# Patient Record
Sex: FEMALE | Race: WHITE | NOT HISPANIC OR LATINO | Employment: FULL TIME | URBAN - METROPOLITAN AREA
[De-identification: names, ages, dates, MRNs, and addresses within clinical notes are randomized per-mention and may not be internally consistent; named-entity substitution may affect disease eponyms.]

---

## 2017-04-19 ENCOUNTER — TELEPHONE (OUTPATIENT)
Dept: FAMILY MEDICINE | Facility: CLINIC | Age: 37
End: 2017-04-19

## 2017-04-19 NOTE — TELEPHONE ENCOUNTER
Panel Management Review      Patient has the following on her problem list: None      Composite cancer screening  Chart review shows that this patient is due/due soon for the following Pap Smear  Summary:    Patient is due/failing the following:   PAP    Action needed:   Patient needs office visit for physical.    Type of outreach:    Sent letter.    Questions for provider review:    None                                                                                                                                    Sylvie Lerner M.A.       Chart routed to n/a .

## 2017-04-19 NOTE — LETTER
[facility]  [address] [facility] [id number] [phone]          April 19, 2017    Azalea Mckeon  [id number]  [facility] [address]          Our records indicate that you have not scheduled for a(n)annual female exam and pap smear with pap smear  which was recommended by your health care team. Monitoring and managing your preventative and chronic health conditions are very important to us.     If you have received your health care elsewhere, please provide us with that information so it can be documented in your chart.    Please call [phone] or message us through your [employer] account to schedule an appointment or provide information for your chart.     I look forward to seeing you and working with you on your health care needs.       *If you have already scheduled an appointment, please disregard this reminder        Sincerely,    Katelin GONZALEZ  amp

## 2017-11-26 ENCOUNTER — HEALTH MAINTENANCE LETTER (OUTPATIENT)
Age: 37
End: 2017-11-26

## 2020-10-01 ENCOUNTER — E-VISIT (OUTPATIENT)
Dept: OBGYN | Facility: CLINIC | Age: 40
End: 2020-10-01
Payer: COMMERCIAL

## 2020-10-01 DIAGNOSIS — F32.A ANXIETY AND DEPRESSION: Primary | ICD-10-CM

## 2020-10-01 DIAGNOSIS — F41.9 ANXIETY AND DEPRESSION: Primary | ICD-10-CM

## 2020-10-01 PROCEDURE — 99207 PR NON-BILLABLE SERV PER CHARTING: CPT | Performed by: NURSE PRACTITIONER

## 2020-10-01 ASSESSMENT — ANXIETY QUESTIONNAIRES
5. BEING SO RESTLESS THAT IT IS HARD TO SIT STILL: NOT AT ALL
6. BECOMING EASILY ANNOYED OR IRRITABLE: NEARLY EVERY DAY
1. FEELING NERVOUS, ANXIOUS, OR ON EDGE: MORE THAN HALF THE DAYS
7. FEELING AFRAID AS IF SOMETHING AWFUL MIGHT HAPPEN: NOT AT ALL
7. FEELING AFRAID AS IF SOMETHING AWFUL MIGHT HAPPEN: NOT AT ALL
4. TROUBLE RELAXING: MORE THAN HALF THE DAYS
GAD7 TOTAL SCORE: 13
2. NOT BEING ABLE TO STOP OR CONTROL WORRYING: NEARLY EVERY DAY
3. WORRYING TOO MUCH ABOUT DIFFERENT THINGS: NEARLY EVERY DAY
GAD7 TOTAL SCORE: 13
GAD7 TOTAL SCORE: 13

## 2020-10-01 ASSESSMENT — PATIENT HEALTH QUESTIONNAIRE - PHQ9
SUM OF ALL RESPONSES TO PHQ QUESTIONS 1-9: 14
SUM OF ALL RESPONSES TO PHQ QUESTIONS 1-9: 14
10. IF YOU CHECKED OFF ANY PROBLEMS, HOW DIFFICULT HAVE THESE PROBLEMS MADE IT FOR YOU TO DO YOUR WORK, TAKE CARE OF THINGS AT HOME, OR GET ALONG WITH OTHER PEOPLE: SOMEWHAT DIFFICULT

## 2020-10-02 ASSESSMENT — ANXIETY QUESTIONNAIRES: GAD7 TOTAL SCORE: 13

## 2020-10-02 ASSESSMENT — PATIENT HEALTH QUESTIONNAIRE - PHQ9: SUM OF ALL RESPONSES TO PHQ QUESTIONS 1-9: 14

## 2020-10-07 ENCOUNTER — HOSPITAL ENCOUNTER (OUTPATIENT)
Dept: BEHAVIORAL HEALTH | Facility: CLINIC | Age: 40
Discharge: HOME | End: 2020-10-07
Attending: FAMILY MEDICINE | Admitting: FAMILY MEDICINE
Payer: COMMERCIAL

## 2020-10-07 PROCEDURE — 90791 PSYCH DIAGNOSTIC EVALUATION: CPT | Mod: GT | Performed by: PSYCHOLOGIST

## 2020-10-07 ASSESSMENT — COLUMBIA-SUICIDE SEVERITY RATING SCALE - C-SSRS
1. IN THE PAST MONTH, HAVE YOU WISHED YOU WERE DEAD OR WISHED YOU COULD GO TO SLEEP AND NOT WAKE UP?: NO
2. HAVE YOU ACTUALLY HAD ANY THOUGHTS OF KILLING YOURSELF?: NO
1. IN THE PAST MONTH, HAVE YOU WISHED YOU WERE DEAD OR WISHED YOU COULD GO TO SLEEP AND NOT WAKE UP?: NO
ATTEMPT PAST THREE MONTHS: NO
5. HAVE YOU STARTED TO WORK OUT OR WORKED OUT THE DETAILS OF HOW TO KILL YOURSELF? DO YOU INTEND TO CARRY OUT THIS PLAN?: NO
4. HAVE YOU HAD THESE THOUGHTS AND HAD SOME INTENTION OF ACTING ON THEM?: NO
2. HAVE YOU ACTUALLY HAD ANY THOUGHTS OF KILLING YOURSELF LIFETIME?: NO
ATTEMPT LIFETIME: NO
3. HAVE YOU BEEN THINKING ABOUT HOW YOU MIGHT KILL YOURSELF?: NO
4. HAVE YOU HAD THESE THOUGHTS AND HAD SOME INTENTION OF ACTING ON THEM?: NO
5. HAVE YOU STARTED TO WORK OUT OR WORKED OUT THE DETAILS OF HOW TO KILL YOURSELF? DO YOU INTEND TO CARRY OUT THIS PLAN?: NO

## 2020-10-07 ASSESSMENT — PAIN SCALES - GENERAL: PAINLEVEL: NO PAIN (0)

## 2020-10-07 NOTE — PROGRESS NOTES
"Azalea Mckeon is a 40 year old female who is being evaluated via a billable video visit.      The patient has been notified of following:     \"This video visit will be conducted via a call between you and your physician/provider. We have found that certain health care needs can be provided without the need for an in-person physical exam.  This service lets us provide the care you need with a video conversation.  If a prescription is necessary we can send it directly to your pharmacy.  If lab work is needed we can place an order for that and you can then stop by our lab to have the test done at a later time.    Video visits are billed at different rates depending on your insurance coverage.  Please reach out to your insurance provider with any questions.    If during the course of the call the physician/provider feels a video visit is not appropriate, you will not be charged for this service.\"    Patient has given verbal consent for Video visit? Yes  How would you like to obtain your AVS? MyChart  If you are dropped from the video visit, the video invite should be resent to: Text to cell phone: [phone] (Mobile)  Will anyone else be joining your video visit? No    Subjective     Azalea Mckeon is a 40 year old female who presents today via video visit for the following health issues:    HPI       Anxiety     Are you having other symptoms that might be associated with anxiety? Yes:  Crying, not being able to relax, no energy,    Have you had a significant life event? No     Are you feeling depressed? No    Do you have any concerns with your use of alcohol or other drugs? No    Reports she has been feeling anxious for the past 6 months.  Denies any trigger.  Reports possible mild anxiety in 2011 after the birth of her daughter.  Reports obsessive thoughts when it comes to work.  She has 3 children and reports feeling overwhelmed getting them ready and preparing them for school.  Reports crying spells for unknown " reason.  Appetite good.  Reports  is supportive.  Denies SI/HI.  She has an appt with Psychology on  but does not feel she can wait that long to start medication.  She spoke with a therapist yesterday.      Social History     Tobacco Use     Smoking status: Former Smoker     Quit date: 2009     Years since quittin.2     Smokeless tobacco: Never Used   Substance Use Topics     Alcohol use: No     Drug use: No     MUSHTAQ-7 SCORE 3/6/2015 10/1/2020 10/6/2020   Total Score 18 - -   Total Score - 13 (moderate anxiety) 16 (severe anxiety)   Total Score - 13 -   Some encounter information is confidential and restricted. Go to Review [employer] activity to see all data.     PHQ 10/1/2020   PHQ-9 Total Score 14   Q9: Thoughts of better off dead/self-harm past 2 weeks Several days   F/U: Thoughts of suicide or self-harm Yes   F/U: Self harm-plan No   F/U: Self-harm action No   F/U: Safety concerns No   Some encounter information is confidential and restricted. Go to Review Coll[employer] activity to see all data.     Last PHQ-9 10/1/2020   1.  Little interest or pleasure in doing things 2   2.  Feeling down, depressed, or hopeless 2   3.  Trouble falling or staying asleep, or sleeping too much 1   4.  Feeling tired or having little energy 1   5.  Poor appetite or overeating 1   6.  Feeling bad about yourself 3   7.  Trouble concentrating 3   8.  Moving slowly or restless 0   Q9: Thoughts of better off dead/self-harm past 2 weeks 1   PHQ-9 Total Score 14   In the past two weeks have you had thoughts of suicide or self harm? Yes   Do you have concerns about your personal safety or the safety of others? No   In the past 2 weeks have you thought about a plan or had intention to harm yourself? No   In the past 2 weeks have you acted on these thoughts in any way? No   Some encounter information is confidential and restricted. Go to Review Coll[employer]Rx activity to see all data.     MUSHTAQ-7  10/1/2020   1. Feeling nervous,  anxious, or on edge 2   2. Not being able to stop or control worrying 3   3. Worrying too much about different things 3   4. Trouble relaxing 2   5. Being so restless that it is hard to sit still 0   6. Becoming easily annoyed or irritable 3   7. Feeling afraid, as if something awful might happen 0   MUSHTAQ-7 Total Score 13   Some encounter information is confidential and restricted. Go to Review Flowsheets activity to see all data.         How many servings of fruits and vegetables do you eat daily?  2-3    On average, how many sweetened beverages do you drink each day (Examples: soda, juice, sweet tea, etc.  Do NOT count diet or artificially sweetened beverages)?   1    How many days per week do you exercise enough to make your heart beat faster? 3 or less    How many minutes a day do you exercise enough to make your heart beat faster? 20 - 29    How many days per week do you miss taking your medication? 0         Video Start Time: 9:00        Review of Systems   Constitutional: Positive for fatigue. Negative for appetite change.   Respiratory: Negative for shortness of breath.    Cardiovascular: Negative for chest pain.   Psychiatric/Behavioral: Positive for agitation. Negative for self-injury, sleep disturbance and suicidal ideas. The patient is nervous/anxious.             Objective           Vitals:  No vitals were obtained today due to virtual visit.    Physical Exam     GENERAL: Healthy, alert and no distress  EYES: Eyes grossly normal to inspection.  No discharge or erythema, or obvious scleral/conjunctival abnormalities.  RESP: No audible wheeze, cough, or visible cyanosis.  No visible retractions or increased work of breathing.    SKIN: Visible skin clear. No significant rash, abnormal pigmentation or lesions.  NEURO: Cranial nerves grossly intact.  Mentation and speech appropriate for age.  PSYCH: Mentation appears normal, flat affect, tearful. Judgement and insight intact, normal speech and appearance  well-groomed.            Assessment & Plan     1. Anxiety  - follow-up with Psychology on 11/16.  - stop medication and contact office if you feel your symptoms are getting worse.  ED if develops SI/HI.   - escitalopram (LEXAPRO) 10 MG tablet; Take 1 tablet (10 mg) by mouth daily  Dispense: 30 tablet; Refill: 1  - discussed importance of self care.         Return in about 4 weeks (around 11/5/2020) for Anxiety.    MOLINA St CNP  [facility]      Video-Visit Details    Type of service:  Video Visit    Video End Time:9:12    Originating Location (pt. Location): Home    [facility]     Platform used for Video Visit: Agari

## 2020-10-07 NOTE — PROGRESS NOTES
"Adult Mental Health Day Treatment  Evaluator Name:  Luis Flores      Credentials:  [facility]IEBER[facility]    PATIENT'S NAME: Azalea Mckeon  PREFERRED NAME: Azalea   PRONOUNS:       MRN:   [id number]  :   1980   ACCT. NUMBER: [id number]  DATE OF SERVICE: 10/07/20  START TIME: 1100  END TIME: 1200  PREFERRED PHONE: [phone]  May we leave a program related message: Yes  SERVICE MODALITY:  Video Visit:    Telemedicine Visit: The patient's condition can be safely assessed and treated via synchronous audio and visual telemedicine encounter.      Reason for Telemedicine Visit: Services only offered telehealth    Originating Site (Patient Location): Patient's home    Distant Site (Provider Location): [facility] [place]    Consent:  The patient/guardian has verbally consented to: the potential risks and benefits of telemedicine (video visit) versus in person care; bill my insurance or make self-payment for services provided; and responsibility for payment of non-covered services.     Patient would like the video invitation sent by: Text to cell phone: .    Mode of Communication:  Video Conference via [employer]    As the provider I attest to compliance with applicable laws and regulations related to telemedicine.    UNIVERSAL ADULT DIAGNOSTIC ASSESSMENT      Identifying Information:  Patient is a 40 year old, .  The pronoun use throughout this assessment reflects the patient's chosen pronoun.  Patient was referred for an assessment by insurance co.  Patient attended the session alone.     Chief Complaint:   The reason for seeking services at this time is: \" edgy, angry, short fused, day dream, check out of conversation, and this is happening more frequently  \"   The problem(s) began May 2020. Patient has attempted to resolve these concerns in the past through read a few self help books and took CBD. .    Social/Family History:  Patient reported they grew up in [place], [place].  They were raised by " "biological mother.  Parents  35 years ago when the client was 5 years old. The client's mother did not remarry and remains single The client's father did not remarry and remains single.   Patient reported that their childhood was \"good\".  Patient described their current relationships with family of origin as : \"don't talk to sister much, dad  3 years ago, and talks to mom weekly. .      The patient describes their cultural background as .  Cultural influences and impact on patient's life structure, values, norms, and healthcare: no cultural issues.      Contextual influences on patient's health include: Stopped going into work. Pt reports she does not want to be there. No really big stressors.    These factors will be addressed in the Preliminary Treatment plan.  Patient identified their preferred language to be English. Patient reported they does not need the assistance of an  or other support involved in therapy.     Patient reported had no significant delays in developmental tasks.   Patient's highest education level was some college. Patient identified the following learning problems: none reported.  Modifications will not be used to assist communication intherapy.   Patient reports they are  able to understand written materials.    Patient reported the following relationship history .  Patient's current relationship status is  for 13 years.   Patient identified their sexual orientation as heterosexual.  Patient reported having three child(racheal). Patient identified spouse as part of their support system.  Patient identified the quality of these relationships as good.      Patient's current living/housing situation involves staying in own home/apartment.  They live with spouse and kids (10. 9. And 7) and they report that housing is stable.     Patient is currently employed part time and reports they are able to function appropriately at work..  Patient reports their " finances are obtained through employment.  Patient does not identify finances as a current stressor.      Patient reported that they have not been involved with the legal system.   Patient denies being on probation / parole / under the jurisdiction of the court.    Patient's Strengths and Limitations:  Patient identified the following strengths or resources that will help them succeed in treatment: intelligence. Things that may interfere with the patient's success in treatment include: lack of social support.     Personal and Family Medical History:   Patient does report a family history of mental health concerns.  Patient reports family history includes Anxiety Disorder in her daughter and mother; Autism Spectrum Disorder in her daughter; Bleeding Disorder in her sister; Depression in her sister; Diabetes in her father; Pulmonary Embolism in her sister; Substance Abuse in her father..     Patient does not report Mental Health Diagnosis or Treatment.      Patient has not had a physical exam to rule out medical causes for current symptoms.  Date of last physical exam was greater than a year ago and client was encouraged to schedule an exam with PCP. The patient has a [facility] Primary Care Provider, who is named No primary care provider on file...  Patient reports no current medical concerns.      There are not significant appetite / nutritional concerns / weight changes.       Patient does not report a history of head injury / trauma / cognitive impairment.      Patient reports not taking any current medications    Medication Adherence:  Patient reports no current eds.    Patient Allergies:    Allergies   Allergen Reactions     Percocet [Oxycodone-Acetaminophen] Itching       Medical History:    Past Medical History:   Diagnosis Date     NO ACTIVE PROBLEMS          Current Mental Status Exam:   Appearance:  Appropriate    Eye Contact:  Good   Psychomotor:  Normal       Gait / station:  no problem  Attitude /  Demeanor: Cooperative   Speech      Rate / Production: Normal/ Responsive      Volume:  Normal  volume      Language:  intact  Mood:   Irritable  Sad   Affect:   Appropriate    Thought Content: Clear   Thought Process: Coherent       Associations: No loosening of associations  Insight:   Fair   Judgment:  Intact   Orientation:  All  Attention/concentration: Good    Rating Scales:    PHQ9:    PHQ-9 SCORE 3/6/2015 10/1/2020 10/6/2020   PHQ-9 Total Score 4 - -   PHQ-9 Total Score MyChart - 14 (Moderate depression) 14 (Moderate depression)   PHQ-9 Total Score - 14 14   ;    GAD7:    MUSHTAQ-7 SCORE 3/6/2015 10/1/2020 10/6/2020   Total Score 18 - -   Total Score - 13 (moderate anxiety) 16 (severe anxiety)   Total Score - 13 16     CGI:     First:Considering your total clinical experience with this particular patient population, how severe are the patient's symptoms at this time?: 3 (10/7/2020 11:08 AM)  ;    Most recentCompared to the patient's condition at the START of treatment, this patient's condition is: 3 (10/7/2020 11:08 AM)      Substance Use:  Patient did report a family history of substance use concerns; see medical history section for details.  Patient has not received chemical dependency treatment in the past.  Patient has not ever been to detox.      Patient is not currently receiving any chemical dependency treatment. Patient reported the following problems as a result of their substance use: None.    Patient reports she stopped drinking alcohol two weeks ago. Pt reports she was drinking on weekends prior. Pt reports the past 6 weeks she started having trouble focusing. She reports she poured herself a drink to calm down. She realized that was not healthy and stopped.     Patient denies using tobacco.  Patient denies using marijuana.  Patient reports she has some caffeine.   Patient reports using/abusing the following substance(s). Patient reported no other substance use.     CAGE- AID:    CAGE-AID Total Score  10/7/2020   Total Score 2       Substance Use: No symptoms    Based on the positive CAGE score and clinical interview there  are not indications of drug or alcohol abuse.    Significant Losses / Trauma / Abuse / Neglect Issues:   Patient did not serve in the .  There are indications or report of significant loss, trauma, abuse or neglect issues related to: are no indications and client denies any losses, trauma, abuse, or neglect concerns.  Concerns for possible neglect are not present.     Safety Assessment:   Current Safety Concerns:  [place] Suicide Severity Rating Scale (Lifetime/Recent)  [place] Suicide Severity Rating (Lifetime/Recent) 10/7/2020   1. Wish to be Dead (Lifetime) No   1. Wish to be Dead (Recent) No   2. Non-Specific Active Suicidal Thoughts (Lifetime) No   2. Non-Specific Active Suicidal Thoughts (Recent) No   3. Active Suicidal Ideation with any Methods (Not Plan) Without Intent to Act (Lifetime) No   3. Active Sucidal Ideation with any Methods (Not Plan) Without Intent to Act (Recent) No   4. Active Suicidal Ideation with Some Intent to Act, Without Specific Plan (Lifetime) No   4. Active Suicidal Ideation with Some Intent to Act, Without Specific Plan (Recent) No   5. Active Suicidal Ideation with Specific Plan and Intent (Lifetime) No   5. Active Suicidal Ideation with Specific Plan and Intent (Recent) No   Actual Attempt (Lifetime) No   Actual Attempt (Past 3 Months) No   Has subject engaged in non-suicidal self-injurious behavior? (Lifetime) No   Has subject engaged in non-suicidal self-injurious behavior? (Past 3 Months) No     Patient denies current homicidal ideation and behaviors.  Patient denies current self-injurious ideation and behaviors.    Patient denied risk behaviors associated with substance use.  Patient denies any high risk behaviors associated with mental health symptoms.  Patient reports the following current concerns for their personal safety: None.    History of  Safety Concerns:  Patient denied a history of homicidal ideation.     Patient denied a history of personal safety concerns.    Patient denied a history of assaultive behaviors.    Patient denied a history of sexual assault behaviors.     Patient denied a history of risk behaviors associated with substance use.  Patient denies any history of high risk behaviors associated with mental health symptoms.  Patient reports the following protective factors: abstinence from substances, daily obligations and financial stability    Risk Plan:  See Recommendations for Safety and Risk Management Plan    Review of Symptoms per patient report:  Depression: Excessive or inappropriate guilt, Change in energy level, Difficulties concentrating, Low self-worth, Feeling sad, down, or depressed, Withdrawn and Frequent crying  Cristina:  No Symptoms  Psychosis: No Symptoms  Anxiety: Nervousness, Social anxiety, Poor concentration and Irritability  Panic:  No symptoms  Post Traumatic Stress Disorder:  No Symptoms   Eating Disorder: No Symptoms  ADD / ADHD:  No symptoms  Conduct Disorder: No symptoms  Autism Spectrum Disorder: No symptoms  Obsessive Compulsive Disorder: No Symptoms    Patient reports the following compulsive behaviors and treatment history: Picking - has not had treatment.Pt reports she picks at her hair.      Diagnostic Criteria:   A. Excessive anxiety and worry about a number of events or activities (such as work or school performance).   B. The person finds it difficult to control the worry.  C. Select 3 or more symptoms (required for diagnosis). Only one item is required in children.   - Restlessness or feeling keyed up or on edge.    - Being easily fatigued.    - Difficulty concentrating or mind going blank.    - Irritability.   D. The focus of the anxiety and worry is not confined to features of an Axis I disorder.  E. The anxiety, worry, or physical symptoms cause clinically significant distress or impairment in social,  occupational, or other important areas of functioning.   F. The disturbance is not due to the direct physiological effects of a substance (e.g., a drug of abuse, a medication) or a general medical condition (e.g., hyperthyroidism) and does not occur exclusively during a Mood Disorder, a Psychotic Disorder, or a Pervasive Developmental Disorder.    - The aformentioned symptoms began 5 month(s) ago and occurs 7 days per week and is experienced as moderate.  CRITERIA (A-C) REPRESENT A MAJOR DEPRESSIVE EPISODE - SELECT THESE CRITERIA   - Depressed mood. Note: In children and adolescents, can be irritable mood.     - Fatigue or loss of energy.    - Feelings of worthlessness or inappropriate and excessive guilt.    - Diminished ability to think or concentrate, or indecisiveness.   B) The symptoms cause clinically significant distress or impairment in social, occupational, or other important areas of functioning  C) The episode is not attributable to the physiological effects of a substance or to another medical condition  D) The occurence of major depressive episode is not better explained by other thought / psychotic disorders  E) There has never been a manic episode or hypomanic episode    Functional Status:  Patient reports the following functional impairments: childcare / parenting, relationship(s) and work / vocational responsibilities.     WHODAS:   WHODAS 2.0 Total Score 10/6/2020   Total Score 34   Total Score MyChart 34       Clinical Summary:  1. Reason for assessment: Anxiety, depressive sx  .  2. Psychosocial, Cultural and Contextual Factors: None identified.   .  3. Principal DSM5 Diagnoses  (Sustained by DSM5 Criteria Listed Above):   311 (F32.9) Unspecified Depressive Disorder   300.02 (F41.1) Generalized Anxiety Disorder.  4. Other Diagnoses that is relevant to services:   None.  5. Provisional Diagnosis:  311 (F32.9) Unspecified Depressive Disorder   300.02 (F41.1) Generalized Anxiety Disorder as  evidenced by anxiety and depressive sx.  .  6. Prognosis: Return to Normal Functioning.  7. Likely consequences of symptoms if not treated: Will need a higher level of care.  8. Client strengths include:  educated, empathetic, employed, goal-focused, good listener, intelligent, motivated, open to learning, open to suggestions / feedback, wants to learn, willing to ask questions, willing to relate to others and work history .     Recommendations:     1. Plan for Safety and Risk Management:   Recommended that patient call 911 or go to the local ED should there be a change in any of these risk factors..          Report to child / adult protection services was NA.     2. Patient's identified no cultural issues identified.     3. Initial Treatment will focus on:    Depressed Mood - ,  Anxiety - ,.     4. Resources/Service Plan:    services are not indicated.   Modifications to assist communication are not indicated.   Additional disability accommodations are not indicated.      5. Collaboration:   Collaboration / coordination of treatment will be initiated with the following  support professionals: primary care physician.      6.  Referrals:   The following referral(s) will be initiated: Outpatient [facility]. Next Scheduled Appointment: TBD thru  Intake Dept.     A Release of Information has been obtained for the following: None.    7. LOTTIE:    LOTTIE:  Discussed the general effects of drugs and alcohol on health and well-being. Provider gave patient printed information about the effects of chemical use on their health and well being. Recommendations:  Abstinence from alcohol   .     8. Records:    were reviewed at time of assessment.   Information in this assessment was obtained from the medical record and  provided by patient who is a good historian.    Patient will have open access to their mental health medical record.      Eval type:  Mental Health    Provider Name/ Credentials:  Luis Flores MA  [facility]  October 7, 2020

## 2020-10-08 ENCOUNTER — VIRTUAL VISIT (OUTPATIENT)
Dept: FAMILY MEDICINE | Facility: CLINIC | Age: 40
End: 2020-10-08
Payer: COMMERCIAL

## 2020-10-08 DIAGNOSIS — F41.9 ANXIETY: Primary | ICD-10-CM

## 2020-10-08 PROCEDURE — 96127 BRIEF EMOTIONAL/BEHAV ASSMT: CPT | Performed by: NURSE PRACTITIONER

## 2020-10-08 PROCEDURE — 99214 OFFICE O/P EST MOD 30 MIN: CPT | Mod: 95 | Performed by: NURSE PRACTITIONER

## 2020-10-08 RX ORDER — ESCITALOPRAM OXALATE 10 MG/1
10 TABLET ORAL DAILY
Qty: 30 TABLET | Refills: 1 | Status: SHIPPED | OUTPATIENT
Start: 2020-10-08 | End: 2020-11-16

## 2020-10-08 ASSESSMENT — ENCOUNTER SYMPTOMS
SHORTNESS OF BREATH: 0
APPETITE CHANGE: 0
NERVOUS/ANXIOUS: 1
SLEEP DISTURBANCE: 0
AGITATION: 1
FATIGUE: 1

## 2020-11-16 ENCOUNTER — VIRTUAL VISIT (OUTPATIENT)
Dept: PSYCHOLOGY | Facility: CLINIC | Age: 40
End: 2020-11-16
Payer: COMMERCIAL

## 2020-11-16 ENCOUNTER — E-VISIT (OUTPATIENT)
Dept: FAMILY MEDICINE | Facility: CLINIC | Age: 40
End: 2020-11-16
Payer: COMMERCIAL

## 2020-11-16 DIAGNOSIS — F43.23 ADJUSTMENT DISORDER WITH MIXED ANXIETY AND DEPRESSED MOOD: Primary | ICD-10-CM

## 2020-11-16 DIAGNOSIS — F41.9 ANXIETY: ICD-10-CM

## 2020-11-16 PROCEDURE — 90791 PSYCH DIAGNOSTIC EVALUATION: CPT | Mod: 95 | Performed by: SOCIAL WORKER

## 2020-11-16 PROCEDURE — 99421 OL DIG E/M SVC 5-10 MIN: CPT | Performed by: NURSE PRACTITIONER

## 2020-11-16 RX ORDER — ESCITALOPRAM OXALATE 10 MG/1
15 TABLET ORAL DAILY
Qty: 45 TABLET | Refills: 1 | Status: SHIPPED | OUTPATIENT
Start: 2020-11-16 | End: 2021-01-06

## 2020-11-16 ASSESSMENT — COLUMBIA-SUICIDE SEVERITY RATING SCALE - C-SSRS
5. HAVE YOU STARTED TO WORK OUT OR WORKED OUT THE DETAILS OF HOW TO KILL YOURSELF? DO YOU INTEND TO CARRY OUT THIS PLAN?: YES
REASONS FOR IDEATION PAST MONTH: DOES NOT APPLY
1. IN THE PAST MONTH, HAVE YOU WISHED YOU WERE DEAD OR WISHED YOU COULD GO TO SLEEP AND NOT WAKE UP?: YES
TOTAL  NUMBER OF ABORTED OR SELF INTERRUPTED ATTEMPTS PAST 3 MONTHS: NO
3. HAVE YOU BEEN THINKING ABOUT HOW YOU MIGHT KILL YOURSELF?: YES
REASONS FOR IDEATION LIFETIME: DOES NOT APPLY
1. IN THE PAST MONTH, HAVE YOU WISHED YOU WERE DEAD OR WISHED YOU COULD GO TO SLEEP AND NOT WAKE UP?: YES
2. HAVE YOU ACTUALLY HAD ANY THOUGHTS OF KILLING YOURSELF LIFETIME?: YES
ATTEMPT LIFETIME: NO
4. HAVE YOU HAD THESE THOUGHTS AND HAD SOME INTENTION OF ACTING ON THEM?: YES
2. HAVE YOU ACTUALLY HAD ANY THOUGHTS OF KILLING YOURSELF?: YES
5. HAVE YOU STARTED TO WORK OUT OR WORKED OUT THE DETAILS OF HOW TO KILL YOURSELF? DO YOU INTEND TO CARRY OUT THIS PLAN?: YES
TOTAL  NUMBER OF INTERRUPTED ATTEMPTS LIFETIME: NO
ATTEMPT PAST THREE MONTHS: NO
4. HAVE YOU HAD THESE THOUGHTS AND HAD SOME INTENTION OF ACTING ON THEM?: YES
6. HAVE YOU EVER DONE ANYTHING, STARTED TO DO ANYTHING, OR PREPARED TO DO ANYTHING TO END YOUR LIFE?: YES
6. HAVE YOU EVER DONE ANYTHING, STARTED TO DO ANYTHING, OR PREPARED TO DO ANYTHING TO END YOUR LIFE?: YES
TOTAL  NUMBER OF ABORTED OR SELF INTERRUPTED ATTEMPTS PAST LIFETIME: NO
TOTAL  NUMBER OF INTERRUPTED ATTEMPTS PAST 3 MONTHS: NO

## 2020-11-16 ASSESSMENT — ANXIETY QUESTIONNAIRES
7. FEELING AFRAID AS IF SOMETHING AWFUL MIGHT HAPPEN: SEVERAL DAYS
2. NOT BEING ABLE TO STOP OR CONTROL WORRYING: NEARLY EVERY DAY
2. NOT BEING ABLE TO STOP OR CONTROL WORRYING: SEVERAL DAYS
1. FEELING NERVOUS, ANXIOUS, OR ON EDGE: SEVERAL DAYS
4. TROUBLE RELAXING: SEVERAL DAYS
6. BECOMING EASILY ANNOYED OR IRRITABLE: NEARLY EVERY DAY
6. BECOMING EASILY ANNOYED OR IRRITABLE: MORE THAN HALF THE DAYS
1. FEELING NERVOUS, ANXIOUS, OR ON EDGE: NEARLY EVERY DAY
7. FEELING AFRAID AS IF SOMETHING AWFUL MIGHT HAPPEN: SEVERAL DAYS
GAD7 TOTAL SCORE: 16
GAD7 TOTAL SCORE: 7
GAD7 TOTAL SCORE: 7
7. FEELING AFRAID AS IF SOMETHING AWFUL MIGHT HAPPEN: SEVERAL DAYS
3. WORRYING TOO MUCH ABOUT DIFFERENT THINGS: SEVERAL DAYS
GAD7 TOTAL SCORE: 7
5. BEING SO RESTLESS THAT IT IS HARD TO SIT STILL: SEVERAL DAYS
4. TROUBLE RELAXING: MORE THAN HALF THE DAYS
5. BEING SO RESTLESS THAT IT IS HARD TO SIT STILL: NOT AT ALL
3. WORRYING TOO MUCH ABOUT DIFFERENT THINGS: NEARLY EVERY DAY

## 2020-11-16 ASSESSMENT — PATIENT HEALTH QUESTIONNAIRE - PHQ9
SUM OF ALL RESPONSES TO PHQ QUESTIONS 1-9: 10
SUM OF ALL RESPONSES TO PHQ QUESTIONS 1-9: 10
SUM OF ALL RESPONSES TO PHQ QUESTIONS 1-9: 18
10. IF YOU CHECKED OFF ANY PROBLEMS, HOW DIFFICULT HAVE THESE PROBLEMS MADE IT FOR YOU TO DO YOUR WORK, TAKE CARE OF THINGS AT HOME, OR GET ALONG WITH OTHER PEOPLE: SOMEWHAT DIFFICULT

## 2020-11-16 NOTE — PROGRESS NOTES
"[facility]   Provider Name:  Miriam Luciano     Credentials:  [facility]    PATIENT'S NAME: Angel Mckeon  PREFERRED NAME: Angel  PRONOUNS:     she/her/hers  MRN: [id number]  : 1980   ACCT. NUMBER:  [id number]  DATE OF SERVICE: 20  START TIME: 1235  END TIME: 1330  PREFERRED PHONE: [phone]  May we leave a program related message: Yes  SERVICE MODALITY:  Video Visit:      Provider verified identity through the following two step process.  Patient provided:  Patient     Telemedicine Visit: The patient's condition can be safely assessed and treated via synchronous audio and visual telemedicine encounter.      Reason for Telemedicine Visit: Services only offered telehealth    Originating Site (Patient Location): Patient's home    Distant Site (Provider Location): Provider Remote Setting    Consent:  The patient/guardian has verbally consented to: the potential risks and benefits of telemedicine (video visit) versus in person care; bill my insurance or make self-payment for services provided; and responsibility for payment of non-covered services.     Patient would like the video invitation sent by: Send to e-mail at: [email or web address]    Mode of Communication:  Video Conference via [facility]    As the provider I attest to compliance with applicable laws and regulations related to telemedicine.    UNIVERSAL ADULT Mental Health DIAGNOSTIC ASSESSMENT      Identifying Information:  Patient is a 40 year old, .  The pronoun use throughout this assessment reflects the patient's chosen pronoun.  Patient was referred for an assessment by primary care provider.  Patient attended the session alone.     Chief Complaint:   The reason for seeking services at this time is: \" feeling al lot of emotions, maybe anxiety \"   The problem(s) began May 2020. Patient has not attempted to resolve these concerns in the past.    Social/Family History:  Patient reported they grew up in [place], [place].  " They were raised by biological mother. Patient's parent's  when patient was [age] years of age. Patient had some visitation with biological father. Patient reported that their childhood was pretty.  Patient describes current relationships with family of origin as talks to mother once a week, father passed away 3 years ok.      The patient describes their cultural background as 40 year old  female who reports no known cultural bias. Patient identified their preferred language to be English. Patient reported they does not need the assistance of an  or other support involved in therapy.     Patient reported had no significant delays in developmental tasks.   Patient's highest education level was two years of college. Patient identified the following learning problems: none reported.  Patient reports they are  able to understand written materials.    Patient reported the following relationship history.  Patient's current relationship status is  for 13 years.   Patient identified their sexual orientation as heterosexual.  Patient reported having three child(racheal).     Patient's current living/housing situation involves staying in own home/apartment.  They live with spouse and three children and they report that housing is stable. Patient identified spouse as part of their support system.  Patient identified the quality of these relationships as good.       Patient is currently employed part time and reports they are able to function appropriately at work.  Patient reports she has cut back her hours due to anxiety. Patient reports their income is obtained through employment and spouse.  Patient does not identify finances as a current stressor.      Patient denies substance related arrests or legal issues.  Patient denies being on probation / parole / under the jurisdiction of the court.      Patient's Strengths and Limitations:  Patient identified the following strengths or resources that  will help them succeed in treatment: friends / good social support, family support, motivation and work ethic. Things that may interfere with the patient's success in treatment include: none identified.     Personal and Family Medical History:   Patient does report a family history of mental health concerns.  Patient reports family history includes Anxiety Disorder in her daughter and mother; Autism Spectrum Disorder in her daughter; Bleeding Disorder in her sister; Depression in her sister; Diabetes in her father; Pulmonary Embolism in her sister; Substance Abuse in her father..     Patient does report Mental Health Diagnosis and/or Treatment.  Patient Patient reported the following previous diagnoses which include(s): an Anxiety Disorder.  Patient reported symptoms began May 2020.   Patient has not received mental health services in the past: none.  Psychiatric Hospitalizations: None.  Patient denies a history of civil commitment.  Currently, patient is not receiving other mental health services.  These include none.           Patient has not had a physical exam to rule out medical causes for current symptoms.  Date of last physical exam was greater than a year ago and client was encouraged to schedule an exam with PCP. The patient has a [facility] Provider, who is named No Ref-Primary, Physician..  Patient reports no current medical concerns.  There are not significant appetite / nutritional concerns / weight changes.   Patient does not report a history of head injury / trauma / cognitive impairment.      Patient reports current meds as: lexapro      Medication Adherence:  Patient reports taking prescribed medications as prescribed.    Patient Allergies:    Allergies   Allergen Reactions     Percocet [Oxycodone-Acetaminophen] Itching       Medical History:    Past Medical History:   Diagnosis Date     NO ACTIVE PROBLEMS          Current Mental Status Exam:   Appearance:  Appropriate    Eye  Contact:  Fair   Psychomotor:  Restless       Gait / station:  no problem  Attitude / Demeanor: Cooperative  Interested Pleasant  Speech      Rate / Production: Emotional Talkative      Volume:  Normal  volume      Language:  intact  Mood:   Anxious   Affect:   Worrisome    Thought Content: Clear   Thought Process: Coherent       Associations: No loosening of associations  Insight:   Fair   Judgment:  Impaired   Orientation:  All  Attention/concentration: Good    Rating Scales:    PHQ9:    PHQ-9 SCORE 10/1/2020 10/6/2020 11/16/2020   PHQ-9 Total Score - - -   PHQ-9 Total Score MyChart 14 (Moderate depression) 14 (Moderate depression) -   PHQ-9 Total Score 14 - 18   Some encounter information is confidential and restricted. Go to Review Flowsheets activity to see all data.   ;    GAD7:    MUSHTAQ-7 SCORE 10/1/2020 10/6/2020 11/16/2020   Total Score - - -   Total Score 13 (moderate anxiety) 16 (severe anxiety) -   Total Score 13 - 16   Some encounter information is confidential and restricted. Go to Review Flowsheets activity to see all data.     CGI:     First:Considering your total clinical experience with this particular patient population, how severe are the patient's symptoms at this time?: 4 (11/16/2020  1:00 PM)  ;    Most recentCompared to the patient's condition at the START of treatment, this patient's condition is: 4 (11/16/2020  1:00 PM)      Substance Use:  Patient did report a family history of substance use concerns; see medical history section for details.  Patient has not received chemical dependency treatment in the past.  Patient has not ever been to detox.      Patient is not currently receiving any chemical dependency treatment. Patient reported the following problems as a result of their substance use: none.    Patient denies using alcohol.   Patient denies using tobacco.  Patient denies using marijuana.  Patient reports using caffeine 1 times per day and drinks 1 at a time. Patient started using  caffeine at age 17.  Patient reports using/abusing the following substance(s). Patient reported no other substance use.     CAGE- AID:    CAGE-AID Total Score 11/16/2020   Total Score 0   Some encounter information is confidential and restricted. Go to Review Flowsheets activity to see all data.       Substance Use: No symptoms    Based on the negative CAGE score and clinical interview there  are not indications of drug or alcohol abuse.      Significant Losses / Trauma / Abuse / Neglect Issues:   Patient did not serve in the .  There are indications or report of significant loss, trauma, abuse or neglect issues related to: are indications but client denies any losses, trauma, abuse, or neglect concerns and death of bio father.  Concerns for possible neglect are not present.     Safety Assessment:   Current Safety Concerns:  [place] Suicide Severity Rating Scale (Lifetime/Recent)  [place] Suicide Severity Rating (Lifetime/Recent) 11/16/2020   1. Wish to be Dead (Lifetime) Yes   1. Wish to be Dead (Recent) Yes   Wish to be Dead Description (Recent) (No Data)   Comments May 2020 and this past weekend no attempts   2. Non-Specific Active Suicidal Thoughts (Lifetime) Yes   2. Non-Specific Active Suicidal Thoughts (Recent) Yes   Non-Specific Active Suicidal Thought Description (Recent) (No Data)   Comments feeling sad and not knowing why   3. Active Suicidal Ideation with any Methods (Not Plan) Without Intent to Act (Lifetime) Yes   3. Active Sucidal Ideation with any Methods (Not Plan) Without Intent to Act (Recent) Yes   4. Active Suicidal Ideation with Some Intent to Act, Without Specific Plan (Lifetime) Yes   Active Suicidal Ideation with Some Intent to Act, Without Specific Plan Description (Lifetime) (No Data)   Comments by hanging    4. Active Suicidal Ideation with Some Intent to Act, Without Specific Plan (Recent) Yes   Active Suicidal Ideation with Some Intent to Act, Without Specific Plan Description  (Recent) (No Data)   Comments by hanging   5. Active Suicidal Ideation with Specific Plan and Intent (Lifetime) Yes   5. Active Suicidal Ideation with Specific Plan and Intent (Recent) Yes   Most Severe Ideation Rating (Lifetime) 2   Most Severe Ideation Description (Lifetime) 2   Frequency (Lifetime) 1   Duration (Lifetime) 2   Controllability (Lifetime) 2   Protective Factors  (Lifetime) 1   Reasons for Ideation (Lifetime) 0   Most Severe Ideation Rating (Past Month) 2   Most Severe Ideation Description (Past Month) 2   Frequency (Past Month) 1   Duration (Past Month) 2   Controllability (Past Month) 2   Protective Factors (Past Month) 1   Reasons for Ideation (Past Month) 0   Actual Attempt (Lifetime) No   Actual Attempt (Past 3 Months) No   Has subject engaged in non-suicidal self-injurious behavior? (Lifetime) No   Has subject engaged in non-suicidal self-injurious behavior? (Past 3 Months) No   Interrupted Attempts (Lifetime) No   Interrupted Attempts (Past 3 Months) No   Aborted or Self-Interrupted Attempt (Lifetime) No   Aborted or Self-Interrupted Attempt (Past 3 Months) No   Preparatory Acts or Behavior (Lifetime) Yes   Preparatory Acts or Behavior (Past 3 Months) Yes   Preparatory Acts or Behavior Description (Past 3 Months) (No Data)   Comments put cord around neck   Some encounter information is confidential and restricted. Go to Review Flowsheets activity to see all data.     Patient denies current homicidal ideation and behaviors.  Patient denies current self-injurious ideation and behaviors.    Patient denied risk behaviors associated with substance use.  Patient denies any high risk behaviors associated with mental health symptoms.  Patient reports the following current concerns for their personal safety: None.  Patient reports there are  firearms in the house. The firearms are secured in a locked space.     History of Safety Concerns:  Patient denied a history of homicidal ideation.     Patient  denied a history of personal safety concerns.    Patient denied a history of assaultive behaviors.    Patient denied a history of sexual assault behaviors.     Patient denied a history of risk behaviors associated with substance use.  Patient denies any history of high risk behaviors associated with mental health symptoms.  Patient reports the following protective factors: positive relationships positive social network and positive family connections, forward/future oriented thinking and dedication to family/friends    Risk Plan:  See Recommendations for Safety and Risk Management Plan    Review of Symptoms per patient report:  Depression: Change in sleep, Lack of interest, Change in energy level, Difficulties concentrating, Suicidal ideation, Ruminations, Feeling sad, down, or depressed and Withdrawn  Cristina:  No Symptoms  Psychosis: No Symptoms  Anxiety: Excessive worry, Nervousness, Physical complaints, such as headaches, stomachaches, muscle tension, Sleep disturbance, Ruminations and Poor concentration  Panic:  No symptoms  Post Traumatic Stress Disorder:  Experienced traumatic event loss of father   Eating Disorder: No Symptoms  ADD / ADHD:  No symptoms  Conduct Disorder: No symptoms  Autism Spectrum Disorder: No symptoms  Obsessive Compulsive Disorder: No Symptoms    Patient reports the following compulsive behaviors and treatment history: none.      Diagnostic Criteria:   A. The development of emotional or behavioral symptoms in response to an identifiable stressor(s) occurring within 3 months of the onset of the stressor(s)  B. These symptoms or behaviors are clinically significant, as evidenced by one or both of the following:       - Marked distress that is out of proportion to the severity/intensity of the stressor (with consideration for external context & culture)       - Significant impairment in social, occupational, or other important areas of functioning  C. The stress-related disturbance does not  meet criteria for another disorder & is not not an exacerbation of another mental disorder  D. The symptoms do not represent normal bereavement  E. Once the stressor or its consequences have terminated, the symptoms do not persist for more than an additional 6 months       * Adjustment Disorder with Mixed Anxiety and Depressed Mood: The predominant manifestation is a combination of depression and anxiety    Functional Status:  Patient reports the following functional impairments: relationship(s), self-care, social interactions and work / vocational responsibilities.     WHODAS:   WHODAS 2.0 Total Score 10/6/2020 11/16/2020   Total Score - 33   Total Score MyChart 34 -   Some encounter information is confidential and restricted. Go to Review Flowsheets activity to see all data.       Clinical Summary:  1. Reason for assessment: increase in depression and anxiety.  2. Psychosocial, Cultural and Contextual Factors: COVID-19, isolation.  3. Principal DSM5 Diagnoses  (Sustained by DSM5 Criteria Listed Above):   Adjustment Disorders  309.28 (F43.23) With mixed anxiety and depressed mood.    6. Prognosis: Return to Normal Functioning, Expect Improvement and Relieve Acute Symptoms.  7. Likely consequences of symptoms if not treated: increase in symptoms.  8. Client strengths include:  caring, creative, empathetic, insightful and intelligent .     Recommendations:     1. Plan for Safety and Risk Management:   A safety and risk management plan has been developed including: Patient consented to co-developed safety plan.  Safety and risk management plan was completed.  Patient agreed to use safety plan should any safety concerns arise.  A copy was given to the patient..          Report to child / adult protection services was NA.     2. Patient's identified none identified.     3. Initial Treatment will focus on:    Depressed Mood - motivation for change  Anxiety - manage anxiety.     4. Resources/Service Plan:     "services are not indicated.   Modifications to assist communication are not indicated.   Additional disability accommodations are not indicated.      5. Collaboration:   Collaboration / coordination of treatment will be initiated with the following  support professionals: none.      6.  Referrals:   The following referral(s) will be initiated: Outpatient Mental Cody Therapy. Next Scheduled Appointment: 12/2020.     A Release of Information has been obtained for the following: none.    7. LOTTIE:    LOTTIE:  Discussed the general effects of drugs and alcohol on health and well-being.   8. Records:   These were reviewed at time of assessment.   Information in this assessment was obtained from the medical record and  provided by patient who is a good historian.    Patient will have open access to their mental health medical record.      Provider Name/ Credentials:  Miriam Luciano, [facility]  November 16, 2020                                                   [place] Mckeon     SAFETY PLAN:  Step 1: Warning signs / cues (Thoughts, images, mood, situation, behavior) that a crisis may be developing:    Thoughts: I feel sad    Images: visions of harm: SI    Thinking Processes: ruminations (can't stop thinking about my problems): sadness and racing thoughts    Mood: worsening depression, hopelessness and intense worry    Behaviors: isolating/withdrawing     Situations: triggers to feels sad   Step 2: Coping strategies - Things I can do to take my mind off of my problems without contacting another person (relaxation technique, physical activity):    Distress Tolerance Strategies:  sensory based activities/self-soothe with five senses: ground self or talk to spouse    Physical Activities: go for a walk and deep breathing    Focus on helpful thoughts:  \"I will get through this\", \"Ride the wave\" and remind myself of what is important to me: family  Step 3: People and social settings that provide distraction:   Name: spouse    Name: " crisis Phone: [phone]       being around my family   Step 4: Remind myself of people and things that are important to me and worth living for:  My family     Step 5: When I am in crisis, I can ask these people to help me use my safety plan:   Spouse and 988  Step 6: Making the environment safe:     be around others  Step 7: Professionals or agencies I can contact during a crisis:    [facility] Daytime Number: [phone]    Suicide Prevention Lifeline: 5-475-033-ORYU (4155)    Crisis Text Line Service (available 24 hours a day, 7 days a week): Text [place] to 737310  Local Crisis Services: 988    Call 911 or go to my nearest emergency department.   I helped develop this safety plan and agree to use it when needed.  I have been given a copy of this plan.      Client signature _________________________________________________________________  Today s date:  11/16/2020  Adapted from Safety Plan Template 2008 Gayatri Farah and Paul [employer] is reprinted with the express permission of the authors.  No portion of the Safety Plan Template may be reproduced without the express, written permission.  You can contact the authors at [email or web address] or [email or web address].

## 2020-11-17 ASSESSMENT — ANXIETY QUESTIONNAIRES
GAD7 TOTAL SCORE: 7
GAD7 TOTAL SCORE: 16

## 2020-11-17 ASSESSMENT — PATIENT HEALTH QUESTIONNAIRE - PHQ9: SUM OF ALL RESPONSES TO PHQ QUESTIONS 1-9: 10

## 2020-11-17 NOTE — PATIENT INSTRUCTIONS
Thank you for choosing us for your care. I have placed an order for a prescription so that you can start treatment. View your full visit summary for details by clicking on the link below. Your pharmacist will able to address any questions you may have about the medication.      If you're not feeling better within 4-6 weeks please schedule an appointment.  You can schedule an appointment right here in [employer], or call [phone]  If the visit is for the same symptoms as your e-visit, we'll refund the cost of your e-visit if seen within seven days.    Thank you for choosing us for your care. Based on your symptoms and length of illness, I do not think that you need a prescription at this time but would like you to start working with a therapy.  Please follow the care advise I've provided and the  will call you with a therapy appointment.  View your full visit summary for details by clicking on the link below.     If you're not feeling better within 4-6 weeks, please let me know and we can consider if a prescription is needed.  If you are feeling worse, please let me know before then.  You can schedule an appointment right here in [employer], or call [phone]  If the visit is for the same symptoms as your e-visit, we'll refund the cost of your e-visit if seen within seven days.

## 2020-12-02 DIAGNOSIS — F43.23 ADJUSTMENT DISORDER WITH MIXED ANXIETY AND DEPRESSED MOOD: ICD-10-CM

## 2020-12-02 DIAGNOSIS — F41.9 ANXIETY: ICD-10-CM

## 2020-12-02 RX ORDER — ESCITALOPRAM OXALATE 10 MG/1
15 TABLET ORAL DAILY
Qty: 45 TABLET | Refills: 1 | OUTPATIENT
Start: 2020-12-02

## 2020-12-14 ENCOUNTER — HEALTH MAINTENANCE LETTER (OUTPATIENT)
Age: 40
End: 2020-12-14

## 2020-12-14 ENCOUNTER — VIRTUAL VISIT (OUTPATIENT)
Dept: PSYCHOLOGY | Facility: CLINIC | Age: 40
End: 2020-12-14
Payer: COMMERCIAL

## 2020-12-14 DIAGNOSIS — F43.23 ADJUSTMENT DISORDER WITH MIXED ANXIETY AND DEPRESSED MOOD: Primary | ICD-10-CM

## 2020-12-14 PROCEDURE — 90834 PSYTX W PT 45 MINUTES: CPT | Mod: 95 | Performed by: SOCIAL WORKER

## 2020-12-14 ASSESSMENT — PATIENT HEALTH QUESTIONNAIRE - PHQ9: SUM OF ALL RESPONSES TO PHQ QUESTIONS 1-9: 9

## 2020-12-14 ASSESSMENT — ANXIETY QUESTIONNAIRES
3. WORRYING TOO MUCH ABOUT DIFFERENT THINGS: MORE THAN HALF THE DAYS
5. BEING SO RESTLESS THAT IT IS HARD TO SIT STILL: MORE THAN HALF THE DAYS
6. BECOMING EASILY ANNOYED OR IRRITABLE: SEVERAL DAYS
7. FEELING AFRAID AS IF SOMETHING AWFUL MIGHT HAPPEN: SEVERAL DAYS
2. NOT BEING ABLE TO STOP OR CONTROL WORRYING: MORE THAN HALF THE DAYS
1. FEELING NERVOUS, ANXIOUS, OR ON EDGE: NEARLY EVERY DAY
4. TROUBLE RELAXING: SEVERAL DAYS
GAD7 TOTAL SCORE: 12

## 2020-12-14 NOTE — PROGRESS NOTES
Progress Note    Patient Name: Azalea Mckeon  Date: 2020         Service Type: Individual      Session Start Time:   Session End Time:      Session Length: 1    Session #: 49    Attendees: Client    Service Modality:  Video Visit:      Provider verified identity through the following two step process.  Patient provided:  Patient     Telemedicine Visit: The patient's condition can be safely assessed and treated via synchronous audio and visual telemedicine encounter.      Reason for Telemedicine Visit: Services only offered telehealth    Originating Site (Patient Location): Patient's home    Distant Site (Provider Location): Provider Remote Setting    Consent:  The patient/guardian has verbally consented to: the potential risks and benefits of telemedicine (video visit) versus in person care; bill my insurance or make self-payment for services provided; and responsibility for payment of non-covered services.     Patient would like the video invitation sent by: Send to e-mail at: [email or web address]    Mode of Communication:  Video Conference via [employer]well    As the provider I attest to compliance with applicable laws and regulations related to telemedicine.     Treatment Plan Last Reviewed: 2020 due 3/14/2021  PHQ-9 / MUSHTAQ-7 :     DATA  Interactive Complexity: No  Crisis: No       Progress Since Last Session (Related to Symptoms / Goals / Homework):   Symptoms: Worsening increase MUSHTAQ-7    Homework: Completed in session      Episode of Care Goals: No improvement - CONTEMPLATION (Considering change and yet undecided); Intervened by assessing the negative and positive thinking (ambivalence) about behavior change     Current / Ongoing Stressors and Concerns:   COVID-19, isolation and identifying triggers     Treatment Objective(s) Addressed in This Session:     Patient will demonstrate and report a level of depression that can be managed at a [facility].   Absence of persistent depression mood and report of reduced frequency and intensity of low mood and absence of persistent low energy and motivation to acceptable levels, report subjective improved motivation and increased energy for a period of 90 days, within 6 months as clinically observed and by patient self-report.  Patient will demonstrate and report a level of anxiety that can be managed at a lower level of care.  Absence of persistent anxious mood and report of reduced frequency and intensity of worry and absence of persistent anxious mood to acceptable levels, no panic attacks, report subjective comfort with rumination for a period of 90 days, within 6 months as clinically observed and by patient self-report.     Intervention:   Motivational Interviewing    MI Intervention: Co-Developed Goal: depression/anxiety and coping skills, Expressed Empathy/Understanding, Supported Autonomy, Collaboration, Evocation, Permission to raise concern or advise and Open-ended questions     Change Talk Expressed by the Patient: Desire to change Ability to change Reasons to change Need to change    Provider Response to Change Talk: E - Evoked more info from patient about behavior change, A - Affirmed patient's thoughts, decisions, or attempts at behavior change, R - Reflected patient's change talk and S - Summarized patient's change talk statements          ASSESSMENT: Current Emotional / Mental Status (status of significant symptoms):   Risk status (Self / Other harm or suicidal ideation)   Patient denies current fears or concerns for personal safety.   Patient denies current or recent suicidal ideation or behaviors.   Patient denies current or recent homicidal ideation or behaviors.   Patient denies current or recent self injurious behavior or ideation.   Patient denies other safety concerns.   Patient reports there has been no change in risk factors since their last session.     Patient reports there has been no change in  protective factors since their last session.     A safety and risk management plan has been developed including: Patient consented to co-developed safety plan.  Safety and risk management plan was completed.  Patient agreed to use safety plan should any safety concerns arise.  A copy was given to the patient.     Appearance:   Appropriate    Eye Contact:   Fair    Psychomotor Behavior: Restless    Attitude:   Cooperative  Friendly Pleasant   Orientation:   All   Speech    Rate / Production: Emotional Talkative    Volume:  Normal    Mood:    Anxious  Depressed    Affect:    Worrisome    Thought Content:  Clear    Thought Form:  Coherent    Insight:    Fair      Medication Review:   Changes to psychiatric medications, see updated Medication List in EPIC.      Medication Compliance:   Yes     Changes in Health Issues:   None reported     Chemical Use Review:   Substance Use: Chemical use reviewed, no active concerns identified      Tobacco Use: No current tobacco use.      Diagnosis:  1. Adjustment disorder with mixed anxiety and depressed mood        Collateral Reports Completed:   Not Applicable    PLAN: (Patient Tasks / Therapist Tasks / Other)  Review education to manage anxiety        Miriam Luciano, [facility]  12/14/2020                                                         ______________________________________________________________________    Treatment Plan    Patient's Name: Azalea Mckeon  YOB: 1980    Date: 12/14/2020    DSM5 Diagnoses: Adjustment Disorders  309.28 (F43.23) With mixed anxiety and depressed mood  Psychosocial / Contextual Factors: COVID-19, isolation and identifying triggers  WHODAS:     Referral / Collaboration:  Referral to another professional/service is not indicated at this time..    Anticipated number of session or this episode of care: 33      MeasurableTreatment Goal(s) related to diagnosis / functional impairment(s)  Goal 1: Patient will absence of persistent  depression mood and report of reduced frequency and intensity of low mood and absence of persistent low energy and motivation to acceptable levels, report subjective improved motivation and increased energy for a period of 90 days, within 6 months as clinically observed and by patient self-report.    I will know I've met my goal when I go back to me. When I don't have bad thoughts or anger.      Objective #A (Patient Action)    Patient will demonstrate and report a level of depression that can be managed at a [facility].  Absence of persistent depression mood and report of reduced frequency and intensity of low mood and absence of persistent low energy and motivation to acceptable levels, report subjective improved motivation and increased energy for a period of 90 days, within 6 months as clinically observed and by patient self-report.  Status: New - Date: 12/14/2020     Intervention(s)  Therapist will provide individual therapy to identify triggers to depression, gain feedback on helpful coping tools and thought-reframing techniques, and identify preferred way of being.  Tx to include discussion of current stressors and interpersonal conflicts and how to cope with these, coaching, diagnostic testing, referral for medication as indicated, use prescribed medication, cognitive restructuring, interpersonal, family therapy, supportive therapy services.        Goal 2: Patient will absence of persistent anxiety mood and report of reduced frequency and intensity of worry and absence of persistent anxious mood to acceptable levels, no panic attacks, report subjective comfort with rumination for a period of 90 days. Within 6 months as clinically observed and by patient self-report.    I will know I've met my goal when I can let things roll off my back.      Objective #A (Patient Action)    Status: New - Date: 12/14/2020     Patient will demonstrate and report a level of anxiety that can be managed at a lower level of care.   Absence of persistent anxious mood and report of reduced frequency and intensity of worry and absence of persistent anxious mood to acceptable levels, no panic attacks, report subjective comfort with rumination for a period of 90 days, within 6 months as clinically observed and by patient self-report.    Intervention(s)  Therapist will provide individual therapy to identify triggers to anxiety, gain feedback on helpful coping tools and thought-reframing techniques, and identify preferred way of being. Tx to include discuss current stressors and interpersonal conflicts and how to cope with these, coaching, diagnostic testing , referral for medication as indicated, use prescribed medication, cognitive restructuring, interpersonal,   family therapy, supportive therapy services.        Patient has reviewed and agreed to the above plan.      Miriam Luciano, [facility]  December 14, 2020

## 2020-12-15 ASSESSMENT — ANXIETY QUESTIONNAIRES: GAD7 TOTAL SCORE: 12

## 2020-12-21 ENCOUNTER — VIRTUAL VISIT (OUTPATIENT)
Dept: PSYCHOLOGY | Facility: CLINIC | Age: 40
End: 2020-12-21
Payer: COMMERCIAL

## 2020-12-21 DIAGNOSIS — F43.23 ADJUSTMENT DISORDER WITH MIXED ANXIETY AND DEPRESSED MOOD: Primary | ICD-10-CM

## 2020-12-21 PROCEDURE — 90834 PSYTX W PT 45 MINUTES: CPT | Mod: 95 | Performed by: SOCIAL WORKER

## 2020-12-21 ASSESSMENT — ANXIETY QUESTIONNAIRES
2. NOT BEING ABLE TO STOP OR CONTROL WORRYING: MORE THAN HALF THE DAYS
GAD7 TOTAL SCORE: 11
3. WORRYING TOO MUCH ABOUT DIFFERENT THINGS: MORE THAN HALF THE DAYS
6. BECOMING EASILY ANNOYED OR IRRITABLE: MORE THAN HALF THE DAYS
5. BEING SO RESTLESS THAT IT IS HARD TO SIT STILL: NOT AT ALL
7. FEELING AFRAID AS IF SOMETHING AWFUL MIGHT HAPPEN: MORE THAN HALF THE DAYS
1. FEELING NERVOUS, ANXIOUS, OR ON EDGE: NEARLY EVERY DAY
4. TROUBLE RELAXING: NOT AT ALL

## 2020-12-21 ASSESSMENT — PATIENT HEALTH QUESTIONNAIRE - PHQ9: SUM OF ALL RESPONSES TO PHQ QUESTIONS 1-9: 9

## 2020-12-21 NOTE — PROGRESS NOTES
Progress Note    Patient Name: Azalea Mckeon  Date: 2020         Service Type: Individual      Session Start Time: 103  Session End Time: 1115     Session Length: 41    Session #: 2    Attendees: Client    Service Modality:  Video Visit:      Provider verified identity through the following two step process.  Patient provided:  Patient     Telemedicine Visit: The patient's condition can be safely assessed and treated via synchronous audio and visual telemedicine encounter.      Reason for Telemedicine Visit: Services only offered telehealth    Originating Site (Patient Location): Patient's home    Distant Site (Provider Location): Provider Remote Setting    Consent:  The patient/guardian has verbally consented to: the potential risks and benefits of telemedicine (video visit) versus in person care; bill my insurance or make self-payment for services provided; and responsibility for payment of non-covered services.     Patient would like the video invitation sent by: Send to e-mail at: [email or web address]    Mode of Communication:  Video Conference via Amwell    As the provider I attest to compliance with applicable laws and regulations related to telemedicine.     Treatment Plan Last Reviewed: 2020 due 3/14/2021  PHQ-9 / MUSHTAQ-7 :     DATA  Interactive Complexity: No  Crisis: No       Progress Since Last Session (Related to Symptoms / Goals / Homework):   Symptoms: Improving decrease MUSHTAQ-7    Homework: Completed in session      Episode of Care Goals: No improvement - CONTEMPLATION (Considering change and yet undecided); Intervened by assessing the negative and positive thinking (ambivalence) about behavior change     Current / Ongoing Stressors and Concerns:   COVID-19, isolation and identifying triggers     Treatment Objective(s) Addressed in This Session:     Patient will demonstrate and report a level of depression that can be managed at a [facility].   Absence of persistent depression mood and report of reduced frequency and intensity of low mood and absence of persistent low energy and motivation to acceptable levels, report subjective improved motivation and increased energy for a period of 90 days, within 6 months as clinically observed and by patient self-report.  Patient will demonstrate and report a level of anxiety that can be managed at a lower level of care.  Absence of persistent anxious mood and report of reduced frequency and intensity of worry and absence of persistent anxious mood to acceptable levels, no panic attacks, report subjective comfort with rumination for a period of 90 days, within 6 months as clinically observed and by patient self-report.     Intervention:   Therapist met with patient to review goals and interventions. Therapist utilized reflected listening as patient gave brief reflection of week. Patient processed different scenarios where she will feel anxious. Therapist supported patient as she processed and validated patient. Therapist educated patient on anxiety and how if effects the brain and body. Therapist encouraged patient to challenged self, ground self and identify triggers.   Patient presented with an anxious affect. Patient was engaged in session and open to feedback. Patient reported no safety concerns.         ASSESSMENT: Current Emotional / Mental Status (status of significant symptoms):   Risk status (Self / Other harm or suicidal ideation)   Patient denies current fears or concerns for personal safety.   Patient denies current or recent suicidal ideation or behaviors.   Patient denies current or recent homicidal ideation or behaviors.   Patient denies current or recent self injurious behavior or ideation.   Patient denies other safety concerns.   Patient reports there has been no change in risk factors since their last session.     Patient reports there has been no change in protective factors since their last  session.     A safety and risk management plan has been developed including: Patient consented to co-developed safety plan.  Safety and risk management plan was completed.  Patient agreed to use safety plan should any safety concerns arise.  A copy was given to the patient.     Appearance:   Appropriate    Eye Contact:   Fair    Psychomotor Behavior: Restless    Attitude:   Cooperative  Friendly Pleasant   Orientation:   All   Speech    Rate / Production: Emotional Talkative    Volume:  Normal    Mood:    Anxious  Depressed    Affect:    Worrisome    Thought Content:  Clear    Thought Form:  Coherent    Insight:    Fair      Medication Review:   No changes to current psychiatric medication(s)     Medication Compliance:   Yes     Changes in Health Issues:   None reported     Chemical Use Review:   Substance Use: Chemical use reviewed, no active concerns identified      Tobacco Use: No current tobacco use.      Diagnosis:  1. Adjustment disorder with mixed anxiety and depressed mood        Collateral Reports Completed:   Not Applicable    PLAN: (Patient Tasks / Therapist Tasks / Other)  patient to challenged self, ground self and identify triggers.       Miriam Luciano, [facility]  12/21/2020                                                         ______________________________________________________________________    Treatment Plan    Patient's Name: Azalea Mckeon  YOB: 1980    Date: 12/14/2020    DSM5 Diagnoses: Adjustment Disorders  309.28 (F43.23) With mixed anxiety and depressed mood  Psychosocial / Contextual Factors: COVID-19, isolation and identifying triggers  WHODAS:     Referral / Collaboration:  Referral to another professional/service is not indicated at this time..    Anticipated number of session or this episode of care: 33      MeasurableTreatment Goal(s) related to diagnosis / functional impairment(s)  Goal 1: Patient will absence of persistent depression mood and report of reduced  frequency and intensity of low mood and absence of persistent low energy and motivation to acceptable levels, report subjective improved motivation and increased energy for a period of 90 days, within 6 months as clinically observed and by patient self-report.    I will know I've met my goal when I go back to me. When I don't have bad thoughts or anger.      Objective #A (Patient Action)    Patient will demonstrate and report a level of depression that can be managed at a [facility].  Absence of persistent depression mood and report of reduced frequency and intensity of low mood and absence of persistent low energy and motivation to acceptable levels, report subjective improved motivation and increased energy for a period of 90 days, within 6 months as clinically observed and by patient self-report.  Status: New - Date: 12/14/2020     Intervention(s)  Therapist will provide individual therapy to identify triggers to depression, gain feedback on helpful coping tools and thought-reframing techniques, and identify preferred way of being.  Tx to include discussion of current stressors and interpersonal conflicts and how to cope with these, coaching, diagnostic testing, referral for medication as indicated, use prescribed medication, cognitive restructuring, interpersonal, family therapy, supportive therapy services.        Goal 2: Patient will absence of persistent anxiety mood and report of reduced frequency and intensity of worry and absence of persistent anxious mood to acceptable levels, no panic attacks, report subjective comfort with rumination for a period of 90 days. Within 6 months as clinically observed and by patient self-report.    I will know I've met my goal when I can let things roll off my back.      Objective #A (Patient Action)    Status: New - Date: 12/14/2020     Patient will demonstrate and report a level of anxiety that can be managed at a lower level of care.  Absence of persistent anxious mood and  report of reduced frequency and intensity of worry and absence of persistent anxious mood to acceptable levels, no panic attacks, report subjective comfort with rumination for a period of 90 days, within 6 months as clinically observed and by patient self-report.    Intervention(s)  Therapist will provide individual therapy to identify triggers to anxiety, gain feedback on helpful coping tools and thought-reframing techniques, and identify preferred way of being. Tx to include discuss current stressors and interpersonal conflicts and how to cope with these, coaching, diagnostic testing , referral for medication as indicated, use prescribed medication, cognitive restructuring, interpersonal,   family therapy, supportive therapy services.        Patient has reviewed and agreed to the above plan.      Miriam Luciano, [facility]  December 14, 2020

## 2020-12-22 ASSESSMENT — ANXIETY QUESTIONNAIRES: GAD7 TOTAL SCORE: 11

## 2020-12-28 ENCOUNTER — VIRTUAL VISIT (OUTPATIENT)
Dept: PSYCHOLOGY | Facility: CLINIC | Age: 40
End: 2020-12-28
Payer: COMMERCIAL

## 2020-12-28 DIAGNOSIS — F43.23 ADJUSTMENT DISORDER WITH MIXED ANXIETY AND DEPRESSED MOOD: Primary | ICD-10-CM

## 2020-12-28 PROCEDURE — 90834 PSYTX W PT 45 MINUTES: CPT | Mod: 95 | Performed by: SOCIAL WORKER

## 2020-12-28 ASSESSMENT — ANXIETY QUESTIONNAIRES
7. FEELING AFRAID AS IF SOMETHING AWFUL MIGHT HAPPEN: MORE THAN HALF THE DAYS
3. WORRYING TOO MUCH ABOUT DIFFERENT THINGS: SEVERAL DAYS
5. BEING SO RESTLESS THAT IT IS HARD TO SIT STILL: NOT AT ALL
2. NOT BEING ABLE TO STOP OR CONTROL WORRYING: MORE THAN HALF THE DAYS
6. BECOMING EASILY ANNOYED OR IRRITABLE: MORE THAN HALF THE DAYS
1. FEELING NERVOUS, ANXIOUS, OR ON EDGE: NEARLY EVERY DAY
4. TROUBLE RELAXING: SEVERAL DAYS
GAD7 TOTAL SCORE: 11

## 2020-12-28 ASSESSMENT — PATIENT HEALTH QUESTIONNAIRE - PHQ9: SUM OF ALL RESPONSES TO PHQ QUESTIONS 1-9: 7

## 2020-12-28 NOTE — PROGRESS NOTES
Progress Note    Patient Name: Azalea Mckeon  Date: 2020         Service Type: Individual      Session Start Time: 1035  Session End Time: 1113     Session Length: 38    Session #: 3    Attendees: Client    Service Modality:  Video Visit:      Provider verified identity through the following two step process.  Patient provided:  Patient     Telemedicine Visit: The patient's condition can be safely assessed and treated via synchronous audio and visual telemedicine encounter.      Reason for Telemedicine Visit: Services only offered telehealth    Originating Site (Patient Location): Patient's home    Distant Site (Provider Location): Provider Remote Setting    Consent:  The patient/guardian has verbally consented to: the potential risks and benefits of telemedicine (video visit) versus in person care; bill my insurance or make self-payment for services provided; and responsibility for payment of non-covered services.     Patient would like the video invitation sent by: Send to e-mail at: [email or web address]    Mode of Communication:  Video Conference via Amwell    As the provider I attest to compliance with applicable laws and regulations related to telemedicine.     Treatment Plan Last Reviewed: 2020 due 3/14/2021  PHQ-9 / MUSHTAQ-7 :     DATA  Interactive Complexity: No  Crisis: No       Progress Since Last Session (Related to Symptoms / Goals / Homework):   Symptoms: Improving decrease PHQ-9    Homework: Completed in session      Episode of Care Goals: Minimal progress - PREPARATION (Decided to change - considering how); Intervened by negotiating a change plan and determining options / strategies for behavior change, identifying triggers, exploring social supports, and working towards setting a date to begin behavior change     Current / Ongoing Stressors and Concerns:   COVID-19, isolation and identifying triggers     Treatment Objective(s) Addressed in  This Session:     Patient will demonstrate and report a level of depression that can be managed at a [facility].  Absence of persistent depression mood and report of reduced frequency and intensity of low mood and absence of persistent low energy and motivation to acceptable levels, report subjective improved motivation and increased energy for a period of 90 days, within 6 months as clinically observed and by patient self-report.  Patient will demonstrate and report a level of anxiety that can be managed at a lower level of care.  Absence of persistent anxious mood and report of reduced frequency and intensity of worry and absence of persistent anxious mood to acceptable levels, no panic attacks, report subjective comfort with rumination for a period of 90 days, within 6 months as clinically observed and by patient self-report.     Intervention:   Therapist met with patient to review goals and interventions. Therapist utilized reflected listening as patient gave brief reflection of week. Patient processed anxiety, medication and children. Therapist supported patient as she processed. Therapist educated patient on medications with anxiety and depression (seasonal, situational and chronic). Therapist coached patient through paying attention to her body and leaving situation before feeling completley overwhelmed.   Patient presented with an anxious/brighter affect. Patient was engaged in session and open to feedback. Patient reported no safety concerns.         ASSESSMENT: Current Emotional / Mental Status (status of significant symptoms):   Risk status (Self / Other harm or suicidal ideation)   Patient denies current fears or concerns for personal safety.   Patient denies current or recent suicidal ideation or behaviors.   Patient denies current or recent homicidal ideation or behaviors.   Patient denies current or recent self injurious behavior or ideation.   Patient denies other safety concerns.   Patient reports there  has been no change in risk factors since their last session.     Patient reports there has been no change in protective factors since their last session.     A safety and risk management plan has been developed including: Patient consented to co-developed safety plan.  Safety and risk management plan was completed.  Patient agreed to use safety plan should any safety concerns arise.  A copy was given to the patient.     Appearance:   Appropriate    Eye Contact:   Fair    Psychomotor Behavior: Normal    Attitude:   Cooperative  Friendly Pleasant   Orientation:   All   Speech    Rate / Production: Talkative    Volume:  Normal    Mood:    Anxious    Affect:    Worrisome    Thought Content:  Clear    Thought Form:  Coherent    Insight:    Fair      Medication Review:   No changes to current psychiatric medication(s)     Medication Compliance:   Yes     Changes in Health Issues:   None reported     Chemical Use Review:   Substance Use: Chemical use reviewed, no active concerns identified      Tobacco Use: No current tobacco use.      Diagnosis:  1. Adjustment disorder with mixed anxiety and depressed mood        Collateral Reports Completed:   Not Applicable    PLAN: (Patient Tasks / Therapist Tasks / Other)  patient to challenged self, ground self and identify triggers.    paying attention to her body and leaving situation before feeling completley overwhelmed.     Miriam Luciano, [facility]  12/28/2020                                                         ______________________________________________________________________    Treatment Plan    Patient's Name: Azalea Mckeon  YOB: 1980    Date: 12/14/2020    DSM5 Diagnoses: Adjustment Disorders  309.28 (F43.23) With mixed anxiety and depressed mood  Psychosocial / Contextual Factors: COVID-19, isolation and identifying triggers  WHODAS:     Referral / Collaboration:  Referral to another professional/service is not indicated at this time..    Anticipated  number of session or this episode of care: 33      MeasurableTreatment Goal(s) related to diagnosis / functional impairment(s)  Goal 1: Patient will absence of persistent depression mood and report of reduced frequency and intensity of low mood and absence of persistent low energy and motivation to acceptable levels, report subjective improved motivation and increased energy for a period of 90 days, within 6 months as clinically observed and by patient self-report.    I will know I've met my goal when I go back to me. When I don't have bad thoughts or anger.      Objective #A (Patient Action)    Patient will demonstrate and report a level of depression that can be managed at a [facility].  Absence of persistent depression mood and report of reduced frequency and intensity of low mood and absence of persistent low energy and motivation to acceptable levels, report subjective improved motivation and increased energy for a period of 90 days, within 6 months as clinically observed and by patient self-report.  Status: New - Date: 12/14/2020     Intervention(s)  Therapist will provide individual therapy to identify triggers to depression, gain feedback on helpful coping tools and thought-reframing techniques, and identify preferred way of being.  Tx to include discussion of current stressors and interpersonal conflicts and how to cope with these, coaching, diagnostic testing, referral for medication as indicated, use prescribed medication, cognitive restructuring, interpersonal, family therapy, supportive therapy services.        Goal 2: Patient will absence of persistent anxiety mood and report of reduced frequency and intensity of worry and absence of persistent anxious mood to acceptable levels, no panic attacks, report subjective comfort with rumination for a period of 90 days. Within 6 months as clinically observed and by patient self-report.    I will know I've met my goal when I can let things roll off my back.       Objective #A (Patient Action)    Status: New - Date: 12/14/2020     Patient will demonstrate and report a level of anxiety that can be managed at a lower level of care.  Absence of persistent anxious mood and report of reduced frequency and intensity of worry and absence of persistent anxious mood to acceptable levels, no panic attacks, report subjective comfort with rumination for a period of 90 days, within 6 months as clinically observed and by patient self-report.    Intervention(s)  Therapist will provide individual therapy to identify triggers to anxiety, gain feedback on helpful coping tools and thought-reframing techniques, and identify preferred way of being. Tx to include discuss current stressors and interpersonal conflicts and how to cope with these, coaching, diagnostic testing , referral for medication as indicated, use prescribed medication, cognitive restructuring, interpersonal,   family therapy, supportive therapy services.        Patient has reviewed and agreed to the above plan.      Miriam Luciano, [facility]  December 14, 2020

## 2020-12-29 ASSESSMENT — ANXIETY QUESTIONNAIRES: GAD7 TOTAL SCORE: 11

## 2021-01-04 DIAGNOSIS — F43.23 ADJUSTMENT DISORDER WITH MIXED ANXIETY AND DEPRESSED MOOD: ICD-10-CM

## 2021-01-04 DIAGNOSIS — F41.9 ANXIETY: ICD-10-CM

## 2021-01-04 NOTE — LETTER
January 6, 2021    Azalea Mckeon  [address] [facility] [address]    Dear Azalea,       We recently received a refill request for escitalopram (LEXAPRO) 10 MG tablet.  We have refilled this for a one time 30 day supply only because you are due for a:    Anxiety/medication check office visit      Please call at your earliest convenience so that there will not be a delay with your future refills.          Thank you,   Your [facility] Team/  [phone]

## 2021-01-05 NOTE — TELEPHONE ENCOUNTER
"Routing refill request to provider for review/approval because:  Failed protocol.  No future appointment scheduled. Please advise. Thank you. Ricarda Martinez R.N.  Requested Prescriptions   Pending Prescriptions Disp Refills    escitalopram (LEXAPRO) 10 MG tablet 45 tablet 1     Sig: Take 1.5 tablets (15 mg) by mouth daily       SSRIs Protocol Failed - 1/4/2021  8:30 AM        Failed - PHQ-9 score less than 5 in past 6 months     Please review last PHQ-9 score.           Passed - Medication is active on med list        Passed - Patient is age 18 or older        Passed - No active pregnancy on record        Passed - No positive pregnancy test in last 12 months        Passed - Recent (6 mo) or future (30 days) visit within the authorizing provider's specialty     Patient had office visit in the last 6 months or has a visit in the next 30 days with authorizing provider or within the authorizing provider's specialty.  See \"Patient Info\" tab in inbasket, or \"Choose Columns\" in Meds & Orders section of the refill encounter.                       "

## 2021-01-06 RX ORDER — ESCITALOPRAM OXALATE 10 MG/1
15 TABLET ORAL DAILY
Qty: 45 TABLET | Refills: 0 | Status: SHIPPED | OUTPATIENT
Start: 2021-01-06 | End: 2021-01-08

## 2021-01-08 ENCOUNTER — VIRTUAL VISIT (OUTPATIENT)
Dept: FAMILY MEDICINE | Facility: CLINIC | Age: 41
End: 2021-01-08
Payer: COMMERCIAL

## 2021-01-08 DIAGNOSIS — F43.23 ADJUSTMENT DISORDER WITH MIXED ANXIETY AND DEPRESSED MOOD: ICD-10-CM

## 2021-01-08 PROCEDURE — 99213 OFFICE O/P EST LOW 20 MIN: CPT | Mod: 95 | Performed by: NURSE PRACTITIONER

## 2021-01-08 RX ORDER — ESCITALOPRAM OXALATE 20 MG/1
20 TABLET ORAL DAILY
Qty: 30 TABLET | Refills: 0 | Status: SHIPPED | OUTPATIENT
Start: 2021-01-08 | End: 2021-01-28

## 2021-01-08 ASSESSMENT — ENCOUNTER SYMPTOMS
FEVER: 0
DECREASED CONCENTRATION: 1
CHILLS: 0
SLEEP DISTURBANCE: 0
NERVOUS/ANXIOUS: 1
APPETITE CHANGE: 0

## 2021-01-08 NOTE — PROGRESS NOTES
Azalea is a 40 year old who is being evaluated via a billable video visit.      How would you like to obtain your AVS? MyChart  If the video visit is dropped, the invitation should be resent by: Text to cell phone: [phone]  Will anyone else be joining your video visit? No    Video Start Time: 11:17  Assessment & Plan     1. Adjustment disorder with mixed anxiety and depressed mood  - continue with therapy.   - escitalopram (LEXAPRO) 20 MG tablet; Take 1 tablet (20 mg) by mouth daily  Dispense: 30 tablet; Refill: 0      15 minutes spent on the date of the encounter doing chart review, history and exam, documentation and further activities as noted above        Return in about 3 months (around 2021).    [facility] [place]    Subjective     Azalea is a 40 year old who presents to clinic today for the following health issues     HPI       Depression Followup    How are you doing with your depression since your last visit? Improved     Are you having other symptoms that might be associated with depression? Yes:       Have you had a significant life event?  No     Are you feeling anxious or having panic attacks?   No    Do you have any concerns with your use of alcohol or other drugs? No    Lexapro was increased from 10 mg to 15 mg approx 2 months ago. She noticed good improvement in overall mood.  She is seeing a therapist routinely.  Has been doing phq9 and gad7 scores every visit.  Seeing improvement in depression but no so much anxiety.  Sleeping well at night.  Has noticed she gets side tracked more often lately.    Social History     Tobacco Use     Smoking status: Former Smoker     Quit date: 2009     Years since quittin.5     Smokeless tobacco: Never Used   Substance Use Topics     Alcohol use: No     Drug use: No     PHQ 2020   PHQ-9 Total Score 9 9 7   Q9: Thoughts of better off dead/self-harm past 2 weeks Several days Not at  all Not at all   F/U: Thoughts of suicide or self-harm - - -   F/U: Self harm-plan - - -   F/U: Self-harm action - - -   F/U: Safety concerns - - -   Some encounter information is confidential and restricted. Go to Review Flowsheets activity to see all data.     MUSHTAQ-7 SCORE 12/14/2020 12/21/2020 12/28/2020   Total Score - - -   Total Score - - -   Total Score 12 11 11   Some encounter information is confidential and restricted. Go to Review Flowsheets activity to see all data.         Suicide Assessment Five-step Evaluation and Treatment (SAFE-T)      How many servings of fruits and vegetables do you eat daily?  2-3    On average, how many sweetened beverages do you drink each day (Examples: soda, juice, sweet tea, etc.  Do NOT count diet or artificially sweetened beverages)?   1    How many days per week do you exercise enough to make your heart beat faster? 3 or less    How many minutes a day do you exercise enough to make your heart beat faster? 20 - 29    How many days per week do you miss taking your medication? 0      Review of Systems   Constitutional: Negative for appetite change, chills and fever.   Psychiatric/Behavioral: Positive for decreased concentration. Negative for self-injury, sleep disturbance and suicidal ideas. The patient is nervous/anxious.             Objective           Vitals:  No vitals were obtained today due to virtual visit.    Physical Exam   GENERAL: Healthy, alert and no distress  EYES: Eyes grossly normal to inspection.  No discharge or erythema, or obvious scleral/conjunctival abnormalities.  RESP: No audible wheeze, cough, or visible cyanosis.  No visible retractions or increased work of breathing.    SKIN: Visible skin clear. No significant rash, abnormal pigmentation or lesions.  NEURO: Cranial nerves grossly intact.  Mentation and speech appropriate for age.  PSYCH: Mentation appears normal, affect normal/bright, judgement and insight intact, normal speech and appearance  [email or web address].                Video-Visit Details    Type of service:  Video Visit    Video End Time:11:25    Originating Location (pt. Location): Home    Distant [facility] (provider [facility]     Platform used for Video Visit: GoodyTag

## 2021-01-19 ENCOUNTER — VIRTUAL VISIT (OUTPATIENT)
Dept: PSYCHOLOGY | Facility: CLINIC | Age: 41
End: 2021-01-19
Payer: COMMERCIAL

## 2021-01-19 DIAGNOSIS — F43.23 ADJUSTMENT DISORDER WITH MIXED ANXIETY AND DEPRESSED MOOD: Primary | ICD-10-CM

## 2021-01-19 PROCEDURE — 90834 PSYTX W PT 45 MINUTES: CPT | Mod: 95 | Performed by: SOCIAL WORKER

## 2021-01-19 ASSESSMENT — PATIENT HEALTH QUESTIONNAIRE - PHQ9: SUM OF ALL RESPONSES TO PHQ QUESTIONS 1-9: 4

## 2021-01-19 ASSESSMENT — ANXIETY QUESTIONNAIRES
2. NOT BEING ABLE TO STOP OR CONTROL WORRYING: SEVERAL DAYS
4. TROUBLE RELAXING: NOT AT ALL
7. FEELING AFRAID AS IF SOMETHING AWFUL MIGHT HAPPEN: SEVERAL DAYS
1. FEELING NERVOUS, ANXIOUS, OR ON EDGE: SEVERAL DAYS
6. BECOMING EASILY ANNOYED OR IRRITABLE: SEVERAL DAYS
3. WORRYING TOO MUCH ABOUT DIFFERENT THINGS: SEVERAL DAYS
GAD7 TOTAL SCORE: 6
5. BEING SO RESTLESS THAT IT IS HARD TO SIT STILL: SEVERAL DAYS

## 2021-01-19 NOTE — PROGRESS NOTES
Progress Note    Patient Name: Azalea Mckeon  Date: 2021         Service Type: Individual      Session Start Time: 1036  Session End Time: 1123     Session Length: 47    Session #: 4    Attendees: Client    Service Modality:  Video Visit:      Provider verified identity through the following two step process.  Patient provided:  Patient     Telemedicine Visit: The patient's condition can be safely assessed and treated via synchronous audio and visual telemedicine encounter.      Reason for Telemedicine Visit: Services only offered telehealth    Originating Site (Patient Location): Patient's home    Distant Site (Provider Location): Provider Remote Setting    Consent:  The patient/guardian has verbally consented to: the potential risks and benefits of telemedicine (video visit) versus in person care; bill my insurance or make self-payment for services provided; and responsibility for payment of non-covered services.     Patient would like the video invitation sent by: Send to e-mail at: [email or web address]    Mode of Communication:  Video Conference via Amwell    As the provider I attest to compliance with applicable laws and regulations related to telemedicine.     Treatment Plan Last Reviewed: 2020 due 3/14/2021  PHQ-9 / MUSHTAQ-7 : 4/6    DATA  Interactive Complexity: No  Crisis: No       Progress Since Last Session (Related to Symptoms / Goals / Homework):   Symptoms: Improving decrease PHQ-9 MUSHTAQ-7    Homework: Completed in session      Episode of Care Goals: Minimal progress - PREPARATION (Decided to change - considering how); Intervened by negotiating a change plan and determining options / strategies for behavior change, identifying triggers, exploring social supports, and working towards setting a date to begin behavior change     Current / Ongoing Stressors and Concerns:   COVID-19, isolation and identifying triggers     Treatment Objective(s) Addressed  in This Session:     Patient will demonstrate and report a level of depression that can be managed at a [facility].  Absence of persistent depression mood and report of reduced frequency and intensity of low mood and absence of persistent low energy and motivation to acceptable levels, report subjective improved motivation and increased energy for a period of 90 days, within 6 months as clinically observed and by patient self-report.  Patient will demonstrate and report a level of anxiety that can be managed at a lower level of care.  Absence of persistent anxious mood and report of reduced frequency and intensity of worry and absence of persistent anxious mood to acceptable levels, no panic attacks, report subjective comfort with rumination for a period of 90 days, within 6 months as clinically observed and by patient self-report.     Intervention:   Therapist met with patient to review goals and interventions. Therapist utilized reflected listening as patient gave brief reflection of week. Patient processed feeling better with the medication increase and her desire to not stay on medications. Therapist educated patient on the importance of identifying triggers and managing them in the moment. Therapist coached patient on coping tools, grounding techniques and for patient to speak to her med provider about her medication goals.   Patient presented with an anxious/brighter affect. Patient was engaged in session and open to feedback. Patient reported no safety concerns.         ASSESSMENT: Current Emotional / Mental Status (status of significant symptoms):   Risk status (Self / Other harm or suicidal ideation)   Patient denies current fears or concerns for personal safety.   Patient denies current or recent suicidal ideation or behaviors.   Patient denies current or recent homicidal ideation or behaviors.   Patient denies current or recent self injurious behavior or ideation.   Patient denies other safety  concerns.   Patient reports there has been no change in risk factors since their last session.     Patient reports there has been no change in protective factors since their last session.     A safety and risk management plan has been developed including: Patient consented to co-developed safety plan.  Safety and risk management plan was completed.  Patient agreed to use safety plan should any safety concerns arise.  A copy was given to the patient.     Appearance:   Appropriate    Eye Contact:   Fair    Psychomotor Behavior: Normal    Attitude:   Cooperative  Friendly Pleasant   Orientation:   All   Speech    Rate / Production: Talkative    Volume:  Normal    Mood:    Anxious    Affect:    Worrisome    Thought Content:  Clear    Thought Form:  Coherent    Insight:    Fair      Medication Review:   Changes to psychiatric medications, see updated Medication List in EPIC.      Medication Compliance:   Yes     Changes in Health Issues:   None reported     Chemical Use Review:   Substance Use: Chemical use reviewed, no active concerns identified      Tobacco Use: No current tobacco use.      Diagnosis:  1. Adjustment disorder with mixed anxiety and depressed mood        Collateral Reports Completed:   Not Applicable    PLAN: (Patient Tasks / Therapist Tasks / Other)  Utilize coping tools, grounding techniques and for patient to speak to her med provider about her medication goals.       Miriam Luciano, [facility]  1/19/2021                                                         ______________________________________________________________________    Treatment Plan    Patient's Name: Azalea Mckeon  YOB: 1980    Date: 12/14/2020    DSM5 Diagnoses: Adjustment Disorders  309.28 (F43.23) With mixed anxiety and depressed mood  Psychosocial / Contextual Factors: COVID-19, isolation and identifying triggers  WHODAS:     Referral / Collaboration:  Referral to another professional/service is not indicated at this  time..    Anticipated number of session or this episode of care: 33      MeasurableTreatment Goal(s) related to diagnosis / functional impairment(s)  Goal 1: Patient will absence of persistent depression mood and report of reduced frequency and intensity of low mood and absence of persistent low energy and motivation to acceptable levels, report subjective improved motivation and increased energy for a period of 90 days, within 6 months as clinically observed and by patient self-report.    I will know I've met my goal when I go back to me. When I don't have bad thoughts or anger.      Objective #A (Patient Action)    Patient will demonstrate and report a level of depression that can be managed at a [facility].  Absence of persistent depression mood and report of reduced frequency and intensity of low mood and absence of persistent low energy and motivation to acceptable levels, report subjective improved motivation and increased energy for a period of 90 days, within 6 months as clinically observed and by patient self-report.  Status: New - Date: 12/14/2020     Intervention(s)  Therapist will provide individual therapy to identify triggers to depression, gain feedback on helpful coping tools and thought-reframing techniques, and identify preferred way of being.  Tx to include discussion of current stressors and interpersonal conflicts and how to cope with these, coaching, diagnostic testing, referral for medication as indicated, use prescribed medication, cognitive restructuring, interpersonal, family therapy, supportive therapy services.        Goal 2: Patient will absence of persistent anxiety mood and report of reduced frequency and intensity of worry and absence of persistent anxious mood to acceptable levels, no panic attacks, report subjective comfort with rumination for a period of 90 days. Within 6 months as clinically observed and by patient self-report.    I will know I've met my goal when I can let things roll  off my back.      Objective #A (Patient Action)    Status: New - Date: 12/14/2020     Patient will demonstrate and report a level of anxiety that can be managed at a lower level of care.  Absence of persistent anxious mood and report of reduced frequency and intensity of worry and absence of persistent anxious mood to acceptable levels, no panic attacks, report subjective comfort with rumination for a period of 90 days, within 6 months as clinically observed and by patient self-report.    Intervention(s)  Therapist will provide individual therapy to identify triggers to anxiety, gain feedback on helpful coping tools and thought-reframing techniques, and identify preferred way of being. Tx to include discuss current stressors and interpersonal conflicts and how to cope with these, coaching, diagnostic testing , referral for medication as indicated, use prescribed medication, cognitive restructuring, interpersonal,   family therapy, supportive therapy services.        Patient has reviewed and agreed to the above plan.      Miriam Luciano, [facility]  December 14, 2020

## 2021-01-20 ASSESSMENT — ANXIETY QUESTIONNAIRES: GAD7 TOTAL SCORE: 6

## 2021-01-28 DIAGNOSIS — F43.23 ADJUSTMENT DISORDER WITH MIXED ANXIETY AND DEPRESSED MOOD: ICD-10-CM

## 2021-01-28 RX ORDER — ESCITALOPRAM OXALATE 20 MG/1
20 TABLET ORAL DAILY
Qty: 90 TABLET | Refills: 1 | Status: SHIPPED | OUTPATIENT
Start: 2021-01-28 | End: 2021-09-03

## 2021-02-02 ENCOUNTER — VIRTUAL VISIT (OUTPATIENT)
Dept: PSYCHOLOGY | Facility: CLINIC | Age: 41
End: 2021-02-02
Payer: COMMERCIAL

## 2021-02-02 DIAGNOSIS — F43.23 ADJUSTMENT DISORDER WITH MIXED ANXIETY AND DEPRESSED MOOD: Primary | ICD-10-CM

## 2021-02-02 PROCEDURE — 90834 PSYTX W PT 45 MINUTES: CPT | Mod: GT | Performed by: SOCIAL WORKER

## 2021-02-02 ASSESSMENT — ANXIETY QUESTIONNAIRES
7. FEELING AFRAID AS IF SOMETHING AWFUL MIGHT HAPPEN: NOT AT ALL
5. BEING SO RESTLESS THAT IT IS HARD TO SIT STILL: NOT AT ALL
1. FEELING NERVOUS, ANXIOUS, OR ON EDGE: SEVERAL DAYS
4. TROUBLE RELAXING: NOT AT ALL
3. WORRYING TOO MUCH ABOUT DIFFERENT THINGS: SEVERAL DAYS
2. NOT BEING ABLE TO STOP OR CONTROL WORRYING: MORE THAN HALF THE DAYS
6. BECOMING EASILY ANNOYED OR IRRITABLE: SEVERAL DAYS
GAD7 TOTAL SCORE: 5

## 2021-02-02 ASSESSMENT — PATIENT HEALTH QUESTIONNAIRE - PHQ9: SUM OF ALL RESPONSES TO PHQ QUESTIONS 1-9: 3

## 2021-02-02 NOTE — PROGRESS NOTES
Progress Note    Patient Name: Azalea Mckeon  Date: 2021         Service Type: Individual      Session Start Time: 1135  Session End Time: 1217     Session Length: 42  Session #: 5    Attendees: Client    Service Modality:  Video Visit:      Provider verified identity through the following two step process.  Patient provided:  Patient     Telemedicine Visit: The patient's condition can be safely assessed and treated via synchronous audio and visual telemedicine encounter.      Reason for Telemedicine Visit: Services only offered telehealth    Originating Site (Patient Location): Patient's home    Distant Site (Provider Location): Provider Remote Setting    Consent:  The patient/guardian has verbally consented to: the potential risks and benefits of telemedicine (video visit) versus in person care; bill my insurance or make self-payment for services provided; and responsibility for payment of non-covered services.     Patient would like the video invitation sent by: Send to e-mail at: [email or web address]    Mode of Communication:  Video Conference via Amwell    As the provider I attest to compliance with applicable laws and regulations related to telemedicine.     Treatment Plan Last Reviewed: 2020 due 3/14/2021  PHQ-9 / MUSHTAQ-7 : 3/5    DATA  Interactive Complexity: No  Crisis: No       Progress Since Last Session (Related to Symptoms / Goals / Homework):   Symptoms: Improving decrease PHQ-9 MUSHTAQ-7    Homework: Achieved / completed to satisfaction      Episode of Care Goals: Satisfactory progress - ACTION (Actively working towards change); Intervened by reinforcing change plan / affirming steps taken     Current / Ongoing Stressors and Concerns:   COVID-19, isolation and identifying triggers     Treatment Objective(s) Addressed in This Session:     Patient will demonstrate and report a level of depression that can be managed at a [facility].  Absence of persistent  depression mood and report of reduced frequency and intensity of low mood and absence of persistent low energy and motivation to acceptable levels, report subjective improved motivation and increased energy for a period of 90 days, within 6 months as clinically observed and by patient self-report.  Patient will demonstrate and report a level of anxiety that can be managed at a lower level of care.  Absence of persistent anxious mood and report of reduced frequency and intensity of worry and absence of persistent anxious mood to acceptable levels, no panic attacks, report subjective comfort with rumination for a period of 90 days, within 6 months as clinically observed and by patient self-report.     Intervention:   Therapist met with patient to review goals and interventions. Therapist utilized reflected listening as patient gave brief reflection of week. Patient processed her anxiety with feeling needed and not wanting to come across as so. Therapist supported patient as she processed. Therapist challenged patient to delegate age appropriate tasks to her children and her spouse. Therapist coached patient through little deal big deal.   Patient presented with an anxious/brighter affect. Patient was engaged in session and open to feedback. Patient reported no safety concerns.         ASSESSMENT: Current Emotional / Mental Status (status of significant symptoms):   Risk status (Self / Other harm or suicidal ideation)   Patient denies current fears or concerns for personal safety.   Patient denies current or recent suicidal ideation or behaviors.   Patient denies current or recent homicidal ideation or behaviors.   Patient denies current or recent self injurious behavior or ideation.   Patient denies other safety concerns.   Patient reports there has been no change in risk factors since their last session.     Patient reports there has been no change in protective factors since their last session.     A safety and risk  management plan has been developed including: Patient consented to co-developed safety plan.  Safety and risk management plan was completed.  Patient agreed to use safety plan should any safety concerns arise.  A copy was given to the patient.     Appearance:   Appropriate    Eye Contact:   Fair    Psychomotor Behavior: Normal    Attitude:   Cooperative  Friendly Pleasant   Orientation:   All   Speech    Rate / Production: Talkative    Volume:  Normal    Mood:    Anxious    Affect:    Worrisome    Thought Content:  Clear    Thought Form:  Coherent    Insight:    Fair      Medication Review:   No changes to current psychiatric medication(s)     Medication Compliance:   Yes     Changes in Health Issues:   None reported     Chemical Use Review:   Substance Use: Chemical use reviewed, no active concerns identified      Tobacco Use: No current tobacco use.      Diagnosis:  1. Adjustment disorder with mixed anxiety and depressed mood        Collateral Reports Completed:   Not Applicable    PLAN: (Patient Tasks / Therapist Tasks / Other)  Speak to spouse about feelings  Work towards discharge  Have kids make their lunches  Let spouse handle morning     Miriam MCampos Luciano, [facility]  2/2/2021                                                         ______________________________________________________________________    Treatment Plan    Patient's Name: Azalea Mckeon  YOB: 1980    Date: 12/14/2020    DSM5 Diagnoses: Adjustment Disorders  309.28 (F43.23) With mixed anxiety and depressed mood  Psychosocial / Contextual Factors: COVID-19, isolation and identifying triggers  WHODAS:     Referral / Collaboration:  Referral to another professional/service is not indicated at this time..    Anticipated number of session or this episode of care: 33      MeasurableTreatment Goal(s) related to diagnosis / functional impairment(s)  Goal 1: Patient will absence of persistent depression mood and report of reduced frequency and  intensity of low mood and absence of persistent low energy and motivation to acceptable levels, report subjective improved motivation and increased energy for a period of 90 days, within 6 months as clinically observed and by patient self-report.    I will know I've met my goal when I go back to me. When I don't have bad thoughts or anger.      Objective #A (Patient Action)    Patient will demonstrate and report a level of depression that can be managed at a [facility].  Absence of persistent depression mood and report of reduced frequency and intensity of low mood and absence of persistent low energy and motivation to acceptable levels, report subjective improved motivation and increased energy for a period of 90 days, within 6 months as clinically observed and by patient self-report.  Status: New - Date: 12/14/2020     Intervention(s)  Therapist will provide individual therapy to identify triggers to depression, gain feedback on helpful coping tools and thought-reframing techniques, and identify preferred way of being.  Tx to include discussion of current stressors and interpersonal conflicts and how to cope with these, coaching, diagnostic testing, referral for medication as indicated, use prescribed medication, cognitive restructuring, interpersonal, family therapy, supportive therapy services.        Goal 2: Patient will absence of persistent anxiety mood and report of reduced frequency and intensity of worry and absence of persistent anxious mood to acceptable levels, no panic attacks, report subjective comfort with rumination for a period of 90 days. Within 6 months as clinically observed and by patient self-report.    I will know I've met my goal when I can let things roll off my back.      Objective #A (Patient Action)    Status: New - Date: 12/14/2020     Patient will demonstrate and report a level of anxiety that can be managed at a lower level of care.  Absence of persistent anxious mood and report of reduced  frequency and intensity of worry and absence of persistent anxious mood to acceptable levels, no panic attacks, report subjective comfort with rumination for a period of 90 days, within 6 months as clinically observed and by patient self-report.    Intervention(s)  Therapist will provide individual therapy to identify triggers to anxiety, gain feedback on helpful coping tools and thought-reframing techniques, and identify preferred way of being. Tx to include discuss current stressors and interpersonal conflicts and how to cope with these, coaching, diagnostic testing , referral for medication as indicated, use prescribed medication, cognitive restructuring, interpersonal,   family therapy, supportive therapy services.        Patient has reviewed and agreed to the above plan.      Miriam Luciano, [facility]  December 14, 2020

## 2021-02-03 ASSESSMENT — ANXIETY QUESTIONNAIRES: GAD7 TOTAL SCORE: 5

## 2021-03-02 ENCOUNTER — VIRTUAL VISIT (OUTPATIENT)
Dept: PSYCHOLOGY | Facility: CLINIC | Age: 41
End: 2021-03-02
Payer: COMMERCIAL

## 2021-03-02 DIAGNOSIS — F43.23 ADJUSTMENT DISORDER WITH MIXED ANXIETY AND DEPRESSED MOOD: Primary | ICD-10-CM

## 2021-03-02 PROCEDURE — 90832 PSYTX W PT 30 MINUTES: CPT | Mod: 95 | Performed by: SOCIAL WORKER

## 2021-03-02 ASSESSMENT — ANXIETY QUESTIONNAIRES
7. FEELING AFRAID AS IF SOMETHING AWFUL MIGHT HAPPEN: SEVERAL DAYS
1. FEELING NERVOUS, ANXIOUS, OR ON EDGE: SEVERAL DAYS
3. WORRYING TOO MUCH ABOUT DIFFERENT THINGS: SEVERAL DAYS
4. TROUBLE RELAXING: NOT AT ALL
2. NOT BEING ABLE TO STOP OR CONTROL WORRYING: SEVERAL DAYS
GAD7 TOTAL SCORE: 5
5. BEING SO RESTLESS THAT IT IS HARD TO SIT STILL: NOT AT ALL
6. BECOMING EASILY ANNOYED OR IRRITABLE: SEVERAL DAYS

## 2021-03-02 ASSESSMENT — PATIENT HEALTH QUESTIONNAIRE - PHQ9: SUM OF ALL RESPONSES TO PHQ QUESTIONS 1-9: 2

## 2021-03-02 NOTE — PROGRESS NOTES
Discharge Summary  Multiple Sessions    Client Name: Azalea Mckeon MRN#: [id number] YOB: 1980    Discharge Date:   2021    Service Modality: Video Visit:      Provider verified identity through the following two step process.  Patient provided:  Patient     Telemedicine Visit: The patient's condition can be safely assessed and treated via synchronous audio and visual telemedicine encounter.      Reason for Telemedicine Visit: Services only offered telehealth    Originating Site (Patient Location): Patient's place of employment    Distant Site (Provider Location): Provider Remote Setting    Consent:  The patient/guardian has verbally consented to: the potential risks and benefits of telemedicine (video visit) versus in person care; bill my insurance or make self-payment for services provided; and responsibility for payment of non-covered services.     Patient would like the video invitation sent by:  Send to e-mail at: [email or web address]    Mode of Communication:  Video Conference via Amwell    As the provider I attest to compliance with applicable laws and regulations related to telemedicine.    Service Type: Individual      Session Start Time: 1033  Session End Time: 1050       Session Length: 17     Session #: 5     Attendees: Client      Focus of Treatment Objective(s):  Client's presenting concerns included: Anxiety - managing worries  Stage of Change at time of Discharge: MAINTENANCE (Working to maintain change, with risk of relapse)    Medication Adherence:  Yes    Chemical Use:  No    Assessment: Current Emotional / Mental Status (status of significant symptoms):    Risk status (Self / Other harm or suicidal ideation)  Client denies current fears or concerns for personal safety.  Client denies current or recent suicidal ideation or behaviors.  Client denies current or recent homicidal ideation or behaviors.  Client denies current or recent self injurious behavior  or ideation.  Client denies other safety concerns.  A safety and risk management plan has not been developed at this time, however client was given the after-hours number should there be a change in any of these risk factors.    Appearance:   Appropriate   Eye Contact:   Good   Psychomotor Behavior: Normal   Attitude:   Cooperative   Orientation:   All  Speech   Rate / Production: Talkative Normal    Volume:  Normal   Mood:    Normal  Affect:    Appropriate   Thought Content:  Clear   Thought Form:  Coherent  Logical   Insight:   Good     DSM5 Diagnoses: (Sustained by DSM5 Criteria Listed Above)  Diagnoses: Adjustment Disorders  309.28 (F43.23) With mixed anxiety and depressed mood  Psychosocial & Contextual Factors: upon intake COVID-19, isolation.  WHODAS 2.0 (12 item) Score: 19    Reason for Discharge:  Client is satisfied with progress      Aftercare Plan:  Client may resume counseling services at any time in the future by calling the [facility] Intake Office, [phone].      Miriam Luciano, [facility]  March 2, 2021

## 2021-03-03 ASSESSMENT — ANXIETY QUESTIONNAIRES: GAD7 TOTAL SCORE: 5

## 2021-04-12 NOTE — PROGRESS NOTES
[facility]  [address] [address] [address]  Phone: [phone]  Primary Provider: No Ref-Primary, Physician  Pre-op Performing Provider: RIGO MG      PREOPERATIVE EVALUATION:  Today's date: 4/14/2021    Azalea Mckeon is a 40 year old female who presents for a preoperative evaluation.    Surgical Information:  Surgery/Procedure: liposuction  Surgery Location: [facility]  Surgeon: Mackenzie  Surgery Date: 4/22/2021  Time of Surgery: 8:30 am  Where patient plans to recover: At home with family  Fax number for surgical facility: [phone]  Type of Anesthesia Anticipated: General    Assessment & Plan     The proposed surgical procedure is considered INTERMEDIATE risk.    1. Preop general physical exam  - CBC with platelets  - Basic metabolic panel  (Ca, Cl, CO2, Creat, Gluc, K, Na, BUN)    2. Elective surgical procedure    3. Adjustment disorder with mixed anxiety and depressed mood  - stable         Risks and Recommendations:  The patient has the following additional risks and recommendations for perioperative complications:   - No identified additional risk factors other than previously addressed    Medication Instructions:  Patient is to take all scheduled medications on the day of surgery    RECOMMENDATION:  APPROVAL GIVEN to proceed with proposed procedure, without further diagnostic evaluation.      Subjective     HPI related to upcoming procedure:   Will be undergoing Liposuction on bilateral legs, back, and upper arms.     Preop Questions 4/14/2021   1. Have you ever had a heart attack or stroke? No   2. Have you ever had surgery on your heart or blood vessels, such as a stent placement, a coronary artery bypass, or surgery on an artery in your head, neck, heart, or legs? No   3. Do you have chest pain with activity? No   4. Do you have a history of  heart failure? No   5. Do you currently have a cold, bronchitis or symptoms of other infection? No   6.  Do you have a cough, shortness of breath, or wheezing? No   7. Do you or anyone in your family have previous history of blood clots? No   8. Do you or does anyone in your family have a serious bleeding problem such as prolonged bleeding following surgeries or cuts? No   9. Have you ever had problems with anemia or been told to take iron pills? No   10. Have you had any abnormal blood loss such as black, tarry or bloody stools, or abnormal vaginal bleeding? No   11. Have you ever had a blood transfusion? No   12. Are you willing to have a blood transfusion if it is medically needed before, during, or after your surgery? Yes   13. Have you or any of your relatives ever had problems with anesthesia? No   14. Do you have sleep apnea, excessive snoring or daytime drowsiness? No   15. Do you have any artifical heart valves or other implanted medical devices like a pacemaker, defibrillator, or continuous glucose monitor? No   16. Do you have artificial joints? No   17. Are you allergic to latex? YES: Rash    18. Is there any chance that you may be pregnant? No     Health Care Directive:  Patient does not have a Health Care Directive or Living Will: Discussed advance care planning with patient; information given to patient to review.    Preoperative Review of :   reviewed - no record of controlled substances prescribed.      Status of Chronic Conditions:  DEPRESSION - Patient has a long history of Depression of moderate severity requiring medication for control with recent symptoms being stable..Current symptoms of depression include none.       Review of Systems  CONSTITUTIONAL: NEGATIVE for fever, chills, change in weight  INTEGUMENTARY/SKIN: NEGATIVE for worrisome rashes, moles or lesions  EYES: NEGATIVE for vision changes or irritation  ENT/MOUTH: NEGATIVE for ear, mouth and throat problems  RESP: NEGATIVE for significant cough or SOB  BREAST: NEGATIVE for masses, tenderness or discharge  CV: NEGATIVE for chest  pain, palpitations or peripheral edema  GI: NEGATIVE for nausea, abdominal pain, heartburn, or change in bowel habits  : NEGATIVE for frequency, dysuria, or hematuria  MUSCULOSKELETAL: NEGATIVE for significant arthralgias or myalgia  NEURO: NEGATIVE for weakness, dizziness or paresthesias  ENDOCRINE: NEGATIVE for temperature intolerance, skin/hair changes  HEME: NEGATIVE for bleeding problems  PSYCHIATRIC: NEGATIVE for changes in mood or affect    Patient Active Problem List    Diagnosis Date Noted     Anxiety 2015     Priority: Medium     CARDIOVASCULAR SCREENING; LDL GOAL LESS THAN 160 01/10/2013     Priority: Medium      Past Medical History:   Diagnosis Date     NO ACTIVE PROBLEMS      Past Surgical History:   Procedure Laterality Date     GYN SURGERY           GYN SURGERY           GYN SURGERY  2013    C/S     GYN SURGERY      C/S     HC REMOVAL OF OVARIAN CYST(S)       TUBAL LIGATION       Current Outpatient Medications   Medication Sig Dispense Refill     escitalopram (LEXAPRO) 20 MG tablet Take 1 tablet (20 mg) by mouth daily 90 tablet 1       Allergies   Allergen Reactions     Percocet [Oxycodone-Acetaminophen] Itching     Latex Rash        Social History     Tobacco Use     Smoking status: Former Smoker     Quit date: 2009     Years since quittin.7     Smokeless tobacco: Never Used   Substance Use Topics     Alcohol use: No     Family History   Problem Relation Age of Onset     Diabetes Father      Substance Abuse Father      Bleeding Disorder Sister      Pulmonary Embolism Sister         33     Depression Sister      Anxiety Disorder Mother      Autism Spectrum Disorder Daughter      Anxiety Disorder Daughter      Cancer No family hx of      Hypertension No family hx of      Cerebrovascular Disease No family hx of      Thyroid Disease No family hx of      Glaucoma No family hx of      Macular Degeneration No family hx of      History   Drug Use  "No         Objective     /80   Pulse 76   Temp 98.6  F (37  C) (Oral)   Ht 1.632 m (5' 4.25\")   Wt 67.9 kg (149 lb 9.6 oz)   BMI 25.48 kg/m      Physical Exam    GENERAL APPEARANCE: healthy, alert and no distress     EYES: EOMI, PERRL     HENT: ear canals and TM's normal and nose and mouth without ulcers or lesions     NECK: no adenopathy, no asymmetry, masses, or scars and thyroid normal to palpation     RESP: lungs clear to auscultation - no rales, rhonchi or wheezes     CV: regular rates and rhythm, normal S1 S2, no S3 or S4 and no murmur, click or rub     ABDOMEN:  soft, nontender, no HSM or masses and bowel sounds normal     MS: extremities normal- no gross deformities noted, no evidence of inflammation in joints, FROM in all extremities.     SKIN: no suspicious lesions or rashes     NEURO: Normal strength and tone, sensory exam grossly normal, mentation intact and speech normal     PSYCH: mentation appears normal. and affect normal/bright     LYMPHATICS: No cervical adenopathy      Diagnostics:  Recent Results (from the past 24 hour(s))   CBC with platelets    Collection Time: 04/14/21  8:33 AM   Result Value Ref Range    WBC 5.3 4.0 - 11.0 10e9/L    RBC Count 4.29 3.8 - 5.2 10e12/L    Hemoglobin 13.3 11.7 - 15.7 g/dL    Hematocrit 39.9 35.0 - 47.0 %    MCV 93 78 - 100 fl    MCH 31.0 26.5 - 33.0 pg    MCHC 33.3 31.5 - 36.5 g/dL    RDW 12.9 10.0 - 15.0 %    Platelet Count 206 150 - 450 10e9/L   Basic metabolic panel  (Ca, Cl, CO2, Creat, Gluc, K, Na, BUN)    Collection Time: 04/14/21  8:33 AM   Result Value Ref Range    Sodium 137 133 - 144 mmol/L    Potassium 3.9 3.4 - 5.3 mmol/L    Chloride 105 94 - 109 mmol/L    Carbon Dioxide 29 20 - 32 mmol/L    Anion Gap 3 3 - 14 mmol/L    Glucose 98 70 - 99 mg/dL    Urea Nitrogen 8 7 - 30 mg/dL    Creatinine 0.78 0.52 - 1.04 mg/dL    GFR Estimate >90 >60 mL/min/[1.73_m2]    GFR Estimate If Black >90 >60 mL/min/[1.73_m2]    Calcium 8.8 8.5 - 10.1 mg/dL      No " EKG required, no history of coronary heart disease, significant arrhythmia, peripheral arterial disease or other structural heart disease.    Revised Cardiac Risk Index (RCRI):  The patient has the following serious cardiovascular risks for perioperative complications:   - No serious cardiac risks = 0 points     RCRI Interpretation: 0 points: Class I (very low risk - 0.4% complication rate)           Signed Electronically by: MOLINA St CNP  Copy of this evaluation report is provided to requesting physician.

## 2021-04-13 ENCOUNTER — MYC MEDICAL ADVICE (OUTPATIENT)
Dept: URGENT CARE | Facility: URGENT CARE | Age: 41
End: 2021-04-13

## 2021-04-13 ENCOUNTER — TELEPHONE (OUTPATIENT)
Dept: FAMILY MEDICINE | Facility: CLINIC | Age: 41
End: 2021-04-13

## 2021-04-13 NOTE — TELEPHONE ENCOUNTER
Patient Quality Outreach      Summary:        Patient is due/failing the following:   Cervical Cancer Screening - PAP Needed and Annual wellness, date due: 2016    Type of outreach:    Sent [employer] message.    Questions for provider review:    None                                                                                                                                     Mireya Cummings CMA       Chart routed to close.

## 2021-04-14 ENCOUNTER — OFFICE VISIT (OUTPATIENT)
Dept: FAMILY MEDICINE | Facility: CLINIC | Age: 41
End: 2021-04-14
Payer: COMMERCIAL

## 2021-04-14 VITALS
HEIGHT: 64 IN | SYSTOLIC BLOOD PRESSURE: 119 MMHG | DIASTOLIC BLOOD PRESSURE: 80 MMHG | HEART RATE: 76 BPM | WEIGHT: 149.6 LBS | BODY MASS INDEX: 25.54 KG/M2 | TEMPERATURE: 98.6 F

## 2021-04-14 DIAGNOSIS — F43.23 ADJUSTMENT DISORDER WITH MIXED ANXIETY AND DEPRESSED MOOD: ICD-10-CM

## 2021-04-14 DIAGNOSIS — Z41.9 ELECTIVE SURGICAL PROCEDURE: ICD-10-CM

## 2021-04-14 DIAGNOSIS — Z01.818 PREOP GENERAL PHYSICAL EXAM: Primary | ICD-10-CM

## 2021-04-14 LAB
ANION GAP SERPL CALCULATED.3IONS-SCNC: 3 MMOL/L (ref 3–14)
BUN SERPL-MCNC: 8 MG/DL (ref 7–30)
CALCIUM SERPL-MCNC: 8.8 MG/DL (ref 8.5–10.1)
CHLORIDE SERPL-SCNC: 105 MMOL/L (ref 94–109)
CO2 SERPL-SCNC: 29 MMOL/L (ref 20–32)
CREAT SERPL-MCNC: 0.78 MG/DL (ref 0.52–1.04)
ERYTHROCYTE [DISTWIDTH] IN BLOOD BY AUTOMATED COUNT: 12.9 % (ref 10–15)
GFR SERPL CREATININE-BSD FRML MDRD: >90 ML/MIN/{1.73_M2}
GLUCOSE SERPL-MCNC: 98 MG/DL (ref 70–99)
HCT VFR BLD AUTO: 39.9 % (ref 35–47)
HGB BLD-MCNC: 13.3 G/DL (ref 11.7–15.7)
MCH RBC QN AUTO: 31 PG (ref 26.5–33)
MCHC RBC AUTO-ENTMCNC: 33.3 G/DL (ref 31.5–36.5)
MCV RBC AUTO: 93 FL (ref 78–100)
PLATELET # BLD AUTO: 206 10E9/L (ref 150–450)
POTASSIUM SERPL-SCNC: 3.9 MMOL/L (ref 3.4–5.3)
RBC # BLD AUTO: 4.29 10E12/L (ref 3.8–5.2)
SODIUM SERPL-SCNC: 137 MMOL/L (ref 133–144)
WBC # BLD AUTO: 5.3 10E9/L (ref 4–11)

## 2021-04-14 PROCEDURE — 85027 COMPLETE CBC AUTOMATED: CPT | Performed by: NURSE PRACTITIONER

## 2021-04-14 PROCEDURE — 99214 OFFICE O/P EST MOD 30 MIN: CPT | Performed by: NURSE PRACTITIONER

## 2021-04-14 PROCEDURE — 36415 COLL VENOUS BLD VENIPUNCTURE: CPT | Performed by: NURSE PRACTITIONER

## 2021-04-14 PROCEDURE — 80048 BASIC METABOLIC PNL TOTAL CA: CPT | Performed by: NURSE PRACTITIONER

## 2021-04-14 ASSESSMENT — MIFFLIN-ST. JEOR: SCORE: 1337.55

## 2021-04-14 NOTE — PATIENT INSTRUCTIONS

## 2021-04-14 NOTE — LETTER
My Depression Action Plan  Name: Azalea Mckeon   Date of Birth 1980  Date: 4/12/2021    My doctor: No Ref-Primary, Physician   My clinic: [facility]  [id number] [place] [id number] [phone]          GREEN    ZONE   Good Control    What it looks like:     Things are going generally well. You have normal ups and downs. You may even feel depressed from time to time, but bad moods usually last less than a day.   What you need to do:  1. Continue to care for yourself (see self care plan)  2. Check your depression survival kit and update it as needed  3. Follow your physician s recommendations including any medication.  4. Do not stop taking medication unless you consult with your physician first.           YELLOW         ZONE Getting Worse    What it looks like:     Depression is starting to interfere with your life.     It may be hard to get out of bed; you may be starting to isolate yourself from others.    Symptoms of depression are starting to last most all day and this has happened for several days.     You may have suicidal thoughts but they are not constant.   What you need to do:     1. Call your care team. Your response to treatment will improve if you keep your care team informed of your progress. Yellow periods are signs an adjustment may need to be made.     2. Continue your self-care.  Just get dressed and ready for the day.  Don't give yourself time to talk yourself out of it.    3. Talk to someone in your support network.    4. Open up your Depression Self-Care Plan/Wellness Kit.           RED    ZONE Medical Alert - Get Help    What it looks like:     Depression is seriously interfering with your life.     You may experience these or other symptoms: You can t get out of bed most days, can t work or engage in other necessary activities, you have trouble taking care of basic hygiene, or basic responsibilities, thoughts of suicide or death that will  not go away, self-injurious behavior.     What you need to do:  1. Call your care team and request a same-day appointment. If they are not available (weekends or after hours) call your local crisis line, emergency room or 911.          Depression Self-Care Plan / Wellness Kit    Many people find that medication and therapy are helpful treatments for managing depression. In addition, making small changes to your everyday life can help to boost your mood and improve your wellbeing. Below are some tips for you to consider. Be sure to talk with your medical provider and/or behavioral health consultant if your symptoms are worsening or not improving.     Sleep   Sleep hygiene  means all of the habits that support good, restful sleep. It includes maintaining a consistent bedtime and wake time, using your bedroom only for sleeping or sex, and keeping the bedroom dark and free of distractions like a computer, smartphone, or television.     Develop a Healthy Routine  Maintain good hygiene. Get out of bed in the morning, make your bed, brush your teeth, take a shower, and get dressed. Don t spend too much time viewing media that makes you feel stressed. Find time to relax each day.    Exercise  Get some form of exercise every day. This will help reduce pain and release endorphins, the  feel good  chemicals in your brain. It can be as simple as just going for a walk or doing some gardening, anything that will get you moving.      Diet  Strive to eat healthy foods, including fruits and vegetables. Drink plenty of water. Avoid excessive sugar, caffeine, alcohol, and other mood-altering substances.     Stay Connected with Others  Stay in touch with friends and family members.    Manage Your Mood  Try deep breathing, massage therapy, biofeedback, or meditation. Take part in fun activities when you can. Try to find something to smile about each day.     Psychotherapy  Be open to working with a therapist if your provider recommends  it.     Medication  Be sure to take your medication as prescribed. Most anti-depressants need to be taken every day. It usually takes several weeks for medications to work. Not all medicines work for all people. It is important to follow-up with your provider to make sure you have a treatment plan that is working for you. Do not stop your medication abruptly without first discussing it with your provider.    Crisis Resources   These hotlines are for both adults and children. They and are open 24 hours a day, 7 days a week unless noted otherwise.      National Suicide Prevention Lifeline   6-045-891-TALK (9627)      Crisis Text Line    www.crisistextline.org  Text HOME to 316425 from anywhere in the [place], anytime, about any type of crisis. A live, trained crisis counselor will receive the text and respond quickly.      [place] Lifeline for LGBTQ Youth  A national crisis intervention and suicide lifeline for LGBTQ youth under 25. Provides a safe place to talk without judgement. Call 1-669.570.8844; text START to 821540 or visit www.thetrevorproject.org to talk to a trained counselor.      For [facility] crisis numbers, visit the [facility] website at:  [place]://[place].gov/dhs/people-we-serve/adults/health-care/mental-health/resources/crisis-contacts.jsp

## 2021-08-30 DIAGNOSIS — F43.23 ADJUSTMENT DISORDER WITH MIXED ANXIETY AND DEPRESSED MOOD: ICD-10-CM

## 2021-08-30 NOTE — TELEPHONE ENCOUNTER
"Requested Prescriptions   Pending Prescriptions Disp Refills    escitalopram (LEXAPRO) 20 MG tablet 90 tablet 1     Sig: Take 1 tablet (20 mg) by mouth daily        SSRIs Protocol Failed - 8/30/2021  8:23 AM        Failed - PHQ-9 score less than 5 in past 6 months     Please review last PHQ-9 score.           Passed - Medication is active on med list        Passed - Patient is age 18 or older        Passed - No active pregnancy on record        Passed - No positive pregnancy test in last 12 months        Passed - Recent (6 mo) or future (30 days) visit within the authorizing provider's specialty     Patient had office visit in the last 6 months or has a visit in the next 30 days with authorizing provider or within the authorizing provider's specialty.  See \"Patient Info\" tab in inbasket, or \"Choose Columns\" in Meds & Orders section of the refill encounter.                  "

## 2021-09-03 RX ORDER — ESCITALOPRAM OXALATE 20 MG/1
20 TABLET ORAL DAILY
Qty: 90 TABLET | Refills: 0 | Status: SHIPPED | OUTPATIENT
Start: 2021-09-03 | End: 2021-12-01

## 2021-09-14 ENCOUNTER — VIRTUAL VISIT (OUTPATIENT)
Dept: PSYCHOLOGY | Facility: CLINIC | Age: 41
End: 2021-09-14
Payer: COMMERCIAL

## 2021-09-14 DIAGNOSIS — F43.23 ADJUSTMENT DISORDER WITH MIXED ANXIETY AND DEPRESSED MOOD: Primary | ICD-10-CM

## 2021-09-14 PROCEDURE — 90834 PSYTX W PT 45 MINUTES: CPT | Mod: 95 | Performed by: SOCIAL WORKER

## 2021-09-14 ASSESSMENT — PATIENT HEALTH QUESTIONNAIRE - PHQ9: SUM OF ALL RESPONSES TO PHQ QUESTIONS 1-9: 14

## 2021-09-14 ASSESSMENT — ANXIETY QUESTIONNAIRES
5. BEING SO RESTLESS THAT IT IS HARD TO SIT STILL: SEVERAL DAYS
4. TROUBLE RELAXING: SEVERAL DAYS
2. NOT BEING ABLE TO STOP OR CONTROL WORRYING: MORE THAN HALF THE DAYS
1. FEELING NERVOUS, ANXIOUS, OR ON EDGE: MORE THAN HALF THE DAYS
6. BECOMING EASILY ANNOYED OR IRRITABLE: MORE THAN HALF THE DAYS
3. WORRYING TOO MUCH ABOUT DIFFERENT THINGS: MORE THAN HALF THE DAYS
GAD7 TOTAL SCORE: 11
7. FEELING AFRAID AS IF SOMETHING AWFUL MIGHT HAPPEN: SEVERAL DAYS

## 2021-09-14 NOTE — PROGRESS NOTES
Progress Note    Patient Name: Azalea Mckeon  Date: 2021         Service Type: Individual      Session Start Time: 1103  Session End Time: 1152     Session Length: 38-52    Session #: 6    Attendees: Client    Service Modality:  Video Visit:      Provider verified identity through the following two step process.  Patient provided:  Patient  and Patient is known previously to provider    Telemedicine Visit: The patient's condition can be safely assessed and treated via synchronous audio and visual telemedicine encounter.      Reason for Telemedicine Visit: Services only offered telehealth    Originating Site (Patient Location): Patient's home    Distant Site (Provider Location): Provider Remote Setting    Consent:  The patient/guardian has verbally consented to: the potential risks and benefits of telemedicine (video visit) versus in person care; bill my insurance or make self-payment for services provided; and responsibility for payment of non-covered services.     Patient would like the video invitation sent by: Send to e-mail at: [email or web address]    Mode of Communication:  Video Conference via [employer]    As the provider I attest to compliance with applicable laws and regulations related to telemedicine.     Treatment Plan Last Reviewed: 2021 due 21  PHQ-9 / MUSHTAQ-7 :     DATA  Interactive Complexity: No  Crisis: No       Progress Since Last Session (Related to Symptoms / Goals / Homework):   Symptoms: Worsening phq-9 mushtaq-7    Homework: Completed in session      Episode of Care Goals: Minimal progress - RELAPSE (Returned to unhealthy behavior); Intervened by reassessing readiness to change and identifying appropriate stage.  Identified reasons for relapse, successes, and change talk     Current / Ongoing Stressors and Concerns:   marriage isolation and identifying triggers     Treatment Objective(s) Addressed in This Session:     Patient will  demonstrate and report a level of depression that can be managed at a [facility].  Absence of persistent depression mood and report of reduced frequency and intensity of low mood and absence of persistent low energy and motivation to acceptable levels, report subjective improved motivation and increased energy for a period of 90 days, within 6 months as clinically observed and by patient self-report.  Patient will demonstrate and report a level of anxiety that can be managed at a lower level of care.  Absence of persistent anxious mood and report of reduced frequency and intensity of worry and absence of persistent anxious mood to acceptable levels, no panic attacks, report subjective comfort with rumination for a period of 90 days, within 6 months as clinically observed and by patient self-report.     Intervention:   Therapist met with patient to review goals and interventions. Therapist utilized reflected listening as patient gave brief reflection of week. Patient reported an increase in depression and anxiety and processed. Therapist supported patient as she processed and validated patient. Therapist removed shame and educated patient on the importance of allowing self to feel that loss or relationship along with setting firm boundaries with spouse.   Patient presented with an anxious affect. Patient was engaged in session and open to feedback. Patient reported no safety concerns.         ASSESSMENT: Current Emotional / Mental Status (status of significant symptoms):   Risk status (Self / Other harm or suicidal ideation)   Patient denies current fears or concerns for personal safety.   Patient denies current or recent suicidal ideation or behaviors.   Patient denies current or recent homicidal ideation or behaviors.   Patient denies current or recent self injurious behavior or ideation.   Patient denies other safety concerns.   Patient reports there has been no change in risk factors since their last session.      Patient reports there has been no change in protective factors since their last session.     A safety and risk management plan has been developed including: Patient consented to co-developed safety plan.  Safety and risk management plan was completed.  Patient agreed to use safety plan should any safety concerns arise.  A copy was given to the patient.     Appearance:   Appropriate    Eye Contact:   Fair    Psychomotor Behavior: Normal    Attitude:   Cooperative  Friendly Pleasant   Orientation:   All   Speech    Rate / Production: Talkative    Volume:  Normal    Mood:    Anxious  Depressed  Sad    Affect:    Worrisome    Thought Content:  Clear    Thought Form:  Coherent    Insight:    Fair      Medication Review:   No changes to current psychiatric medication(s)     Medication Compliance:   Yes     Changes in Health Issues:   None reported     Chemical Use Review:   Substance Use: Chemical use reviewed, no active concerns identified      Tobacco Use: No current tobacco use.      Diagnosis:  1. Adjustment disorder with mixed anxiety and depressed mood        Collateral Reports Completed:   Not Applicable    PLAN: (Patient Tasks / Therapist Tasks / Other)  Therapist removed shame and educated patient on the importance of allowing self to feel that loss or relationship along with setting firm boundaries with spouse.     Miriam Luciano, [facility]  9/14/2021                                                         ______________________________________________________________________    Treatment Plan    Patient's Name: Azalea Mckeon  YOB: 1980    Date: 9/14/2021    DSM5 Diagnoses: Adjustment Disorders  309.28 (F43.23) With mixed anxiety and depressed mood  Psychosocial / Contextual Factors: COVID-19, isolation and identifying triggers  WHODAS: 19    Referral / Collaboration:  Referral to another professional/service is not indicated at this time..    Anticipated number of session or this episode of  care: 15      MeasurableTreatment Goal(s) related to diagnosis / functional impairment(s)  Goal 1: Patient will absence of persistent depression mood and report of reduced frequency and intensity of low mood and absence of persistent low energy and motivation to acceptable levels, report subjective improved motivation and increased energy for a period of 90 days, within 6 months as clinically observed and by patient self-report.    I will know I've met my goal when I go back to me. When I don't have bad thoughts or anger.      Objective #A (Patient Action)    Patient will demonstrate and report a level of depression that can be managed at a [facility].  Absence of persistent depression mood and report of reduced frequency and intensity of low mood and absence of persistent low energy and motivation to acceptable levels, report subjective improved motivation and increased energy for a period of 90 days, within 6 months as clinically observed and by patient self-report.  Status: Restarted - Date: 9/14/2021     Intervention(s)  Therapist will provide individual therapy to identify triggers to depression, gain feedback on helpful coping tools and thought-reframing techniques, and identify preferred way of being.  Tx to include discussion of current stressors and interpersonal conflicts and how to cope with these, coaching, diagnostic testing, referral for medication as indicated, use prescribed medication, cognitive restructuring, interpersonal, family therapy, supportive therapy services.        Goal 2: Patient will absence of persistent anxiety mood and report of reduced frequency and intensity of worry and absence of persistent anxious mood to acceptable levels, no panic attacks, report subjective comfort with rumination for a period of 90 days. Within 6 months as clinically observed and by patient self-report.    I will know I've met my goal when I can let things roll off my back.      Objective #A (Patient  Action)    Status: Restarted - Date: 9/14/2021     Patient will demonstrate and report a level of anxiety that can be managed at a lower level of care.  Absence of persistent anxious mood and report of reduced frequency and intensity of worry and absence of persistent anxious mood to acceptable levels, no panic attacks, report subjective comfort with rumination for a period of 90 days, within 6 months as clinically observed and by patient self-report.    Intervention(s)  Therapist will provide individual therapy to identify triggers to anxiety, gain feedback on helpful coping tools and thought-reframing techniques, and identify preferred way of being. Tx to include discuss current stressors and interpersonal conflicts and how to cope with these, coaching, diagnostic testing , referral for medication as indicated, use prescribed medication, cognitive restructuring, interpersonal,   family therapy, supportive therapy services.        Patient has reviewed and agreed to the above plan.      Miriam Luciano, [facility]  9/14/2021

## 2021-09-15 ASSESSMENT — ANXIETY QUESTIONNAIRES: GAD7 TOTAL SCORE: 11

## 2021-10-02 ENCOUNTER — HEALTH MAINTENANCE LETTER (OUTPATIENT)
Age: 41
End: 2021-10-02

## 2021-11-29 DIAGNOSIS — F43.23 ADJUSTMENT DISORDER WITH MIXED ANXIETY AND DEPRESSED MOOD: ICD-10-CM

## 2021-11-29 NOTE — LETTER
December 3, 2021    [place]  [address] [address] [address]    Dear Azalea,       We recently received a refill request for escitalopram (LEXAPRO) 20 MG tablet.  We have refilled this for a one time 30 day supply only because you are due for a:    Anxiety/medication check office visit      Please call at your earliest convenience so that there will not be a delay with your future refills.          Thank you,   Your [facility] Team/  [phone]

## 2021-12-01 RX ORDER — ESCITALOPRAM OXALATE 20 MG/1
TABLET ORAL
Qty: 30 TABLET | Refills: 0 | Status: SHIPPED | OUTPATIENT
Start: 2021-12-01 | End: 2022-01-04

## 2021-12-01 NOTE — TELEPHONE ENCOUNTER
"Requested Prescriptions   Pending Prescriptions Disp Refills    escitalopram (LEXAPRO) 20 MG tablet [Pharmacy Med Name: ESCITALOPRAM 20 MG TABLET] 90 tablet 0     Sig: TAKE 1 TABLET BY MOUTH EVERY DAY        SSRIs Protocol Failed - 11/29/2021 12:32 AM        Failed - PHQ-9 score less than 5 in past 6 months     Please review last PHQ-9 score.           Failed - Recent (6 mo) or future (30 days) visit within the authorizing provider's specialty     Patient had office visit in the last 6 months or has a visit in the next 30 days with authorizing provider or within the authorizing provider's specialty.  See \"Patient Info\" tab in inbasket, or \"Choose Columns\" in Meds & Orders section of the refill encounter.            Passed - Medication is active on med list        Passed - Patient is age 18 or older        Passed - No active pregnancy on record        Passed - No positive pregnancy test in last 12 months              "

## 2022-01-01 DIAGNOSIS — F43.23 ADJUSTMENT DISORDER WITH MIXED ANXIETY AND DEPRESSED MOOD: ICD-10-CM

## 2022-01-01 NOTE — LETTER
January 4, 2022    Azale[place] Mckeon  [address] [address] [address]    Dear Azalea,       We recently received a refill request for escitalopram (LEXAPRO) 20 MG tablet.  We have refilled this for a one time 30 day supply only because you are due for a:    Medication refill check office visit      Please call at your earliest convenience so that there will not be a delay with your future refills.          Thank you,   Your [facility] Team/JAYLIN  [phone]

## 2022-01-03 NOTE — TELEPHONE ENCOUNTER
"Requested Prescriptions   Pending Prescriptions Disp Refills    escitalopram (LEXAPRO) 20 MG tablet [Pharmacy Med Name: ESCITALOPRAM 20 MG TABLET] 30 tablet 0     Sig: TAKE 1 TABLET BY MOUTH EVERY DAY        SSRIs Protocol Failed - 1/1/2022 11:31 AM        Failed - PHQ-9 score less than 5 in past 6 months     Please review last PHQ-9 score.           Failed - Recent (6 mo) or future (30 days) visit within the authorizing provider's specialty     Patient had office visit in the last 6 months or has a visit in the next 30 days with authorizing provider or within the authorizing provider's specialty.  See \"Patient Info\" tab in inbasket, or \"Choose Columns\" in Meds & Orders section of the refill encounter.            Passed - Medication is active on med list        Passed - Patient is age 18 or older        Passed - No active pregnancy on record        Passed - No positive pregnancy test in last 12 months              "

## 2022-01-04 RX ORDER — ESCITALOPRAM OXALATE 20 MG/1
TABLET ORAL
Qty: 30 TABLET | Refills: 0 | Status: SHIPPED | OUTPATIENT
Start: 2022-01-04 | End: 2023-04-07

## 2022-01-22 ENCOUNTER — HEALTH MAINTENANCE LETTER (OUTPATIENT)
Age: 42
End: 2022-01-22

## 2022-09-04 ENCOUNTER — HEALTH MAINTENANCE LETTER (OUTPATIENT)
Age: 42
End: 2022-09-04

## 2023-02-03 ENCOUNTER — ANCILLARY PROCEDURE (OUTPATIENT)
Dept: MAMMOGRAPHY | Facility: CLINIC | Age: 43
End: 2023-02-03
Payer: COMMERCIAL

## 2023-02-03 DIAGNOSIS — Z12.31 VISIT FOR SCREENING MAMMOGRAM: ICD-10-CM

## 2023-02-03 PROCEDURE — 77067 SCR MAMMO BI INCL CAD: CPT | Mod: TC | Performed by: RADIOLOGY

## 2023-02-03 PROCEDURE — 77063 BREAST TOMOSYNTHESIS BI: CPT | Mod: TC | Performed by: RADIOLOGY

## 2023-03-02 ENCOUNTER — ANCILLARY PROCEDURE (OUTPATIENT)
Dept: ULTRASOUND IMAGING | Facility: CLINIC | Age: 43
End: 2023-03-02
Attending: FAMILY MEDICINE
Payer: COMMERCIAL

## 2023-03-02 ENCOUNTER — ANCILLARY PROCEDURE (OUTPATIENT)
Dept: MAMMOGRAPHY | Facility: CLINIC | Age: 43
End: 2023-03-02
Attending: FAMILY MEDICINE
Payer: COMMERCIAL

## 2023-03-02 DIAGNOSIS — R92.8 ABNORMAL MAMMOGRAM: ICD-10-CM

## 2023-03-02 PROCEDURE — 77065 DX MAMMO INCL CAD UNI: CPT | Mod: RT

## 2023-03-02 PROCEDURE — 76642 ULTRASOUND BREAST LIMITED: CPT | Mod: RT

## 2023-03-02 PROCEDURE — G0279 TOMOSYNTHESIS, MAMMO: HCPCS

## 2023-03-09 ENCOUNTER — TRANSFERRED RECORDS (OUTPATIENT)
Dept: MULTI SPECIALTY CLINIC | Facility: CLINIC | Age: 43
End: 2023-03-09

## 2023-03-09 LAB — RETINOPATHY: NORMAL

## 2023-03-17 ENCOUNTER — ANCILLARY PROCEDURE (OUTPATIENT)
Dept: MAMMOGRAPHY | Facility: CLINIC | Age: 43
End: 2023-03-17
Attending: FAMILY MEDICINE
Payer: COMMERCIAL

## 2023-03-17 DIAGNOSIS — R92.8 ABNORMAL MAMMOGRAM: ICD-10-CM

## 2023-03-17 PROCEDURE — 19081 BX BREAST 1ST LESION STRTCTC: CPT | Mod: RT | Performed by: RADIOLOGY

## 2023-03-17 PROCEDURE — 77066 DX MAMMO INCL CAD BI: CPT | Performed by: RADIOLOGY

## 2023-03-17 PROCEDURE — 88342 IMHCHEM/IMCYTCHM 1ST ANTB: CPT | Mod: XS | Performed by: PATHOLOGY

## 2023-03-17 PROCEDURE — 88341 IMHCHEM/IMCYTCHM EA ADD ANTB: CPT | Mod: XS | Performed by: PATHOLOGY

## 2023-03-17 PROCEDURE — 88360 TUMOR IMMUNOHISTOCHEM/MANUAL: CPT | Mod: GC | Performed by: PATHOLOGY

## 2023-03-17 PROCEDURE — 88305 TISSUE EXAM BY PATHOLOGIST: CPT | Mod: GC | Performed by: PATHOLOGY

## 2023-03-17 PROCEDURE — G0279 TOMOSYNTHESIS, MAMMO: HCPCS | Performed by: RADIOLOGY

## 2023-03-17 RX ORDER — IOPAMIDOL 755 MG/ML
90 INJECTION, SOLUTION INTRAVASCULAR ONCE
Status: COMPLETED | OUTPATIENT
Start: 2023-03-17 | End: 2023-03-17

## 2023-03-17 RX ADMIN — IOPAMIDOL 90 ML: 755 INJECTION, SOLUTION INTRAVASCULAR at 10:47

## 2023-03-21 ENCOUNTER — TELEPHONE (OUTPATIENT)
Dept: MAMMOGRAPHY | Facility: CLINIC | Age: 43
End: 2023-03-21
Payer: COMMERCIAL

## 2023-03-21 LAB
PATH REPORT.COMMENTS IMP SPEC: ABNORMAL
PATH REPORT.COMMENTS IMP SPEC: YES
PATH REPORT.FINAL DX SPEC: ABNORMAL
PATH REPORT.GROSS SPEC: ABNORMAL
PATH REPORT.MICROSCOPIC SPEC OTHER STN: ABNORMAL
PATH REPORT.RELEVANT HX SPEC: ABNORMAL
PATHOLOGY SYNOPTIC REPORT: ABNORMAL
PHOTO IMAGE: ABNORMAL

## 2023-03-21 NOTE — TELEPHONE ENCOUNTER
Spoke with patient regarding right breast biopsy results, which indicate invasive ductal carcinoma. Notified patient that the radiologist's recommendation is surgical and oncologic consultations. Patient verbalized understanding of these results and all questions answered to her satisfaction. Pt is scheduled with Dr. Souza and Dr. Dyson on 3/29.

## 2023-03-28 ENCOUNTER — TELEPHONE (OUTPATIENT)
Dept: SURGERY | Facility: CLINIC | Age: 43
End: 2023-03-28
Payer: COMMERCIAL

## 2023-03-28 NOTE — TELEPHONE ENCOUNTER
MDC Previsit      Date of call:3/28/23      Reviewed MDC: Pt to see Dr.Bryan Dyson  at 1:00pm and Dr. Mitra Souza  at 1:45pm      Address/Location of clinic: [address] [address]/Phone number: [phone]    Upon arrival to the clinic patient to take the elevator or stairs to the second floor and check in at   desk D      Call [phone] with any questions prior to this appointment            Viji Llamas RNCC  Surgical Oncology, Plastics and General Surgery  [facility]

## 2023-03-29 ENCOUNTER — PATIENT OUTREACH (OUTPATIENT)
Dept: ONCOLOGY | Facility: CLINIC | Age: 43
End: 2023-03-29

## 2023-03-29 ENCOUNTER — OFFICE VISIT (OUTPATIENT)
Dept: ONCOLOGY | Facility: CLINIC | Age: 43
End: 2023-03-29
Payer: COMMERCIAL

## 2023-03-29 ENCOUNTER — OFFICE VISIT (OUTPATIENT)
Dept: SURGERY | Facility: CLINIC | Age: 43
End: 2023-03-29
Payer: COMMERCIAL

## 2023-03-29 VITALS
WEIGHT: 157 LBS | DIASTOLIC BLOOD PRESSURE: 81 MMHG | SYSTOLIC BLOOD PRESSURE: 119 MMHG | BODY MASS INDEX: 26.8 KG/M2 | TEMPERATURE: 97.7 F | HEIGHT: 64 IN | OXYGEN SATURATION: 98 % | HEART RATE: 67 BPM

## 2023-03-29 VITALS
SYSTOLIC BLOOD PRESSURE: 119 MMHG | OXYGEN SATURATION: 98 % | HEIGHT: 64 IN | WEIGHT: 157 LBS | TEMPERATURE: 97.7 F | DIASTOLIC BLOOD PRESSURE: 81 MMHG | BODY MASS INDEX: 26.8 KG/M2 | HEART RATE: 67 BPM

## 2023-03-29 DIAGNOSIS — C50.811 MALIGNANT NEOPLASM OF OVERLAPPING SITES OF RIGHT BREAST IN FEMALE, ESTROGEN RECEPTOR POSITIVE (H): ICD-10-CM

## 2023-03-29 DIAGNOSIS — C50.811 MALIGNANT NEOPLASM OF OVERLAPPING SITES OF RIGHT BREAST IN FEMALE, ESTROGEN RECEPTOR POSITIVE (H): Primary | ICD-10-CM

## 2023-03-29 DIAGNOSIS — Z17.0 MALIGNANT NEOPLASM OF OVERLAPPING SITES OF RIGHT BREAST IN FEMALE, ESTROGEN RECEPTOR POSITIVE (H): Primary | ICD-10-CM

## 2023-03-29 DIAGNOSIS — Z17.0 MALIGNANT NEOPLASM OF OVERLAPPING SITES OF RIGHT BREAST IN FEMALE, ESTROGEN RECEPTOR POSITIVE (H): ICD-10-CM

## 2023-03-29 LAB
INTERPRETATION: NORMAL
LAB PDF RESULT: NORMAL
SIGNIFICANT RESULTS: NORMAL
SPECIMEN DESCRIPTION: NORMAL
TEST DETAILS, MDL: NORMAL

## 2023-03-29 PROCEDURE — 99204 OFFICE O/P NEW MOD 45 MIN: CPT | Performed by: INTERNAL MEDICINE

## 2023-03-29 PROCEDURE — 99205 OFFICE O/P NEW HI 60 MIN: CPT | Performed by: SURGERY

## 2023-03-29 PROCEDURE — 36415 COLL VENOUS BLD VENIPUNCTURE: CPT | Performed by: INTERNAL MEDICINE

## 2023-03-29 NOTE — PROGRESS NOTES
Writer received referral to Cancer Risk Management/Genetic Counseling.  Referred for: Breast Cancer/Family hx of Breast Cancer     Referral reviewed for appropriate plan, and sent to New Patient Scheduling for completion.    Ricarda Carballo, RN, BSN  Oncology New Patient Nurse Navigator   [facility]  [phone]

## 2023-03-29 NOTE — NURSING NOTE
"Azalea Mckeon's chief complaint for this visit includes:  Chief Complaint   Patient presents with     New Patient     invasive ductal carcinoma  path printed- imaging in EPIC Previsit completed ce CHECK IN 2ND FLOOR DESK D     PCP: No Ref-Primary, Physician    Referring Provider:  No referring provider defined for this encounter.    Ht 1.632 m (5' 4.25\")   BMI 25.48 kg/m    Data Unavailable        Allergies   Allergen Reactions     Percocet [Oxycodone-Acetaminophen] Itching     Latex Rash         Do you need any medication refills at today's visit? No    Oncology Rooming Note    March 29, 2023 12:50 PM   Azalea Mckeon is a 42 year old female who presents for:    Chief Complaint   Patient presents with     New Patient     invasive ductal carcinoma  path printed- imaging in EPIC Previsit completed ce CHECK IN 2ND FLOOR DESK D     Initial Vitals: /81 (BP Location: Left arm, Patient Position: Sitting, Cuff Size: Adult Regular)   Pulse 67   Temp 97.7  F (36.5  C) (Oral)   Ht 1.632 m (5' 4.25\")   Wt 71.2 kg (157 lb)   SpO2 98%   BMI 26.74 kg/m   Estimated body mass index is 26.74 kg/m  as calculated from the following:    Height as of this encounter: 1.632 m (5' 4.25\").    Weight as of this encounter: 71.2 kg (157 lb). Body surface area is 1.8 meters squared.  Data Unavailable Comment: Data Unavailable   No LMP recorded.  Allergies reviewed:   Medications reviewed:     Medications:   Pharmacy name entered into [facility]: [id number] [place], [place] - [address]    Clinical concerns:      Jocelynn Whaley                              "

## 2023-03-29 NOTE — LETTER
3/29/2023         RE: Azalea Mckeon  [address] [facility] [address]        Dear Colleague,    Thank you for referring your patient, Azalea Mckeon, to the [facility] [place]. Please see a copy of my visit note below.    [facility] Hematology / Oncology  Initial Visit / Consultation Note Mar 29, 2023  Name: Azalea Mckeon  :  1980  MRN:  [id number]    --------------------    Assessment / Plan:  Over the course of our visit, Azalea, her  and I reviewed the natural history of what appears to be invasive ductal carcinoma amongst the big span of DCIS.  Given the size of the span of calcifications and abnormalities on breast imaging, it is likely that she will need a mastectomy with sentinel lymph node biopsy.  Once we have a finalized surgical pathology report, I anticipate she will be a great candidate for an Oncotype DX test to determine the role of systemic chemotherapy.  We also reviewed the indications for radiation for positive margins, lumpectomy as well as positive lymph nodes.  Determination of tamoxifen versus ovarian suppression/aromatase inhibitor would also be determined by surgical pathology report as well as the Oncotype DX test.  She will plan to have genetic testing given her young age of diagnosis.  We will plan to see her back 3 to 4 weeks postoperatively to review pathology report, Oncotype DX score and finalize a plan for adjuvant therapy.    Thank you kindly for this consultation.  King Dyson MD    --------------------    Interval History:  Azalea present for evaluation of breast cancer.    Presented w/ couple months of right breast heaviness and pain.  No prior breast biopsies.    Maternal family history of early stage breast cancer (great-aunts and 2nd cousin).  No family history of ovarian, colon, uterine, prostate cancer.    Menarche age 13; 3 full term pregnancies (first at age 28); no nursing; 1/2 right ovary removed for  ovarian cyst; no HRT; no OCPs.    Screening mammogram Feb 2023:  Findings: The breasts are heterogeneously dense, which may obscure small masses.     There are possible calcifications in the upper right breast.  Bilateral breast augmentation.     The remainder of the breast tissue is unremarkable.  There is no suspicious finding of the left breast.    Diagnostic mammogram / ultrasound Mar 2023:  FINDINGS:    Additional mammographic images were obtained for further  evaluation of possible calcifications in the upper right breast.  Additional views demonstrate fine pleomorphic calcifications in a  segmental distribution spanning the upper inner and upper outer  quadrants measuring up to 10 cm. At approximately the 11:00 position  there is asymmetry with mild architectural distortion amidst the  calcifications.     Targeted right breast ultrasound was performed demonstrating vague  areas of shadowing throughout the upper outer and upper inner  quadrants. There is a more focal area of subtle shadowing at the 11:00  position, 5 cm from the nipple measuring up to 9 mm.    Contrast enhanced mammogram Mar 2023:  FINDINGS:   BREAST DENSITY: Scattered fibroglandular densities.     There is moderate background parenchymal enhancement.     On standard implant displaced mammographic views, in the right breast,  there is redemonstration of the segmental pleomorphic calcifications  in the right upper outer and upper inner breast. Additionally,  redemonstration of the focal asymmetry with superimposed  calcifications at 11:00 position, middle depth. No significant change  in the left breast.     On dual subtraction images, there is avid contrast enhancement  associated with the focal asymmetry in the right breast at 11:00  position, middle depth, seen best on CC view. There is mild diffuse  equivocal nonmass enhancement throughout the right breast, which could  match the distribution of the microcalcifications spanning at least  10  cm and involving the superior and central breast.     There is no suspicious enhancement in the left breast.     Breast biopsy Mar 2023:  RIGHT BREAST, STEREOTACTIC CORE BIOPSY:   -INVASIVE DUCTAL CARCINOMA, Loy[place] grade 3, at least 9 mm in linear extent.  -Ductal carcinoma in-situ (DCIS), high nuclear grade, solid type.  -Calcifications associated with invasive carcinoma and DCIS.  -Invasive carcinoma is estrogen receptor positive (>90% nuclei, strong staining), progesterone receptor positive (>80% nuclei, moderate staining) and negative for HER2 gene amplification by immunohistochemistry (score 1+).    --------------------    Review of Systems:  10 point ROS negative except for that above.    Past Medical / Surgical History:  Past Medical History:   Diagnosis Date     NO ACTIVE PROBLEMS      Past Surgical History:   Procedure Laterality Date     GYN SURGERY           GYN SURGERY           GYN SURGERY  2013    C/S     GYN SURGERY      C/S     HC REMOVAL OF OVARIAN CYST(S)       TUBAL LIGATION         Family History:  Family History   Problem Relation Age of Onset     Diabetes Father      Substance Abuse Father      Bleeding Disorder Sister      Pulmonary Embolism Sister         33     Depression Sister      Anxiety Disorder Mother      Autism Spectrum Disorder Daughter      Anxiety Disorder Daughter      Cancer No family hx of      Hypertension No family hx of      Cerebrovascular Disease No family hx of      Thyroid Disease No family hx of      Glaucoma No family hx of      Macular Degeneration No family hx of        Social History:  Social History     Socioeconomic History     Marital status:    Tobacco Use     Smoking status: Former     Types: Cigarettes     Quit date: 2009     Years since quittin.7     Smokeless tobacco: Never   Substance and Sexual Activity     Alcohol use: No     Drug use: No     Sexual activity: Yes     Partners: Male      "Birth control/protection: Surgical     Comment: tubal ligation       Medications / Allergies:  Reviewed in EMR.    --------------------    Physical Exam:  VS: /81 (BP Location: Left arm, Patient Position: Sitting, Cuff Size: Adult Regular)   Pulse 67   Temp 97.7  F (36.5  C) (Oral)   Ht 1.632 m (5' 4.25\")   Wt 71.2 kg (157 lb)   SpO2 98%   BMI 26.74 kg/m    GEN: Well appearing.  BRITTANEY: No axillary adenopathy.    Labs / Imaging / Path:  We reviewed screening, diagnostic mammogram, breast ultrasound, breast biopsy pathology.      Again, thank you for allowing me to participate in the care of your patient.        Sincerely,        King Dyson MD    "

## 2023-03-29 NOTE — PROGRESS NOTES
[facility] Hematology / Oncology  Initial Visit / Consultation Note Mar 29, 2023  Name: Azalea Mckeon  :  1980  MRN:  [id number]    --------------------    Assessment / Plan:  Over the course of our visit, Azalea, her  and I reviewed the natural history of what appears to be invasive ductal carcinoma amongst the big span of DCIS.  Given the size of the span of calcifications and abnormalities on breast imaging, it is likely that she will need a mastectomy with sentinel lymph node biopsy.  Once we have a finalized surgical pathology report, I anticipate she will be a great candidate for an Oncotype DX test to determine the role of systemic chemotherapy.  We also reviewed the indications for radiation for positive margins, lumpectomy as well as positive lymph nodes.  Determination of tamoxifen versus ovarian suppression/aromatase inhibitor would also be determined by surgical pathology report as well as the Oncotype DX test.  She will plan to have genetic testing given her young age of diagnosis.  We will plan to see her back 3 to 4 weeks postoperatively to review pathology report, Oncotype DX score and finalize a plan for adjuvant therapy.  Case discussed w/ Dr. Souza.    Thank you kindly for this consultation.  King Dyson MD    --------------------    Interval History:  Azalea present for evaluation of breast cancer.    Presented w/ couple months of right breast heaviness and pain.  No prior breast biopsies.    Maternal family history of early stage breast cancer (great-aunts and 2nd cousin).  No family history of ovarian, colon, uterine, prostate cancer.    Menarche age 13; 3 full term pregnancies (first at age 28); no nursing; 1/2 right ovary removed for ovarian cyst; no HRT; no OCPs.    Screening mammogram 2023:  Findings: The breasts are heterogeneously dense, which may obscure small masses.     There are possible calcifications in the upper right breast.  Bilateral breast  augmentation.     The remainder of the breast tissue is unremarkable.  There is no suspicious finding of the left breast.    Diagnostic mammogram / ultrasound Mar 2023:  FINDINGS:    Additional mammographic images were obtained for further  evaluation of possible calcifications in the upper right breast.  Additional views demonstrate fine pleomorphic calcifications in a  segmental distribution spanning the upper inner and upper outer  quadrants measuring up to 10 cm. At approximately the 11:00 position  there is asymmetry with mild architectural distortion amidst the  calcifications.     Targeted right breast ultrasound was performed demonstrating vague  areas of shadowing throughout the upper outer and upper inner  quadrants. There is a more focal area of subtle shadowing at the 11:00  position, 5 cm from the nipple measuring up to 9 mm.    Contrast enhanced mammogram Mar 2023:  FINDINGS:   BREAST DENSITY: Scattered fibroglandular densities.     There is moderate background parenchymal enhancement.     On standard implant displaced mammographic views, in the right breast,  there is redemonstration of the segmental pleomorphic calcifications  in the right upper outer and upper inner breast. Additionally,  redemonstration of the focal asymmetry with superimposed  calcifications at 11:00 position, middle depth. No significant change  in the left breast.     On dual subtraction images, there is avid contrast enhancement  associated with the focal asymmetry in the right breast at 11:00  position, middle depth, seen best on CC view. There is mild diffuse  equivocal nonmass enhancement throughout the right breast, which could  match the distribution of the microcalcifications spanning at least 10  cm and involving the superior and central breast.     There is no suspicious enhancement in the left breast.     Breast biopsy Mar 2023:  RIGHT BREAST, STEREOTACTIC CORE BIOPSY:   -INVASIVE DUCTAL CARCINOMA, [place]a grade 3,  at least 9 mm in linear extent.  -Ductal carcinoma in-situ (DCIS), high nuclear grade, solid type.  -Calcifications associated with invasive carcinoma and DCIS.  -Invasive carcinoma is estrogen receptor positive (>90% nuclei, strong staining), progesterone receptor positive (>80% nuclei, moderate staining) and negative for HER2 gene amplification by immunohistochemistry (score 1+).    --------------------    Review of Systems:  10 point ROS negative except for that above.    Past Medical / Surgical History:  Past Medical History:   Diagnosis Date     NO ACTIVE PROBLEMS      Past Surgical History:   Procedure Laterality Date     GYN SURGERY           GYN SURGERY           GYN SURGERY  2013    C/S     GYN SURGERY      C/S     HC REMOVAL OF OVARIAN CYST(S)       TUBAL LIGATION         Family History:  Family History   Problem Relation Age of Onset     Diabetes Father      Substance Abuse Father      Bleeding Disorder Sister      Pulmonary Embolism Sister         33     Depression Sister      Anxiety Disorder Mother      Autism Spectrum Disorder Daughter      Anxiety Disorder Daughter      Cancer No family hx of      Hypertension No family hx of      Cerebrovascular Disease No family hx of      Thyroid Disease No family hx of      Glaucoma No family hx of      Macular Degeneration No family hx of        Social History:  Social History     Socioeconomic History     Marital status:    Tobacco Use     Smoking status: Former     Types: Cigarettes     Quit date: 2009     Years since quittin.7     Smokeless tobacco: Never   Substance and Sexual Activity     Alcohol use: No     Drug use: No     Sexual activity: Yes     Partners: Male     Birth control/protection: Surgical     Comment: tubal ligation       Medications / Allergies:  Reviewed in EMR.    --------------------    Physical Exam:  VS: /81 (BP Location: Left arm, Patient Position: Sitting, Cuff Size:  "Adult Regular)   Pulse 67   Temp 97.7  F (36.5  C) (Oral)   Ht 1.632 m (5' 4.25\")   Wt 71.2 kg (157 lb)   SpO2 98%   BMI 26.74 kg/m    GEN: Well appearing.  BRITTANEY: No axillary adenopathy.    Labs / Imaging / Path:  We reviewed screening, diagnostic mammogram, breast ultrasound, breast biopsy pathology.  "

## 2023-03-29 NOTE — LETTER
3/29/2023         RE: Azalea Mckeon  [id number] [facility] [address]        Dear Colleague,    Thank you for referring your patient, Azalea Mckeon, to the [facility]. Please see a copy of my visit note below.    NEW SURGICAL CONSULTATION  Mar 29, 2023    Azalea Mckeon is a 42 year old woman who presents with a right  breast complaint.  She was self-referred.    HPI:    She noted a RIGHT breast heaviness that had been worsening to pain with pressure.  She noted also a nipple change as well where it was persistently more prominent. No inversion or discharge.    Imaging showed calcifications spanning upper RIGHT breast measuring 10 cm. Contrast imaging showed mild diffuse equivocal enhancement.    A biopsy was performed and a clip was placed.  It showed invasive ductal carcinoma, grade 3, ER+ NJ+ HER2-.      BREAST-SPECIFIC HISTORY:  Prior breast surgeries: Yes - bilateral augmentation w/ saline implants (retropectoral) - no issues with them  Prior radiation history: No  Bra size: 36 C (after augmentation), 34 B (before augmentation)  Dominant hand: Left    FAMILY HISTORY:  Breast ca: Yes - mat aunts x 2 (dx late 50s and late 40s)  Ovarian ca: No  Pancreatic ca: No  Melanoma: No  Gastric ca: No  Colon ca: No  Other cancer: No    Past Medical History:   Diagnosis Date     NO ACTIVE PROBLEMS    No HTN, asthma, MI, CVA, DM    Past Surgical History:   Procedure Laterality Date     GYN SURGERY           GYN SURGERY           GYN SURGERY  2013    C/S     GYN SURGERY      C/S     HC REMOVAL OF OVARIAN CYST(S)       TUBAL LIGATION     No GA issues    Current Outpatient Medications   Medication Sig Dispense Refill     escitalopram (LEXAPRO) 20 MG tablet TAKE 1 TABLET BY MOUTH EVERY DAY (Patient not taking: Reported on 3/29/2023) 30 tablet 0           Allergies   Allergen Reactions     Percocet [Oxycodone-Acetaminophen] Itching     Latex Rash     "    SOCIAL HISTORY:  Smokes: No    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    ROS:  Easy bruising/bleeding: No  History of DVT/PE: No    /81 (BP Location: Left arm, Patient Position: Sitting, Cuff Size: Adult Regular)   Pulse 67   Temp 97.7  F (36.5  C) (Oral)   Ht 1.632 m (5' 4.25\")   Wt 71.2 kg (157 lb)   SpO2 98%   BMI 26.74 kg/m     Physical Exam  Constitutional:       Appearance: She is well-developed.   Chest:   Breasts:     Breasts are symmetrical.      Right: No inverted nipple, mass, nipple discharge, skin change or tenderness.      Left: No inverted nipple, mass, nipple discharge, skin change or tenderness.          Comments: Patient was examined in both supine and upright positions. Bilateral augmentation.  Lymphadenopathy:      Cervical: No cervical adenopathy.      Right cervical: No superficial, deep or posterior cervical adenopathy.     Left cervical: No superficial, deep or posterior cervical adenopathy.      Upper Body:      Right upper body: No supraclavicular, axillary or pectoral adenopathy.      Left upper body: No supraclavicular, axillary or pectoral adenopathy.      Comments: No lymphedema in bilateral upper extremities.   Skin:     General: Skin is warm and dry.          INVESTIGATIONS:    Contrast-Enhanced Mammogram (3/17/2023) showed:  FINDINGS:   BREAST DENSITY: Scattered fibroglandular densities.  There is moderate background parenchymal enhancement.  On standard implant displaced mammographic views, in the right breast, there is redemonstration of the segmental pleomorphic calcifications in the right upper outer and upper inner breast. Additionally, redemonstration of the focal asymmetry with superimposed calcifications at 11:00 position, middle depth. No significant change in the left breast.  On dual subtraction images, there is avid contrast enhancement associated with the focal asymmetry in the right breast at 11:00 position, middle depth, seen best on CC view. " There is mild diffuse equivocal nonmass enhancement throughout the right breast, which could match the distribution of the microcalcifications spanning at least 10 cm and involving the superior and central breast.  There is no suspicious enhancement in the left breast.   IMPRESSION: BI-RADS CATEGORY: 4 - Suspicious.     Diagnostic Mammogram & Ultrasound (3/2/2023) showed:  FINDINGS:    Additional mammographic images were obtained for further evaluation of possible calcifications in the upper right breast. Additional views demonstrate fine pleomorphic calcifications in a segmental distribution spanning the upper inner and upper outer quadrants measuring up to 10 cm. At approximately the 11:00 position there is asymmetry with mild architectural distortion amidst the calcifications.  Targeted right breast ultrasound was performed demonstrating vague areas of shadowing throughout the upper outer and upper inner quadrants. There is a more focal area of subtle shadowing at the 11:00 position, 5 cm from the nipple measuring up to 9 mm.  IMPRESSION: BI-RADS CATEGORY: 4 - Suspicious Abnormality-Biopsy Should Be Considered    Screening Mammogram (2/3/2023) showed:  Findings: The breasts are heterogeneously dense, which may obscure small masses.  There are possible calcifications in the upper right breast.  Bilateral breast augmentation.  The remainder of the breast tissue is unremarkable.  There is no suspicious finding of the left breast.  IMPRESSION: BI-RADS CATEGORY: 0 - Incomplete - Need Additional Imaging    Biopsy (3/17/2023) showed:  Final Diagnosis   RIGHT BREAST, STEREOTACTIC CORE BIOPSY:   -INVASIVE DUCTAL CARCINOMA, Rafaela grade 3, at least 9 mm in linear extent.  -Ductal carcinoma in-situ (DCIS), high nuclear grade, solid type.  -Calcifications associated with invasive carcinoma and DCIS.  -Invasive carcinoma is estrogen receptor positive (>90% nuclei, strong staining), progesterone receptor positive (>80%  nuclei, moderate staining) and negative for HER2 gene amplification by immunohistochemistry (score 1+).     ASSESSMENT:  Azalea Mckeon is a 42 year old woman with right breast cancer.    Her stage is:   Cancer Staging   Malignant neoplasm of overlapping sites of right breast in female, estrogen receptor positive (H)  Staging form: Breast, AJCC 8th Edition  - Clinical: Stage IIB (cT3, cN0, cM0, G3, ER+, KS+, HER2-) - Signed by Mitra Souza MD on 3/29/2023    I personally reviewed the imaging above.  I personally discussed her case with Dr Dyson of medical oncology (whom she met with as well) today as part of multidisciplinary care.    I reviewed the imaging, diagnosis, staging, and management (including surgery, chemotherapy/systemic therapy, radiation therapy, and endocrine therapy) of breast cancer with Azalea Mckeon and her spouse. A copy of the biopsy pathology report was also provided.    The mainstay of treatment for resectable breast cancer is surgical resection, in the form of either breast conservation (segmental mastectomy plus radiation) or mastectomy.  We reviewed that the two strategies are equivalent in terms of overall survival.  The advantages and disadvantages of each were discussed.    The surgeries are performed as day surgeries.  Azalea Mckeon IS NOT a candidate for breast conservation therapy owing to the extent of the suspicious calcifications.      We also discussed the various types of mastectomy, including simple, skin-sparing, and nipple-sparing mastectomy.  We reviewed that the nipple-sparing technique is cosmetic; sensation and contractility will likely be lost.  Azalea Mckeon is not a candidate for nipple-sparing mastectomy owing to the proximity of the suspicious calcifications to the base of the nipple.  The risks of a mastectomy were discussed with the patient and family, including the risks of bleeding, wound infection, wound dehiscence, skin flap/nipple necrosis, poor  cosmetic outcomes with skin folds, decreased shoulder range of motion, chest wall numbness, etc.   A drain would be placed intra-operatively. The option of having immediate versus delayed reconstruction was also discussed.   We reviewed that the advantages of immediate reconstruction includes superior cosmetics, as the skin is preserved.  However, the major disadvantage is increased postoperative risks, including skin flap ischemia and expander infection, which can potentially delay adjuvant oncologic treatments which may be needed post-surgically.  Azalea Mckeon was interested in this; a Plastic Surgery consultation was offered and will be arranged. Depending on the margin and carmella status post-mastectomy, radiation may be necessary.      In addition to the surgical management of the breast, a sentinel lymph node biopsy is recommended for carmella staging of the axilla.  This is performed with the combination of the radioactive Tilmanocept and lymphazurin. The risks of a sentinel lymph node biopsy were discussed with the patient and family, including the risks of lymphedema (5-10%), bleeding, wound infection, wound dehiscence, seroma formation, and paresthesias. There is an approximately 10% false negative rate associated with sentinel lymph node biopsy as published in the literature.  The findings of the sentinel lymph node biopsy may result in the need for further surgery (i.e. Axillary lymph node dissection) or radiation. There is a 1-2% chance of patients whose sentinel lymph nodes do not map despite dual tracer (radioactive Tilmanocept and lymphazurin). Should this be the case, we discussed that I would proceed with an axillary lymph node dissection at the index procedure if proceeding with a mastectomy.  The higher risks of an axillary lymph node dissection were also reviewed, including lymphedema (20-30%), bleeding, wound infection, wound dehiscence, seroma formation, nerve injury, limited arm range of motion  and paresthesias. We discussed that a drain would be placed intra-operatively should an axillary lymph node dissection be performed.     We discussed that surgical pathology results will be reviewed at the postoperative visit to allow for careful discussion of next steps and for answering questions.    Finally, we reviewed that as part of team-based approach to breast cancer, medical oncology and radiation oncology referrals will also be made to discuss adjuvant systemic/endocrine therapy and radiation therapy.  The decision regarding adjuvant systemic therapy will be made after surgery, depending on Oncotype DX results.  We reviewed that this can take up to 4 weeks after surgery to result.      We reviewed the genetic testing guidelines by the American Society of Breast Surgeons from February 2019.  I have recommended genetic testing and counseling.  The natural history of pathogenic variants in genes like BRCA and breast cancer were discussed with the patient. Should a pathogenic variant be identified, we reviewed the risk reduction benefits of a contralateral mastectomy in this situation. Because genetic testing results will influence surgical management of this breast cancer, I have recommended germline genetic testing on an urgent basis with the Breast Actionable panel.  We discussed that results can take 2-3 weeks to result. We reviewed that genetic counseling will be arranged.  Please see below for additional details.     All of the above was discussed with Azalea Mckeon and all questions were answered.  She elected to proceed with RIGHT (vs BILATERAL) skin-sparing mastectomy, RIGHT axillary sentinel lymph node biopsy, possible axillary lymph node dissection.    Total time spent with the patient was 55 minutes, of which 75% was counseling.     PLAN:  1. Germline genetic testing with the Breast Actionable panel - I will review results with patient via [employer].   2. Genetic counseling referral  3. Postoperative  Oncotype per Dr Dyson  4. RIGHT (vs BILATERAL) skin-sparing mastectomy  5. RIGHT axillary sentinel lymph node biopsy, possible axillary lymph node dissection   6. Patient to report to her PCP for preoperative H&P and testing.  7. Plastic surgery referral    Mitra Souza MD MS [facility] FACS  Associate Professor of Surgery  Division of Surgical Oncology  [facility]     65 minutes spent on the date of the encounter doing chart review, review of test results, interpretation of tests, patient visit, documentation, discussion with other provider(s) and discussion with family.    ---    GENETIC TESTING FOR BRCA1/BRCA2:    Based on her personal history, Azalea meets the current National Comprehensive Cancer Network (NCCN) criteria for genetic testing of BRCA1/BRCA2 and/or requires genetic testing based on the recommendation of the American Society of Breast Surgeons Consensus Guideline on Genetic Testing for Hereditary Breast Cancer.    As hereditary breast cancer is suspected, there is a reasonable probability of detecting a mutation in my patient.    Medical Management:  For Azalea, we reviewed that the information from genetic testing may determine:    surgery to treat Azalea's active cancer diagnosis    targeted chemotherapies for Azalea's active cancer, or     additional cancer screening and/or medical intervention for which Azalea may qualify    Additionally, family history was reviewed for any significant cancer history in Azalea's first and second degree relatives. Family history is significant for the following: see above    I, a qualified medical provider, counseled the patient on possible test results and its implications in relation to Azalea's treatment/management. Azalea expressed understanding and consented to proceed with genetic testing. Additionally, a referral to genetic counseling was placed for further discussion.       Again, thank you for allowing me to participate in the care of your patient.         Sincerely,        Mitra Souza MD

## 2023-03-29 NOTE — PROGRESS NOTES
NEW SURGICAL CONSULTATION  Mar 29, 2023    Azalea Mckeon is a 42 year old woman who presents with a right  breast complaint.  She was self-referred.    HPI:    She noted a RIGHT breast heaviness that had been worsening to pain with pressure.  She noted also a nipple change as well where it was persistently more prominent. No inversion or discharge.    Imaging showed calcifications spanning upper RIGHT breast measuring 10 cm. Contrast imaging showed mild diffuse equivocal enhancement.    A biopsy was performed and a clip was placed.  It showed invasive ductal carcinoma, grade 3, ER+ MD+ HER2-.      BREAST-SPECIFIC HISTORY:  Prior breast surgeries: Yes - bilateral augmentation w/ saline implants (retropectoral) - no issues with them  Prior radiation history: No  Bra size: 36 C (after augmentation), 34 B (before augmentation)  Dominant hand: Left    FAMILY HISTORY:  Breast ca: Yes - mat aunts x 2 (dx late 50s and late 40s)  Ovarian ca: No  Pancreatic ca: No  Melanoma: No  Gastric ca: No  Colon ca: No  Other cancer: No    Past Medical History:   Diagnosis Date     NO ACTIVE PROBLEMS    No HTN, asthma, MI, CVA, DM    Past Surgical History:   Procedure Laterality Date     GYN SURGERY           GYN SURGERY           GYN SURGERY  2013    C/S     GYN SURGERY      C/S     HC REMOVAL OF OVARIAN CYST(S)       TUBAL LIGATION     No GA issues    Current Outpatient Medications   Medication Sig Dispense Refill     escitalopram (LEXAPRO) 20 MG tablet TAKE 1 TABLET BY MOUTH EVERY DAY (Patient not taking: Reported on 3/29/2023) 30 tablet 0           Allergies   Allergen Reactions     Percocet [Oxycodone-Acetaminophen] Itching     Latex Rash        SOCIAL HISTORY:  Smokes: No    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    ROS:  Easy bruising/bleeding: No  History of DVT/PE: No    /81 (BP Location: Left arm, Patient Position: Sitting, Cuff Size: Adult Regular)   Pulse 67   Temp  "97.7  F (36.5  C) (Oral)   Ht 1.632 m (5' 4.25\")   Wt 71.2 kg (157 lb)   SpO2 98%   BMI 26.74 kg/m     Physical Exam  Constitutional:       Appearance: She is well-developed.   Chest:   Breasts:     Breasts are symmetrical.      Right: No inverted nipple, mass, nipple discharge, skin change or tenderness.      Left: No inverted nipple, mass, nipple discharge, skin change or tenderness.          Comments: Patient was examined in both supine and upright positions. Bilateral augmentation.  Lymphadenopathy:      Cervical: No cervical adenopathy.      Right cervical: No superficial, deep or posterior cervical adenopathy.     Left cervical: No superficial, deep or posterior cervical adenopathy.      Upper Body:      Right upper body: No supraclavicular, axillary or pectoral adenopathy.      Left upper body: No supraclavicular, axillary or pectoral adenopathy.      Comments: No lymphedema in bilateral upper extremities.   Skin:     General: Skin is warm and dry.          INVESTIGATIONS:    Contrast-Enhanced Mammogram (3/17/2023) showed:  FINDINGS:   BREAST DENSITY: Scattered fibroglandular densities.  There is moderate background parenchymal enhancement.  On standard implant displaced mammographic views, in the right breast, there is redemonstration of the segmental pleomorphic calcifications in the right upper outer and upper inner breast. Additionally, redemonstration of the focal asymmetry with superimposed calcifications at 11:00 position, middle depth. No significant change in the left breast.  On dual subtraction images, there is avid contrast enhancement associated with the focal asymmetry in the right breast at 11:00 position, middle depth, seen best on CC view. There is mild diffuse equivocal nonmass enhancement throughout the right breast, which could match the distribution of the microcalcifications spanning at least 10 cm and involving the superior and central breast.  There is no suspicious enhancement in " the left breast.   IMPRESSION: BI-RADS CATEGORY: 4 - Suspicious.     Diagnostic Mammogram & Ultrasound (3/2/2023) showed:  FINDINGS:    Additional mammographic images were obtained for further evaluation of possible calcifications in the upper right breast. Additional views demonstrate fine pleomorphic calcifications in a segmental distribution spanning the upper inner and upper outer quadrants measuring up to 10 cm. At approximately the 11:00 position there is asymmetry with mild architectural distortion amidst the calcifications.  Targeted right breast ultrasound was performed demonstrating vague areas of shadowing throughout the upper outer and upper inner quadrants. There is a more focal area of subtle shadowing at the 11:00 position, 5 cm from the nipple measuring up to 9 mm.  IMPRESSION: BI-RADS CATEGORY: 4 - Suspicious Abnormality-Biopsy Should Be Considered    Screening Mammogram (2/3/2023) showed:  Findings: The breasts are heterogeneously dense, which may obscure small masses.  There are possible calcifications in the upper right breast.  Bilateral breast augmentation.  The remainder of the breast tissue is unremarkable.  There is no suspicious finding of the left breast.  IMPRESSION: BI-RADS CATEGORY: 0 - Incomplete - Need Additional Imaging    Biopsy (3/17/2023) showed:  Final Diagnosis   RIGHT BREAST, STEREOTACTIC CORE BIOPSY:   -INVASIVE DUCTAL CARCINOMA, Rafaela grade 3, at least 9 mm in linear extent.  -Ductal carcinoma in-situ (DCIS), high nuclear grade, solid type.  -Calcifications associated with invasive carcinoma and DCIS.  -Invasive carcinoma is estrogen receptor positive (>90% nuclei, strong staining), progesterone receptor positive (>80% nuclei, moderate staining) and negative for HER2 gene amplification by immunohistochemistry (score 1+).     ASSESSMENT:  Azalea Mckeon is a 42 year old woman with right breast cancer.    Her stage is:   Cancer Staging   Malignant neoplasm of overlapping  sites of right breast in female, estrogen receptor positive (H)  Staging form: Breast, AJCC 8th Edition  - Clinical: Stage IIB (cT3, cN0, cM0, G3, ER+, MA+, HER2-) - Signed by Mitra Souza MD on 3/29/2023    I personally reviewed the imaging above.  I personally discussed her case with Dr Dyson of medical oncology (whom she met with as well) today as part of multidisciplinary care.    I reviewed the imaging, diagnosis, staging, and management (including surgery, chemotherapy/systemic therapy, radiation therapy, and endocrine therapy) of breast cancer with Azalea Mckeon and her spouse. A copy of the biopsy pathology report was also provided.    The mainstay of treatment for resectable breast cancer is surgical resection, in the form of either breast conservation (segmental mastectomy plus radiation) or mastectomy.  We reviewed that the two strategies are equivalent in terms of overall survival.  The advantages and disadvantages of each were discussed.    The surgeries are performed as day surgeries.  Azalea Mckeon IS NOT a candidate for breast conservation therapy owing to the extent of the suspicious calcifications.      We also discussed the various types of mastectomy, including simple, skin-sparing, and nipple-sparing mastectomy.  We reviewed that the nipple-sparing technique is cosmetic; sensation and contractility will likely be lost.  Azalea Mckeon is not a candidate for nipple-sparing mastectomy owing to the proximity of the suspicious calcifications to the base of the nipple.  The risks of a mastectomy were discussed with the patient and family, including the risks of bleeding, wound infection, wound dehiscence, skin flap/nipple necrosis, poor cosmetic outcomes with skin folds, decreased shoulder range of motion, chest wall numbness, etc.   A drain would be placed intra-operatively. The option of having immediate versus delayed reconstruction was also discussed.   We reviewed that the advantages  of immediate reconstruction includes superior cosmetics, as the skin is preserved.  However, the major disadvantage is increased postoperative risks, including skin flap ischemia and expander infection, which can potentially delay adjuvant oncologic treatments which may be needed post-surgically.  Azalea Mckeon was interested in this; a Plastic Surgery consultation was offered and will be arranged. Depending on the margin and carmella status post-mastectomy, radiation may be necessary.      In addition to the surgical management of the breast, a sentinel lymph node biopsy is recommended for carmella staging of the axilla.  This is performed with the combination of the radioactive Tilmanocept and lymphazurin. The risks of a sentinel lymph node biopsy were discussed with the patient and family, including the risks of lymphedema (5-10%), bleeding, wound infection, wound dehiscence, seroma formation, and paresthesias. There is an approximately 10% false negative rate associated with sentinel lymph node biopsy as published in the literature.  The findings of the sentinel lymph node biopsy may result in the need for further surgery (i.e. Axillary lymph node dissection) or radiation. There is a 1-2% chance of patients whose sentinel lymph nodes do not map despite dual tracer (radioactive Tilmanocept and lymphazurin). Should this be the case, we discussed that I would proceed with an axillary lymph node dissection at the index procedure if proceeding with a mastectomy.  The higher risks of an axillary lymph node dissection were also reviewed, including lymphedema (20-30%), bleeding, wound infection, wound dehiscence, seroma formation, nerve injury, limited arm range of motion and paresthesias. We discussed that a drain would be placed intra-operatively should an axillary lymph node dissection be performed.     We discussed that surgical pathology results will be reviewed at the postoperative visit to allow for careful discussion  of next steps and for answering questions.    Finally, we reviewed that as part of team-based approach to breast cancer, medical oncology and radiation oncology referrals will also be made to discuss adjuvant systemic/endocrine therapy and radiation therapy.  The decision regarding adjuvant systemic therapy will be made after surgery, depending on Oncotype DX results.  We reviewed that this can take up to 4 weeks after surgery to result.      We reviewed the genetic testing guidelines by the American Society of Breast Surgeons from February 2019.  I have recommended genetic testing and counseling.  The natural history of pathogenic variants in genes like BRCA and breast cancer were discussed with the patient. Should a pathogenic variant be identified, we reviewed the risk reduction benefits of a contralateral mastectomy in this situation. Because genetic testing results will influence surgical management of this breast cancer, I have recommended germline genetic testing on an urgent basis with the Breast Actionable panel.  We discussed that results can take 2-3 weeks to result. We reviewed that genetic counseling will be arranged.  Please see below for additional details.     All of the above was discussed with Azalea Mckeon and all questions were answered.  She elected to proceed with RIGHT (vs BILATERAL) skin-sparing mastectomy, RIGHT axillary sentinel lymph node biopsy, possible axillary lymph node dissection.    Total time spent with the patient was 55 minutes, of which 75% was counseling.     PLAN:  1. Germline genetic testing with the Breast Actionable panel - I will review results with patient via [facility].   2. Genetic counseling referral  3. Postoperative Oncotype per Dr Dyson  4. RIGHT (vs BILATERAL) skin-sparing mastectomy  5. RIGHT axillary sentinel lymph node biopsy, possible axillary lymph node dissection   6. Patient to report to her PCP for preoperative H&P and testing.  7. Plastic surgery  referral    Mitra Souza MD MS [facility] FACS  Associate Professor of Surgery  Division of Surgical Oncology  [facility]     65 minutes spent on the date of the encounter doing chart review, review of test results, interpretation of tests, patient visit, documentation, discussion with other provider(s) and discussion with family.    ---    GENETIC TESTING FOR BRCA1/BRCA2:    Based on her personal history, Azalea meets the current National Comprehensive Cancer Network (NCCN) criteria for genetic testing of BRCA1/BRCA2 and/or requires genetic testing based on the recommendation of the American Society of Breast Surgeons Consensus Guideline on Genetic Testing for Hereditary Breast Cancer.    As hereditary breast cancer is suspected, there is a reasonable probability of detecting a mutation in my patient.    Medical Management:  For Azalea, we reviewed that the information from genetic testing may determine:    surgery to treat Azalea's active cancer diagnosis    targeted chemotherapies for Azalea's active cancer, or     additional cancer screening and/or medical intervention for which Azalea may qualify    Additionally, family history was reviewed for any significant cancer history in Azalea's first and second degree relatives. Family history is significant for the following: see above    I, a qualified medical provider, counseled the patient on possible test results and its implications in relation to Azalea's treatment/management. Azalea expressed understanding and consented to proceed with genetic testing. Additionally, a referral to genetic counseling was placed for further discussion.

## 2023-03-29 NOTE — NURSING NOTE
Per  Genetic testing to be ordered and pt to have lab drawn today, plastics referral and Genetic counseling referral to be placed. Genetic testing consent reviewed and signed by pt.  Lab ordered, referrals placed and appt scheduled on 4/7/23 at 3:15pm with ...Surgery folder reviewed with patient today in clinic.  Viji Llamas RN

## 2023-03-29 NOTE — NURSING NOTE
"Azalea Mckeon's chief complaint for this visit includes:  Chief Complaint   Patient presents with     New Patient     invasive ductal carcinoma  path printed- imaging in EPIC Previsit completed ce CHECK IN 2ND FLOOR DESK D     PCP: No Ref-Primary, Physician    Referring Provider:  No referring provider defined for this encounter.    /81 (BP Location: Left arm, Patient Position: Sitting, Cuff Size: Adult Regular)   Pulse 67   Temp 97.7  F (36.5  C) (Oral)   Ht 1.632 m (5' 4.25\")   Wt 71.2 kg (157 lb)   SpO2 98%   BMI 26.74 kg/m    Data Unavailable        Allergies   Allergen Reactions     Percocet [Oxycodone-Acetaminophen] Itching     Latex Rash         Do you need any medication refills at today's visit?    Azalea Mckeon's goals for this visit include:   She requests these members of her care team be copied on today's visit information:     PCP: No Ref-Primary, Physician    Referring Provider:  No referring provider defined for this encounter.    /81 (BP Location: Left arm, Patient Position: Sitting, Cuff Size: Adult Regular)   Pulse 67   Temp 97.7  F (36.5  C) (Oral)   Ht 1.632 m (5' 4.25\")   Wt 71.2 kg (157 lb)   SpO2 98%   BMI 26.74 kg/m      Do you need any medication refills at today's visit?    "

## 2023-03-30 LAB — INTERPRETATION: NORMAL

## 2023-03-30 NOTE — PATIENT INSTRUCTIONS
1) Send Oncotype Dx score from surgical pathology report.  2) BJT 3-4 weeks post-op (surgery date TBD).    King Dyson MD.

## 2023-04-03 ENCOUNTER — LAB (OUTPATIENT)
Dept: LAB | Facility: CLINIC | Age: 43
End: 2023-04-03
Payer: COMMERCIAL

## 2023-04-03 DIAGNOSIS — C50.811 MALIGNANT NEOPLASM OF OVERLAPPING SITES OF RIGHT BREAST IN FEMALE, ESTROGEN RECEPTOR POSITIVE (H): Primary | ICD-10-CM

## 2023-04-03 DIAGNOSIS — Z17.0 MALIGNANT NEOPLASM OF OVERLAPPING SITES OF RIGHT BREAST IN FEMALE, ESTROGEN RECEPTOR POSITIVE (H): Primary | ICD-10-CM

## 2023-04-03 PROCEDURE — 81162 BRCA1&2 GEN FULL SEQ DUP/DEL: CPT | Performed by: SURGERY

## 2023-04-03 PROCEDURE — G0452 MOLECULAR PATHOLOGY INTERPR: HCPCS | Mod: 26 | Performed by: PATHOLOGY

## 2023-04-06 ENCOUNTER — TELEPHONE (OUTPATIENT)
Dept: SURGERY | Facility: CLINIC | Age: 43
End: 2023-04-06
Payer: COMMERCIAL

## 2023-04-06 DIAGNOSIS — Z17.0 MALIGNANT NEOPLASM OF OVERLAPPING SITES OF RIGHT BREAST IN FEMALE, ESTROGEN RECEPTOR POSITIVE (H): Primary | ICD-10-CM

## 2023-04-06 DIAGNOSIS — C50.811 MALIGNANT NEOPLASM OF OVERLAPPING SITES OF RIGHT BREAST IN FEMALE, ESTROGEN RECEPTOR POSITIVE (H): Primary | ICD-10-CM

## 2023-04-06 RX ORDER — ACETAMINOPHEN 325 MG/1
975 TABLET ORAL ONCE
Status: CANCELLED | OUTPATIENT
Start: 2023-04-06 | End: 2023-04-06

## 2023-04-06 RX ORDER — CEFAZOLIN SODIUM 2 G/50ML
2 SOLUTION INTRAVENOUS SEE ADMIN INSTRUCTIONS
Status: CANCELLED | OUTPATIENT
Start: 2023-04-06

## 2023-04-06 RX ORDER — CEFAZOLIN SODIUM 2 G/50ML
2 SOLUTION INTRAVENOUS
Status: CANCELLED | OUTPATIENT
Start: 2023-04-06

## 2023-04-06 RX ORDER — ENOXAPARIN SODIUM 100 MG/ML
40 INJECTION SUBCUTANEOUS
Status: CANCELLED | OUTPATIENT
Start: 2023-04-06

## 2023-04-06 NOTE — PROGRESS NOTES
Patient has not yet decided on RIGHT vs BILATERAL surgery - however, I will place surgery orders to facilitate OR scheduling.    Mitra Souza MD MS FRCSC FACS  Associate Professor of Surgery  Division of Surgical Oncology  [facility]

## 2023-04-06 NOTE — TELEPHONE ENCOUNTER
PREVISIT INFORMATION                                                   No patient name on file. scheduled for future visit at [facility].    Patient is scheduled to see Dr. Ferreira on 4/7/23  Reason for visit: Breast reconstruction  Referring provider   Has patient seen previous specialist?   Medical Records:      REVIEW                                                     New patient packet mailed to patient: Medication reconciliation complete:     No patient specified.      Allergies:     PLAN/FOLLOW-UP NEEDED                                                     Patient Reminders Given:  Please, make sure you bring an updated list of your medications.   If you are having a procedure, please, present 15 minutes early.  If you need to cancel or reschedule,please call [phone].

## 2023-04-07 ENCOUNTER — OFFICE VISIT (OUTPATIENT)
Dept: SURGERY | Facility: CLINIC | Age: 43
End: 2023-04-07
Payer: COMMERCIAL

## 2023-04-07 VITALS — BODY MASS INDEX: 26.7 KG/M2 | WEIGHT: 156.4 LBS | HEIGHT: 64 IN

## 2023-04-07 DIAGNOSIS — C50.811 MALIGNANT NEOPLASM OF OVERLAPPING SITES OF RIGHT BREAST IN FEMALE, ESTROGEN RECEPTOR POSITIVE (H): ICD-10-CM

## 2023-04-07 DIAGNOSIS — Z17.0 MALIGNANT NEOPLASM OF OVERLAPPING SITES OF RIGHT BREAST IN FEMALE, ESTROGEN RECEPTOR POSITIVE (H): ICD-10-CM

## 2023-04-07 DIAGNOSIS — Z85.3 HISTORY OF RIGHT BREAST CANCER: Primary | ICD-10-CM

## 2023-04-07 PROCEDURE — 99205 OFFICE O/P NEW HI 60 MIN: CPT | Performed by: PLASTIC SURGERY

## 2023-04-07 RX ORDER — CEFAZOLIN SODIUM 2 G/50ML
2 SOLUTION INTRAVENOUS SEE ADMIN INSTRUCTIONS
Status: CANCELLED | OUTPATIENT
Start: 2023-04-07

## 2023-04-07 RX ORDER — CEFAZOLIN SODIUM 2 G/50ML
2 SOLUTION INTRAVENOUS
Status: CANCELLED | OUTPATIENT
Start: 2023-04-07

## 2023-04-07 ASSESSMENT — PAIN SCALES - GENERAL: PAINLEVEL: NO PAIN (0)

## 2023-04-07 NOTE — PROGRESS NOTES
REFERRING PROVIDER:  Dr. Mitra Souza, [facility]    PRESENTING COMPLAINT:  Consultation for breast reconstruction.    HISTORY OF PRESENTING COMPLAINT:  Ms. Mckeon is 42 years old, here with her .  She has been referred to me for my recommendations regarding breast reconstruction.  She has been diagnosed with right-sided breast cancer, is scheduled to undergo bilateral nipple-nonsparing mastectomy and is interested in reconstruction.  She wears a C cup and wants to be a bit smaller, around a B cup.  She had breast implants placed 8 years ago under the muscle saline implants through an IMF approach with 425 cc implants.  The patient may require radiation therapy, but it is unknown at this moment in time.    PAST MEDICAL HISTORY:  Nil.    PAST SURGICAL HISTORY:  Breast augmentation, nephrectomy, 3 C-sections, abdominoplasty.    MEDICATIONS:  Nil.    ALLERGIES:  Latex and Percocet -- hives.    SOCIAL HISTORY:  Does not smoke, does not drink.  Lives in [place].  Works in an office.    REVIEW OF SYSTEMS:  Denies chest pain, shortness of breath, MI, CVA, DVT and PE.    PHYSICAL EXAMINATION:  Vital signs are stable.  She is afebrile, in no obvious distress.  She is 5 feet 4 inches, 156 pounds, BMI 26.8 kg/m2.  On examination of her breasts, she has grade 2 on 3 ptosis, breast implant in the submuscular plane, grade 2-3 Baker classification.  She has a scar in her abdomen.  She has enough tissue in her thighs to give her around a B cup.  She has enough tissue in her back to give her around an A to B cup.    ASSESSMENT AND PLAN:  Based upon the above findings, a diagnosis of bilateral breast augmentation with right-sided breast cancer, undergoing bilateral nipple-nonsparing mastectomy for breast cancer in the right side and interested in reconstruction was made.  The patient has had an abdominoplasty.  She is not a candidate for RUDI flap.  I had a monica detailed discussion with the patient and her  about breast  reconstruction, the elective nature of reconstruction, the staged nature of reconstruction, implant-based versus autologous reconstruction, the pros and cons of each.  The patient is not interested in proceeding with any form of implant-based reconstruction going forward.  She is only interested in autologous reconstruction.  I think given the fact she is not a candidate for a RUDI flap, her only options are either a thigh-based flap or latissimus flap.  The pros and cons of each were explained.  The scars were explained in detail.  She will think about that in more detail but will go with one of those.  With regards to the first stage, expander-based reconstruction will be undertaken.  She understood that plan.  All risks, benefits, and alternatives of the procedure including pain, infection, bleeding, scarring, asymmetry, seromas, hematomas, wound breakdown, wound dehiscence, exposure of the expander, requirement of removal depending on infection, delaying of reconstruction based on the blood flow, DVT, PE, MI, CVA, pneumonia, renal failure and death were all explained.  She understood that she will be numb.  All her questions were answered.  She was happy the visit.  I look forward to helping her out in the near future.  All exam and discussion done in presence of my student, jJ Garza.    Total time spent in the encounter today, including chart review, visit itself and postoperative paperwork, was 60 minutes.    LAWANDA Ferreira MD    cc:  Mitra Souza MD  [facility]  [address] [facility], [address] [address]

## 2023-04-07 NOTE — NURSING NOTE
"Azalea Mckeon's chief complaint for this visit includes:  Chief Complaint   Patient presents with     Consult     Breast reconstruction, pt of Dr. Souza.       PCP: No Ref-Primary, Physician    Referring Provider:  Mitra Souza MD  [address] [place] [id number] 1.626 m (5' 4\")   Wt 70.9 kg (156 lb 6.4 oz)   BMI 26.85 kg/m    No Pain (0)        Allergies   Allergen Reactions     Percocet [Oxycodone-Acetaminophen] Itching     Latex Rash         Do you need any medication refills at today's visit? NA    Comfort Beckford, RN           "

## 2023-04-07 NOTE — LETTER
4/7/2023         RE: Azalea Mckeon  [address] [facility] [address]        Dear Colleague,    Thank you for referring your patient, Azalea Mckeon, to [facility]. Please see a copy of my visit note below.    REFERRING PROVIDER:  Dr. Mitra Souza, [facility]    PRESENTING COMPLAINT:  Consultation for breast reconstruction.    HISTORY OF PRESENTING COMPLAINT:  Ms. Mckeon is 42 years old, here with her .  She has been referred to me for my recommendations regarding breast reconstruction.  She has been diagnosed with right-sided breast cancer, is scheduled to undergo bilateral nipple-nonsparing mastectomy and is interested in reconstruction.  She wears a C cup and wants to be a bit smaller, around a B cup.  She had breast implants placed 8 years ago under the muscle saline implants through an IMF approach with 425 cc implants.  The patient may require radiation therapy, but it is unknown at this moment in time.    PAST MEDICAL HISTORY:  Nil.    PAST SURGICAL HISTORY:  Breast augmentation, nephrectomy, 3 C-sections, abdominoplasty.    MEDICATIONS:  Nil.    ALLERGIES:  Latex and Percocet -- hives.    SOCIAL HISTORY:  Does not smoke, does not drink.  Lives in [place].  Works in an office.    REVIEW OF SYSTEMS:  Denies chest pain, shortness of breath, MI, CVA, DVT and PE.    PHYSICAL EXAMINATION:  Vital signs are stable.  She is afebrile, in no obvious distress.  She is 5 feet 4 inches, 156 pounds, BMI 26.8 kg/m2.  On examination of her breasts, she has grade 2 on 3 ptosis, breast implant in the submuscular plane, grade 2-3 Baker classification.  She has a scar in her abdomen.  She has enough tissue in her thighs to give her around a B cup.  She has enough tissue in her back to give her around an A to B cup.    ASSESSMENT AND PLAN:  Based upon the above findings, a diagnosis of bilateral breast augmentation with right-sided breast cancer, undergoing bilateral nipple-nonsparing  mastectomy for breast cancer in the right side and interested in reconstruction was made.  The patient has had an abdominoplasty.  She is not a candidate for RUDI flap.  I had a monica detailed discussion with the patient and her  about breast reconstruction, the elective nature of reconstruction, the staged nature of reconstruction, implant-based versus autologous reconstruction, the pros and cons of each.  The patient is not interested in proceeding with any form of implant-based reconstruction going forward.  She is only interested in autologous reconstruction.  I think given the fact she is not a candidate for a RUDI flap, her only options are either a thigh-based flap or latissimus flap.  The pros and cons of each were explained.  The scars were explained in detail.  She will think about that in more detail but will go with one of those.  With regards to the first stage, expander-based reconstruction will be undertaken.  She understood that plan.  All risks, benefits, and alternatives of the procedure including pain, infection, bleeding, scarring, asymmetry, seromas, hematomas, wound breakdown, wound dehiscence, exposure of the expander, requirement of removal depending on infection, delaying of reconstruction based on the blood flow, DVT, PE, MI, CVA, pneumonia, renal failure and death were all explained.  She understood that she will be numb.  All her questions were answered.  She was happy the visit.  I look forward to helping her out in the near future.  All exam and discussion done in presence of my student, Jj Garza.    Total time spent in the encounter today, including chart review, visit itself and postoperative paperwork, was 60 minutes.    LAWANDA Ferreira MD    cc:  Mitra Souza MD  [address], [address] [address]        Again, thank you for allowing me to participate in the care of your patient.        Sincerely,        EDWARDO Ferreira MD

## 2023-04-10 ENCOUNTER — PREP FOR PROCEDURE (OUTPATIENT)
Dept: PLASTIC SURGERY | Facility: CLINIC | Age: 43
End: 2023-04-10

## 2023-04-10 ENCOUNTER — OFFICE VISIT (OUTPATIENT)
Dept: FAMILY MEDICINE | Facility: CLINIC | Age: 43
End: 2023-04-10
Payer: COMMERCIAL

## 2023-04-10 VITALS
WEIGHT: 155.6 LBS | BODY MASS INDEX: 26.56 KG/M2 | HEIGHT: 64 IN | RESPIRATION RATE: 16 BRPM | TEMPERATURE: 97.9 F | OXYGEN SATURATION: 97 % | SYSTOLIC BLOOD PRESSURE: 131 MMHG | DIASTOLIC BLOOD PRESSURE: 82 MMHG | HEART RATE: 77 BPM

## 2023-04-10 DIAGNOSIS — Z17.0 MALIGNANT NEOPLASM OF OVERLAPPING SITES OF RIGHT BREAST IN FEMALE, ESTROGEN RECEPTOR POSITIVE (H): ICD-10-CM

## 2023-04-10 DIAGNOSIS — Z01.818 PREOP GENERAL PHYSICAL EXAM: Primary | ICD-10-CM

## 2023-04-10 DIAGNOSIS — C50.811 MALIGNANT NEOPLASM OF OVERLAPPING SITES OF RIGHT BREAST IN FEMALE, ESTROGEN RECEPTOR POSITIVE (H): ICD-10-CM

## 2023-04-10 LAB
BASOPHILS # BLD AUTO: 0 10E3/UL (ref 0–0.2)
BASOPHILS NFR BLD AUTO: 0 %
EOSINOPHIL # BLD AUTO: 0.1 10E3/UL (ref 0–0.7)
EOSINOPHIL NFR BLD AUTO: 1 %
ERYTHROCYTE [DISTWIDTH] IN BLOOD BY AUTOMATED COUNT: 12.4 % (ref 10–15)
HCT VFR BLD AUTO: 40 % (ref 35–47)
HGB BLD-MCNC: 13.8 G/DL (ref 11.7–15.7)
LYMPHOCYTES # BLD AUTO: 2 10E3/UL (ref 0.8–5.3)
LYMPHOCYTES NFR BLD AUTO: 23 %
MCH RBC QN AUTO: 31.2 PG (ref 26.5–33)
MCHC RBC AUTO-ENTMCNC: 34.5 G/DL (ref 31.5–36.5)
MCV RBC AUTO: 91 FL (ref 78–100)
MONOCYTES # BLD AUTO: 0.5 10E3/UL (ref 0–1.3)
MONOCYTES NFR BLD AUTO: 6 %
NEUTROPHILS # BLD AUTO: 5.9 10E3/UL (ref 1.6–8.3)
NEUTROPHILS NFR BLD AUTO: 70 %
PLATELET # BLD AUTO: 248 10E3/UL (ref 150–450)
RBC # BLD AUTO: 4.42 10E6/UL (ref 3.8–5.2)
WBC # BLD AUTO: 8.5 10E3/UL (ref 4–11)

## 2023-04-10 PROCEDURE — 85025 COMPLETE CBC W/AUTO DIFF WBC: CPT | Performed by: PHYSICIAN ASSISTANT

## 2023-04-10 PROCEDURE — 36415 COLL VENOUS BLD VENIPUNCTURE: CPT | Performed by: PHYSICIAN ASSISTANT

## 2023-04-10 PROCEDURE — 99213 OFFICE O/P EST LOW 20 MIN: CPT | Performed by: PHYSICIAN ASSISTANT

## 2023-04-10 ASSESSMENT — PATIENT HEALTH QUESTIONNAIRE - PHQ9
SUM OF ALL RESPONSES TO PHQ QUESTIONS 1-9: 6
10. IF YOU CHECKED OFF ANY PROBLEMS, HOW DIFFICULT HAVE THESE PROBLEMS MADE IT FOR YOU TO DO YOUR WORK, TAKE CARE OF THINGS AT HOME, OR GET ALONG WITH OTHER PEOPLE: NOT DIFFICULT AT ALL
SUM OF ALL RESPONSES TO PHQ QUESTIONS 1-9: 6

## 2023-04-10 ASSESSMENT — PAIN SCALES - GENERAL: PAINLEVEL: MILD PAIN (2)

## 2023-04-10 NOTE — PATIENT INSTRUCTIONS
For informational purposes only. Not to replace the advice of your health care provider. Copyright   2003,  [employer] [facility]. All rights reserved. Clinically reviewed by Nevin Shepherd MD. HOSTEX [id number] - REV .  Preparing for Your Surgery  Getting started  A nurse will call you to review your health history and instructions. They will give you an arrival time based on your scheduled surgery time. Please be ready to share:    Your doctor's clinic name and phone number    Your medical, surgical, and anesthesia history    A list of allergies and sensitivities    A list of medicines, including herbal treatments and over-the-counter drugs    Whether the patient has a legal guardian (ask how to send us the papers in advance)  Please tell us if you're pregnant--or if there's any chance you might be pregnant. Some surgeries may injure a fetus (unborn baby), so they require a pregnancy test. Surgeries that are safe for a fetus don't always need a test, and you can choose whether to have one.   If you have a child who's having surgery, please ask for a copy of Preparing for Your Child's Surgery.    Preparing for surgery    Within 10 to 30 days of surgery: Have a pre-op exam (sometimes called an H&P, or History and Physical). This can be done at a clinic or pre-operative center.  ? If you're having a , you may not need this exam. Talk to your care team.    At your pre-op exam, talk to your care team about all medicines you take. If you need to stop any medicines before surgery, ask when to start taking them again.  ? We do this for your safety. Many medicines can make you bleed too much during surgery. Some change how well surgery (anesthesia) drugs work.    Call your insurance company to let them know you're having surgery. (If you don't have insurance, call [phone].)    Call your clinic if there's any change in your health. This includes signs of a cold or flu (sore throat, runny nose,  cough, rash, fever). It also includes a scrape or scratch near the surgery site.    If you have questions on the day of surgery, call your hospital or surgery center.  Eating and drinking guidelines  For your safety: Unless your surgeon tells you otherwise, follow the guidelines below.    Eat and drink as usual until 8 hours before you arrive for surgery. After that, no food or milk.    Drink clear liquids until 2 hours before you arrive. These are liquids you can see through, like water, Gatorade, and Propel Water. They also include plain black coffee and tea (no cream or milk), candy, and breath mints. You can spit out gum when you arrive.    If you drink alcohol: Stop drinking it the night before surgery.    If your care team tells you to take medicine on the morning of surgery, it's okay to take it with a sip of water.  Preventing infection    Shower or bathe the night before and morning of your surgery. Follow the instructions your clinic gave you. (If no instructions, use regular soap.)    Don't shave or clip hair near your surgery site. We'll remove the hair if needed.    Don't smoke or vape the morning of surgery. You may chew nicotine gum up to 2 hours before surgery. A nicotine patch is okay.  ? Note: Some surgeries require you to completely quit smoking and nicotine. Check with your surgeon.    Your care team will make every effort to keep you safe from infection. We will:  ? Clean our hands often with soap and water (or an alcohol-based hand rub).  ? Clean the skin at your surgery site with a special soap that kills germs.  ? Give you a special gown to keep you warm. (Cold raises the risk of infection.)  ? Wear special hair covers, masks, gowns and gloves during surgery.  ? Give antibiotic medicine, if prescribed. Not all surgeries need antibiotics.  What to bring on the day of surgery    Photo ID and insurance card    Copy of your health care directive, if you have one    Glasses and hearing aids (bring  cases)  ? You can't wear contacts during surgery    Inhaler and eye drops, if you use them (tell us about these when you arrive)    CPAP machine or breathing device, if you use them    A few personal items, if spending the night    If you have . . .  ? A pacemaker, ICD (cardiac defibrillator) or other implant: Bring the ID card.  ? An implanted stimulator: Bring the remote control.  ? A legal guardian: Bring a copy of the certified (court-stamped) guardianship papers.  Please remove any jewelry, including body piercings. Leave jewelry and other valuables at home.  If you're going home the day of surgery    You must have a responsible adult drive you home. They should stay with you overnight as well.    If you don't have someone to stay with you, and you aren't safe to go home alone, we may keep you overnight. Insurance often won't pay for this.  After surgery  If it's hard to control your pain or you need more pain medicine, please call your surgeon's office.  Questions?   If you have any questions for your care team, list them here: _________________________________________________________________________________________________________________________________________________________________________ ____________________________________ ____________________________________ ____________________________________

## 2023-04-10 NOTE — PROGRESS NOTES
[facility]  [address] [facility] [address]  Phone: [phone]  Primary Provider: No Ref-Primary, Physician  Pre-op Performing Provider: CARLOS KRISHNAN      PREOPERATIVE EVALUATION:  Today's date: 4/10/2023    Azalea Mckeon is a 42 year old female who presents for a preoperative evaluation.       View : No data to display.              Surgical Information:  Surgery/Procedure:BILATERAL Skin-Sparing Mastectomy, RIGHT Axillary [place] Lymph Node Biopsy, Possible RIGHT Axillary Lymph Node Dissection AND  Reconstruct breast, insert tissue expander bilateral,With Spy,Will  Need Allergan Expanders, -750 cc  Surgery Location: [facility]  Surgeon:Mitra Souza MD and EDWARDO Ferreira MD  Surgery Date: 4/24/2023  Time of Surgery: 1:50 pm  Where patient plans to recover: At home with family  Fax number for surgical facility: Note does not need to be faxed, will be available electronically in Epic.    Assessment & Plan     The proposed surgical procedure is considered INTERMEDIATE risk.    (Z01.818) Preop general physical exam  (primary encounter diagnosis)  Comment: Patient here for preoperative examination.  Plan: CBC with platelets and differential          (C50.811,  Z17.0) Malignant neoplasm of overlapping sites of right breast in female, estrogen receptor positive (H)  Comment: History of malignant neoplasm right breast.  Estrogen receptor positive.  Patient has been following with breast clinic.  Had mammogram 2/3/2023 with sensation of breast heaviness.  Confirmatory biopsies have been performed.  Has not yet initiated chemotherapy or radiation therapy.  No medications on daily basis      Risks and Recommendations:  The patient has the following additional risks and recommendations for perioperative complications:   - No identified additional risk factors other than previously addressed    Medication Instructions:  Patient is on no chronic  medications    RECOMMENDATION:  APPROVAL GIVEN to proceed with proposed procedure pending review of diagnostic evaluation.        Subjective     HPI related to upcoming procedure:   Breast with heavy sensation with abnormal mammogram 2/3/2023with follow up ultrasound and mammogram 3/2/2023. Biopsy on 3/17/2023. Breast implants 9 years prior. No abnormal nipple discharge. No chemotherapy or radiation therapy as of yet.   .       4/10/2023     1:40 PM   Preop Questions   1. Have you ever had a heart attack or stroke? No   2. Have you ever had surgery on your heart or blood vessels, such as a stent placement, a coronary artery bypass, or surgery on an artery in your head, neck, heart, or legs? No   3. Do you have chest pain with activity? No   4. Do you have a history of  heart failure? No   5. Do you currently have a cold, bronchitis or symptoms of other infection? No   6. Do you have a cough, shortness of breath, or wheezing? No   7. Do you or anyone in your family have previous history of blood clots? No   8. Do you or does anyone in your family have a serious bleeding problem such as prolonged bleeding following surgeries or cuts? No   9. Have you ever had problems with anemia or been told to take iron pills? No   10. Have you had any abnormal blood loss such as black, tarry or bloody stools, or abnormal vaginal bleeding? No   11. Have you ever had a blood transfusion? No   12. Are you willing to have a blood transfusion if it is medically needed before, during, or after your surgery? Yes   13. Have you or any of your relatives ever had problems with anesthesia? No   14. Do you have sleep apnea, excessive snoring or daytime drowsiness? No   15. Do you have any artifical heart valves or other implanted medical devices like a pacemaker, defibrillator, or continuous glucose monitor? No   16. Do you have artificial joints? No   17. Are you allergic to latex? YES:    18. Is there any chance that you may be pregnant? No        Health Care Directive:  Patient does not have a Health Care Directive or Living Will: Discussed advance care planning with patient; information given to patient to review.    Preoperative Review of :   reviewed - no record of controlled substances prescribed.      Status of Chronic Conditions:  See problem list for active medical problems.  Problems all longstanding and stable, except as noted/documented.  See ROS for pertinent symptoms related to these conditions.      Review of Systems  CONSTITUTIONAL: NEGATIVE for fever, chills, change in weight  INTEGUMENTARY/SKIN: NEGATIVE for worrisome rashes, moles or lesions  EYES: NEGATIVE for vision changes or irritation  ENT/MOUTH: NEGATIVE for ear, mouth and throat problems  RESP: NEGATIVE for significant cough or SOB  CV: NEGATIVE for chest pain, palpitations or peripheral edema  GI: NEGATIVE for nausea, abdominal pain, heartburn, or change in bowel habits  : NEGATIVE for frequency, dysuria, or hematuria  MUSCULOSKELETAL: NEGATIVE for significant arthralgias or myalgia  NEURO: NEGATIVE for weakness, dizziness or paresthesias  ENDOCRINE: NEGATIVE for temperature intolerance, skin/hair changes  HEME: NEGATIVE for bleeding problems  PSYCHIATRIC: NEGATIVE for changes in mood or affect    Patient Active Problem List    Diagnosis Date Noted     Malignant neoplasm of overlapping sites of right breast in female, estrogen receptor positive (H) 2023     Priority: Medium     Anxiety 2015     Priority: Medium     CARDIOVASCULAR SCREENING; LDL GOAL LESS THAN 160 01/10/2013     Priority: Medium      Past Medical History:   Diagnosis Date     NO ACTIVE PROBLEMS      Past Surgical History:   Procedure Laterality Date     GYN SURGERY           GYN SURGERY           GYN SURGERY  2013    C/S     GYN SURGERY      C/S     HC REMOVAL OF OVARIAN CYST(S)       TUBAL LIGATION       No current outpatient medications on file.  "      Allergies   Allergen Reactions     Percocet [Oxycodone-Acetaminophen] Itching     Latex Rash        Social History     Tobacco Use     Smoking status: Former     Types: Cigarettes     Quit date: 2009     Years since quittin.7     Smokeless tobacco: Never   Vaping Use     Vaping status: Never Used   Substance Use Topics     Alcohol use: No     Family History   Problem Relation Age of Onset     Diabetes Father      Substance Abuse Father      Bleeding Disorder Sister      Pulmonary Embolism Sister         33     Depression Sister      Anxiety Disorder Mother      Autism Spectrum Disorder Daughter      Anxiety Disorder Daughter      Cancer No family hx of      Hypertension No family hx of      Cerebrovascular Disease No family hx of      Thyroid Disease No family hx of      Glaucoma No family hx of      Macular Degeneration No family hx of      History   Drug Use No         Objective     /82   Pulse 77   Temp 97.9  F (36.6  C) (Tympanic)   Resp 16   Ht 1.632 m (5' 4.25\")   Wt 70.6 kg (155 lb 9.6 oz)   SpO2 97%   BMI 26.50 kg/m      Physical Exam    GENERAL APPEARANCE: healthy, alert and no distress     EYES: EOMI, PERRL     HENT: ear canals and TM's normal and nose and mouth without ulcers or lesions     NECK: no adenopathy, no asymmetry, masses, or scars and thyroid normal to palpation     RESP: lungs clear to auscultation - no rales, rhonchi or wheezes     CV: regular rates and rhythm, normal S1 S2, no S3 or S4 and no murmur, click or rub     ABDOMEN:  soft, nontender, no HSM or masses and bowel sounds normal     MS: extremities normal- no gross deformities noted, no evidence of inflammation in joints, FROM in all extremities.     SKIN: no suspicious lesions or rashes     NEURO: Normal strength and tone, sensory exam grossly normal, mentation intact and speech normal     PSYCH: mentation appears normal. and affect normal/bright     LYMPHATICS: No cervical adenopathy    Recent Labs   Lab " Test 04/14/21  0833   HGB 13.3         POTASSIUM 3.9   CR 0.78        Diagnostics:  Recent Results (from the past 24 hour(s))   CBC with platelets and differential    Collection Time: 04/10/23  2:09 PM   Result Value Ref Range    WBC Count 8.5 4.0 - 11.0 10e3/uL    RBC Count 4.42 3.80 - 5.20 10e6/uL    Hemoglobin 13.8 11.7 - 15.7 g/dL    Hematocrit 40.0 35.0 - 47.0 %    MCV 91 78 - 100 fL    MCH 31.2 26.5 - 33.0 pg    MCHC 34.5 31.5 - 36.5 g/dL    RDW 12.4 10.0 - 15.0 %    Platelet Count 248 150 - 450 10e3/uL    % Neutrophils 70 %    % Lymphocytes 23 %    % Monocytes 6 %    % Eosinophils 1 %    % Basophils 0 %    Absolute Neutrophils 5.9 1.6 - 8.3 10e3/uL    Absolute Lymphocytes 2.0 0.8 - 5.3 10e3/uL    Absolute Monocytes 0.5 0.0 - 1.3 10e3/uL    Absolute Eosinophils 0.1 0.0 - 0.7 10e3/uL    Absolute Basophils 0.0 0.0 - 0.2 10e3/uL      No EKG required, no history of coronary heart disease, significant arrhythmia, peripheral arterial disease or other structural heart disease.    Revised Cardiac Risk Index (RCRI):  The patient has the following serious cardiovascular risks for perioperative complications:   - No serious cardiac risks = 0 points     RCRI Interpretation: 0 points: Class I (very low risk - 0.4% complication rate)           Signed Electronically by: Jem Arredondo PA-C  Copy of this evaluation report is provided to requesting physician.      Answers for HPI/ROS submitted by the patient on 4/10/2023  If you checked off any problems, how difficult have these problems made it for you to do your work, take care of things at home, or get along with other people?: Not difficult at all  PHQ9 TOTAL SCORE: 6

## 2023-04-11 ENCOUNTER — TELEPHONE (OUTPATIENT)
Dept: ONCOLOGY | Facility: CLINIC | Age: 43
End: 2023-04-11
Payer: COMMERCIAL

## 2023-04-11 NOTE — TELEPHONE ENCOUNTER
RN Care Coordinator: Alayna Singh RN     Surgery is scheduled with Dr. Mitra Souza & Dr. Melida Ferreira  Date: 4/24/2023   Location: [facility].  Scheduled per next available date    Nuc Med injection scheduled to be delivered to the OR for surgeon to inject.    H&P to be completed by Primary Care team; patient to schedule     COVID-19 test: n/a    Post-op: 5/10/2023 at 2:00pm with Dr. Souza and 5/12/2023 at 3:15pm at the [facility], in person visit    Patient will receive a phone call from pre-admission nurses 1-2 days prior to surgery with arrival and start time.    Spoke with the patient and was able to confirm all scheduled information.     The surgery packet was provided by the RNCC    Surgery and appointment overview was sent via [employer]    ______________    Liz Rosario on 4/11/2023 at 11:56 AM  P: [phone]

## 2023-04-12 ENCOUNTER — MYC MEDICAL ADVICE (OUTPATIENT)
Dept: SURGERY | Facility: CLINIC | Age: 43
End: 2023-04-12
Payer: COMMERCIAL

## 2023-04-12 NOTE — TELEPHONE ENCOUNTER
Dr. Libby Cordoba's  at the [facility] reached out to RN with date and time of post op that was scheduled and to inquire if this appt was okay. RN reviewed this clinic and this appt was scheduled during the Multidisciplinary clinic. Dr. Souza ok'd via staff message on 4/12 that she is okay with adding this patient at 2:45pm. Appointment updated and [employer] message sent to patient with this information. RN will follow up to ensure mychart is read and if not RN will call patient today...Viji Llamas RN

## 2023-04-21 ENCOUNTER — OFFICE VISIT (OUTPATIENT)
Dept: SURGERY | Facility: CLINIC | Age: 43
End: 2023-04-21
Payer: COMMERCIAL

## 2023-04-21 ENCOUNTER — ANESTHESIA EVENT (OUTPATIENT)
Dept: SURGERY | Facility: CLINIC | Age: 43
End: 2023-04-21
Payer: COMMERCIAL

## 2023-04-21 DIAGNOSIS — C50.811 MALIGNANT NEOPLASM OF OVERLAPPING SITES OF RIGHT BREAST IN FEMALE, ESTROGEN RECEPTOR POSITIVE (H): Primary | ICD-10-CM

## 2023-04-21 DIAGNOSIS — Z17.0 MALIGNANT NEOPLASM OF OVERLAPPING SITES OF RIGHT BREAST IN FEMALE, ESTROGEN RECEPTOR POSITIVE (H): Primary | ICD-10-CM

## 2023-04-21 PROCEDURE — 99214 OFFICE O/P EST MOD 30 MIN: CPT | Performed by: PLASTIC SURGERY

## 2023-04-21 NOTE — LETTER
4/21/2023         RE: Azalea Mckeon  [address] [facility] [address]        Dear Colleague,    Thank you for referring your patient, Azalea Mckeon, to the [facility]. Please see a copy of my visit note below.    Service Date: 04/21/2023    PREOPERATIVE VISIT    RESENTING COMPLAINT:  Preoperative visit for upcoming bilateral breast reconstruction.    HISTORY OF PRESENTING COMPLAINT:  Ms. Mckeon is 42 years old.  She has been diagnosed with right-sided breast cancer, undergoing bilateral nipple-non-sparing mastectomy and is interested in reconstruction.  She is scheduled for this coming Monday.  Plan is to do an expander-based reconstruction. Down the road some form of autologous reconstruction will be undertaken as the patient is not interested in implant-based reconstruction.  She wants to either undergo a latissimus flap reconstruction or a PAP flap reconstruction.  No change otherwise in her history and physical exam.    ASSESSMENT AND PLAN:  Based on the above findings, a diagnosis of right breast cancer, here for preoperative visit for upcoming bilateral mastectomy and reconstruction was made.  Went over the planned procedure with the patient in detail.  We will place expanders and a drain.  Plan is to do autologous reconstruction down the road.  All risks, benefits, and alternatives of the procedure including pain, infection, bleeding, scarring, asymmetry, seromas, hematomas, wound breakdown, wound dehiscence, loss of the expander, requirement of further surgeries delay of the reconstruction based on the findings in the operating room or blood flow to the skin flaps, DVT, PE, MI, CVA, pneumonia, renal failure and death.  Numbness was also explained.  All questions were answered.  She was happy with the visit.  All exam and discussion done in the presence of my resident, Rusty Eagle MD    Total time spent in the encounter today, including chart review, visit itself and  post-visit paperwork, was 30 minutes.    EDWARDO Ferreira MD        D: 2023   T: 2023   MT: marisol    Name:     MAIN COTA  MRN:      [id number]        Account:      [id number]   :      1980           Service Date: 2023       Document: [id number]       Again, thank you for allowing me to participate in the care of your patient.        Sincerely,        EDWARDO Ferreira MD

## 2023-04-21 NOTE — NURSING NOTE
Azalea Mckeon's chief complaint for this visit includes:  Chief Complaint   Patient presents with     RECHECK     Breast reconstruction, consult for surgery on 4/24; combined case with Dr. Souza     PCP: No Ref-Primary, Physician    Referring Provider:  No referring provider defined for this encounter.    There were no vitals taken for this visit.  Data Unavailable        Allergies   Allergen Reactions     Percocet [Oxycodone-Acetaminophen] Itching     Latex Rash         Do you need any medication refills at today's visit? [facility] Visit Facilitator- Surgical Specialties

## 2023-04-23 NOTE — PROGRESS NOTES
Service Date: 2023    PREOPERATIVE VISIT    RESENTING COMPLAINT:  Preoperative visit for upcoming bilateral breast reconstruction.    HISTORY OF PRESENTING COMPLAINT:  Ms. Mckeon is 42 years old.  She has been diagnosed with right-sided breast cancer, undergoing bilateral nipple-non-sparing mastectomy and is interested in reconstruction.  She is scheduled for this coming Monday.  Plan is to do an expander-based reconstruction. Down the road some form of autologous reconstruction will be undertaken as the patient is not interested in implant-based reconstruction.  She wants to either undergo a latissimus flap reconstruction or a PAP flap reconstruction.  No change otherwise in her history and physical exam.    ASSESSMENT AND PLAN:  Based on the above findings, a diagnosis of right breast cancer, here for preoperative visit for upcoming bilateral mastectomy and reconstruction was made.  Went over the planned procedure with the patient in detail.  We will place expanders and a drain.  Plan is to do autologous reconstruction down the road.  All risks, benefits, and alternatives of the procedure including pain, infection, bleeding, scarring, asymmetry, seromas, hematomas, wound breakdown, wound dehiscence, loss of the expander, requirement of further surgeries delay of the reconstruction based on the findings in the operating room or blood flow to the skin flaps, DVT, PE, MI, CVA, pneumonia, renal failure and death.  Numbness was also explained.  All questions were answered.  She was happy with the visit.  All exam and discussion done in the presence of my resident, Rusty Eagle MD    Total time spent in the encounter today, including chart review, visit itself and post-visit paperwork, was 30 minutes.    EDWARDO Ferreira MD        D: 2023   T: 2023   MT: marisol    Name:     MAIN MCKEON  MRN:      [id number]        Account:      [id number]   :      1980           Service Date:  04/21/2023       Document: [id number]

## 2023-04-24 ENCOUNTER — HOSPITAL ENCOUNTER (OUTPATIENT)
Dept: NUCLEAR MEDICINE | Facility: CLINIC | Age: 43
Setting detail: NUCLEAR MEDICINE
Discharge: HOME | End: 2023-04-24
Attending: SURGERY
Payer: COMMERCIAL

## 2023-04-24 ENCOUNTER — TRANSFERRED RECORDS (OUTPATIENT)
Dept: HEALTH INFORMATION MANAGEMENT | Facility: CLINIC | Age: 43
End: 2023-04-24

## 2023-04-24 ENCOUNTER — ANESTHESIA (OUTPATIENT)
Dept: SURGERY | Facility: CLINIC | Age: 43
End: 2023-04-24
Payer: COMMERCIAL

## 2023-04-24 ENCOUNTER — HOSPITAL ENCOUNTER (OUTPATIENT)
Facility: CLINIC | Age: 43
Discharge: HOME | End: 2023-04-24
Attending: SURGERY | Admitting: SURGERY
Payer: COMMERCIAL

## 2023-04-24 VITALS
HEART RATE: 79 BPM | OXYGEN SATURATION: 96 % | RESPIRATION RATE: 14 BRPM | HEIGHT: 64 IN | BODY MASS INDEX: 27.06 KG/M2 | TEMPERATURE: 98.1 F | SYSTOLIC BLOOD PRESSURE: 102 MMHG | DIASTOLIC BLOOD PRESSURE: 63 MMHG | WEIGHT: 158.51 LBS

## 2023-04-24 DIAGNOSIS — Z17.0 MALIGNANT NEOPLASM OF OVERLAPPING SITES OF RIGHT BREAST IN FEMALE, ESTROGEN RECEPTOR POSITIVE (H): Primary | ICD-10-CM

## 2023-04-24 DIAGNOSIS — Z17.0 MALIGNANT NEOPLASM OF OVERLAPPING SITES OF RIGHT BREAST IN FEMALE, ESTROGEN RECEPTOR POSITIVE (H): ICD-10-CM

## 2023-04-24 DIAGNOSIS — C50.811 MALIGNANT NEOPLASM OF OVERLAPPING SITES OF RIGHT BREAST IN FEMALE, ESTROGEN RECEPTOR POSITIVE (H): Primary | ICD-10-CM

## 2023-04-24 DIAGNOSIS — C50.811 MALIGNANT NEOPLASM OF OVERLAPPING SITES OF RIGHT BREAST IN FEMALE, ESTROGEN RECEPTOR POSITIVE (H): ICD-10-CM

## 2023-04-24 PROCEDURE — 250N000013 HC RX MED GY IP 250 OP 250 PS 637: Performed by: SURGERY

## 2023-04-24 PROCEDURE — 250N000011 HC RX IP 250 OP 636: Performed by: NURSE ANESTHETIST, CERTIFIED REGISTERED

## 2023-04-24 PROCEDURE — 258N000003 HC RX IP 258 OP 636: Performed by: NURSE ANESTHETIST, CERTIFIED REGISTERED

## 2023-04-24 PROCEDURE — 250N000009 HC RX 250: Performed by: NURSE ANESTHETIST, CERTIFIED REGISTERED

## 2023-04-24 PROCEDURE — 88342 IMHCHEM/IMCYTCHM 1ST ANTB: CPT | Mod: 26 | Performed by: PATHOLOGY

## 2023-04-24 PROCEDURE — 250N000009 HC RX 250

## 2023-04-24 PROCEDURE — 88307 TISSUE EXAM BY PATHOLOGIST: CPT | Mod: 26 | Performed by: PATHOLOGY

## 2023-04-24 PROCEDURE — 250N000011 HC RX IP 250 OP 636

## 2023-04-24 PROCEDURE — C1789 PROSTHESIS, BREAST, IMP: HCPCS | Performed by: SURGERY

## 2023-04-24 PROCEDURE — 88307 TISSUE EXAM BY PATHOLOGIST: CPT | Mod: TC | Performed by: SURGERY

## 2023-04-24 PROCEDURE — 19303 MAST SIMPLE COMPLETE: CPT | Mod: 50 | Performed by: SURGERY

## 2023-04-24 PROCEDURE — 38792 RA TRACER ID OF SENTINL NODE: CPT

## 2023-04-24 PROCEDURE — 19357 TISS XPNDR PLMT BRST RCNSTJ: CPT | Mod: 50 | Performed by: PLASTIC SURGERY

## 2023-04-24 PROCEDURE — 38525 BIOPSY/REMOVAL LYMPH NODES: CPT | Mod: RT | Performed by: SURGERY

## 2023-04-24 PROCEDURE — 250N000011 HC RX IP 250 OP 636: Performed by: SURGERY

## 2023-04-24 PROCEDURE — 343N000001 HC RX 343: Performed by: SURGERY

## 2023-04-24 PROCEDURE — 250N000009 HC RX 250: Performed by: SURGERY

## 2023-04-24 PROCEDURE — 88300 SURGICAL PATH GROSS: CPT | Mod: 26 | Performed by: PATHOLOGY

## 2023-04-24 PROCEDURE — A9520 TC99 TILMANOCEPT DIAG 0.5MCI: HCPCS | Performed by: SURGERY

## 2023-04-24 PROCEDURE — 250N000011 HC RX IP 250 OP 636: Performed by: ANESTHESIOLOGY

## 2023-04-24 PROCEDURE — 360N000076 HC SURGERY LEVEL 3, PER MIN: Performed by: SURGERY

## 2023-04-24 PROCEDURE — 38900 IO MAP OF SENT LYMPH NODE: CPT | Mod: RT | Performed by: SURGERY

## 2023-04-24 PROCEDURE — 710N000010 HC RECOVERY PHASE 1, LEVEL 2, PER MIN: Performed by: SURGERY

## 2023-04-24 PROCEDURE — 250N000011 HC RX IP 250 OP 636: Performed by: PLASTIC SURGERY

## 2023-04-24 PROCEDURE — 272N000002 HC OR SUPPLY OTHER OPNP: Performed by: SURGERY

## 2023-04-24 PROCEDURE — 710N000012 HC RECOVERY PHASE 2, PER MINUTE: Performed by: SURGERY

## 2023-04-24 PROCEDURE — 272N000001 HC OR GENERAL SUPPLY STERILE: Performed by: SURGERY

## 2023-04-24 PROCEDURE — 250N000009 HC RX 250: Performed by: PLASTIC SURGERY

## 2023-04-24 PROCEDURE — 37799 UNLISTED PX VASCULAR SURGERY: CPT | Performed by: PLASTIC SURGERY

## 2023-04-24 PROCEDURE — 370N000017 HC ANESTHESIA TECHNICAL FEE, PER MIN: Performed by: SURGERY

## 2023-04-24 PROCEDURE — 999N000141 HC STATISTIC PRE-PROCEDURE NURSING ASSESSMENT: Performed by: SURGERY

## 2023-04-24 PROCEDURE — 99207 NM LYMPHOSCINTIGRAPHY INJECTION ONLY: CPT | Performed by: RADIOLOGY

## 2023-04-24 PROCEDURE — 88360 TUMOR IMMUNOHISTOCHEM/MANUAL: CPT | Mod: 26 | Performed by: PATHOLOGY

## 2023-04-24 DEVICE — NATRELLE TE SMOOTH 133S-FX-13-T (US)
Type: IMPLANTABLE DEVICE | Site: BREAST | Status: NON-FUNCTIONAL
Brand: NATRELLE 133S TISSUE EXPANDERS
Removed: 2024-06-13

## 2023-04-24 RX ORDER — DEXMEDETOMIDINE HYDROCHLORIDE 4 UG/ML
INJECTION, SOLUTION INTRAVENOUS
Status: COMPLETED | OUTPATIENT
Start: 2023-04-24 | End: 2023-04-24

## 2023-04-24 RX ORDER — NALOXONE HYDROCHLORIDE 0.4 MG/ML
0.2 INJECTION, SOLUTION INTRAMUSCULAR; INTRAVENOUS; SUBCUTANEOUS
Status: DISCONTINUED | OUTPATIENT
Start: 2023-04-24 | End: 2023-04-24 | Stop reason: HOSPADM

## 2023-04-24 RX ORDER — NALOXONE HYDROCHLORIDE 0.4 MG/ML
0.4 INJECTION, SOLUTION INTRAMUSCULAR; INTRAVENOUS; SUBCUTANEOUS
Status: DISCONTINUED | OUTPATIENT
Start: 2023-04-24 | End: 2023-04-24 | Stop reason: HOSPADM

## 2023-04-24 RX ORDER — METOPROLOL TARTRATE 1 MG/ML
1-2 INJECTION, SOLUTION INTRAVENOUS EVERY 5 MIN PRN
Status: DISCONTINUED | OUTPATIENT
Start: 2023-04-24 | End: 2023-04-24 | Stop reason: HOSPADM

## 2023-04-24 RX ORDER — FENTANYL CITRATE 50 UG/ML
25 INJECTION, SOLUTION INTRAMUSCULAR; INTRAVENOUS EVERY 5 MIN PRN
Status: DISCONTINUED | OUTPATIENT
Start: 2023-04-24 | End: 2023-04-24 | Stop reason: HOSPADM

## 2023-04-24 RX ORDER — BUPIVACAINE HYDROCHLORIDE 2.5 MG/ML
INJECTION, SOLUTION EPIDURAL; INFILTRATION; INTRACAUDAL
Status: COMPLETED | OUTPATIENT
Start: 2023-04-24 | End: 2023-04-24

## 2023-04-24 RX ORDER — INDOCYANINE GREEN AND WATER 25 MG
KIT INJECTION PRN
Status: DISCONTINUED | OUTPATIENT
Start: 2023-04-24 | End: 2023-04-24 | Stop reason: HOSPADM

## 2023-04-24 RX ORDER — FENTANYL CITRATE 50 UG/ML
25-50 INJECTION, SOLUTION INTRAMUSCULAR; INTRAVENOUS
Status: DISCONTINUED | OUTPATIENT
Start: 2023-04-24 | End: 2023-04-24 | Stop reason: HOSPADM

## 2023-04-24 RX ORDER — SODIUM CHLORIDE, SODIUM LACTATE, POTASSIUM CHLORIDE, CALCIUM CHLORIDE 600; 310; 30; 20 MG/100ML; MG/100ML; MG/100ML; MG/100ML
INJECTION, SOLUTION INTRAVENOUS CONTINUOUS PRN
Status: DISCONTINUED | OUTPATIENT
Start: 2023-04-24 | End: 2023-04-24

## 2023-04-24 RX ORDER — ACETAMINOPHEN 325 MG/1
650 TABLET ORAL
Status: DISCONTINUED | OUTPATIENT
Start: 2023-04-24 | End: 2023-04-24 | Stop reason: HOSPADM

## 2023-04-24 RX ORDER — OXYCODONE HYDROCHLORIDE 5 MG/1
5 TABLET ORAL EVERY 6 HOURS PRN
Qty: 16 TABLET | Refills: 0 | Status: SHIPPED | OUTPATIENT
Start: 2023-04-24 | End: 2023-04-24

## 2023-04-24 RX ORDER — ENOXAPARIN SODIUM 100 MG/ML
40 INJECTION SUBCUTANEOUS
Status: COMPLETED | OUTPATIENT
Start: 2023-04-24 | End: 2023-04-24

## 2023-04-24 RX ORDER — INDOCYANINE GREEN AND WATER 25 MG
KIT INJECTION PRN
Status: DISCONTINUED | OUTPATIENT
Start: 2023-04-24 | End: 2023-04-24

## 2023-04-24 RX ORDER — PROPOFOL 10 MG/ML
INJECTION, EMULSION INTRAVENOUS PRN
Status: DISCONTINUED | OUTPATIENT
Start: 2023-04-24 | End: 2023-04-24

## 2023-04-24 RX ORDER — HYDROMORPHONE HCL IN WATER/PF 6 MG/30 ML
0.4 PATIENT CONTROLLED ANALGESIA SYRINGE INTRAVENOUS EVERY 5 MIN PRN
Status: DISCONTINUED | OUTPATIENT
Start: 2023-04-24 | End: 2023-04-24 | Stop reason: HOSPADM

## 2023-04-24 RX ORDER — DEXAMETHASONE SODIUM PHOSPHATE 10 MG/ML
INJECTION, SOLUTION INTRAMUSCULAR; INTRAVENOUS
Status: COMPLETED | OUTPATIENT
Start: 2023-04-24 | End: 2023-04-24

## 2023-04-24 RX ORDER — AMOXICILLIN 250 MG
1 CAPSULE ORAL 2 TIMES DAILY
Qty: 60 TABLET | Refills: 0 | Status: SHIPPED | OUTPATIENT
Start: 2023-04-24 | End: 2023-05-24

## 2023-04-24 RX ORDER — PROPOFOL 10 MG/ML
INJECTION, EMULSION INTRAVENOUS CONTINUOUS PRN
Status: DISCONTINUED | OUTPATIENT
Start: 2023-04-24 | End: 2023-04-24

## 2023-04-24 RX ORDER — ONDANSETRON 2 MG/ML
INJECTION INTRAMUSCULAR; INTRAVENOUS PRN
Status: DISCONTINUED | OUTPATIENT
Start: 2023-04-24 | End: 2023-04-24

## 2023-04-24 RX ORDER — HYDROMORPHONE HYDROCHLORIDE 2 MG/1
2 TABLET ORAL EVERY 6 HOURS PRN
Qty: 15 TABLET | Refills: 0 | Status: SHIPPED | OUTPATIENT
Start: 2023-04-24 | End: 2023-04-27

## 2023-04-24 RX ORDER — FENTANYL CITRATE 50 UG/ML
50 INJECTION, SOLUTION INTRAMUSCULAR; INTRAVENOUS EVERY 5 MIN PRN
Status: DISCONTINUED | OUTPATIENT
Start: 2023-04-24 | End: 2023-04-24 | Stop reason: HOSPADM

## 2023-04-24 RX ORDER — ACETAMINOPHEN 325 MG/1
975 TABLET ORAL ONCE
Status: COMPLETED | OUTPATIENT
Start: 2023-04-24 | End: 2023-04-24

## 2023-04-24 RX ORDER — DEXAMETHASONE SODIUM PHOSPHATE 4 MG/ML
INJECTION, SOLUTION INTRA-ARTICULAR; INTRALESIONAL; INTRAMUSCULAR; INTRAVENOUS; SOFT TISSUE PRN
Status: DISCONTINUED | OUTPATIENT
Start: 2023-04-24 | End: 2023-04-24

## 2023-04-24 RX ORDER — HYDROMORPHONE HCL IN WATER/PF 6 MG/30 ML
0.2 PATIENT CONTROLLED ANALGESIA SYRINGE INTRAVENOUS EVERY 5 MIN PRN
Status: DISCONTINUED | OUTPATIENT
Start: 2023-04-24 | End: 2023-04-24 | Stop reason: HOSPADM

## 2023-04-24 RX ORDER — OXYCODONE HYDROCHLORIDE 10 MG/1
10 TABLET ORAL
Status: DISCONTINUED | OUTPATIENT
Start: 2023-04-24 | End: 2023-04-24 | Stop reason: HOSPADM

## 2023-04-24 RX ORDER — CEFAZOLIN SODIUM/WATER 2 G/20 ML
2 SYRINGE (ML) INTRAVENOUS SEE ADMIN INSTRUCTIONS
Status: DISCONTINUED | OUTPATIENT
Start: 2023-04-24 | End: 2023-04-24 | Stop reason: HOSPADM

## 2023-04-24 RX ORDER — CEFAZOLIN SODIUM/WATER 2 G/20 ML
2 SYRINGE (ML) INTRAVENOUS
Status: COMPLETED | OUTPATIENT
Start: 2023-04-24 | End: 2023-04-24

## 2023-04-24 RX ORDER — ONDANSETRON 2 MG/ML
4 INJECTION INTRAMUSCULAR; INTRAVENOUS EVERY 30 MIN PRN
Status: DISCONTINUED | OUTPATIENT
Start: 2023-04-24 | End: 2023-04-24 | Stop reason: HOSPADM

## 2023-04-24 RX ORDER — FENTANYL CITRATE 50 UG/ML
INJECTION, SOLUTION INTRAMUSCULAR; INTRAVENOUS PRN
Status: DISCONTINUED | OUTPATIENT
Start: 2023-04-24 | End: 2023-04-24

## 2023-04-24 RX ORDER — ONDANSETRON 4 MG/1
4 TABLET, ORALLY DISINTEGRATING ORAL EVERY 8 HOURS PRN
Qty: 10 TABLET | Refills: 0 | Status: SHIPPED | OUTPATIENT
Start: 2023-04-24 | End: 2023-12-12

## 2023-04-24 RX ORDER — SODIUM CHLORIDE, SODIUM LACTATE, POTASSIUM CHLORIDE, CALCIUM CHLORIDE 600; 310; 30; 20 MG/100ML; MG/100ML; MG/100ML; MG/100ML
INJECTION, SOLUTION INTRAVENOUS CONTINUOUS
Status: DISCONTINUED | OUTPATIENT
Start: 2023-04-24 | End: 2023-04-24 | Stop reason: HOSPADM

## 2023-04-24 RX ORDER — CEPHALEXIN 500 MG/1
500 CAPSULE ORAL 4 TIMES DAILY
Qty: 56 CAPSULE | Refills: 0 | Status: SHIPPED | OUTPATIENT
Start: 2023-04-24 | End: 2023-05-08

## 2023-04-24 RX ORDER — LIDOCAINE HYDROCHLORIDE 20 MG/ML
INJECTION, SOLUTION INFILTRATION; PERINEURAL PRN
Status: DISCONTINUED | OUTPATIENT
Start: 2023-04-24 | End: 2023-04-24

## 2023-04-24 RX ORDER — ONDANSETRON 4 MG/1
4 TABLET, ORALLY DISINTEGRATING ORAL EVERY 30 MIN PRN
Status: DISCONTINUED | OUTPATIENT
Start: 2023-04-24 | End: 2023-04-24 | Stop reason: HOSPADM

## 2023-04-24 RX ORDER — FLUMAZENIL 0.1 MG/ML
0.2 INJECTION, SOLUTION INTRAVENOUS
Status: DISCONTINUED | OUTPATIENT
Start: 2023-04-24 | End: 2023-04-24 | Stop reason: HOSPADM

## 2023-04-24 RX ADMIN — PROPOFOL 80 MG: 10 INJECTION, EMULSION INTRAVENOUS at 14:26

## 2023-04-24 RX ADMIN — HYDROMORPHONE HYDROCHLORIDE 0.5 MG: 1 INJECTION, SOLUTION INTRAMUSCULAR; INTRAVENOUS; SUBCUTANEOUS at 19:11

## 2023-04-24 RX ADMIN — MIDAZOLAM 2 MG: 1 INJECTION INTRAMUSCULAR; INTRAVENOUS at 14:15

## 2023-04-24 RX ADMIN — FENTANYL CITRATE 50 MCG: 50 INJECTION, SOLUTION INTRAMUSCULAR; INTRAVENOUS at 18:22

## 2023-04-24 RX ADMIN — TRANEXAMIC ACID 1 G: 1 INJECTION, SOLUTION INTRAVENOUS at 18:39

## 2023-04-24 RX ADMIN — PROPOFOL 130 MCG/KG/MIN: 10 INJECTION, EMULSION INTRAVENOUS at 14:32

## 2023-04-24 RX ADMIN — SODIUM CHLORIDE, POTASSIUM CHLORIDE, SODIUM LACTATE AND CALCIUM CHLORIDE: 600; 310; 30; 20 INJECTION, SOLUTION INTRAVENOUS at 14:15

## 2023-04-24 RX ADMIN — ONDANSETRON 4 MG: 2 INJECTION INTRAMUSCULAR; INTRAVENOUS at 19:11

## 2023-04-24 RX ADMIN — Medication 20 MG: at 17:26

## 2023-04-24 RX ADMIN — FENTANYL CITRATE 50 MCG: 50 INJECTION, SOLUTION INTRAMUSCULAR; INTRAVENOUS at 14:55

## 2023-04-24 RX ADMIN — ACETAMINOPHEN 975 MG: 325 TABLET ORAL at 13:03

## 2023-04-24 RX ADMIN — Medication 20 MG: at 15:20

## 2023-04-24 RX ADMIN — INDOCYANINE GREEN AND WATER 12.5 MG: KIT at 18:18

## 2023-04-24 RX ADMIN — FENTANYL CITRATE 50 MCG: 50 INJECTION, SOLUTION INTRAMUSCULAR; INTRAVENOUS at 16:29

## 2023-04-24 RX ADMIN — DEXAMETHASONE SODIUM PHOSPHATE 6 MG: 4 INJECTION, SOLUTION INTRA-ARTICULAR; INTRALESIONAL; INTRAMUSCULAR; INTRAVENOUS; SOFT TISSUE at 14:34

## 2023-04-24 RX ADMIN — PROPOFOL 40 MG: 10 INJECTION, EMULSION INTRAVENOUS at 19:31

## 2023-04-24 RX ADMIN — SUGAMMADEX 200 MG: 100 INJECTION, SOLUTION INTRAVENOUS at 19:32

## 2023-04-24 RX ADMIN — DEXMEDETOMIDINE 40 MCG: 100 INJECTION, SOLUTION, CONCENTRATE INTRAVENOUS at 13:40

## 2023-04-24 RX ADMIN — LIDOCAINE HYDROCHLORIDE 80 MG: 20 INJECTION, SOLUTION INFILTRATION; PERINEURAL at 14:26

## 2023-04-24 RX ADMIN — FENTANYL CITRATE 50 MCG: 50 INJECTION, SOLUTION INTRAMUSCULAR; INTRAVENOUS at 14:26

## 2023-04-24 RX ADMIN — ONDANSETRON 4 MG: 4 TABLET, ORALLY DISINTEGRATING ORAL at 21:03

## 2023-04-24 RX ADMIN — FENTANYL CITRATE 25 MCG: 50 INJECTION, SOLUTION INTRAMUSCULAR; INTRAVENOUS at 20:02

## 2023-04-24 RX ADMIN — Medication 2 G: at 18:34

## 2023-04-24 RX ADMIN — SODIUM CHLORIDE, POTASSIUM CHLORIDE, SODIUM LACTATE AND CALCIUM CHLORIDE: 600; 310; 30; 20 INJECTION, SOLUTION INTRAVENOUS at 14:36

## 2023-04-24 RX ADMIN — FENTANYL CITRATE 50 MCG: 50 INJECTION, SOLUTION INTRAMUSCULAR; INTRAVENOUS at 13:33

## 2023-04-24 RX ADMIN — Medication 20 MG: at 16:46

## 2023-04-24 RX ADMIN — MIDAZOLAM 1 MG: 1 INJECTION INTRAMUSCULAR; INTRAVENOUS at 13:33

## 2023-04-24 RX ADMIN — Medication 20 MG: at 16:04

## 2023-04-24 RX ADMIN — ENOXAPARIN SODIUM 40 MG: 40 INJECTION SUBCUTANEOUS at 13:03

## 2023-04-24 RX ADMIN — Medication 20 MG: at 18:13

## 2023-04-24 RX ADMIN — DEXAMETHASONE SODIUM PHOSPHATE 2 MG: 10 INJECTION, SOLUTION INTRAMUSCULAR; INTRAVENOUS at 13:40

## 2023-04-24 RX ADMIN — FENTANYL CITRATE 50 MCG: 50 INJECTION, SOLUTION INTRAMUSCULAR; INTRAVENOUS at 19:46

## 2023-04-24 RX ADMIN — BUPIVACAINE HYDROCHLORIDE 60 ML: 2.5 INJECTION, SOLUTION EPIDURAL; INFILTRATION; INTRACAUDAL; PERINEURAL at 13:40

## 2023-04-24 RX ADMIN — Medication 2 G: at 14:34

## 2023-04-24 RX ADMIN — Medication 50 MG: at 14:26

## 2023-04-24 ASSESSMENT — ACTIVITIES OF DAILY LIVING (ADL)
ADLS_ACUITY_SCORE: 35

## 2023-04-24 ASSESSMENT — ENCOUNTER SYMPTOMS: SEIZURES: 0

## 2023-04-24 NOTE — OP NOTE
DATE OF SURGERY: April 24, 2023     SURGEON: Mitra Souza MD  ASSISTANT(S): Boo Garcia MD PGY-4    PREOPERATIVE DIAGNOSIS: Right upper breast cancer  POSTOPERATIVE DIAGNOSIS: same    PROCEDURE(S):   1. Left risk-reducing skin-sparing mastectomy  2. Right skin-sparing mastectomy  3. Right axillary sentinel lymph node mapping and biopsy     ANESTHESIA: General + Block    CLINICAL NOTE:  Azalea Mckeon is a 42 year old woman with right breast cancer.  The risks and benefits of bilateral skin-sparing mastectomy and right axillary carmella staging were discussed with the patient and she elected to proceed with informed consent.    OPERATIVE FINDINGS:  1. No palpable mass in the left breast.  2. Palpable very superficial mass in the right upper outer breast; care was taken to take a grossly normal margin by dissecting just behind the dermis where this mass was.  3. Bilateral retroglandular implants removed. Implant capsule without nodularity. There was no pericapsular fluid.  4. One sentinel lymph node removed.    OPERATIVE PROCEDURE:  The patient was brought to the operating room and placed in the supine position with appropriate padding for all of the pressure points. A general anesthetic was administered by the Anesthesia team.   A surgical safety checklist was performed to confirm the patient's identity, the site and laterality of the procedure. Perioperative antibiotics (Ancef) was provided.  VTE prophylaxis was provided with serial compression devices and subcutaneous lovenox preoperatively. A beltran catheter was placed. 0.5 sophia Curie of technetium-labelled Tilmanocept was injected into the right retroareolar space. The bilateral breasts were then prepped and draped in the usual sterile fashion using Chloraprep.     We began on the left.  A circumareolar incision with a radial extension towards the upper outer quadrant was made.  Superficial skin flaps were raised circumferentially, heading superiorly towards the  clavicle, medially towards the lateral border of the sternum, inferiorly towards the insertion of the rectus sheath, and laterally to the lateral border of the pectoralis major muscle.  During the inferior dissection, the pocket of the retroglandular implant was entered and the prior implant was removed. There was no fluid and the capsule was without nodularity. The implant was sent to pathology.  The breast was then dissected off of the pectoralis major muscle, taking its fascia en bloc with the specimen.   The specimen was then marked with a short suture for the 12:00 position at the areola and a long suture for the axillary tail and sent to pathology.      Next, a total of 0.1 mL of indocyanin green was injected into the right retroareolar space at 12:00 and 9:00.  A circumareolar incision with a radial extension towards the upper outer quadrant was made.  Superficial skin flaps were raised circumferentially, heading superiorly towards the clavicle, medially towards the lateral border of the sternum, inferiorly towards the insertion of the rectus sheath, and laterally to the lateral border of the pectoralis major muscle. Superiorly, the palpable mass in the upper outer quadrant was very superficial. Care was taken to take the dissection here to just behind the dermis to remove the mass with a grossly normal margin en bloc with the specimen. During the inferior dissection, the pocket of the retroglandular implant was entered and the prior implant was removed. There was no fluid and the capsule was without nodularity. The implant was sent to pathology. The breast was then dissected off of the pectoralis major muscle, taking its fascia en bloc with the specimen.   The specimen was then marked with a short suture for the 12:00 position at the areola and a long suture for the axillary tail and sent to pathology.      The Neoprobe was used to identify the sentinel lymph nodes in the right axilla.  [place] lymph node  #1 was fluorescent and had an ex vivo count of 3650.  The background count was 85. No other fluorescence or palpable lymph nodes were found.  Thus no additional sentinel lymph nodes were sought.  All of the lymph nodes were sent to pathology individually.     The wound was irrigated and hemostasis was achieved. The patient tolerated the procedure well thus far. The sponge, needle, instrument counts were correct thus far.  At this point, Dr Ferreira and his team took over for the reconstruction portion of the case, which will be dictated separately.       I was present and scrubbed for the entire above procedure.    Dr. Garcia actively first assisted during this operation providing necessary retraction and exposure as well as maintaining hemostasis and clear operative field.  This was helpful in allowing the operation to proceed in a safe and expeditious manner.  They were present for the entire duration of the critical portions of the operation.    EBL: 45 mL    SPECIMEN(S):  A. Left breast implant - for gross only  B. Left breast and nipple; short suture = 12:00 position at the areola, long suture = axillary tail   C. Right breast implant - for gross only  D. Right breast and nipple; short suture = 12:00 position at the areola, long suture = axillary tail   E. Right axillary sentinel lymph node # 1    COMMISSION ON CANCER SYNOPTIC REPORT:  [place] Node Biopsy for Breast Cancer - Right  Operation performed with curative intent Yes   Tracer(s) used to identify sentinel nodes in the upfront surgery (non-neoadjuvant) setting Dye and Radioactive tracer   Tracer(s) used to identify sentinel nodes in the neoadjuvant setting N/A   All nodes (colored or non-colored) present at the end of a dye-filled lymphatic channel were removed Yes   All significantly radioactive nodes were removed Yes   All palpably suspicious nodes were removed N/A   Biopsy-proven positive nodes marked with clips prior to chemotherapy were identified  and removed N/A      Mitra Souza MD MSc FRCSC FACS  Associate Professor of Surgery  Division of Surgical Oncology  [facility]

## 2023-04-24 NOTE — BRIEF OP NOTE
[facility]    Brief Operative Note    Pre-operative diagnosis: Malignant neoplasm of overlapping sites of right breast in female, estrogen receptor positive (H) [C50.811, Z17.0]  Post-operative diagnosis Same as pre-operative diagnosis    Procedure: Procedure(s):  BILATERAL Skin-Sparing Mastectomy, BILATERAL breast implant removal, RIGHT Axillary [place] Lymph Node Biopsy  Reconstruct breast, insert tissue expander bilateral, Spy **Latex Allergy**  Surgeon: Surgeon(s) and Role:  Panel 1:     * Mitra Souza MD - Primary     * Boo Garcia MD - Resident - Assisting  Panel 2:     * EDWARDO Ferreira MD - Primary  Anesthesia: General with Block   Estimated Blood Loss: 45 mL    Drains: None  Specimens:   ID Type Source Tests Collected by Time Destination   1 : Left breast implant Tissue Other OR DOCUMENTATION ONLY, SURGICAL PATHOLOGY EXAM Mitra Souza MD 4/24/2023  3:20 PM    2 : Left breast + nipple + implant capsule Tissue Breast, Left SURGICAL PATHOLOGY EXAM Mitra Souza MD 4/24/2023  3:48 PM    3 : Right breast implant Tissue Other OR DOCUMENTATION ONLY, SURGICAL PATHOLOGY EXAM Mitra Souza MD 4/24/2023  4:48 PM    4 : Right breast + nipple + implant capsule Tissue Breast, Right SURGICAL PATHOLOGY EXAM Mitra Souza MD 4/24/2023  4:49 PM    5 : Right axillary sentinel lymph node 1 Tissue Lymph Node(s), [place] SURGICAL PATHOLOGY EXAM Mitra Souza MD 4/24/2023  5:40 PM      Findings:   Bilateral mastectomies, previous implants removed, implant capsules smooth without fluid. One right axillary sentinel lymph node..  Complications: None.  Implants: * No implants in log *

## 2023-04-24 NOTE — DISCHARGE INSTRUCTIONS
TISSUE EXPANSION FOR BREAST RECONSTRUCTION  POST-OPERATIVE INSTRUCTIONS    Instructions  ?  Shower with Bactoshield the night before and morning of each tissue     expansion.  ?  You need someone to drive you to your appointment for the first 3 weeks     post-operatively or as long as you requiring narcotic pain medicine.    Activities  ?  You cannot perform house hold chores or heavy lifting greater than 5 to     10 pounds for 6 weeks post-operatively.  ?  No hot tubs, swimming in lakes until fully healed.  Be Aware:  ?  You cannot have an MRI if you have tissue expanders in place.  ?  The tissue expanders may be detected via the security scanners at the      airport. You will need a note from your Doctor if you plan to travel      indicating you have tissue expanders in place with metal.    Incision Care  ?  You can shower 48 hours after the last drain tube has removed. Prior to drain removal you may shower with a handheld showerhead, keep incisions clean and dry but lower body can get wet.    ?  Avoid sun exposure to scars for at least 12 months after your last     surgery.  ?  If sun exposure is unavoidable then always use a sun block with 50 SPF     or higher.  ?  Shower regularly to keep the incisions clean and inspect for signs of      Infection.  ?  You can use moisturizer on the incisions and surrounding skin starting 3      Weeks.    What to Expect  ?  You may experience minor discomfort for the following 12 to 24 hours      after each tissue expansion. This discomfort usually subsides 2 to 3 days      after each tissue expansion.  ?  The tissue expander may shift after the 1st or 2 nd expansion.  ?  You may experience more discomfort on one side than the other if you      have bilateral tissue expanders placed.  ?  Your posture may change causing you to have some upper and/or mid      back pain as you re expanded.  ?  Your shoulder range of motion may become stiff requiring continuing to     perform the  shoulder exercises during the tissue expansion process to     prevent shoulder stiffness and a frozen shoulder.  ?  You may find it difficult to sleep because you feel tight across the chest      and shoulders.  ?  You may find it difficult to fit into certain types of tight fitting clothing.      You may need to wear oversized shirts until the final exchange of the      tissue expander (s).  ?  You may feel chest persist tightness or heaviness secondary to being     expanded. This usually subsides with time.  ?  The tissue expanders will remain in place for a minimum of 8 weeks after     completing the last tissue expansion. This will allow for the soft tissues     (i.e. skin and muscle) to heal and recover from being stretched before the     second surgery takes place for the exchange of the tissue expander for a     permanent breast implant.  ?  You ll see your surgeon when we think you have achieve your desired     breast size to determine if you need additional expansions and for     surgical planning.    How can I treat discomfort?  ?  Ibuprofen (or other NSAIDs) 600 mg by mouth every 6 to 8 hours with     food starting the morning of each tissue expansion and continue to take     around the clock for 3 to 5 days as needed for discomfort. You can start     this 2 weeks after surgery.  ?  If you need additional pain control at night, you may take the prescribed      Vicodin or Norco you were prescribed for pain immediately after surgery.  ?  You can take the ibuprofen with the Vicodin or Norco if additional pain      relief is required.    Will I achieve my desired breast size?  ?  This is determined on an individual basis.  ?  There is no scientific way to determine the exact breast size. It will be     determined by a number of different factors i.e.  ?  How well you tolerate each tissue expansion.  ?  How well your skin reacts to the expansions.  ?  The size of the other breast (if a unilateral tissue  expander).  ?  Previous surgeries or amount of scarring  ?  We recommend trying on a desired bra size as you approach your desired      breast size to see how well the bra size fits.    Appearance  ?  While the tissue is being expanded, a bulge will be created. Depending     upon the location of the tissue expander, the bulge may be considered     desirable or unsightly.  ?  Following the exchange of the tissue expander, a more normal and     desirable look should be restored.  ?  After the exchange for permanent implant, the breasts will feel softer     with less fullness in your axilla.    When to Call:  ?  If you have increasing swelling or bruising.  ?  If swelling and redness persist after a few days.  ?  If you have increased redness along the incision.  ?  If you have severe or increased pain not relieved by medication.  ?  If you have any side effects to mediations; such as, rash, nauseas,      headache, vomiting, etc.  ?  If you have an oral temperature of 100.5 degrees or higher.  ?  If you have any yellow or greenish drainage from the incisions or notice a      foul smell.  ?  If you have bleeding from the incisions that is difficult to control with      light pressure.  ?  If you have loss of feeling or motion.    For Medical Questions, Please Call:  ?  [phone], Monday - Friday, 8 a.m. - 4:30 p.m.  ?  After hours and on weekends, call Hospital Paging at [phone] and     ask for the Plastic Surgeon on call.    Please note my office will call you 1-2 business days after your procedure to check up on how you're doing and to schedule your post-operative appointment.     [facility], [place]  Same-Day Surgery   Adult Discharge Orders & Instructions     For 24 hours after surgery    Get plenty of rest.  A responsible adult must stay with you for at least 24 hours after you leave the hospital.   Do not drive or use heavy equipment.  If you have weakness or tingling, don't  drive or use heavy equipment until this feeling goes away.  Do not drink alcohol.  Avoid strenuous or risky activities.  Ask for help when climbing stairs.   You may feel lightheaded.  IF so, sit for a few minutes before standing.  Have someone help you get up.   If you have nausea (feel sick to your stomach): Drink only clear liquids such as apple juice, ginger ale, broth or 7-Up.  Rest may also help.  Be sure to drink enough fluids.  Move to a regular diet as you feel able.  You may have a slight fever. Call the doctor if your fever is over 100 F (37.7 C) (taken under the tongue) or lasts longer than 24 hours.  You may have a dry mouth, a sore throat, muscle aches or trouble sleeping.  These should go away after 24 hours.  Do not make important or legal decisions.   Call your doctor for any of the followin.  Signs of infection (fever, growing tenderness at the surgery site, a large amount of drainage or bleeding, severe pain, foul-smelling drainage, redness, swelling).    2. It has been over 8 to 10 hours since surgery and you are still not able to urinate (pass water).    3.  Headache for over 24 hours.    4.  Numbness, tingling or weakness the day after surgery (if you had spinal anesthesia).  To contact a doctor, call Dr[facility] @ [phone]  (option 5, then option 2 ) or [facility] at [phone]. Or Call Deborah VALLECILLO--Dr. Souza's nurse @ [phone]  or:    '   [phone] and ask for the resident on call for   Surgery answered 24 hours a day)  '   Emergency Department:    [facility]: [phone]       (TTY for hearing impaired: [phone])

## 2023-04-24 NOTE — ANESTHESIA PREPROCEDURE EVALUATION
Anesthesia Pre-Procedure Evaluation    Patient: Azalea Mckeon   MRN: [id number] : 1980        Procedure : Procedure(s):  BILATERAL Skin-Sparing Mastectomy,RIGHT Axillary [place] Lymph Node Biopsy,  Possible RIGHT Axillary Lymph Node Dissection  Reconstruct breast, insert tissue expander bilateral, Spy **Latex Allergy**          Past Medical History:   Diagnosis Date     NO ACTIVE PROBLEMS       Past Surgical History:   Procedure Laterality Date     GYN SURGERY           GYN SURGERY           GYN SURGERY  2013    C/S     GYN SURGERY      C/S     HC REMOVAL OF OVARIAN CYST(S)  1999     TUBAL LIGATION        Allergies   Allergen Reactions     Percocet [Oxycodone-Acetaminophen] Itching     Latex Rash      Social History     Tobacco Use     Smoking status: Former     Types: Cigarettes     Quit date: 2009     Years since quittin.8     Smokeless tobacco: Never   Vaping Use     Vaping status: Never Used   Substance Use Topics     Alcohol use: No      Wt Readings from Last 1 Encounters:   23 71.9 kg (158 lb 8.2 oz)        Anesthesia Evaluation   Pt has had prior anesthetic.         ROS/MED HX  ENT/Pulmonary:       Neurologic:  - neg neurologic ROS  (-) no seizures and no CVA   Cardiovascular:       METS/Exercise Tolerance:     Hematologic:       Musculoskeletal:       GI/Hepatic:    (-) liver disease   Renal/Genitourinary:    (-) renal disease   Endo:       Psychiatric/Substance Use:       Infectious Disease:       Malignancy:   (+) Malignancy, History of Breast.    Other:            Physical Exam    Airway        Mallampati: II   TM distance: > 3 FB   Neck ROM: full   Mouth opening: > 3 cm    Respiratory Devices and Support         Dental       (+) Completely normal teeth      Cardiovascular          Rhythm and rate: regular and normal     Pulmonary           breath sounds clear to auscultation           OUTSIDE LABS:  CBC:   Lab Results   Component Value Date     WBC 8.5 04/10/2023    WBC 5.3 04/14/2021    HGB 13.8 04/10/2023    HGB 13.3 04/14/2021    HCT 40.0 04/10/2023    HCT 39.9 04/14/2021     04/10/2023     04/14/2021     BMP:   Lab Results   Component Value Date     04/14/2021    POTASSIUM 3.9 04/14/2021    CHLORIDE 105 04/14/2021    CO2 29 04/14/2021    BUN 8 04/14/2021    CR 0.78 04/14/2021    GLC 98 04/14/2021    GLC 84 01/15/2013     COAGS: No results found for: PTT, INR, FIBR  POC:   Lab Results   Component Value Date    HCG Negative 03/31/2014     HEPATIC: No results found for: ALBUMIN, PROTTOTAL, ALT, AST, GGT, ALKPHOS, BILITOTAL, BILIDIRECT, STEPH  OTHER:   Lab Results   Component Value Date    MINNA 8.8 04/14/2021    TSH 2.04 01/15/2013       Anesthesia Plan    ASA Status:  3   NPO Status:  NPO Appropriate    Anesthesia Type: General.     - Airway: ETT      Maintenance: TIVA.   Techniques and Equipment:     - Lines/Monitors: 2nd IV     Consents    Anesthesia Plan(s) and associated risks, benefits, and realistic alternatives discussed. Questions answered and patient/representative(s) expressed understanding.    - Discussed:     - Discussed with:  Patient      - Extended Intubation/Ventilatory Support Discussed: No.      - Patient is DNR/DNI Status: No    Use of blood products discussed: No .     Postoperative Care    Pain management: IV analgesics.   PONV prophylaxis: Ondansetron (or other 5HT-3), Dexamethasone or Solumedrol, Background Propofol Infusion     Comments:                Eli Gomez MD

## 2023-04-24 NOTE — ANESTHESIA PROCEDURE NOTES
Airway       Patient location during procedure: OR       Procedure Start/Stop Times: 4/24/2023 2:31 PM  Staff -        CRNA: Rowena Miller APRN CRNA       Performed By: CRNA  Consent for Airway        Urgency: elective  Indications and Patient Condition       Indications for airway management: nora-procedural       Induction type:intravenous       Mask difficulty assessment: 1 - vent by mask    Final Airway Details       Final airway type: endotracheal airway       Successful airway: ETT - single  Endotracheal Airway Details        ETT size (mm): 7.0       Cuffed: yes       Successful intubation technique: direct laryngoscopy       DL Blade Type: Vickers 2       Grade View of Cords: 1       Adjucts: stylet       Position: Right       Measured from: lips       Secured at (cm): 21       Bite block used: None    Post intubation assessment        Placement verified by: capnometry, equal breath sounds and chest rise        Number of attempts at approach: 1       Secured with: pink tape       Ease of procedure: easy       Dentition: Intact and Unchanged    Medication(s) Administered   Medication Administration Time: 4/24/2023 2:31 PM

## 2023-04-24 NOTE — ANESTHESIA PROCEDURE NOTES
Pectoralis Procedure Note    Pre-Procedure   Staff -        Anesthesiologist:  Marek Mathew MD       Resident/Fellow: Gomez Arita       Performed By: resident       Location: pre-op       Pre-Anesthestic Checklist: patient identified, IV checked, site marked, risks and benefits discussed, informed consent, monitors and equipment checked, pre-op evaluation, at physician/surgeon's request and post-op pain management  Timeout:       Correct Patient: Yes        Correct Procedure: Yes        Correct Site: Yes        Correct Position: Yes        Correct Laterality: Yes        Site Marked: Yes  Procedure Documentation  Procedure: Pectoralis             Pectoralis II       Laterality: bilateral       Patient Position: sitting       Patient Prep/Sterile Barriers: sterile gloves, mask       Skin prep: Chloraprep       Needle Type: short bevel       Needle Gauge: 21.        Needle Length (millimeters): 100        Ultrasound guided       1. Ultrasound was used to identify targeted nerve, plexus, vascular marker, or fascial plane and place a needle adjacent to it in real-time.       2. Ultrasound was used to visualize the spread of anesthetic in close proximity to the above referenced structure.       3. A permanent image is entered into the patient's record.    Assessment/Narrative         The placement was negative for: blood aspirated, painful injection and site bleeding       Paresthesias: No.       Bolus given via needle..        Secured via.        Insertion/Infusion Method: Single Shot       Complications: none    Medication(s) Administered   Bupivacaine 0.25% PF (Infiltration) - Infiltration   60 mL - 4/24/2023 1:40:00 PM  Dexmedetomidine 4 mcg/mL (Perineural) - Perineural   40 mcg - 4/24/2023 1:40:00 PM  Dexamethasone 10 mg/mL PF (Perineural) - Perineural   2 mg - 4/24/2023 1:40:00 PM    FOR [facility] ([facility]) ONLY:   Pain Team Contact information: please page the Pain Team Via [employer]. Search  "\"Pain\". During daytime hours, please page the attending first. At night please page the resident first.      "

## 2023-04-25 NOTE — BRIEF OP NOTE
[facility]    Brief Operative Note    Pre-operative diagnosis: Malignant neoplasm of overlapping sites of right breast in female, estrogen receptor positive (H) [C50.811, Z17.0]  Post-operative diagnosis Same as pre-operative diagnosis    Procedure: Procedure(s):  BILATERAL Skin-Sparing Mastectomy, BILATERAL breast implant removal, RIGHT Axillary [place] Lymph Node Biopsy  Reconstruct breast, insert tissue expander bilateral, Spy **Latex Allergy**  Surgeon: Surgeon(s) and Role:  Panel 1:     * Mitra Souza MD - Primary     * Boo Garcia MD - Resident - Assisting  Panel 2:     * EDWARDO Ferreira MD - Primary     * Urvashi Jefferson PA-DAMIR - Assisting  Anesthesia: General with Block   Estimated Blood Loss: 25 mL from 4/24/2023  2:18 PM to 4/24/2023  7:44 PM      Drains: Anish-Solomon  Specimens:   ID Type Source Tests Collected by Time Destination   1 : Left breast implant Tissue Other OR DOCUMENTATION ONLY, SURGICAL PATHOLOGY EXAM Mitra Souza MD 4/24/2023  3:20 PM    2 : Left breast + nipple + implant capsule Tissue Breast, Left SURGICAL PATHOLOGY EXAM Mitra Souza MD 4/24/2023  3:48 PM    3 : Right breast implant Tissue Other OR DOCUMENTATION ONLY, SURGICAL PATHOLOGY EXAM Mitra Souza MD 4/24/2023  4:48 PM    4 : Right breast + nipple + implant capsule Tissue Breast, Right SURGICAL PATHOLOGY EXAM Mitra Souza MD 4/24/2023  4:49 PM    5 : Right axillary sentinel lymph node 1 Tissue Lymph Node(s), [place] SURGICAL PATHOLOGY EXAM Mitra Souza MD 4/24/2023  5:40 PM      Findings:   None.  Complications: None.  Implants:   Implant Name Type Inv. Item Serial No.  Lot No. LRB No. Used Action   TISSUE EXPANDER NATRELLE 133S SMOOTH 550ML [id number] - [id number] Breast Implant/Tissue Expander TISSUE EXPANDER NATRELLE 133S SMOOTH 550ML [id number] [employer]  Right 1 Implanted   TISSUE  EXPANDER NATRELLE 133S SMOOTH 550ML [id number] - [id number] Breast Implant/Tissue Expander TISSUE EXPANDER NATRELLE 133S SMOOTH 550ML [id number] [employer]  Left 1 Implanted     Bilateral tissue expander placement filled with air without tension. Nitropaste around incisions bilaterally, gentle ace wrap.

## 2023-04-25 NOTE — PROGRESS NOTES
All discharge instructions discussed with mother.  All questions answered to satisfaction.  Return demonstration completed regarding HONEY drain care.

## 2023-04-25 NOTE — ANESTHESIA CARE TRANSFER NOTE
Patient: Azalea Mckeon    Procedure: Procedure(s):  BILATERAL Skin-Sparing Mastectomy, BILATERAL breast implant removal, RIGHT Axillary [place] Lymph Node Biopsy  Reconstruct breast, insert tissue expander bilateral, Spy **Latex Allergy**       Diagnosis: Malignant neoplasm of overlapping sites of right breast in female, estrogen receptor positive (H) [C50.811, Z17.0]  Diagnosis Additional Information: No value filed.    Anesthesia Type:   General     Note:    Oropharynx: oropharynx clear of all foreign objects and spontaneously breathing  Level of Consciousness: awake  Oxygen Supplementation: nasal cannula  Level of Supplemental Oxygen (L/min / FiO2): 4  Independent Airway: airway patency satisfactory and stable  Dentition: dentition unchanged  Vital Signs Stable: post-procedure vital signs reviewed and stable  Report to RN Given: handoff report given  Patient transferred to: PACU    Handoff Report: Identifed the Patient, Identified the Reponsible Provider, Reviewed the pertinent medical history, Discussed the surgical course, Reviewed Intra-OP anesthesia mangement and issues during anesthesia, Set expectations for post-procedure period and Allowed opportunity for questions and acknowledgement of understanding      Vitals:  Vitals Value Taken Time   /78 04/24/23 1946   Temp     Pulse 86 04/24/23 1948   Resp 10 04/24/23 1948   SpO2 98 % 04/24/23 1948   Vitals shown include unvalidated device data.    Electronically Signed By: MOLINA Floyd CRNA  April 24, 2023  7:49 PM

## 2023-04-25 NOTE — OP NOTE
PREOPERATIVE DIAGNOSIS: Status post left breast cancer requiring bilateral nipple-non-sparing mastectomy and immediate reconstruction.     POSTOPERATIVE DIAGNOSIS: Status post left breast cancer requiring bilateral nipple-non-sparing mastectomy and immediate reconstruction.     PROCEDURES:   1. Bilateral immediate breast reconstruction using pre-pectoral expander Allergan FV breast expander with a base diameter 14.0 cm and a total fill volume of 550 mL filled without air. Medical Indication for Use in the Pre-pectoral plane: Prevent animation defect, decrease nora-operative pain, decrease anatomical destruction/distortion of the pectoral muscle).   2. Intraoperative chemical angiography with injection of ICG dye and interpretation of the angiogram using the SPY Phi System (indication of use was the fact the patient had undergone a nipple-sparing mastectomy and we needed to evaluate the blood flow to the skin flaps to decide appropriate reconstruction as well as debridement).    IMPLANTS:     Implant Name Type Inv. Item Serial No.  Lot No. LRB No. Used Action   TISSUE EXPANDER NATRELLE 133S SMOOTH 550ML [id number] - [id number] Breast Implant/Tissue Expander TISSUE EXPANDER NATRELLE 133S SMOOTH 550ML [id number] [employer]  Right 1 Implanted   TISSUE EXPANDER NATRELLE 133S SMOOTH 550ML [id number] - [id number] Breast Implant/Tissue Expander TISSUE EXPANDER NATRELLE 133S SMOOTH 550ML [id number] [employer]  Left 1 Implanted       SURGEON: Melida Ferreira MD     PA: Urvashi Kent (No resident help. Helped with closure).     ANESTHESIA: General anesthesia with endotracheal intubation.     COMPLICATIONS: Nil.     DRAINS: One 15-Swedish channel HONEY drain on each side.    SPECIMENS: Nil.     BLOOD LOSS: 5 ml.    DESCRIPTION OF PROCEDURE: After informed consent was taken from the patient, the proper site and procedure was ascertained with her and she was appropriately marked and taken to  the operating room. She was placed in a supine position with her knees comfortably flexed, pillows underneath them and pneumoboots placed and running prior to induction of anesthesia. Preoperative antibiotics were given in the OR and appropriately redosed during the case. General anesthesia was administered without any complications. A Rios catheter was inserted. Her arms were abducted to about 50 degrees and appropriately padded. Surgical Oncology took over and began the procedure. The bilateral nipple-non sparing mastectomy was completed and I was called to the operating room. She was reprepped and draped and I began the procedure by first analyzing the skin flaps. Clinically everything looked good. I went ahead and carried out the preoperative chemical angiogram using the SPY Elite System and injection of ICG dye. A total of 5 mL was injected at this time and at 30 seconds we evaluated the blood flow to the surrounding skin flaps. Overall both skin flaps had some dark nora-incisional areas but overall the flow was good. These were marked out for later excision. Given the clinical findings, we decided to proceed with the immediate pre-pectoral reconstruction. Identical steps were performed bilaterally.The dead-space in the lateral areas were closed off and the LMF and IMF were recreated with 0-vicryl sutures. I sewed down the pec major muscle to the chest wall with a running 0-Prolene suture on each side as she had previously had a submuscular implant which was removed. Key 0-Prolene sutures were placed in the LMF, Superiorly,MMF and IMF to sew the tabs of the expanders to. No Alloderm/ADM was used.  We measured the base width at about 14.0 cm. The surgical pocket was then irrigated with triple antibiotic solution and betadine. We changed our gloves. The expander was then removed from the package and were placed in the pockets. The medial and inferior tabs were sutured down to the present sutures. Air was removed  completely. A 15-Cambodian channeled HONEY drain was then placed on each side and brought out through a separate stab incision and sewn into position. The edges of the wounds were refreshened per the SPY and there was bleeding seen at the edges of the wounds and then the skin was closed using 2-0 Monocryl suture in a deep dermal layer. We went ahead and then placed a 3-0 Strattafix suture in a running intracuticular manner followed by Surgical Glue and Tape. Appropriate dressings were placed over the drain sites. The patient was wrapped not tightly. The patient tolerated the procedure well. All counts were correct at the end of the case. The patient was extubated and sent to recovery room in stable condition.

## 2023-04-25 NOTE — ANESTHESIA POSTPROCEDURE EVALUATION
Patient: Azalea Mckeon    Procedure: Procedure(s):  BILATERAL Skin-Sparing Mastectomy, BILATERAL breast implant removal, RIGHT Axillary [place] Lymph Node Biopsy  Reconstruct breast, insert tissue expander bilateral, Spy **Latex Allergy**       Anesthesia Type:  General    Note:  Disposition: Admission   Postop Pain Control: Uneventful            Sign Out: Well controlled pain   PONV: No   Neuro/Psych: Uneventful            Sign Out: Acceptable/Baseline neuro status   Airway/Respiratory: Uneventful            Sign Out: Acceptable/Baseline resp. status   CV/Hemodynamics: Uneventful            Sign Out: Acceptable CV status   Other NRE: NONE   DID A NON-ROUTINE EVENT OCCUR? No           Last vitals:  Vitals Value Taken Time   /60 04/24/23 2030   Temp 36.7  C (98.1  F) 04/24/23 2015   Pulse 73 04/24/23 2030   Resp 9 04/24/23 2030   SpO2 97 % 04/24/23 2030   Vitals shown include unvalidated device data.    Electronically Signed By: Christopher J. Behrens, MD  April 24, 2023  8:31 PM

## 2023-04-26 ENCOUNTER — TELEPHONE (OUTPATIENT)
Dept: SURGERY | Facility: CLINIC | Age: 43
End: 2023-04-26
Payer: COMMERCIAL

## 2023-04-26 ENCOUNTER — PATIENT OUTREACH (OUTPATIENT)
Dept: ONCOLOGY | Facility: CLINIC | Age: 43
End: 2023-04-26
Payer: COMMERCIAL

## 2023-04-26 NOTE — TELEPHONE ENCOUNTER
Patient's mother called in with concerns regarding her daughter (patient)'s HONEY drain in her L mastectomy site.      Reports she has been changing it 2x/day, with 30cc out at 530am 4/25, 50cc out at 830pm 4/25, 30cc out this morning 5:55am, and then 50cc out this evening at 5pm. There was also a large clot in the HONEY drain this evening which mom has been unable to get out, and she reports that the drain was totally full from this clot. Additionally she reports that since she emptied it at 5pm, it has filled up with another 50cc in a time span of about an hour and a half, however some of that is the clot. She reports the output is dark red and she thinks it is blood. The drain is able to hold suction.     They do not know what incision looks like or if she has swelling around the incision, as they haven't taken down bandage and were instructed not to until tomorrow, but there is no strikethrough of the bandage.     Patient herself reports that she did feel lightheaded and some nausea when the drain was emptied for about 10 minutes, but this resolved when she had a bowel movement. She is not currently lightheaded or dizzy, and she denies fever, chills, chest pain, palpitations, shortness of breath, headaches, and  vision changes.     Right drain has not had any issues.     I am concerned about the increase in rate of drainage and her episode of lightheadedness as it is possible she is bleeding. However, it is possible that the 50cc of the fluid that filled up immediately came out because the bulb had been full and had not been able to drain appropriately, and that now it should stop filling so quickly as the body catches up. Advised patient that if she were to have the HONEY drain fill up again within an hour or two of being emptied, that she should present to the ED, ideally here at the , but the nearest ED is also okay, due to concern for bleeding. She should also present to the ED if she has another episode of  lightheadedness or develops, dizziness, shortness of breath, fever, or significant soakage of dressing with blood. Discussed this with patient's mother and patient, who were agreeable with the plan.     Justin Tejeda MD   Surgery PGY2

## 2023-04-26 NOTE — PROGRESS NOTES
Post Op Discharge Call     Surgery:      1. Left risk-reducing skin-sparing mastectomy  2. Right skin-sparing mastectomy  3. Right axillary sentinel lymph node mapping and biopsy         Surgery date:   4/24/2023     Discharge Date:  4/24/2023    Date of Post-op Call:  4/26/2023       Immediate Concerns:          Pain:  No concerns with pain management. Oxycodone as needed.   Using pain medications as recommended with appropriate relief.   Discussed using medication only as needed. Not scheduled.      Incision:   No concerns, healing well, no redness, drainage or edema reported. Reviewed ACE wrap recommendations.      Drains:   Drain (x2) to suction. No concerns.      Activity:   No difficulty with ADLs reported.   Patient is up independently at home.   Encourage patient to continue to stay active.        Post op/follow up plans:      Post op appointment scheduled,confirmed date and time with patient. Direct contact number provided for any additional questions or concerns.         Alayna Singh, NERISSA  [facility]  Surgical Oncology         Future Appointments   Date Time Provider Department Center   5/10/2023  2:45 PM [facility]   5/12/2023  3:15 PM EDWARDO Ferreira MD [facility] [place]   5/22/2023  3:00 PM King Dyson MD [facility]   7/6/2023  9:00 AM Rain Lou GC [facility]

## 2023-04-27 ENCOUNTER — TELEPHONE (OUTPATIENT)
Dept: SURGERY | Facility: CLINIC | Age: 43
End: 2023-04-27
Payer: COMMERCIAL

## 2023-04-27 ENCOUNTER — ONCOLOGY VISIT (OUTPATIENT)
Dept: SURGERY | Facility: CLINIC | Age: 43
End: 2023-04-27
Attending: PHYSICIAN ASSISTANT
Payer: COMMERCIAL

## 2023-04-27 VITALS
BODY MASS INDEX: 26.42 KG/M2 | HEART RATE: 77 BPM | TEMPERATURE: 98.1 F | OXYGEN SATURATION: 99 % | SYSTOLIC BLOOD PRESSURE: 114 MMHG | WEIGHT: 153.9 LBS | DIASTOLIC BLOOD PRESSURE: 77 MMHG

## 2023-04-27 DIAGNOSIS — C50.811 MALIGNANT NEOPLASM OF OVERLAPPING SITES OF RIGHT BREAST IN FEMALE, ESTROGEN RECEPTOR POSITIVE (H): Primary | ICD-10-CM

## 2023-04-27 DIAGNOSIS — Z17.0 MALIGNANT NEOPLASM OF OVERLAPPING SITES OF RIGHT BREAST IN FEMALE, ESTROGEN RECEPTOR POSITIVE (H): Primary | ICD-10-CM

## 2023-04-27 PROCEDURE — 99024 POSTOP FOLLOW-UP VISIT: CPT | Performed by: PHYSICIAN ASSISTANT

## 2023-04-27 PROCEDURE — G0463 HOSPITAL OUTPT CLINIC VISIT: HCPCS | Performed by: PHYSICIAN ASSISTANT

## 2023-04-27 RX ORDER — HYDROMORPHONE HYDROCHLORIDE 2 MG/1
2 TABLET ORAL EVERY 8 HOURS PRN
Qty: 10 TABLET | Refills: 0 | Status: SHIPPED | OUTPATIENT
Start: 2023-04-27 | End: 2023-07-02

## 2023-04-27 RX ORDER — METHOCARBAMOL 500 MG/1
500 TABLET, FILM COATED ORAL 4 TIMES DAILY PRN
Qty: 20 TABLET | Refills: 0 | Status: SHIPPED | OUTPATIENT
Start: 2023-04-27 | End: 2023-12-12

## 2023-04-27 ASSESSMENT — PAIN SCALES - GENERAL: PAINLEVEL: SEVERE PAIN (7)

## 2023-04-27 NOTE — NURSING NOTE
"Oncology Rooming Note    April 27, 2023 9:07 AM   Azalea Mckeon is a 42 year old female who presents for:    Chief Complaint   Patient presents with     Oncology Clinic Visit     NEW EVAL: Malignant neoplasm of right breast     Initial Vitals: /77   Pulse 77   Temp 98.1  F (36.7  C) (Oral)   Wt 69.8 kg (153 lb 14.4 oz)   LMP 04/16/2023   SpO2 99%   PF 99 L/min   BMI 26.42 kg/m   Estimated body mass index is 26.42 kg/m  as calculated from the following:    Height as of 4/24/23: 1.626 m (5' 4\").    Weight as of this encounter: 69.8 kg (153 lb 14.4 oz). Body surface area is 1.78 meters squared.  Severe Pain (7) Comment: Data Unavailable   Patient's last menstrual period was 04/16/2023.  Allergies reviewed: Yes  Medications reviewed: Yes    Medications: MEDICATION REFILLS NEEDED TODAY. Provider was notified.  Pharmacy name entered into [facility]: [id number] [place], [place] - [address]    Clinical concerns: : Patient reports significant pain on the right side of herabdomen      Shadi Isabel            "

## 2023-04-27 NOTE — TELEPHONE ENCOUNTER
POST-OP CALL  Apr 27, 2023    Azalea Mckeon is a 42 year old female s/p bilateral mastectomy, right axillary sentinel lymph node biopsy, and reconstruction with expanders 4/24/23 with Dr. Souza and Dr. Ferreira.     She called with increased bloody drainage from her surgical drains. Will have to home to the clinic today to evaluate.     My Morris PA-C

## 2023-04-27 NOTE — PROGRESS NOTES
FOLLOW-UP  Apr 27, 2023    Azalea Mckeon is a 42 year old female who returns for a post-operative follow-up visit.    HPI:    She underwent a bilateral mastectomy and right axillary sentinel lymph node biopsy with Dr. Souza and expander reconstruction with Dr. Ferreira 4/24/23 for right breast cancer.     Last evening her left drain output doubled and turned bloody red. She also was feeling dizzy at that time. This occurred after a long walk. She denies any swelling or worsening bruising. Her pain is controlled but she needs a refill of her pain medication. She is on motrin.     Drain outputs    4/25   R 80 ml  L 86 ml    4/26  R 70 ml  L 190 ml     4/25 morning  R 30 ml  L 45 ml    /77   Pulse 77   Temp 98.1  F (36.7  C) (Oral)   Wt 69.8 kg (153 lb 14.4 oz)   LMP 04/16/2023   SpO2 99%   PF 99 L/min   BMI 26.42 kg/m     Physical Exam  Right and left mastectomy incisions are clean dry and intact. The inferior portion of both reconstructed breasts is fluctuant. There is mild bruising on the left and right side but no obvious hematoma. The right drain is is serosanguinous. The left drain is red bloody with clot in the bulb. Both drains were stripped. Left bulb was replaced.     ASSESSMENT:    Azalea Mckeon is a 42 year old female POD 3 s/p bilateral mastectomy, right axillary sentinel lymph node biopsy and expander reconstruction 4/24/23. She developed dizziness and increased left drain output last evening. Today on exam there is no obvious hematoma but the left drain output is bloody. Patient was examined with Dr. Souza. Will continued to monitor. She will continue to wrap her chest and take the wrap down once daily to readjust. She will discontinue Motrin. She was given a prescription for dilaudid and robaxin. She will keep us updated with any changes.     My Morris PA-C

## 2023-04-27 NOTE — LETTER
4/27/2023         RE: Azalea Mckeon  [id number] [id number]        Dear Colleague,    Thank you for referring your patient, Azalea Mckeon, to the [facility]. Please see a copy of my visit note below.    FOLLOW-UP  Apr 27, 2023    Azalea Mckeon is a 42 year old female who returns for a post-operative follow-up visit.    HPI:    She underwent a bilateral mastectomy and right axillary sentinel lymph node biopsy with Dr. Souza and expander reconstruction with Dr. Ferreira 4/24/23 for right breast cancer.     Last evening her left drain output doubled and turned bloody red. She also was feeling dizzy at that time. This occurred after a long walk. She denies any swelling or worsening bruising. Her pain is controlled but she needs a refill of her pain medication. She is on motrin.     Drain outputs    4/25   R 80 ml  L 86 ml    4/26  R 70 ml  L 190 ml     4/25 morning  R 30 ml  L 45 ml    /77   Pulse 77   Temp 98.1  F (36.7  C) (Oral)   Wt 69.8 kg (153 lb 14.4 oz)   LMP 04/16/2023   SpO2 99%   PF 99 L/min   BMI 26.42 kg/m     Physical Exam  Right and left mastectomy incisions are clean dry and intact. The inferior portion of both reconstructed breasts is fluctuant. There is mild bruising on the left and right side but no obvious hematoma. The right drain is is serosanguinous. The left drain is red bloody with clot in the bulb. Both drains were stripped. Left bulb was replaced.     ASSESSMENT:    Azalea Mckeon is a 42 year old female POD 3 s/p bilateral mastectomy, right axillary sentinel lymph node biopsy and expander reconstruction 4/24/23. She developed dizziness and increased left drain output last evening. Today on exam there is no obvious hematoma but the left drain output is bloody. Patient was examined with Dr. Souza. Will continued to monitor. She will continue to wrap her chest and take the wrap down once daily to readjust. She will discontinue Motrin.  She was given a prescription for dilaudid and robaxin. She will keep us updated with any changes.     My Morris PA-C

## 2023-04-29 ENCOUNTER — HEALTH MAINTENANCE LETTER (OUTPATIENT)
Age: 43
End: 2023-04-29

## 2023-05-03 LAB
PATH REPORT.COMMENTS IMP SPEC: ABNORMAL
PATH REPORT.COMMENTS IMP SPEC: ABNORMAL
PATH REPORT.COMMENTS IMP SPEC: YES
PATH REPORT.FINAL DX SPEC: ABNORMAL
PATH REPORT.GROSS SPEC: ABNORMAL
PATH REPORT.MICROSCOPIC SPEC OTHER STN: ABNORMAL
PATH REPORT.RELEVANT HX SPEC: ABNORMAL
PATHOLOGY SYNOPTIC REPORT: ABNORMAL
PHOTO IMAGE: ABNORMAL

## 2023-05-04 ENCOUNTER — TELEPHONE (OUTPATIENT)
Dept: SURGERY | Facility: CLINIC | Age: 43
End: 2023-05-04
Payer: COMMERCIAL

## 2023-05-04 NOTE — TELEPHONE ENCOUNTER
Call placed to Azalea who states that she is doing fine other than they were wondering about the brown/red color of the drainage from the left breast. RN reviewed with pt signs of drainage that is concerning, thick, yellow, gray/green and foul smelling. Pt denies any of these symptoms.  Incision is clean,dry and intact per pt . No fevers, chills or general ill feelings. Drain output still above 30cc per day.  No further questions per pt. Pt advised to call RN back at [phone] with any additional questions or concerns. Future post operative appointments confirmed with pt...Viji Llamas RN

## 2023-05-04 NOTE — TELEPHONE ENCOUNTER
Received call from pt's mom Em [phone] looking for Viji.    She has concerns about Azalea's left breast drainage.  No consent to communicate on file. Em put Azalea on the phone and Azalea gave me verbal consent to communicate with her mom.    Per Em left breast drainage is dark brown/red.  No odor noted.   Drainage output is stable ~40ccs/day.  Denies fever, no trauma to the area. Pt had some dizziness on Tuesday that has since resolved.  Skin at drain insertion site looks normal per pt's mom.      She states the right breast drain is changing from serosanguinous color to more serous in color. I let her know that is the normal progression and it sounds like that is healing well.      Let her know I'd run all by Viji, if need be we can escalate to Dr. Souza. Reassured her that it sounds like the normal healing process at this time.  Em verbalized understanding.  Will call her back with updates/recommendations.    Comfort Beckford RN

## 2023-05-08 ENCOUNTER — PATIENT OUTREACH (OUTPATIENT)
Dept: ONCOLOGY | Facility: CLINIC | Age: 43
End: 2023-05-08
Payer: COMMERCIAL

## 2023-05-08 NOTE — PROGRESS NOTES
[facility]:  Cancer Care Note    Order for Oncotype Dx testing was submitted via the [employer] portal 5/5/2023, as requested by Dr. Dyson.  Testing was requested for surgical pathology case number [id number], collected on 4/24/2023 (specimen site: Breast, right).  A copy of patient's pathology results report, face sheet, office visit notes and copies of insurance cards were faxed to [employer] at [phone] on 5/8/2023.      [employer] Order Number: [id number].    Patient will follow-up with Dr. Dyson in approximately 2 weeks weeks to review the final results.  Appointment has already been scheduled, and patient is aware.     Sofi Martinez, RN  RN Care Coordinator - Oncology  [facility]

## 2023-05-09 ENCOUNTER — TELEPHONE (OUTPATIENT)
Dept: SURGERY | Facility: CLINIC | Age: 43
End: 2023-05-09
Payer: COMMERCIAL

## 2023-05-09 NOTE — TELEPHONE ENCOUNTER
VM received from pt's mom.  Bilateral mastectomy 4/24/23.      Azalea's left breast was bleeding 4/27/23.  Drainage now has an odor.  Left lower breast is yellow.    Appt scheduled with Dr. Souza tomorrow 5/10, but just wondering if she needs something sooner.    Comfort Beckford RN

## 2023-05-10 ENCOUNTER — OFFICE VISIT (OUTPATIENT)
Dept: SURGERY | Facility: CLINIC | Age: 43
End: 2023-05-10
Payer: COMMERCIAL

## 2023-05-10 DIAGNOSIS — C50.811 MALIGNANT NEOPLASM OF OVERLAPPING SITES OF RIGHT BREAST IN FEMALE, ESTROGEN RECEPTOR POSITIVE (H): Primary | ICD-10-CM

## 2023-05-10 DIAGNOSIS — Z17.0 MALIGNANT NEOPLASM OF OVERLAPPING SITES OF RIGHT BREAST IN FEMALE, ESTROGEN RECEPTOR POSITIVE (H): Primary | ICD-10-CM

## 2023-05-10 PROCEDURE — 99024 POSTOP FOLLOW-UP VISIT: CPT | Performed by: SURGERY

## 2023-05-10 NOTE — PROGRESS NOTES
FOLLOW-UP  May 10, 2023    Azalea Mckeon is a 42 year old female who returns for her 1st post-operative follow-up visit.     Cancer Staging   Malignant neoplasm of overlapping sites of right breast in female, estrogen receptor positive (H)  Staging form: Breast, AJCC 8th Edition  - Clinical: Stage IIB (cT3, cN0, cM0, G3, ER+, MA+, HER2-) - Signed by Mitra Souza MD on 3/29/2023    Treatment to date:  1. Right skin-sparing mastectomy, right axillary sentinel lymph node mapping and biopsy, left risk-reducing skin-sparing mastectomy (4/24/2023)    HPI:    She underwent a bilateral skin-sparing mastectomy on 4/24/2023.  She is currently 2 week(s) post-op.  Final surgical pathology showed a T1cN0 invasive ductal carcinoma in the right breast in a background of 10 cm of DCIS with nipple ductal involvement.  The anterior (skin) margin is involved by DCIS and the posterior margin is close.    Since the procedure, she had a postoperative hematoma on the LEFT side and was seen in clinic on 4/27. No active bleeding was noted and this was managed non-operatively.  She has been doing well otherwise.  She denies any redness or swelling, or drainage from the incision, or fever/chills.  She has some limited range of motion of her shoulders bilaterally and denies any upper extremity swelling.  Her drain has put out 20 mL per day for several days.  There has not been fluid leakage around the drain site.     LMP 04/16/2023    Physical Exam  Constitutional:       Appearance: She is well-developed.   Pulmonary:      Effort: No respiratory distress.   Chest:      Comments: Bilateral surgical absence of breasts. Bilateral incisions healing well. No cellulitis. No ischemic changes. Left with ecchymotic skin changes but no hematoma. Right drain with serous fluid. Left with dark thin fluid. Bilateral drains removed without incident.  Skin:     General: Skin is warm and dry.       INVESTIGATIONS:  Surgical Pathology  (4/24/2023):  Final Diagnosis   A. Medical device, LEFT breast implant, removal:  -Intact breast implant (gross examination only)     B. LEFT breast, risk-reducing skin-sparing mastectomy:  -Benign breast tissue with fibrocystic change (including microcysts), columnar cell change and usual ductal hyperplasia  -Implant capsule  -Benign nipple  -Benign skin with demodex mites  -Negative for atypia or malignancy     C. Medical device, RIGHT breast implant, removal:  -Intact breast implant (gross examination only)     D. RIGHT breast, skin-sparing mastectomy:  -INVASIVE BREAST CARCINOMA OF NO SPECIAL TYPE (INVASIVE DUCTAL CARCINOMA), Rafael[place] grade 3, two foci (size 16 mm (in the upper outer quadrant at 11:00) and 3 mm (in the upper inner quadrant))   -Invasive carcinoma is in a background of extensive ductal carcinoma in situ (DCIS), nuclear grade 3, cribriform, solid and micropapillary type(s), with lobular involvement and comedonecrosis  -DCIS spans an estimated 10 cm, in the upper inner and upper outer quadrants  -No lymphovascular invasion identified  -Margins are uninvolved by invasive carcinoma  -Invasive carcinoma is 5 mm from the nearest (anterior superior) margin, and is > 5 mm from the posterior margin  -Anterior superior margin is involved by DCIS  -DCIS is < 1 mm (0.2 mm) from the posterior margin  -DCIS involves nipple ducts  -Nipple is uninvolved by invasive carcinoma  -Benign skin  -Other findings: Implant capsule, fibrocystic change (including microcysts with apocrine metaplasia), fibroadenoma and usual ductal hyperplasia  -Calcifications associated with DCIS  -Prior core biopsy site changes  -Smaller focus of invasive carcinoma is estrogen receptor positive, progesterone receptor positive and HER2 negative by immunohistochemistry (performed on this specimen, see biomarker reporting template below)  -See tumor synoptic below  -See microscopic description      E. Lymph node, RIGHT axillary, sentinel  #1, excision:  - One benign lymph node (0/1)     ASSESSMENT:    Azalea Mckeon is a 42 year old female with RIGHT breast cancer.    She is healing very well. There is no further sign of bleeding. Her drains were removed today.  We reviewed shoulder range of motion exercises today. She will call in a week if her range of motion does not improve and I can place a physical therapy referral.     We reviewed the pathology today and a copy of the report was provided.  Multiple foci at the anterior superior margin were involved with DCIS and the posterior margin was close. We discussed that this is likely related to the burden of DCIS (10 cm of it) in the right breast.  Options for management include returning to the OR for expander removal and resection of more of the superior skin flap, and/or radiation therapy.  I recommended a radiation oncology referral to further discuss. Regarding returning to the OR, it will not be possible to resect the entire superior skin flap, and it will likely not be possible for me to tell how superior we need to be to resect the area of margin involvement.  I also suggested that I would like to discuss her case at an upcoming multidisciplinary tumor conference as well.      All of the above was discussed with the patient and all questions were answered.   I will follow up with her regarding the discussion on Friday via [employer] and will see her in follow up in 3 months    PLAN:  1. Multidisciplinary tumor conference discussion  2. Radiation oncology referral to Dr Linares  3. Follow up with me in 3 months    Mitra Souza MD MS [facility] FACS  Associate Professor of Surgery  Division of Surgical Oncology  [facility]

## 2023-05-10 NOTE — LETTER
5/10/2023         RE: Azalea Mckeon  [address] [address] [facility] [address]        Dear Colleague,    Thank you for referring your patient, Azalea Mckeon, to the [facility]. Please see a copy of my visit note below.    FOLLOW-UP  May 10, 2023    Azalea Mckeon is a 42 year old female who returns for her 1st post-operative follow-up visit.     Cancer Staging   Malignant neoplasm of overlapping sites of right breast in female, estrogen receptor positive (H)  Staging form: Breast, AJCC 8th Edition  - Clinical: Stage IIB (cT3, cN0, cM0, G3, ER+, SC+, HER2-) - Signed by Mitra Souza MD on 3/29/2023    Treatment to date:  1. Right skin-sparing mastectomy, right axillary sentinel lymph node mapping and biopsy, left risk-reducing skin-sparing mastectomy (4/24/2023)    HPI:    She underwent a bilateral skin-sparing mastectomy on 4/24/2023.  She is currently 2 week(s) post-op.  Final surgical pathology showed a T1cN0 invasive ductal carcinoma in the right breast in a background of 10 cm of DCIS with nipple ductal involvement.  The anterior (skin) margin is involved by DCIS and the posterior margin is close.    Since the procedure, she had a postoperative hematoma on the LEFT side and was seen in clinic on 4/27. No active bleeding was noted and this was managed non-operatively.  She has been doing well otherwise.  She denies any redness or swelling, or drainage from the incision, or fever/chills.  She has some limited range of motion of her shoulders bilaterally and denies any upper extremity swelling.  Her drain has put out 20 mL per day for several days.  There has not been fluid leakage around the drain site.     LMP 04/16/2023    Physical Exam  Constitutional:       Appearance: She is well-developed.   Pulmonary:      Effort: No respiratory distress.   Chest:      Comments: Bilateral surgical absence of breasts. Bilateral incisions healing well. No cellulitis. No ischemic changes.  Left with ecchymotic skin changes but no hematoma. Right drain with serous fluid. Left with dark thin fluid. Bilateral drains removed without incident.  Skin:     General: Skin is warm and dry.       INVESTIGATIONS:  Surgical Pathology (4/24/2023):  Final Diagnosis   A. Medical device, LEFT breast implant, removal:  -Intact breast implant (gross examination only)     B. LEFT breast, risk-reducing skin-sparing mastectomy:  -Benign breast tissue with fibrocystic change (including microcysts), columnar cell change and usual ductal hyperplasia  -Implant capsule  -Benign nipple  -Benign skin with demodex mites  -Negative for atypia or malignancy     C. Medical device, RIGHT breast implant, removal:  -Intact breast implant (gross examination only)     D. RIGHT breast, skin-sparing mastectomy:  -INVASIVE BREAST CARCINOMA OF NO SPECIAL TYPE (INVASIVE DUCTAL CARCINOMA), [place] grade 3, two foci (size 16 mm (in the upper outer quadrant at 11:00) and 3 mm (in the upper inner quadrant))   -Invasive carcinoma is in a background of extensive ductal carcinoma in situ (DCIS), nuclear grade 3, cribriform, solid and micropapillary type(s), with lobular involvement and comedonecrosis  -DCIS spans an estimated 10 cm, in the upper inner and upper outer quadrants  -No lymphovascular invasion identified  -Margins are uninvolved by invasive carcinoma  -Invasive carcinoma is 5 mm from the nearest (anterior superior) margin, and is > 5 mm from the posterior margin  -Anterior superior margin is involved by DCIS  -DCIS is < 1 mm (0.2 mm) from the posterior margin  -DCIS involves nipple ducts  -Nipple is uninvolved by invasive carcinoma  -Benign skin  -Other findings: Implant capsule, fibrocystic change (including microcysts with apocrine metaplasia), fibroadenoma and usual ductal hyperplasia  -Calcifications associated with DCIS  -Prior core biopsy site changes  -Smaller focus of invasive carcinoma is estrogen receptor positive,  progesterone receptor positive and HER2 negative by immunohistochemistry (performed on this specimen, see biomarker reporting template below)  -See tumor synoptic below  -See microscopic description      E. Lymph node, RIGHT axillary, sentinel #1, excision:  - One benign lymph node (0/1)     ASSESSMENT:    Azalea Mckeon is a 42 year old female with RIGHT breast cancer.    She is healing very well. There is no further sign of bleeding. Her drains were removed today.  We reviewed shoulder range of motion exercises today. She will call in a week if her range of motion does not improve and I can place a physical therapy referral.     We reviewed the pathology today and a copy of the report was provided.  Multiple foci at the anterior superior margin were involved with DCIS and the posterior margin was close. We discussed that this is likely related to the burden of DCIS (10 cm of it) in the right breast.  Options for management include returning to the OR for expander removal and resection of more of the superior skin flap, and/or radiation therapy.  I recommended a radiation oncology referral to further discuss. Regarding returning to the OR, it will not be possible to resect the entire superior skin flap, and it will likely not be possible for me to tell how superior we need to be to resect the area of margin involvement.  I also suggested that I would like to discuss her case at an upcoming multidisciplinary tumor conference as well.      All of the above was discussed with the patient and all questions were answered.   I will follow up with her regarding the discussion on Friday via [employer] and will see her in follow up in 3 months    PLAN:  Multidisciplinary tumor conference discussion  Radiation oncology referral to Dr Linares  Follow up with me in 3 months    Mitra Souza MD MS [facility] FACS  Associate Professor of Surgery  Division of Surgical Oncology  [facility]      Again, thank you for allowing me to  participate in the care of your patient.        Sincerely,        Mitra Souza MD

## 2023-05-10 NOTE — NURSING NOTE
Azalea Mckeon's goals for this visit include:   Chief Complaint   Patient presents with     Surgical Followup     Bilateral mastectomy, leeann drain removal     She requests these members of her care team be copied on today's visit information: no    PCP: No Ref-Primary, Physician    Referring Provider:  No referring provider defined for this encounter.    LMP 04/16/2023     Do you need any medication refills at today's visit? No    Thao Grewal LPN

## 2023-05-10 NOTE — NURSING NOTE
Per Dr. Souza Radiation Oncology referral pt to see Dr. Linares. Referral placed. Per Dr. Souza pt to follow up with her in 3 months. Message sent to procedure scheduling team to assist with scheduling 3 month follow up...Viji Llamas RN    Pt scheduled for 3 month follow up on 8/15/23..Viji Llamas RN

## 2023-05-11 ENCOUNTER — PATIENT OUTREACH (OUTPATIENT)
Dept: ONCOLOGY | Facility: CLINIC | Age: 43
End: 2023-05-11
Payer: COMMERCIAL

## 2023-05-11 NOTE — PROGRESS NOTES
New Patient Oncology Nurse Navigator Note     Referring provider: Dr. Mitra Souza     Referring Clinic/Organization: [facility]     Referred to (specialty:) Radiation Oncology     Requested provider (if applicable): Dr. Jona Linares     Date Referral Received: May 11, 2023     Evaluation for:  Breast cancer     Clinical History (per Nurse review of records provided):      Azalea underwent bilateral screening mammogram (with implants from augmentation) on 2/3/23 and possible calcifications were identified in the upper right breast. Diagnostic imaging followed on 3/2 and additional mammographic images were obtained for further evaluation of possible calcifications in the upper right breast.  Additional views demonstrate fine pleomorphic calcifications in a segmental distribution spanning the upper inner and upper outer quadrants measuring up to 10 cm. At approximately the 11:00 position there is asymmetry with mild architectural distortion amidst the calcifications. Targeted right breast ultrasound was performed demonstrating vague areas of shadowing throughout the upper outer and upper inner quadrants. There is a more focal area of subtle shadowing at the 11:00 position, 5 cm from the nipple measuring up to 9 mm.    3/17/23 -   RIGHT BREAST, STEREOTACTIC CORE BIOPSY:  -INVASIVE DUCTAL CARCINOMA, Rafaela grade 3, at least 9 mm in linear extent.  -Ductal carcinoma in-situ (DCIS), high nuclear grade, solid type.  -Calcifications associated with invasive carcinoma and DCIS.  -Invasive carcinoma is estrogen receptor positive (>90% nuclei, strong staining), progesterone receptor positive  (>80% nuclei, moderate staining) and negative for HER2 gene amplification by immunohistochemistry (score  1+).    4/24/23 -   A. Medical device, LEFT breast implant, removal:  -Intact breast implant (gross examination only)  B. LEFT breast, risk-reducing skin-sparing mastectomy:  -Benign breast tissue with  fibrocystic change (including microcysts), columnar cell change and usual ductal  hyperplasia  -Implant capsule  -Benign nipple  -Benign skin with demodex mites  -Negative for atypia or malignancy  C. Medical device, RIGHT breast implant, removal:  -Intact breast implant (gross examination only)  D. RIGHT breast, skin-sparing mastectomy:  -INVASIVE BREAST CARCINOMA OF NO SPECIAL TYPE (INVASIVE DUCTAL CARCINOMA), Rafaela grade 3, two foci (size 16 mm (in the upper outer quadrant at 11:00) and 3 mm (in the upper inner quadrant))  -Invasive carcinoma is in a background of extensive ductal carcinoma in situ (DCIS), nuclear grade 3, cribriform,  solid and micropapillary type(s), with lobular involvement and comedonecrosis  -DCIS spans an estimated 10 cm, in the upper inner and upper outer quadrants  -No lymphovascular invasion identified  -Margins are uninvolved by invasive carcinoma  -Invasive carcinoma is 5 mm from the nearest (anterior superior) margin, and is > 5 mm from the posterior margin  -Anterior superior margin is involved by DCIS  -DCIS is < 1 mm (0.2 mm) from the posterior margin  -DCIS involves nipple ducts  -Nipple is uninvolved by invasive carcinoma  -Benign skin  -Other findings: Implant capsule, fibrocystic change (including microcysts with apocrine metaplasia), fibroadenoma and usual ductal hyperplasia  -Calcifications associated with DCIS  -Prior core biopsy site changes  -Smaller focus of invasive carcinoma is estrogen receptor positive, progesterone receptor positive and HER2  negative by immunohistochemistry (performed on this specimen, see biomarker reporting template below)  -See tumor synoptic below  -See microscopic description  E. Lymph node, RIGHT axillary, sentinel #1, excision:  - One benign lymph node (0/1)    Dr. Ferreira was her plastic surgeon bilateral immediate breast reconstruction using pre-pectoral expander [employer] breast expander.     Patient met with Dr. King Dyson on  3/29/23 in multidisciplinary breast clinic and follow up is planned for 5/22/23.    An Oncotype has been ordered.  Requisition 5/8. [employer] by 5/10 or 11. Two weeks = 5/25.  EXPANDER CONSIDERATIONS: INCREASE TO OPTIMAL FILL AND THEN REDUCE THE CONTRALATERAL SIDE TO ALLOW BEAM CROSS. SO IS THERE EVEN A RUSH TO CONSULT WITH RAD ONC UNTIL THEY GET TO FILL? WHAT'S THE FILL SCHEDULE WITH DR. HOLLIS?    Records Location: See bookmarked items    5/16 9:34 - Telephoned and spoke with Azalea to identify her preferred location for radiation oncology. She does not have a preference between [place] vs [place] and is willing to go to either location.  Referral unassigned from writer and [place] Rad onc indicated in referral notes.

## 2023-05-12 ENCOUNTER — OFFICE VISIT (OUTPATIENT)
Dept: SURGERY | Facility: CLINIC | Age: 43
End: 2023-05-12
Payer: COMMERCIAL

## 2023-05-12 ENCOUNTER — MYC MEDICAL ADVICE (OUTPATIENT)
Dept: SURGERY | Facility: CLINIC | Age: 43
End: 2023-05-12

## 2023-05-12 ENCOUNTER — TUMOR CONFERENCE (OUTPATIENT)
Dept: ONCOLOGY | Facility: CLINIC | Age: 43
End: 2023-05-12
Payer: COMMERCIAL

## 2023-05-12 DIAGNOSIS — Z98.890 S/P BREAST RECONSTRUCTION, BILATERAL: Primary | ICD-10-CM

## 2023-05-12 PROCEDURE — 99024 POSTOP FOLLOW-UP VISIT: CPT | Performed by: PLASTIC SURGERY

## 2023-05-12 NOTE — NURSING NOTE
Azlaea Mckeon's chief complaint for this visit includes:  Chief Complaint   Patient presents with     RECHECK     2 week post-op: DOS 4/24, expander placement, drains prev removed     PCP: No Ref-Primary, Physician    Referring Provider:  No referring provider defined for this encounter.    [facility] 04/16/2023   Data Unavailable        Allergies   Allergen Reactions     Percocet [Oxycodone-Acetaminophen] Itching     Latex Rash         Do you need any medication refills at today's visit? [facility] Visit Facilitator- Surgical Specialties

## 2023-05-12 NOTE — LETTER
5/12/2023         RE: Azalea Mckeon  [address] [address] [facility] [address]        Dear Colleague,    Thank you for referring your patient, Azalea Mckeon, to the [facility]. Please see a copy of my visit note below.    POSTOPERATIVE VISIT NOTE    PRESENTING COMPLAINT:  Postoperative visit, status post right breast cancer, underwent bilateral nipple non-sparing mastectomy and immediate expander-based reconstruction done 04/24/2023.    HISTORY OF PRESENTING COMPLAINT:  Ms. Mckeon is 42 years old.  She is about 3 weeks out from surgery.  HONEY drains have been removed.  She is healing in well.  She may require radiation therapy.  Plans for chemotherapy are still awaited.      PHYSICAL EXAMINATION:  Vital signs are stable.  She is afebrile, in no obvious distress.  Her incisions are healing well.  No evidence of infection, seroma or hematoma.  The right side skin edges are a little dark.    ASSESSMENT AND PLAN:  Based upon the above findings, a diagnosis of right breast cancer, underwent bilateral mastectomy and reconstruction with expanders was made.  I advised aggressive moisturization.  I am a little concerned about the scabs at the ends of the incision, but everything is intact.  No evidence of infection or seroma at this moment in time. The plan is to see her back in 1 week to monitor her progress.  We will plan appropriate expansion based on discussions with Radiation Oncology.  I will then plan RUDI flaps after the radiation is completed.  All her questions were answered.  She was happy with the visit.  All exam and discussion done in the presence of my nurse.        Again, thank you for allowing me to participate in the care of your patient.        Sincerely,        EDWARDO Ferreira MD

## 2023-05-12 NOTE — PROGRESS NOTES
POSTOPERATIVE VISIT NOTE    PRESENTING COMPLAINT:  Postoperative visit, status post right breast cancer, underwent bilateral nipple non-sparing mastectomy and immediate expander-based reconstruction done 04/24/2023.    HISTORY OF PRESENTING COMPLAINT:  Ms. Mckeon is 42 years old.  She is about 3 weeks out from surgery.  HONEY drains have been removed.  She is healing in well.  She may require radiation therapy.  Plans for chemotherapy are still awaited.      PHYSICAL EXAMINATION:  Vital signs are stable.  She is afebrile, in no obvious distress.  Her incisions are healing well.  No evidence of infection, seroma or hematoma.  The right side skin edges are a little dark.    ASSESSMENT AND PLAN:  Based upon the above findings, a diagnosis of right breast cancer, underwent bilateral mastectomy and reconstruction with expanders was made.  I advised aggressive moisturization.  I am a little concerned about the scabs at the ends of the incision, but everything is intact.  No evidence of infection or seroma at this moment in time. The plan is to see her back in 1 week to monitor her progress.  We will plan appropriate expansion based on discussions with Radiation Oncology.  I will then plan RUDI flaps after the radiation is completed.  All her questions were answered.  She was happy with the visit.  All exam and discussion done in the presence of my nurse.

## 2023-05-15 ENCOUNTER — PATIENT OUTREACH (OUTPATIENT)
Dept: ONCOLOGY | Facility: CLINIC | Age: 43
End: 2023-05-15
Payer: COMMERCIAL

## 2023-05-15 NOTE — PROGRESS NOTES
Fax received from [employer] stating that the slide supplied for testing had insufficient carcinoma for testing.  Contacted the pathology lab, informed that slide was unable to be tested for Oncotype.   Pathologist will be notified to select another slide to submit.  Informed that patient has follow up visit in 7 days.  Call will be returned if there is not an appropriate slide to submit for testing.    Sofi Martinez RN

## 2023-05-18 NOTE — PROGRESS NOTES
Call received from [facility] in the pathology lab on 5/16/2023 informing that new slides are being sent to [facility] for Oncotype testing.  Patient contacted, informed that results will not be available for scheduled visit on 5/22/2023 and assisted in rescheduling visit.    Sofi Martinez RN

## 2023-05-19 ENCOUNTER — OFFICE VISIT (OUTPATIENT)
Dept: SURGERY | Facility: CLINIC | Age: 43
End: 2023-05-19
Payer: COMMERCIAL

## 2023-05-19 DIAGNOSIS — Z98.890 S/P BREAST RECONSTRUCTION, BILATERAL: Primary | ICD-10-CM

## 2023-05-19 PROCEDURE — 99024 POSTOP FOLLOW-UP VISIT: CPT | Performed by: PLASTIC SURGERY

## 2023-05-19 NOTE — LETTER
5/19/2023         RE: Azalea Mckeon  [address] [address] [facility] [address]        Dear Colleague,    Thank you for referring your patient, Azalea Mckeon, to the [facility]. Please see a copy of my visit note below.    PRESENTING COMPLAINT:  Postoperative visit status post bilateral breast reconstruction with expanders for right breast cancer after undergoing bilateral nipple-nonsparing mastectomies on 04/24/2023.    HISTORY OF PRESENTING COMPLAINT:  Ms. Mckeon is 43 years old.  She is here for regular postoperative visit.  Overall, doing much better.  Pain is gone.  No issues.    PHYSICAL EXAMINATION:  Vital signs are stable.  She is afebrile, in no obvious distress.  The tape is falling off.  Both breasts are healing in well.  The area of scabbing on the right side has fallen off, and the incision is intact.    ASSESSMENT AND PLAN:  Based upon the above findings, a diagnosis of bilateral breast reconstruction with expanders was made.  Plan now is to continue to moisturize, get definitive plans on chemoradiation, and then start expansion prior to the radiation if indeed needed.  Viji, my nurse at [facility], is aware of this.  I will see her back once she is expanded.  All questions were answered.  They were happy with the visit.  All exam and discussion done in presence of my resident, Oscar Arias.        Again, thank you for allowing me to participate in the care of your patient.        Sincerely,        EDWARDO Ferreira MD

## 2023-05-19 NOTE — PROGRESS NOTES
PRESENTING COMPLAINT:  Postoperative visit status post bilateral breast reconstruction with expanders for right breast cancer after undergoing bilateral nipple-nonsparing mastectomies on 04/24/2023.    HISTORY OF PRESENTING COMPLAINT:  Ms. Mckeon is 43 years old.  She is here for regular postoperative visit.  Overall, doing much better.  Pain is gone.  No issues.    PHYSICAL EXAMINATION:  Vital signs are stable.  She is afebrile, in no obvious distress.  The tape is falling off.  Both breasts are healing in well.  The area of scabbing on the right side has fallen off, and the incision is intact.    ASSESSMENT AND PLAN:  Based upon the above findings, a diagnosis of bilateral breast reconstruction with expanders was made.  Plan now is to continue to moisturize, get definitive plans on chemoradiation, and then start expansion prior to the radiation if indeed needed.  Viji, my nurse at [facility], is aware of this.  I will see her back once she is expanded.  All questions were answered.  They were happy with the visit.  All exam and discussion done in presence of my resident, Oscar Arias.

## 2023-05-19 NOTE — NURSING NOTE
Azalea Mckeon's chief complaint for this visit includes:    Chief Complaint   Patient presents with     RECHECK     Post op, DOS 04/24. Concerns about drainage on right breast       PCP: No Ref-Primary, Physician         Referring Provider:    No referring provider defined for this encounter.         LMP 04/16/2023     Data Unavailable            Allergies   Allergen Reactions     Percocet [Oxycodone-Acetaminophen] Itching     Latex Rash                 Do you need any medication refills at today's visit? No    Jada Duque LPN on 5/19/2023 at 2:00 PM

## 2023-05-22 ENCOUNTER — TRANSFERRED RECORDS (OUTPATIENT)
Dept: HEALTH INFORMATION MANAGEMENT | Facility: CLINIC | Age: 43
End: 2023-05-22

## 2023-05-31 ENCOUNTER — VIRTUAL VISIT (OUTPATIENT)
Dept: ONCOLOGY | Facility: CLINIC | Age: 43
End: 2023-05-31
Attending: INTERNAL MEDICINE
Payer: COMMERCIAL

## 2023-05-31 DIAGNOSIS — D70.1 CHEMOTHERAPY-INDUCED NEUTROPENIA (H): ICD-10-CM

## 2023-05-31 DIAGNOSIS — T45.1X5A CHEMOTHERAPY-INDUCED NEUTROPENIA (H): ICD-10-CM

## 2023-05-31 DIAGNOSIS — Z17.0 MALIGNANT NEOPLASM OF OVERLAPPING SITES OF RIGHT BREAST IN FEMALE, ESTROGEN RECEPTOR POSITIVE (H): Primary | ICD-10-CM

## 2023-05-31 DIAGNOSIS — C50.811 MALIGNANT NEOPLASM OF OVERLAPPING SITES OF RIGHT BREAST IN FEMALE, ESTROGEN RECEPTOR POSITIVE (H): Primary | ICD-10-CM

## 2023-05-31 PROCEDURE — 99213 OFFICE O/P EST LOW 20 MIN: CPT | Mod: VID | Performed by: INTERNAL MEDICINE

## 2023-05-31 NOTE — LETTER
2023         RE: Azalea Mckeon  [id number] [address] [id number]        Dear Colleague,    Thank you for referring your patient, Azalea Mckeon, to the [facility]. Please see a copy of my visit note below.    [facility] Hematology / Oncology  Progress Note  Name: Azalea Mckeon  :  1980    MRN:  [id number]    --------------------    Assessment / Plan:  Clovis Tristan and I reviewed the Oncotype DX score resulting from her surgical specimen.  As a premenopausal woman under the age of 50 with an Oncotype score of 22, we reviewed the clinical equipose among medical oncologists regarding the utilization of adjuvant chemotherapy with Taxotere Cytoxan x4 cycles versus ovarian suppression and aromatase inhibitor use.  Given her young age and the desire to avoid ovarian suppression with aromatase inhibitor use, I would recommend and Azalea agreed with proceeding with Taxotere and Cytoxan for 4 cycles.  We reviewed the logistics of administration as well as potential side effects including but not limited to hair loss, nausea, vomiting, diarrhea, infection, fatigue, bleeding, neuropathy, allergic reaction, organ toxicity, infertility and second malignancies.  She will be meeting with radiation in the coming days.  We would anticipate the use of tamoxifen as adjuvant endocrine therapy for 10 years and a good portion our future visits will focus on cardiovascular and bone health moving forward.    King Dyson MD    --------------------    Interval History:  Azalea returns for follow up of breast cancer accompanied by her .  All in all, she remains well and is recovering from surgery without issue.  Entirety of visit was spent in consultation.    --------------------    Physical Exam:  Virtual visit.    Labs / Imaging:  We reviewed surgical pathology report and Oncotype DX score.      Again, thank you for allowing me to participate in the care of your  patient.        Sincerely,        King Dyson MD

## 2023-05-31 NOTE — PROGRESS NOTES
[facility] Hematology / Oncology  Progress Note  Name: Azalea Mckeon  :  1980    MRN:  [id number]    --------------------    Assessment / Plan:  Azalea Clovis and I reviewed the Oncotype DX score resulting from her surgical specimen.  As a premenopausal woman under the age of 50 with an Oncotype score of 22, we reviewed the clinical equipose among medical oncologists regarding the utilization of adjuvant chemotherapy with Taxotere Cytoxan x4 cycles versus ovarian suppression and aromatase inhibitor use.  Given her young age and the desire to avoid ovarian suppression with aromatase inhibitor use, I would recommend and Azalea agreed with proceeding with Taxotere and Cytoxan for 4 cycles.  We reviewed the logistics of administration as well as potential side effects including but not limited to hair loss, nausea, vomiting, diarrhea, infection, fatigue, bleeding, neuropathy, allergic reaction, organ toxicity, infertility and second malignancies.  She will be meeting with radiation in the coming days.  We would anticipate the use of tamoxifen as adjuvant endocrine therapy for 10 years and a good portion our future visits will focus on cardiovascular and bone health moving forward.    King Dyson MD    --------------------    Interval History:  Azalea returns for follow up of breast cancer accompanied by her .  All in all, she remains well and is recovering from surgery without issue.  Entirety of visit was spent in consultation.    --------------------    Physical Exam:  Virtual visit.    Labs / Imaging:  We reviewed surgical pathology report and Oncotype DX score.    Video Visit:  Azalea is a 43 year old female who is being evaluated via a billable video visit.  }    Video start time: 4:04 PM  Video end time: 4:29 PM    [facility]  Patient location: Home    Mode of transmission: Flavours / Jack Robie.

## 2023-05-31 NOTE — NURSING NOTE
Is the patient currently in the state of [place]? YES    Visit mode:VIDEO    If the visit is dropped, the patient can be reconnected by: VIDEO VISIT: Text to cell phone: [phone]    Will anyone else be joining the visit? NO      How would you like to obtain your AVS? MyChart    Are changes needed to the allergy or medication list? YES: pt taking no meds currently     Reason for visit: RECHECK        
Alert and oriented, no focal deficits, no motor or sensory deficits.

## 2023-06-01 PROBLEM — T45.1X5A CHEMOTHERAPY-INDUCED NEUTROPENIA (H): Status: ACTIVE | Noted: 2023-06-01

## 2023-06-01 PROBLEM — D70.1 CHEMOTHERAPY-INDUCED NEUTROPENIA (H): Status: ACTIVE | Noted: 2023-06-01

## 2023-06-01 RX ORDER — METHYLPREDNISOLONE SODIUM SUCCINATE 125 MG/2ML
125 INJECTION, POWDER, LYOPHILIZED, FOR SOLUTION INTRAMUSCULAR; INTRAVENOUS
Status: CANCELLED
Start: 2023-08-28

## 2023-06-01 RX ORDER — METHYLPREDNISOLONE SODIUM SUCCINATE 125 MG/2ML
125 INJECTION, POWDER, LYOPHILIZED, FOR SOLUTION INTRAMUSCULAR; INTRAVENOUS
Status: CANCELLED
Start: 2023-08-07

## 2023-06-01 RX ORDER — METHYLPREDNISOLONE SODIUM SUCCINATE 125 MG/2ML
125 INJECTION, POWDER, LYOPHILIZED, FOR SOLUTION INTRAMUSCULAR; INTRAVENOUS
Status: CANCELLED
Start: 2023-07-17

## 2023-06-01 RX ORDER — EPINEPHRINE 1 MG/ML
0.3 INJECTION, SOLUTION INTRAMUSCULAR; SUBCUTANEOUS EVERY 5 MIN PRN
Status: CANCELLED | OUTPATIENT
Start: 2023-08-28

## 2023-06-01 RX ORDER — HEPARIN SODIUM,PORCINE 10 UNIT/ML
5 VIAL (ML) INTRAVENOUS
Status: CANCELLED | OUTPATIENT
Start: 2023-07-17

## 2023-06-01 RX ORDER — MEPERIDINE HYDROCHLORIDE 25 MG/ML
25 INJECTION INTRAMUSCULAR; INTRAVENOUS; SUBCUTANEOUS EVERY 30 MIN PRN
Status: CANCELLED | OUTPATIENT
Start: 2023-07-17

## 2023-06-01 RX ORDER — EPINEPHRINE 1 MG/ML
0.3 INJECTION, SOLUTION INTRAMUSCULAR; SUBCUTANEOUS EVERY 5 MIN PRN
Status: CANCELLED | OUTPATIENT
Start: 2023-07-17

## 2023-06-01 RX ORDER — ALBUTEROL SULFATE 90 UG/1
1-2 AEROSOL, METERED RESPIRATORY (INHALATION)
Status: CANCELLED
Start: 2023-06-26

## 2023-06-01 RX ORDER — HEPARIN SODIUM,PORCINE 10 UNIT/ML
5 VIAL (ML) INTRAVENOUS
Status: CANCELLED | OUTPATIENT
Start: 2023-08-28

## 2023-06-01 RX ORDER — HEPARIN SODIUM,PORCINE 10 UNIT/ML
5 VIAL (ML) INTRAVENOUS
Status: CANCELLED | OUTPATIENT
Start: 2023-08-07

## 2023-06-01 RX ORDER — ALBUTEROL SULFATE 0.83 MG/ML
2.5 SOLUTION RESPIRATORY (INHALATION)
Status: CANCELLED | OUTPATIENT
Start: 2023-08-07

## 2023-06-01 RX ORDER — METHYLPREDNISOLONE SODIUM SUCCINATE 125 MG/2ML
125 INJECTION, POWDER, LYOPHILIZED, FOR SOLUTION INTRAMUSCULAR; INTRAVENOUS
Status: CANCELLED
Start: 2023-06-26

## 2023-06-01 RX ORDER — ONDANSETRON 2 MG/ML
8 INJECTION INTRAMUSCULAR; INTRAVENOUS ONCE
Status: CANCELLED | OUTPATIENT
Start: 2023-08-07

## 2023-06-01 RX ORDER — MEPERIDINE HYDROCHLORIDE 25 MG/ML
25 INJECTION INTRAMUSCULAR; INTRAVENOUS; SUBCUTANEOUS EVERY 30 MIN PRN
Status: CANCELLED | OUTPATIENT
Start: 2023-06-26

## 2023-06-01 RX ORDER — DIPHENHYDRAMINE HYDROCHLORIDE 50 MG/ML
50 INJECTION INTRAMUSCULAR; INTRAVENOUS
Status: CANCELLED
Start: 2023-08-07

## 2023-06-01 RX ORDER — ALBUTEROL SULFATE 0.83 MG/ML
2.5 SOLUTION RESPIRATORY (INHALATION)
Status: CANCELLED | OUTPATIENT
Start: 2023-06-26

## 2023-06-01 RX ORDER — PROCHLORPERAZINE MALEATE 10 MG
10 TABLET ORAL EVERY 6 HOURS PRN
Qty: 30 TABLET | Refills: 2 | Status: SHIPPED | OUTPATIENT
Start: 2023-06-01 | End: 2023-12-12

## 2023-06-01 RX ORDER — HEPARIN SODIUM (PORCINE) LOCK FLUSH IV SOLN 100 UNIT/ML 100 UNIT/ML
5 SOLUTION INTRAVENOUS
Status: CANCELLED | OUTPATIENT
Start: 2023-08-28

## 2023-06-01 RX ORDER — DIPHENHYDRAMINE HYDROCHLORIDE 50 MG/ML
50 INJECTION INTRAMUSCULAR; INTRAVENOUS
Status: CANCELLED
Start: 2023-08-28

## 2023-06-01 RX ORDER — EPINEPHRINE 1 MG/ML
0.3 INJECTION, SOLUTION INTRAMUSCULAR; SUBCUTANEOUS EVERY 5 MIN PRN
Status: CANCELLED | OUTPATIENT
Start: 2023-06-26

## 2023-06-01 RX ORDER — MEPERIDINE HYDROCHLORIDE 25 MG/ML
25 INJECTION INTRAMUSCULAR; INTRAVENOUS; SUBCUTANEOUS EVERY 30 MIN PRN
Status: CANCELLED | OUTPATIENT
Start: 2023-08-07

## 2023-06-01 RX ORDER — ALBUTEROL SULFATE 0.83 MG/ML
2.5 SOLUTION RESPIRATORY (INHALATION)
Status: CANCELLED | OUTPATIENT
Start: 2023-07-17

## 2023-06-01 RX ORDER — DIPHENHYDRAMINE HYDROCHLORIDE 50 MG/ML
50 INJECTION INTRAMUSCULAR; INTRAVENOUS
Status: CANCELLED
Start: 2023-07-17

## 2023-06-01 RX ORDER — DIPHENHYDRAMINE HYDROCHLORIDE 50 MG/ML
50 INJECTION INTRAMUSCULAR; INTRAVENOUS
Status: CANCELLED
Start: 2023-06-26

## 2023-06-01 RX ORDER — DEXAMETHASONE 4 MG/1
8 TABLET ORAL 2 TIMES DAILY WITH MEALS
Qty: 24 TABLET | Refills: 0 | Status: SHIPPED | OUTPATIENT
Start: 2023-06-01 | End: 2023-08-07

## 2023-06-01 RX ORDER — ALBUTEROL SULFATE 90 UG/1
1-2 AEROSOL, METERED RESPIRATORY (INHALATION)
Status: CANCELLED
Start: 2023-08-07

## 2023-06-01 RX ORDER — ALBUTEROL SULFATE 90 UG/1
1-2 AEROSOL, METERED RESPIRATORY (INHALATION)
Status: CANCELLED
Start: 2023-07-17

## 2023-06-01 RX ORDER — ONDANSETRON 8 MG/1
8 TABLET, FILM COATED ORAL EVERY 8 HOURS PRN
Qty: 30 TABLET | Refills: 2 | Status: SHIPPED | OUTPATIENT
Start: 2023-06-01 | End: 2023-12-12

## 2023-06-01 RX ORDER — ONDANSETRON 2 MG/ML
8 INJECTION INTRAMUSCULAR; INTRAVENOUS ONCE
Status: CANCELLED | OUTPATIENT
Start: 2023-07-17

## 2023-06-01 RX ORDER — ALBUTEROL SULFATE 90 UG/1
1-2 AEROSOL, METERED RESPIRATORY (INHALATION)
Status: CANCELLED
Start: 2023-08-28

## 2023-06-01 RX ORDER — ONDANSETRON 2 MG/ML
8 INJECTION INTRAMUSCULAR; INTRAVENOUS ONCE
Status: CANCELLED | OUTPATIENT
Start: 2023-08-28

## 2023-06-01 RX ORDER — EPINEPHRINE 1 MG/ML
0.3 INJECTION, SOLUTION INTRAMUSCULAR; SUBCUTANEOUS EVERY 5 MIN PRN
Status: CANCELLED | OUTPATIENT
Start: 2023-08-07

## 2023-06-01 RX ORDER — HEPARIN SODIUM (PORCINE) LOCK FLUSH IV SOLN 100 UNIT/ML 100 UNIT/ML
5 SOLUTION INTRAVENOUS
Status: CANCELLED | OUTPATIENT
Start: 2023-08-07

## 2023-06-01 RX ORDER — ALBUTEROL SULFATE 0.83 MG/ML
2.5 SOLUTION RESPIRATORY (INHALATION)
Status: CANCELLED | OUTPATIENT
Start: 2023-08-28

## 2023-06-01 RX ORDER — ONDANSETRON 2 MG/ML
8 INJECTION INTRAMUSCULAR; INTRAVENOUS ONCE
Status: CANCELLED | OUTPATIENT
Start: 2023-06-26

## 2023-06-01 RX ORDER — HEPARIN SODIUM (PORCINE) LOCK FLUSH IV SOLN 100 UNIT/ML 100 UNIT/ML
5 SOLUTION INTRAVENOUS
Status: CANCELLED | OUTPATIENT
Start: 2023-06-26

## 2023-06-01 RX ORDER — HEPARIN SODIUM (PORCINE) LOCK FLUSH IV SOLN 100 UNIT/ML 100 UNIT/ML
5 SOLUTION INTRAVENOUS
Status: CANCELLED | OUTPATIENT
Start: 2023-07-17

## 2023-06-01 RX ORDER — HEPARIN SODIUM,PORCINE 10 UNIT/ML
5 VIAL (ML) INTRAVENOUS
Status: CANCELLED | OUTPATIENT
Start: 2023-06-26

## 2023-06-01 RX ORDER — MEPERIDINE HYDROCHLORIDE 25 MG/ML
25 INJECTION INTRAMUSCULAR; INTRAVENOUS; SUBCUTANEOUS EVERY 30 MIN PRN
Status: CANCELLED | OUTPATIENT
Start: 2023-08-28

## 2023-06-02 ENCOUNTER — TELEPHONE (OUTPATIENT)
Dept: RADIATION THERAPY | Facility: OUTPATIENT CENTER | Age: 43
End: 2023-06-02

## 2023-06-02 ENCOUNTER — TELEPHONE (OUTPATIENT)
Dept: SURGERY | Facility: CLINIC | Age: 43
End: 2023-06-02

## 2023-06-02 NOTE — TELEPHONE ENCOUNTER
RN from radiation oncology called to check in on med onc consult and plan with Azalea. New note from visit on 5/31/23 with Dr. Dyson noted that Azalea needs chemo and orders were placed.  Due to this, RN reviewed with radiation oncology MD what plan should be. Per discussion - pt can begin filling expanders with plastics team. Keep consult for next week even though patient will be starting chemo soon. Check on tumor conference recommendations from 5/12/23.  RN called Azalea to update her to go ahead and begin expander filling with plastics team. Reviewed consult with radiation MD and moved visit to 6/5/23 10:30. Azalea appreciated information and will be in radiation clinic on Monday.

## 2023-06-02 NOTE — TELEPHONE ENCOUNTER
"RN reached out to patient to reschedule Breast fill appt. Pt informed RN that she feels like one of the tissue expanders \"has moved\" and the metal port is higher than it was before. RN advised to be seen by  and if okay to fill RN can do on same day. Pt scheduled on June 9th at 1:30p in [place] with .  Earlier in the clinic appt needed as pt is going out of town..Routing to  plastics team as an FYI .Viji Llamas RN    "

## 2023-06-02 NOTE — TELEPHONE ENCOUNTER
Spoke with Pt in regards to nurse visit on 06/02/23 at 1:30pm in [place].     Nurse stated that unfortunately we have to cancel due to our RN being out today. Stated that Viji should reach out to her for rescheduling that appointment.     Pt noted that it will be cancelled and to watch for a call/message.    No further questions at this time.    Jada Duque LPN on 6/2/2023 at 8:58 AM

## 2023-06-05 ENCOUNTER — MYC MEDICAL ADVICE (OUTPATIENT)
Dept: ONCOLOGY | Facility: CLINIC | Age: 43
End: 2023-06-05
Payer: COMMERCIAL

## 2023-06-05 ENCOUNTER — OFFICE VISIT (OUTPATIENT)
Dept: RADIATION THERAPY | Facility: OUTPATIENT CENTER | Age: 43
End: 2023-06-05
Payer: COMMERCIAL

## 2023-06-05 VITALS
RESPIRATION RATE: 12 BRPM | SYSTOLIC BLOOD PRESSURE: 113 MMHG | DIASTOLIC BLOOD PRESSURE: 72 MMHG | WEIGHT: 156.4 LBS | OXYGEN SATURATION: 99 % | BODY MASS INDEX: 26.85 KG/M2 | HEART RATE: 72 BPM

## 2023-06-05 DIAGNOSIS — C50.911 MALIGNANT NEOPLASM OF RIGHT BREAST IN FEMALE, ESTROGEN RECEPTOR POSITIVE, UNSPECIFIED SITE OF BREAST (H): Primary | ICD-10-CM

## 2023-06-05 DIAGNOSIS — Z17.0 MALIGNANT NEOPLASM OF RIGHT BREAST IN FEMALE, ESTROGEN RECEPTOR POSITIVE, UNSPECIFIED SITE OF BREAST (H): Primary | ICD-10-CM

## 2023-06-05 DIAGNOSIS — Z17.0 MALIGNANT NEOPLASM OF OVERLAPPING SITES OF RIGHT BREAST IN FEMALE, ESTROGEN RECEPTOR POSITIVE (H): Primary | ICD-10-CM

## 2023-06-05 DIAGNOSIS — C50.811 MALIGNANT NEOPLASM OF OVERLAPPING SITES OF RIGHT BREAST IN FEMALE, ESTROGEN RECEPTOR POSITIVE (H): Primary | ICD-10-CM

## 2023-06-05 NOTE — NURSING NOTE
"REASON FOR APPOINTMENT   Newly diagnosed right breast cancer, s/p bilateral mastectomy + expander placement  Her to discuss radiation therapy, still needs chemotherapy     PERSONAL HISTORY OF CANCER   Previous Cancer ? no   Prior Radiation ? no   Prior Chemotherapy ? no   Prior Hormonal Therapy ? no     REFERRALS NEEDED  Not at this time  May need lymphedema therapy, social work support    VITALS  /72 (BP Location: Left arm, Patient Position: Chair, Cuff Size: Adult Regular)   Pulse 72   Resp 12   Wt 70.9 kg (156 lb 6.4 oz)   LMP 04/16/2023   SpO2 99%   BMI 26.85 kg/m      PACEMAKER/IMPLANTED CARDIAC DEVICE : No    PAIN  Denies    PSYCHOSOCIAL  Marital Status:   Patient lives in [place], [place] with her  Clovis.  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 Yes    REVIEW OF SYSTEMS  Skin:  Healing bilateral mastectomy incisions   Eyes: negative  Ears/Nose/Throat: negative  Respiratory: No shortness of breath, dyspnea on exertion, cough, or hemoptysis  Cardiovascular: negative  Gastrointestinal: negative  Genitourinary: negative  Musculoskeletal: negative  Neurologic: negative  Psychiatric: negative  Hematologic/Lymphatic/Immunologic: negative  Endocrine: negative    WOMEN ONLY  Any chance you may be pregnant: No  Age at first period: 13  Date of last period: current   Number of pregnancies: 3    Radiation Oncology Patient Teaching    Current Concern: will need chemotherapy first, not currently scheduled  Scheduled to get first expander filling this Friday, 6/9/23    Person involved with teaching: Patient and Mother Em  Patient asked Questions: Yes  Patient was cooperative: Yes  Patient was receptive (willing to accept information given): Yes    Education Assessment  Comprehension ability: High  Knowledge level: High  Factors affecting teaching: None    Response To Teaching  Verbalizes " understanding    Do you have an advanced directive or living will? Yes  Are you DNR/DNI? No

## 2023-06-05 NOTE — LETTER
2023         RE: Azalea Mckeon  [address] [facility] [facility] [id number]        Dear Colleague,    Thank you for referring your patient, Azalea Mckeon, to the [facility]. Please see a copy of my visit note below.       Department of Radiation Oncology  [facility]  [facility]  [address] [place] [id number]  [phone]       Consultation Note    Name: Azalea Mckeon MRN: [id number]   : 1980   Date of Service: 2023 Referring: Dr. Souza      Reason for consultation: right invasive ductal carcinoma s/p mastectomy     History of Present Illness     Ms. Mckeon is a 43 year old female with RIGHT Stage IA ER+/MI+/Her2- invasive ductal carcinoma s/p bilateral mastectomy with expander placement (23) with pathology demonstrating multifocal IDC (upper outer, upper inner quadrant) largest tumor measuring 16 mm, grade 3, no LVSI, negative invasive margins, with 10cm of DCIS (grade 3, comedonecrosis), with multiple positive DCIS margins (anterior, superior), close posterior margin, 0/1 LN, pT1cN0. Her oncotype score was 22 and she will be receiving chemotherapy (Dr. Dyson).    Briefly, her oncologic history is as follows:    Patient underwent a screening mammogram on 2/3/2023 which demonstrated heterogenously dense breasts bilaterally, with calcifications noted in the upper right breast.      She went diagnostic mammogram and ultrasound on 3/2/2023 which demonstrated fine pleomorphic calcifications spanning the upper inner and upper outer quadrant measuring up to 10 cm in length, along with a asymmetry at the 11 o'clock position with architectural distortion.  Ultrasound identified vague areas of shadowing in the upper outer and upper inner quadrant, with a focal area of shadowing at 11 o'clock position, 5 cm from the nipple, measuring up to 9 mm in size.  This is characterized as a BI-RADS 4  abnormality.    She underwent right breast stereotactic biopsy of right breast mass, with pathology returning as invasive ductal carcinoma, grade 3, at least 9 mm in linear extent associated with high-grade DCIS, ER positive/AK positive/HER2 negative.      She underwent bilateral mastectomy with expander placement on 2023 (Dr. Souza), with pathology demonstrating multifocal IDC (upper outer, upper inner quadrant) largest tumor measuring 16 mm, grade 3, no LVSI, negative invasive margins, with 10cm of DCIS (grade 3, comedonecrosis), with multiple positive DCIS margins (anterior, superior), close posterior margin, 0/1 LN, pT1cN0.     She was evaluated medical oncology, and her oncotype score was 22 and she will be receiving chemotherapy under care of Dr. Dyson.    Although she is technically in early stage invasive ductal carcinoma who is underwent a mastectomy, given her age, extent of DCIS with a positive margin of undetermined location, her case was discussed at breast tumor board with no recommendation for additional surgery, and a strong consideration for adjuvant radiotherapy.    Overall, patient denies any breast tenderness, pain, drainage, fevers, chills, extremity range of motion difficulties, chest pain, dyspnea, or weight loss.     Past Medical History:   Past Medical History:   Diagnosis Date     Cancer (H)     right breast, s/p bilateral mastectomy     NO ACTIVE PROBLEMS        Past Surgical History:   Past Surgical History:   Procedure Laterality Date     GYN SURGERY           GYN SURGERY           GYN SURGERY  2013    C/S     GYN SURGERY      C/S      REMOVAL OF OVARIAN CYST(S)       MASTECTOMY PARTIAL WITH SENTINEL NODE Bilateral 2023    Procedure: BILATERAL Skin-Sparing Mastectomy, BILATERAL breast implant removal, RIGHT Axillary Reynolds Lymph Node Biopsy;  Surgeon: Mitra Souza MD;  Location: [facility] OR     RECONSTRUCT BREAST, INSERT TISSUE  EXPANDER BILATERAL, COMBINED Bilateral 4/24/2023    Procedure: bilateral breast reconstruction, insert tissue expander bilateral, Spy **Latex Allergy**;  Surgeon: EDWARDO Ferreira MD;  Location: [facility] OR     TUBAL LIGATION  2013       Chemotherapy History:  No prior chemotherapy    Radiation History:  No prior RT    Pregnant: No  Implanted Cardiac Devices: No    Medications:  Current Outpatient Medications   Medication     dexamethasone (DECADRON) 4 MG tablet     HYDROmorphone (DILAUDID) 2 MG tablet     methocarbamol (ROBAXIN) 500 MG tablet     ondansetron (ZOFRAN ODT) 4 MG ODT tab     ondansetron (ZOFRAN) 8 MG tablet     prochlorperazine (COMPAZINE) 10 MG tablet     No current facility-administered medications for this visit.         Allergies:     Allergies   Allergen Reactions     Percocet [Oxycodone-Acetaminophen] Itching     Latex Rash       Social History:  Tobacco: not smoking  Alcohol: occasional  Employment: not currently working    Family History:  Family History   Problem Relation Age of Onset     Diabetes Father      Substance Abuse Father      Bleeding Disorder Sister      Pulmonary Embolism Sister         33     Depression Sister      Anxiety Disorder Mother      Autism Spectrum Disorder Daughter      Anxiety Disorder Daughter      Cancer No family hx of      Hypertension No family hx of      Cerebrovascular Disease No family hx of      Thyroid Disease No family hx of      Glaucoma No family hx of      Macular Degeneration No family hx of        Review of Systems   A 10-point review of systems was performed. Pertinent findings are noted in the HPI.    Physical Exam   ECOG Status: 1    Vitals:  /72 (BP Location: Left arm, Patient Position: Chair, Cuff Size: Adult Regular)   Pulse 72   Resp 12   Wt 70.9 kg (156 lb 6.4 oz)   LMP 04/16/2023   SpO2 99%   BMI 26.85 kg/m      Gen: Alert, in NAD  Head: NC/AT  Eyes: PERRL, EOMI, sclera anicteric  Ears: No external auricular lesions  Nose/sinus:  No rhinorrhea or epistaxis  Breast: deferred for now (she is getting chemotherapy first), no upper extremity range of motion limitation     Oral cavity/oropharynx: MMM, no visible oral cavity lesions  Neck: Full ROM, supple, no palpable adenopathy  Pulm: No wheezing, stridor or respiratory distress  CV: Extremities are warm and well-perfused, no cyanosis, no pedal edema  Abdominal: Normal bowel sounds, soft, nontender, no masses  Musculoskeletal: Normal bulk and tone  Skin: Normal color and turgor  Neuro: A/Ox3, CN II-XII intact, normal gait    Imaging/Path/Labs   Imaging: per HPI, personally reviewed and in agreement     Path: per HPI, personally reviewed and in agreement     Labs: per HPI, personally reviewed and in agreement     Assessment      Ms. Mckeon is a 43 year old female with RIGHT Stage IA ER+/KS+/Her2- invasive ductal carcinoma s/p bilateral mastectomy with expander placement (4/24/23) with pathology demonstrating multifocal IDC (upper outer, upper inner quadrant) largest tumor measuring 16 mm, grade 3, no LVSI, negative invasive margins, with 10cm of DCIS (grade 3, comedonecrosis), with multiple positive DCIS margins (anterior, superior), close posterior margin, 0/1 LN, pT1cN0. Her oncotype score was 22 and she will be receiving chemotherapy (Dr. Dyson).    In general, we reviewed the NCCN Guidelines for invasive breast cancer following total mastectomy with surgical axillary lymph node staging. If four or more axillary nodes are involved, radiation therapy to the chest wall plus infraclavicular region, supraclavicular area, internal mammary nodes, and any part of the axillary bed at risk is a category 1 recommendation (based on high-level evidence and uniform consensus of the NCCN). In the setting of one to three axillary nodes are involved, the recommendation is to strongly consider radiation to the chest wall plus the regional carmella sites as above. In patients with negative axillary lymph nodes  and tumor >5cm, the recommendation is to consider radiation to the chest wall and regional lymph nodes.  In patients with negative axillary lymph nodes and tumor less than 5 cm in size, and negative but close < 1mm margins, 1 may consider RT to the chest wall.  In patients with negative axillary lymph nodes and tumor less than 5 cm and margins greater than or equal to 1 mm, typically no radiation is recommended.These recommendations are supported by various literature (Jesse et al, EBCTCG Challis-analysis, Lancet 2005, update Lancet 2014; Virygaapam et al., 82b and 82c Combined Analysis, Radiother Oncol 2007).    Thus, in this particular scenario, she does not have the typical national cancer network guideline strong indication for postmastectomy radiation. However, we also discussed risk factors for increased locoregional recurrence after mastectomy in node negative patients and possible implications for PMRT (Tio et al., IJROBP, 2005).     Further, we the implications of positive margins for DCIS in the setting of mastectomy (Shaun et al, IJROBP, 2011).  Although the 5-year local regional recurrence risk is relatively low at approximately 7%, her age, premenopausal status, grade 3,multifocality of tumor, extent of DCIS (10cm), positive DCIS margin (with inability to clear surgically), , all increase her cumulative lifetime risk of local regional recurrence.  Thus, although an extrapolation, I would estimate her 5-year local regional risk to be somewhere in the 10 to 15% range.    Further, her case was discussed at breast tumor conference, with strong recommendation for pursuing adjuvant radiotherapy to the chest wall.    Further, we discussed the logistics of treatment planning and delivery in detail with the patient. We also discussed side effects, including short term risks of fatigue and skin reaction, and long term risks of pneumonitis, lung fibrosis, soft tissue fibrosis, skin changes, rib fractures, cardiac  damage, secondary cancers, lymphedema, and thyroid dysfunction, and implant failure. We described the use of 3D-conformal radiotherapy to minimize dose to the normal tissues, while adequately covering the target tissues, and the ability of this technique to decrease potential for toxicity.     Plan     1. After extensive discussion, patient wishes to proceed with adjuvant RIGHT post mastectomy radiation.    2. Patient has already met with medical oncology (Dr. Dyson) and has a oncotype of 22 and will receive adjuvant  chemotherapy.     3. We will plan for ct simulation 2-3 weeks after chemotherapy, with plan for 50Gy/25fx to the chest wall, to hopefully decrease change of implant failure.     4. From reconstruction standpoint, recommended delay reconstruction.She may continue to have expanders filled bilaterally under care of Dr. Ferreira. When ready for CT simulation after chemotherapy, recommened deflating contralateral breast completely to avoid radiation to that side, and recommend reducing ipsilateral breast (RIGHT) to comfortably low value (typically 250-300cc).       5. She wishes to follow up with me in [facility], and follow up will be scheduled after chemotherapy is completed.     All benefits and risks discussed, and patient is in agreement with the oncologic plan discussed above.     Thank you for allowing me to participate in your patient's care.  If you should require any additional information, please do not hesitate in contacting me.        Jona Linares MD  Department of Radiation Oncology  [facility]         Again, thank you for allowing me to participate in the care of your patient.        Sincerely,        Jona Linares MD

## 2023-06-05 NOTE — Clinical Note
Follow up with Jona Linares after completion of chemotherapy, will be seen at  [facility] for radiotherapy

## 2023-06-07 NOTE — PROGRESS NOTES
Department of Radiation Oncology  [facility]    Health: Cancer Center  [facility] Physicians  [address] [place] [address] [phone]       Consultation Note    Name: Azalea Mckeon MRN: [id number]   : 1980   Date of Service: 2023 Referring: Dr. Souza      Reason for consultation: right invasive ductal carcinoma s/p mastectomy     History of Present Illness     Ms. Mckeon is a 43 year old female with RIGHT Stage IA ER+/ND+/Her2- invasive ductal carcinoma s/p bilateral mastectomy with expander placement (23) with pathology demonstrating multifocal IDC (upper outer, upper inner quadrant) largest tumor measuring 16 mm, grade 3, no LVSI, negative invasive margins, with 10cm of DCIS (grade 3, comedonecrosis), with multiple positive DCIS margins (anterior, superior), close posterior margin, 0/1 LN, pT1cN0. Her oncotype score was 22 and she will be receiving chemotherapy (Dr. Dyson).    Briefly, her oncologic history is as follows:    Patient underwent a screening mammogram on 2/3/2023 which demonstrated heterogenously dense breasts bilaterally, with calcifications noted in the upper right breast.      She went diagnostic mammogram and ultrasound on 3/2/2023 which demonstrated fine pleomorphic calcifications spanning the upper inner and upper outer quadrant measuring up to 10 cm in length, along with a asymmetry at the 11 o'clock position with architectural distortion.  Ultrasound identified vague areas of shadowing in the upper outer and upper inner quadrant, with a focal area of shadowing at 11 o'clock position, 5 cm from the nipple, measuring up to 9 mm in size.  This is characterized as a BI-RADS 4 abnormality.    She underwent right breast stereotactic biopsy of right breast mass, with pathology returning as invasive ductal carcinoma, grade 3, at least 9 mm in linear extent associated with high-grade DCIS, ER positive/ND positive/HER2 negative.       She underwent bilateral mastectomy with expander placement on 2023 (Dr. Souza), with pathology demonstrating multifocal IDC (upper outer, upper inner quadrant) largest tumor measuring 16 mm, grade 3, no LVSI, negative invasive margins, with 10cm of DCIS (grade 3, comedonecrosis), with multiple positive DCIS margins (anterior, superior), close posterior margin, 0/1 LN, pT1cN0.     She was evaluated medical oncology, and her oncotype score was 22 and she will be receiving chemotherapy under care of Dr. Dyson.    Although she is technically in early stage invasive ductal carcinoma who is underwent a mastectomy, given her age, extent of DCIS with a positive margin of undetermined location, her case was discussed at breast tumor board with no recommendation for additional surgery, and a strong consideration for adjuvant radiotherapy.    Overall, patient denies any breast tenderness, pain, drainage, fevers, chills, extremity range of motion difficulties, chest pain, dyspnea, or weight loss.     Past Medical History:   Past Medical History:   Diagnosis Date     Cancer (H)     right breast, s/p bilateral mastectomy     NO ACTIVE PROBLEMS        Past Surgical History:   Past Surgical History:   Procedure Laterality Date     GYN SURGERY           GYN SURGERY           GYN SURGERY  2013    C/S     GYN SURGERY      C/S     HC REMOVAL OF OVARIAN CYST(S)       MASTECTOMY PARTIAL WITH SENTINEL NODE Bilateral 2023    Procedure: BILATERAL Skin-Sparing Mastectomy, BILATERAL breast implant removal, RIGHT Axillary [place] Lymph Node Biopsy;  Surgeon: Mitra Souza MD;  Location: [facility] OR     RECONSTRUCT BREAST, INSERT TISSUE EXPANDER BILATERAL, COMBINED Bilateral 2023    Procedure: bilateral breast reconstruction, insert tissue expander bilateral, Spy **Latex Allergy**;  Surgeon: EDWARDO Ferreira MD;  Location: [facility] OR     TUBAL LIGATION         Chemotherapy  History:  No prior chemotherapy    Radiation History:  No prior RT    Pregnant: No  Implanted Cardiac Devices: No    Medications:  Current Outpatient Medications   Medication     dexamethasone (DECADRON) 4 MG tablet     HYDROmorphone (DILAUDID) 2 MG tablet     methocarbamol (ROBAXIN) 500 MG tablet     ondansetron (ZOFRAN ODT) 4 MG ODT tab     ondansetron (ZOFRAN) 8 MG tablet     prochlorperazine (COMPAZINE) 10 MG tablet     No current facility-administered medications for this visit.         Allergies:     Allergies   Allergen Reactions     Percocet [Oxycodone-Acetaminophen] Itching     Latex Rash       Social History:  Tobacco: not smoking  Alcohol: occasional  Employment: not currently working    Family History:  Family History   Problem Relation Age of Onset     Diabetes Father      Substance Abuse Father      Bleeding Disorder Sister      Pulmonary Embolism Sister         33     Depression Sister      Anxiety Disorder Mother      Autism Spectrum Disorder Daughter      Anxiety Disorder Daughter      Cancer No family hx of      Hypertension No family hx of      Cerebrovascular Disease No family hx of      Thyroid Disease No family hx of      Glaucoma No family hx of      Macular Degeneration No family hx of        Review of Systems   A 10-point review of systems was performed. Pertinent findings are noted in the HPI.    Physical Exam   ECOG Status: 1    Vitals:  /72 (BP Location: Left arm, Patient Position: Chair, Cuff Size: Adult Regular)   Pulse 72   Resp 12   Wt 70.9 kg (156 lb 6.4 oz)   LMP 04/16/2023   SpO2 99%   BMI 26.85 kg/m      Gen: Alert, in NAD  Head: NC/AT  Eyes: PERRL, EOMI, sclera anicteric  Ears: No external auricular lesions  Nose/sinus: No rhinorrhea or epistaxis  Breast: deferred for now (she is getting chemotherapy first), no upper extremity range of motion limitation     Oral cavity/oropharynx: MMM, no visible oral cavity lesions  Neck: Full ROM, supple, no palpable  adenopathy  Pulm: No wheezing, stridor or respiratory distress  CV: Extremities are warm and well-perfused, no cyanosis, no pedal edema  Abdominal: Normal bowel sounds, soft, nontender, no masses  Musculoskeletal: Normal bulk and tone  Skin: Normal color and turgor  Neuro: A/Ox3, CN II-XII intact, normal gait    Imaging/Path/Labs   Imaging: per HPI, personally reviewed and in agreement     Path: per HPI, personally reviewed and in agreement     Labs: per HPI, personally reviewed and in agreement     Assessment      Ms. Mckeon is a 43 year old female with RIGHT Stage IA ER+/NM+/Her2- invasive ductal carcinoma s/p bilateral mastectomy with expander placement (4/24/23) with pathology demonstrating multifocal IDC (upper outer, upper inner quadrant) largest tumor measuring 16 mm, grade 3, no LVSI, negative invasive margins, with 10cm of DCIS (grade 3, comedonecrosis), with multiple positive DCIS margins (anterior, superior), close posterior margin, 0/1 LN, pT1cN0. Her oncotype score was 22 and she will be receiving chemotherapy (Dr. Dyson).    In general, we reviewed the NCCN Guidelines for invasive breast cancer following total mastectomy with surgical axillary lymph node staging. If four or more axillary nodes are involved, radiation therapy to the chest wall plus infraclavicular region, supraclavicular area, internal mammary nodes, and any part of the axillary bed at risk is a category 1 recommendation (based on high-level evidence and uniform consensus of the NCCN). In the setting of one to three axillary nodes are involved, the recommendation is to strongly consider radiation to the chest wall plus the regional carmella sites as above. In patients with negative axillary lymph nodes and tumor >5cm, the recommendation is to consider radiation to the chest wall and regional lymph nodes.  In patients with negative axillary lymph nodes and tumor less than 5 cm in size, and negative but close < 1mm margins, 1 may consider  RT to the chest wall.  In patients with negative axillary lymph nodes and tumor less than 5 cm and margins greater than or equal to 1 mm, typically no radiation is recommended.These recommendations are supported by various literature (Jesse et al, EBCTCG [place]-analysis, Lancet 2005, update Lancet 2014; Federico et al., 82b and 82c Combined Analysis, Radiother Oncol 2007).    Thus, in this particular scenario, she does not have the typical national cancer network guideline strong indication for postmastectomy radiation. However, we also discussed risk factors for increased locoregional recurrence after mastectomy in node negative patients and possible implications for PMRT (Tio et al., IJROBP, 2005).     Further, we the implications of positive margins for DCIS in the setting of mastectomy (Shaun et al, IJROBP, 2011).  Although the 5-year local regional recurrence risk is relatively low at approximately 7%, her age, premenopausal status, grade 3,multifocality of tumor, extent of DCIS (10cm), positive DCIS margin (with inability to clear surgically), , all increase her cumulative lifetime risk of local regional recurrence.  Thus, although an extrapolation, I would estimate her 5-year local regional risk to be somewhere in the 10 to 15% range.    Further, her case was discussed at breast tumor conference, with strong recommendation for pursuing adjuvant radiotherapy to the chest wall.    Further, we discussed the logistics of treatment planning and delivery in detail with the patient. We also discussed side effects, including short term risks of fatigue and skin reaction, and long term risks of pneumonitis, lung fibrosis, soft tissue fibrosis, skin changes, rib fractures, cardiac damage, secondary cancers, lymphedema, and thyroid dysfunction, and implant failure. We described the use of 3D-conformal radiotherapy to minimize dose to the normal tissues, while adequately covering the target tissues, and the ability of  this technique to decrease potential for toxicity.     Plan     1. After extensive discussion, patient wishes to proceed with adjuvant RIGHT post mastectomy radiation.    2. Patient has already met with medical oncology (Dr. Dyson) and has a oncotype of 22 and will receive adjuvant  chemotherapy.     3. We will plan for ct simulation 2-3 weeks after chemotherapy, with plan for 50Gy/25fx to the chest wall, to hopefully decrease change of implant failure.     4. From reconstruction standpoint, recommended delay reconstruction.She may continue to have expanders filled bilaterally under care of Dr. Ferreira. When ready for CT simulation after chemotherapy, recommened deflating contralateral breast completely to avoid radiation to that side, and recommend reducing ipsilateral breast (RIGHT) to comfortably low value (typically 250-300cc).       5. She wishes to follow up with me in [facility], and follow up will be scheduled after chemotherapy is completed.     All benefits and risks discussed, and patient is in agreement with the oncologic plan discussed above.     Thank you for allowing me to participate in your patient's care.  If you should require any additional information, please do not hesitate in contacting me.        Jona Linares MD  Department of Radiation Oncology  [facility]

## 2023-06-08 LAB — SCANNED LAB RESULT: NORMAL

## 2023-06-09 ENCOUNTER — OFFICE VISIT (OUTPATIENT)
Dept: SURGERY | Facility: CLINIC | Age: 43
End: 2023-06-09
Payer: COMMERCIAL

## 2023-06-09 DIAGNOSIS — Z98.890 S/P BREAST RECONSTRUCTION, BILATERAL: Primary | ICD-10-CM

## 2023-06-09 LAB
ALBUMIN SERPL BCG-MCNC: 4.6 G/DL (ref 3.5–5.2)
ALP SERPL-CCNC: 34 U/L (ref 35–104)
ALT SERPL W P-5'-P-CCNC: 17 U/L (ref 10–35)
ANION GAP SERPL CALCULATED.3IONS-SCNC: 12 MMOL/L (ref 7–15)
AST SERPL W P-5'-P-CCNC: 26 U/L (ref 10–35)
BASOPHILS # BLD AUTO: 0 10E3/UL (ref 0–0.2)
BASOPHILS NFR BLD AUTO: 0 %
BILIRUB SERPL-MCNC: 0.5 MG/DL
BUN SERPL-MCNC: 12.4 MG/DL (ref 6–20)
CALCIUM SERPL-MCNC: 9.2 MG/DL (ref 8.6–10)
CHLORIDE SERPL-SCNC: 102 MMOL/L (ref 98–107)
CREAT SERPL-MCNC: 0.8 MG/DL (ref 0.51–0.95)
DEPRECATED HCO3 PLAS-SCNC: 24 MMOL/L (ref 22–29)
EOSINOPHIL # BLD AUTO: 0.1 10E3/UL (ref 0–0.7)
EOSINOPHIL NFR BLD AUTO: 2 %
ERYTHROCYTE [DISTWIDTH] IN BLOOD BY AUTOMATED COUNT: 11.9 % (ref 10–15)
GFR SERPL CREATININE-BSD FRML MDRD: >90 ML/MIN/1.73M2
GLUCOSE SERPL-MCNC: 90 MG/DL (ref 70–99)
HCT VFR BLD AUTO: 39.4 % (ref 35–47)
HGB BLD-MCNC: 13.4 G/DL (ref 11.7–15.7)
IMM GRANULOCYTES # BLD: 0 10E3/UL
IMM GRANULOCYTES NFR BLD: 0 %
LYMPHOCYTES # BLD AUTO: 1.6 10E3/UL (ref 0.8–5.3)
LYMPHOCYTES NFR BLD AUTO: 22 %
MCH RBC QN AUTO: 30 PG (ref 26.5–33)
MCHC RBC AUTO-ENTMCNC: 34 G/DL (ref 31.5–36.5)
MCV RBC AUTO: 88 FL (ref 78–100)
MONOCYTES # BLD AUTO: 0.4 10E3/UL (ref 0–1.3)
MONOCYTES NFR BLD AUTO: 6 %
NEUTROPHILS # BLD AUTO: 5.2 10E3/UL (ref 1.6–8.3)
NEUTROPHILS NFR BLD AUTO: 70 %
NRBC # BLD AUTO: 0 10E3/UL
NRBC BLD AUTO-RTO: 0 /100
PLATELET # BLD AUTO: 246 10E3/UL (ref 150–450)
POTASSIUM SERPL-SCNC: 4 MMOL/L (ref 3.4–5.3)
PROT SERPL-MCNC: 7.2 G/DL (ref 6.4–8.3)
RBC # BLD AUTO: 4.46 10E6/UL (ref 3.8–5.2)
SODIUM SERPL-SCNC: 138 MMOL/L (ref 136–145)
WBC # BLD AUTO: 7.4 10E3/UL (ref 4–11)

## 2023-06-09 PROCEDURE — 99024 POSTOP FOLLOW-UP VISIT: CPT | Performed by: PLASTIC SURGERY

## 2023-06-09 PROCEDURE — 87040 BLOOD CULTURE FOR BACTERIA: CPT | Performed by: PLASTIC SURGERY

## 2023-06-09 PROCEDURE — 36415 COLL VENOUS BLD VENIPUNCTURE: CPT | Performed by: PLASTIC SURGERY

## 2023-06-09 PROCEDURE — 85025 COMPLETE CBC W/AUTO DIFF WBC: CPT | Performed by: PLASTIC SURGERY

## 2023-06-09 PROCEDURE — 80053 COMPREHEN METABOLIC PANEL: CPT | Performed by: PLASTIC SURGERY

## 2023-06-09 RX ORDER — SULFAMETHOXAZOLE/TRIMETHOPRIM 800-160 MG
1 TABLET ORAL 2 TIMES DAILY
Qty: 20 TABLET | Refills: 0 | Status: SHIPPED | OUTPATIENT
Start: 2023-06-09 | End: 2023-06-19

## 2023-06-09 NOTE — PROGRESS NOTES
POSTOPERATIVE VISIT    PRESENTING COMPLAINT:  Postoperative visit status post bilateral breast reconstruction with expanders for right sided breast cancer after undergoing bilateral nipple non-sparing mastectomies on 04/24/2023.    HISTORY OF PRESENTING COMPLAINT:  Ms. Mckeon is 43 years old.  She is about 7 weeks out from surgery.  Overall, was doing well until about a few days ago when she started noticing some burning and redness throughout her upper extremities, chest and abdominal regions. Not been taking any new medications. Not been using any new lotions.  Just does not feel well overall.  Was supposed to start expansion today, but my nurse had her see me.    PHYSICAL EXAMINATION:  Vital signs are stable.  She is afebrile, in no obvious distress.  Both breasts are healed. Nonspecific redness throughout her torso.    ASSESSMENT AND PLAN:  Based on the above findings, a diagnosis of nonspecific redness throughout her anterior torso was made.  Plan is to get CBC, electrolytes and blood cultures, start her on Bactrim just to be on the safe side, and have her continue to follow up with her primary care physician to rule out other indicated etiologies. In the interim, her CBC, electrolytes have all come back normal.  I will see her back on Tuesday to make sure she is doing better.  In the interim, she will call us if anything changes.  I doubt this is an infection. We will hold off on expansion for now. We will see her back next week in my clinic on Tuesday at the [facility] or next Friday here and plan expansion thereafter.  She will be requiring chemotherapy as well as radiation therapy.  All questions were answered.  She was happy with the visit.

## 2023-06-09 NOTE — LETTER
6/9/2023         RE: Azalea Mckeon  [address] [address] [place] [address]        Dear Colleague,    Thank you for referring your patient, Azalea Mckeon, to the [facility]. Please see a copy of my visit note below.    POSTOPERATIVE VISIT    PRESENTING COMPLAINT:  Postoperative visit status post bilateral breast reconstruction with expanders for right sided breast cancer after undergoing bilateral nipple non-sparing mastectomies on 04/24/2023.    HISTORY OF PRESENTING COMPLAINT:  Ms. Mckeon is 43 years old.  She is about 7 weeks out from surgery.  Overall, was doing well until about a few days ago when she started noticing some burning and redness throughout her upper extremities, chest and abdominal regions. Not been taking any new medications. Not been using any new lotions.  Just does not feel well overall.  Was supposed to start expansion today, but my nurse had her see me.    PHYSICAL EXAMINATION:  Vital signs are stable.  She is afebrile, in no obvious distress.  Both breasts are healed. Nonspecific redness throughout her torso.    ASSESSMENT AND PLAN:  Based on the above findings, a diagnosis of nonspecific redness throughout her anterior torso was made.  Plan is to get CBC, electrolytes and blood cultures, start her on Bactrim just to be on the safe side, and have her continue to follow up with her primary care physician to rule out other indicated etiologies. In the interim, her CBC, electrolytes have all come back normal.  I will see her back on Tuesday to make sure she is doing better.  In the interim, she will call us if anything changes.  I doubt this is an infection. We will hold off on expansion for now. We will see her back next week in my clinic on Tuesday at the [facility] or next Friday here and plan expansion thereafter.  She will be requiring chemotherapy as well as radiation therapy.  All questions were answered.  She was happy with the visit.        Again, thank  you for allowing me to participate in the care of your patient.        Sincerely,        EDWARDO Ferreira MD

## 2023-06-09 NOTE — NURSING NOTE
Azalea Mckeon's chief complaint for this visit includes:  Chief Complaint   Patient presents with     RECHECK     check Tissue expanders. If okay to fill RN to do     PCP: No Ref-Primary, Physician    Referring Provider:  No referring provider defined for this encounter.    [facility] 04/16/2023   Data Unavailable        Allergies   Allergen Reactions     Percocet [Oxycodone-Acetaminophen] Itching     Latex Rash         Do you need any medication refills at today's visit? NO    [facility] Visit Facilitator- Surgical Specialties

## 2023-06-13 ENCOUNTER — PATIENT OUTREACH (OUTPATIENT)
Dept: ONCOLOGY | Facility: CLINIC | Age: 43
End: 2023-06-13

## 2023-06-13 ENCOUNTER — OFFICE VISIT (OUTPATIENT)
Dept: PLASTIC SURGERY | Facility: CLINIC | Age: 43
End: 2023-06-13
Attending: PLASTIC SURGERY
Payer: COMMERCIAL

## 2023-06-13 VITALS
OXYGEN SATURATION: 100 % | SYSTOLIC BLOOD PRESSURE: 109 MMHG | WEIGHT: 153 LBS | RESPIRATION RATE: 16 BRPM | DIASTOLIC BLOOD PRESSURE: 75 MMHG | HEART RATE: 74 BPM | BODY MASS INDEX: 26.26 KG/M2 | TEMPERATURE: 97.9 F

## 2023-06-13 DIAGNOSIS — Z85.3 HISTORY OF RIGHT BREAST CANCER: Primary | ICD-10-CM

## 2023-06-13 PROCEDURE — G0463 HOSPITAL OUTPT CLINIC VISIT: HCPCS | Performed by: PLASTIC SURGERY

## 2023-06-13 PROCEDURE — 99024 POSTOP FOLLOW-UP VISIT: CPT | Performed by: PLASTIC SURGERY

## 2023-06-13 ASSESSMENT — PAIN SCALES - GENERAL: PAINLEVEL: MILD PAIN (2)

## 2023-06-13 NOTE — NURSING NOTE
"Oncology Rooming Note    June 13, 2023 9:29 AM   Azalea Mckeon is a 43 year old female who presents for:    Chief Complaint   Patient presents with     Oncology Clinic Visit     Breast Ca, surgical follow up     Initial Vitals: /75   Pulse 74   Temp 97.9  F (36.6  C) (Oral)   Resp 16   Wt 69.4 kg (153 lb)   LMP 06/01/2023   SpO2 100%   BMI 26.26 kg/m   Estimated body mass index is 26.26 kg/m  as calculated from the following:    Height as of 4/24/23: 1.626 m (5' 4\").    Weight as of this encounter: 69.4 kg (153 lb). Body surface area is 1.77 meters squared.  Mild Pain (2) Comment: Data Unavailable   Patient's last menstrual period was 06/01/2023.  Allergies reviewed: Yes  Medications reviewed: Yes    Medications: Medication refills not needed today.  Pharmacy name entered into [employer]: [id number] [place] [address]    Clinical concerns:  Patient states there are no new concerns to discuss with provider.  Dr Ferreira was not notified.        Tamanna Cody CMA              "

## 2023-06-13 NOTE — LETTER
6/13/2023         RE: Azalea Mckeon  [address] [address] [facility] [address]        Dear Colleague,    Thank you for referring your patient, Azalea Mckeon, to the [facility] BREAST [facility]. Please see a copy of my visit note below.    POSTOPERATIVE VISIT NOTE    PRESENTING COMPLAINT:  Postoperative visit, status post bilateral breast reconstruction with expanders for right-sided breast cancer after undergoing bilateral nipple-non-sparing mastectomies on 04/24/2023.    HISTORY OF PRESENTING COMPLAINT:  Ms. Mckeon is 43 years old.  She is a couple months out from her surgery.  I saw her last week for nonspecific erythema throughout her body.  She was started on Bactrim.  She is feeling better, but continues to have erythema throughout her body, which is improving slowly.  No fevers, no drainage from her wounds.  Overall doing well.    PHYSICAL EXAMINATION:  Vital signs are stable.  She is afebrile, in no obvious distress. Nonspecific redness throughout the torso.  Healed breasts.    ASSESSMENT AND PLAN:  Based upon the above findings, a diagnosis of bilateral breast reconstruction was made.  All her labs from last week were normal.  The plan is to finish the course of antibiotics, start expansion, and then I will see her back thereafter to plan the next stages of reconstruction.  She was happy with the visit.  All exam and discussion done in the presence of my nurse, Viji Rosario.          EDWARDO Ferreira MD

## 2023-06-13 NOTE — PROGRESS NOTES
Patient wishes to have a PORT placed prior to starting chemotherapy on 6/26.  Orders were faxed to [facility] along with face sheet, insurance cards and office notes.

## 2023-06-14 LAB — BACTERIA BLD CULT: NO GROWTH

## 2023-06-15 NOTE — TELEPHONE ENCOUNTER
Patient is scheduled at [facility] on 6/22/2023 for port placement.  Replied to patient with information and requested to meet to do chemotherapy teaching.     Sofi Martinez RN

## 2023-06-20 ENCOUNTER — OFFICE VISIT (OUTPATIENT)
Dept: FAMILY MEDICINE | Facility: CLINIC | Age: 43
End: 2023-06-20
Payer: COMMERCIAL

## 2023-06-20 ENCOUNTER — PATIENT OUTREACH (OUTPATIENT)
Dept: ONCOLOGY | Facility: CLINIC | Age: 43
End: 2023-06-20

## 2023-06-20 VITALS
HEART RATE: 92 BPM | DIASTOLIC BLOOD PRESSURE: 74 MMHG | SYSTOLIC BLOOD PRESSURE: 108 MMHG | TEMPERATURE: 98.1 F | OXYGEN SATURATION: 99 % | BODY MASS INDEX: 25.49 KG/M2 | WEIGHT: 153 LBS | HEIGHT: 65 IN

## 2023-06-20 DIAGNOSIS — R21 RASH: Primary | ICD-10-CM

## 2023-06-20 PROCEDURE — 99213 OFFICE O/P EST LOW 20 MIN: CPT | Performed by: NURSE PRACTITIONER

## 2023-06-20 RX ORDER — PREDNISONE 20 MG/1
20 TABLET ORAL DAILY
Qty: 5 TABLET | Refills: 0 | Status: SHIPPED | OUTPATIENT
Start: 2023-06-20 | End: 2023-06-26

## 2023-06-20 ASSESSMENT — ANXIETY QUESTIONNAIRES
6. BECOMING EASILY ANNOYED OR IRRITABLE: SEVERAL DAYS
5. BEING SO RESTLESS THAT IT IS HARD TO SIT STILL: NOT AT ALL
8. IF YOU CHECKED OFF ANY PROBLEMS, HOW DIFFICULT HAVE THESE MADE IT FOR YOU TO DO YOUR WORK, TAKE CARE OF THINGS AT HOME, OR GET ALONG WITH OTHER PEOPLE?: SOMEWHAT DIFFICULT
IF YOU CHECKED OFF ANY PROBLEMS ON THIS QUESTIONNAIRE, HOW DIFFICULT HAVE THESE PROBLEMS MADE IT FOR YOU TO DO YOUR WORK, TAKE CARE OF THINGS AT HOME, OR GET ALONG WITH OTHER PEOPLE: SOMEWHAT DIFFICULT
GAD7 TOTAL SCORE: 4
7. FEELING AFRAID AS IF SOMETHING AWFUL MIGHT HAPPEN: SEVERAL DAYS
3. WORRYING TOO MUCH ABOUT DIFFERENT THINGS: SEVERAL DAYS
1. FEELING NERVOUS, ANXIOUS, OR ON EDGE: NOT AT ALL
GAD7 TOTAL SCORE: 4
2. NOT BEING ABLE TO STOP OR CONTROL WORRYING: SEVERAL DAYS
7. FEELING AFRAID AS IF SOMETHING AWFUL MIGHT HAPPEN: SEVERAL DAYS
GAD7 TOTAL SCORE: 4
4. TROUBLE RELAXING: NOT AT ALL

## 2023-06-20 ASSESSMENT — PATIENT HEALTH QUESTIONNAIRE - PHQ9
SUM OF ALL RESPONSES TO PHQ QUESTIONS 1-9: 3
SUM OF ALL RESPONSES TO PHQ QUESTIONS 1-9: 3
10. IF YOU CHECKED OFF ANY PROBLEMS, HOW DIFFICULT HAVE THESE PROBLEMS MADE IT FOR YOU TO DO YOUR WORK, TAKE CARE OF THINGS AT HOME, OR GET ALONG WITH OTHER PEOPLE: NOT DIFFICULT AT ALL

## 2023-06-20 NOTE — PATIENT INSTRUCTIONS
Switch body soap to something gentle like Dove.   Switch detergent to a scent/dye free, like Tide Free and Gentle.    Start Zyrtec 10 mg once daily, can buy over the counter.    Start prednisone 20 mg once daily with food for 5 days.   Can continue Benadryl as needed.     If things rapidly progress, such as high fever, rash spreading, pain, weakness then please go to the ER.

## 2023-06-20 NOTE — PROGRESS NOTES
"  Assessment & Plan     Rash  I think this is a rash vs infectious process.  She did not improve with Bactrim.  Rash now present on bilateral arms, back and upper chest.  Rash itches, not painful.   Rash NOT present on breasts and incisions look good today, well healed and no signs of infection.  No temp today.  Normal CBC on 6/9/23.    Asked her to start Zyrtec daily.  Can continue Benadryl as needed.    Stop Dial soap, switch to something for sensitive skin like Dove.  Switch detergent to something gentle like Tide Free.  Start low dose prednisone, 20 mg daily x 5 days.   If not improving, would like her to see dermatology.   Discussed symptoms that would warrant a more urgent evaluation, patient reports understanding.     - predniSONE (DELTASONE) 20 MG tablet; Take 1 tablet (20 mg) by mouth daily for 5 days     BMI:   Estimated body mass index is 25.46 kg/m  as calculated from the following:    Height as of this encounter: 1.651 m (5' 5\").    Weight as of this encounter: 69.4 kg (153 lb).       Mildred Perez [facility]   Azalea is a 43 year old, presenting for the following health issues:  Derm Problem        6/20/2023     6:59 AM   Additional Questions   Roomed by vasyl   Accompanied by self         Patient is here for skin concern.   Hx of mastectomy in April 2023.   On 6/9 she presented to her surgeon for planned fill of her expanders. She was reporting erythema on her chest at that time.  Looked like sunburn, but wasn't sunburn. Fill was deferred at that time.  Was placed on Bactrim x 10 days, to cover possible infection.  Incisions looked okay.  Labs okay, normal WBC at that time.  Has finished Bactrim.  Chest didn't really change in appearance with Bactrim. Over this past weekend, developed redness on both arms.   Itches.  Stings, but not painful. Has been a little more tired.  Temp of 99.3.  Temp today normal.   Prior to any of the rashes, only thing new was " "using Aquaphor on her incisions.  Was applying twice daily for about a month.  Switched to vaseline yesterday.  Has also been using Dial antibacterial soap since surgery.  No new detergents, perfumes or skin products.  Denies any insect bites or sun exposure.    No history of radiation to chest.  Not on chemo, but will be starting next week.         Review of Systems   Constitutional, HEENT, cardiovascular, pulmonary, gi and gu systems are negative, except as otherwise noted.      Objective    /74   Pulse 92   Temp 98.1  F (36.7  C)   Ht 1.651 m (5' 5\")   Wt 69.4 kg (153 lb)   LMP 06/01/2023   SpO2 99%   BMI 25.46 kg/m    Body mass index is 25.46 kg/m .  Physical Exam   GENERAL: healthy, alert and no distress  EYES: Eyes grossly normal to inspection, PERRL and conjunctivae and sclerae normal  NECK: no adenopathy, no asymmetry, masses, or scars and thyroid normal to palpation  RESP: breathing unlabored  SKIN: erythematous rash, to bilateral arms, back and upper chest- fine macules becoming confluent in many areas.  Breasts are sparred and incisions appear well healed.    PSYCH: mentation appears normal, affect normal/bright    Recent Labs   Lab Test 06/09/23  1418 04/14/21  0833    137   POTASSIUM 4.0 3.9   CHLORIDE 102 105   CO2 24 29   ANIONGAP 12 3   GLC 90 98   BUN 12.4 8   CR 0.80 0.78   MINNA 9.2 8.8     CBC RESULTS: Recent Labs   Lab Test 06/09/23  1418   WBC 7.4   RBC 4.46   HGB 13.4   HCT 39.4   MCV 88   MCH 30.0   MCHC 34.0   RDW 11.9                  "

## 2023-06-20 NOTE — PROGRESS NOTES
Received faxed request from [facility] for Genetic Testing Prior Authorization Form for Oncotype DX Breast Recurrence Score through [employer].  Form returned with 3/29/2023 office visit notes outlining how the test would be used to guide patient's care.  Transmission confirmed.     Sofi Martinez RN

## 2023-06-21 ENCOUNTER — ALLIED HEALTH/NURSE VISIT (OUTPATIENT)
Dept: SURGERY | Facility: CLINIC | Age: 43
End: 2023-06-21
Payer: COMMERCIAL

## 2023-06-21 ENCOUNTER — PATIENT OUTREACH (OUTPATIENT)
Dept: ONCOLOGY | Facility: CLINIC | Age: 43
End: 2023-06-21

## 2023-06-21 DIAGNOSIS — Z17.0 MALIGNANT NEOPLASM OF OVERLAPPING SITES OF RIGHT BREAST IN FEMALE, ESTROGEN RECEPTOR POSITIVE (H): Primary | ICD-10-CM

## 2023-06-21 DIAGNOSIS — Z98.890 S/P BREAST RECONSTRUCTION, BILATERAL: Primary | ICD-10-CM

## 2023-06-21 DIAGNOSIS — C50.811 MALIGNANT NEOPLASM OF OVERLAPPING SITES OF RIGHT BREAST IN FEMALE, ESTROGEN RECEPTOR POSITIVE (H): Primary | ICD-10-CM

## 2023-06-21 PROCEDURE — 99207 PR NO CHARGE NURSE ONLY: CPT | Performed by: DERMATOLOGY

## 2023-06-21 RX ORDER — LIDOCAINE/PRILOCAINE 2.5 %-2.5%
CREAM (GRAM) TOPICAL PRN
Qty: 30 G | Refills: 2 | Status: SHIPPED | OUTPATIENT
Start: 2023-06-21 | End: 2023-12-12

## 2023-06-21 NOTE — PROGRESS NOTES
[facility]: Cancer Care Plan of Care Education Note                                    Discussion with Patient:                                                      Chemotherapy teaching     Antiemetics: take home medications to use for symptoms  Steroid use/side effect: reviewed dexamethasone dosing instructions  Port concerns: reviewed port demonstrator in clinic, will send Rx for EMLA cream    Assessment:                                                      Assessment completed with:: Patient;Spouse or significant other    Plan of Care Education   Diagnosis:: Malignant neoplasm of overlapping sites of right breast in female, estrogen receptor positive  Does patient understand diagnosis?: Yes  Tx plan/regimen:: Taxotere Cytoxan x4 cycles  Does patient understand treatment plan/regimen?: Yes  Preparing for treatment:: Reviewed treatment preparation information with patient (vascular access, day of chemo, visitor policy, what to bring, etc.)  Vascular access:: Port  Side effect education:: Diarrhea/Constipation;Fatigue;Hair loss;Infection;Lab value monitoring (anemia, neutropenia, thrombocytopenia);Mouth sores;Mylosuppression;Nausea/Vomiting;Neuropathy;Urinary;Skin changes  Supportive services:: Cancer rehab;Nutrition;Oncology behavioral health;Social work  Transportation means:: Regular car  Safety/self care at home reviewed with patient:: Yes  Coping - concerns/fears reviewed with patient:: Yes  Plan of Care:: Lab appointment;MD follow-up appointment;Treatment schedule  When to call provider:: Bleeding;Increased shortness of breath;New/worsening pain;Shaking chills;Temperature >100.4F;Uncontrolled diarrhea/constipation;Uncontrolled nausea/vomiting  Reasons for deferring treatment reviewed with patient:: Yes    Evaluation of Learning  Patient Education Provided: Yes  Readiness:: Acceptance  Method:: Literature;Explanation  Response:: Verbalizes understanding    Intervention/Education provided during  outreach:                                                       Reviewed chemotherapy literature with patient and spouse, reviewed port demonstrator and how to use EMLA cream.  Provided cyclophosphamide and docetaxel information from [facility].  Spouse inquired about a concert planned for next month.  Advised that large crowds are discouraged if blood counts are low.  Recommended that patient monitor symptoms, see how she is feeling at the time.  If febrile, would not attend.  Also recommended to wear a mask if attending, N95 is best.  Advised also to discuss with Dr. Dyson and/or MOLINA Molina, CNP at pre-treatment appointment.  Patient/spouse verbalize understanding.     Patient to follow up as scheduled at next appt  Patient to call/[employer] message with updates  Confirmed patient has clinic and triage numbers      Sofi Martinez RN

## 2023-06-21 NOTE — NURSING NOTE
Azalea Mckeon comes into clinic today at the request of Dr. Ferreira Ordering Provider for Tissue Expansion .    Pt presented to the clinic today for a bilateral breast fill. Pt reports that she saw her PCP for a rash on her neck area and abdomen last week. Pt states that she was advised to stop dial soap and switch detergents. Per pt her pcp also started her on low dose prednisone. Rash seems to be a little better per pt and pt denies any additional symptoms along with the rash. No rash noted on bilateral breasts or around or on incisions. RN asked pt if the rash was new or if it was present when pt saw . Pt states that it was there when she saw Dr. Ferreira a week ago and none of her symptoms are new  Per  RN to remove air from bilateral expander(s) and fill bilateral breast expander(s). Pt stated that Dr. Ferreira thought at pt's last visit with him  that since it has been sometime since her surgery the air may be all gone from the expanders. RN  removed 50cc of air from right breast and none from the left. 200cc of NS filled into right breast expander and 185cc of NS filled into left breast expander. Total fill volume for right breast expander is 200cc. Total fill volume for left breast expander 180cc. Bilateral expanders feel full to the point where additional fluid injected does not seem appropriate. Due to this RN advised pt follow up with Dr. Ferreira next week and if he advises that additional fluid be added RN will be present in clinic to do so. Bacitracin and a bandaid applied to the injection sites. Pt tolerated the breast fill with some minor discomfort.  Pt advised to monitor the injection sites for any increased redness, drainage, pain or swelling and call the clinic back if any of these issues develop.  Plastics direct department number provided on AVS.  Pt to follow up in one week with     This service provided today was under the supervising provider of the day   Maryann,who was available if needed.    Viji Llamas RN

## 2023-06-21 NOTE — PATIENT INSTRUCTIONS
It was great seeing you today for your first Tissue Expansion visit. Please review the information below. If you have any additional questions please do not hesitate to call Viji RN or Comfort RN at [phone]        What to expect with Tissue Expansion     *You may experience minor discomfort for the following 12 to 24 hours      after each tissue expansion. This discomfort usually subsides 2 to 3 days      after each tissue expansion.    *The tissue expander may shift after the 1st or 2nd expansion.    *You may experience more discomfort on one side than the other if you      have bilateral tissue expanders placed.    * Your posture may change causing you to have some upper and/or mid      back pain as you're expanded.    * Your shoulder range of motion may become stiff requiring you to continue to     perform the shoulder exercises during the tissue expansion process to     prevent shoulder stiffness and a frozen shoulder.    * You may find it difficult to sleep because you feel tight across the chest      and shoulders.    * You may find it difficult to fit into certain types of tight fitting clothing.      You may need to wear oversized shirts until the final exchange of the      tissue expander (s).    * You may feel chest persist tightness or heaviness secondary to being     expanded. This usually subsides with time.    * The tissue expanders will remain in place for a minimum of 8 weeks after     completing the last tissue expansion. This will allow for the soft tissues     (i.e. skin and muscle) to heal and recover from being stretched before the     second surgery takes place for the exchange of the tissue expander.     *You'll see your surgeon when we think you have achieve your desired     breast size to determine if you need additional expansions and for     surgical planning.    How can I treat discomfort?   * Ibuprofen (or other NSAIDs) 600 mg by mouth every 6 to 8 hours with     Food  starting the morning of each tissue expansion and continue to take     around the clock for 3 to 5 days as needed for discomfort. You can start     this 2 weeks after surgery. If you cannot take Ibuprofen okay to take Tylenol as     directed   * Cool compresses wrapped in a towel  can be applied for 20 min every 2 hours if      Needed    How well the desired breast size is determined by a few things:      *On an individual basis.    *There is no scientific way to determine the exact breast size. It will be     determined by a number of different factors i.e.         -How well you tolerate each tissue expansion.         -How well your skin reacts to the expansions.         -The size of the other breast (if a unilateral tissue expander).         -Previous surgeries or amount of scarring         -We recommend trying on a desired bra size as you approach your desired      breast size to see how well the bra size fits.      When to Call:  *If you have increasing swelling or bruising.  *If swelling and redness persist after a few days.  *If you have increased redness along the incision.  *If you have severe or increased pain not relieved by medication.  *If you have any side effects to medications; such as, rash, nauseas,      headache, vomiting, etc.  *f you have an oral temperature of 100.5 degrees or higher.  *If you have any yellow or greenish drainage from the incisions or notice a      foul smell.  *If you have bleeding from the incisions that is difficult to control with      light pressure.

## 2023-06-22 NOTE — PROGRESS NOTES
Patient returned call, informed that Dr. Dyson had no concerns regarding prednisone and zyrtec.  Discussed that antihistamines can be helpful for bone pain related to Neulasta that she would be getting - Claritin is usually what is recommended, but Zyrtec could also be helpful being it is also an antihistamine.      Patient is premenopausal, had a tubal ligation previously.  Discussed that treatment may cause irregular menses or amenorrhea.  Also advised if any sexual concerns develop to discuss with provider.     Patient reports that her first tissue expansion went well, she is a bit sore today.  She will be following up with plastics.      Sofi Martinez RN

## 2023-06-22 NOTE — PROGRESS NOTES
Discussed current prednisone end date and zyrtec use with Dr. Dyson, provider had no concerns.  Attempted to reach patient, requested return call.  Also wanted to discuss sexual/fertility side effects of treatment as this was missed in initial teaching.    Sofi Martinez RN

## 2023-06-26 ENCOUNTER — PATIENT OUTREACH (OUTPATIENT)
Dept: CARE COORDINATION | Facility: CLINIC | Age: 43
End: 2023-06-26

## 2023-06-26 ENCOUNTER — LAB (OUTPATIENT)
Dept: LAB | Facility: CLINIC | Age: 43
End: 2023-06-26
Payer: COMMERCIAL

## 2023-06-26 ENCOUNTER — ONCOLOGY VISIT (OUTPATIENT)
Dept: ONCOLOGY | Facility: CLINIC | Age: 43
End: 2023-06-26
Payer: COMMERCIAL

## 2023-06-26 ENCOUNTER — INFUSION THERAPY VISIT (OUTPATIENT)
Dept: INFUSION THERAPY | Facility: CLINIC | Age: 43
End: 2023-06-26
Payer: COMMERCIAL

## 2023-06-26 VITALS
HEIGHT: 65 IN | OXYGEN SATURATION: 100 % | WEIGHT: 156 LBS | SYSTOLIC BLOOD PRESSURE: 119 MMHG | HEART RATE: 70 BPM | DIASTOLIC BLOOD PRESSURE: 80 MMHG | BODY MASS INDEX: 25.99 KG/M2

## 2023-06-26 DIAGNOSIS — T45.1X5A CHEMOTHERAPY-INDUCED NEUTROPENIA (H): Primary | ICD-10-CM

## 2023-06-26 DIAGNOSIS — D70.1 CHEMOTHERAPY-INDUCED NEUTROPENIA (H): Primary | ICD-10-CM

## 2023-06-26 DIAGNOSIS — C50.811 MALIGNANT NEOPLASM OF OVERLAPPING SITES OF RIGHT BREAST IN FEMALE, ESTROGEN RECEPTOR POSITIVE (H): ICD-10-CM

## 2023-06-26 DIAGNOSIS — Z17.0 MALIGNANT NEOPLASM OF OVERLAPPING SITES OF RIGHT BREAST IN FEMALE, ESTROGEN RECEPTOR POSITIVE (H): ICD-10-CM

## 2023-06-26 DIAGNOSIS — Z71.89 COUNSELING AND COORDINATION OF CARE: Primary | ICD-10-CM

## 2023-06-26 DIAGNOSIS — C50.811 MALIGNANT NEOPLASM OF OVERLAPPING SITES OF RIGHT BREAST IN FEMALE, ESTROGEN RECEPTOR POSITIVE (H): Primary | ICD-10-CM

## 2023-06-26 DIAGNOSIS — Z17.0 MALIGNANT NEOPLASM OF OVERLAPPING SITES OF RIGHT BREAST IN FEMALE, ESTROGEN RECEPTOR POSITIVE (H): Primary | ICD-10-CM

## 2023-06-26 LAB
ALBUMIN SERPL BCG-MCNC: 4.5 G/DL (ref 3.5–5.2)
ALP SERPL-CCNC: 36 U/L (ref 35–104)
ALT SERPL W P-5'-P-CCNC: 33 U/L (ref 0–50)
ANION GAP SERPL CALCULATED.3IONS-SCNC: 11 MMOL/L (ref 7–15)
AST SERPL W P-5'-P-CCNC: 26 U/L (ref 0–45)
BASOPHILS # BLD AUTO: 0 10E3/UL (ref 0–0.2)
BASOPHILS NFR BLD AUTO: 1 %
BILIRUB SERPL-MCNC: 0.3 MG/DL
BUN SERPL-MCNC: 9.1 MG/DL (ref 6–20)
CALCIUM SERPL-MCNC: 9.3 MG/DL (ref 8.6–10)
CHLORIDE SERPL-SCNC: 103 MMOL/L (ref 98–107)
CREAT SERPL-MCNC: 0.75 MG/DL (ref 0.51–0.95)
DEPRECATED HCO3 PLAS-SCNC: 26 MMOL/L (ref 22–29)
EOSINOPHIL # BLD AUTO: 0.1 10E3/UL (ref 0–0.7)
EOSINOPHIL NFR BLD AUTO: 1 %
ERYTHROCYTE [DISTWIDTH] IN BLOOD BY AUTOMATED COUNT: 11.9 % (ref 10–15)
GFR SERPL CREATININE-BSD FRML MDRD: >90 ML/MIN/1.73M2
GLUCOSE SERPL-MCNC: 93 MG/DL (ref 70–99)
HCT VFR BLD AUTO: 40.5 % (ref 35–47)
HGB BLD-MCNC: 13.4 G/DL (ref 11.7–15.7)
IMM GRANULOCYTES # BLD: 0 10E3/UL
IMM GRANULOCYTES NFR BLD: 1 %
LYMPHOCYTES # BLD AUTO: 2.1 10E3/UL (ref 0.8–5.3)
LYMPHOCYTES NFR BLD AUTO: 32 %
MCH RBC QN AUTO: 29.5 PG (ref 26.5–33)
MCHC RBC AUTO-ENTMCNC: 33.1 G/DL (ref 31.5–36.5)
MCV RBC AUTO: 89 FL (ref 78–100)
MONOCYTES # BLD AUTO: 0.4 10E3/UL (ref 0–1.3)
MONOCYTES NFR BLD AUTO: 6 %
NEUTROPHILS # BLD AUTO: 3.8 10E3/UL (ref 1.6–8.3)
NEUTROPHILS NFR BLD AUTO: 59 %
NRBC # BLD AUTO: 0 10E3/UL
NRBC BLD AUTO-RTO: 0 /100
PLATELET # BLD AUTO: 295 10E3/UL (ref 150–450)
POTASSIUM SERPL-SCNC: 4.1 MMOL/L (ref 3.4–5.3)
PROT SERPL-MCNC: 7.4 G/DL (ref 6.4–8.3)
RBC # BLD AUTO: 4.54 10E6/UL (ref 3.8–5.2)
SODIUM SERPL-SCNC: 140 MMOL/L (ref 136–145)
WBC # BLD AUTO: 6.5 10E3/UL (ref 4–11)

## 2023-06-26 PROCEDURE — 99207 PR NO CHARGE LOS: CPT

## 2023-06-26 PROCEDURE — 99214 OFFICE O/P EST MOD 30 MIN: CPT | Mod: 25 | Performed by: INTERNAL MEDICINE

## 2023-06-26 PROCEDURE — 96377 APPLICATON ON-BODY INJECTOR: CPT | Mod: 59 | Performed by: NURSE PRACTITIONER

## 2023-06-26 PROCEDURE — 80053 COMPREHEN METABOLIC PANEL: CPT | Performed by: INTERNAL MEDICINE

## 2023-06-26 PROCEDURE — 36415 COLL VENOUS BLD VENIPUNCTURE: CPT | Performed by: INTERNAL MEDICINE

## 2023-06-26 PROCEDURE — 85025 COMPLETE CBC W/AUTO DIFF WBC: CPT | Performed by: INTERNAL MEDICINE

## 2023-06-26 PROCEDURE — 96413 CHEMO IV INFUSION 1 HR: CPT | Performed by: NURSE PRACTITIONER

## 2023-06-26 PROCEDURE — 96375 TX/PRO/DX INJ NEW DRUG ADDON: CPT | Performed by: NURSE PRACTITIONER

## 2023-06-26 PROCEDURE — 96417 CHEMO IV INFUS EACH ADDL SEQ: CPT | Performed by: NURSE PRACTITIONER

## 2023-06-26 PROCEDURE — 96367 TX/PROPH/DG ADDL SEQ IV INF: CPT | Performed by: NURSE PRACTITIONER

## 2023-06-26 RX ORDER — ONDANSETRON 2 MG/ML
8 INJECTION INTRAMUSCULAR; INTRAVENOUS ONCE
Status: COMPLETED | OUTPATIENT
Start: 2023-06-26 | End: 2023-06-26

## 2023-06-26 RX ADMIN — ONDANSETRON 8 MG: 2 INJECTION INTRAMUSCULAR; INTRAVENOUS at 10:29

## 2023-06-26 RX ADMIN — Medication 250 ML: at 10:28

## 2023-06-26 ASSESSMENT — PAIN SCALES - GENERAL: PAINLEVEL: NO PAIN (0)

## 2023-06-26 NOTE — LETTER
"    2023         RE: Azalea Mckeon  [address] [facility] [address]        Dear Colleague,    Thank you for referring your patient, Azalea Mckeon, to the [facility] [place]. Please see a copy of my visit note below.    [facility] Hematology / Oncology  Progress Note  Name: Azalea Mckeon  :  1980    MRN:  [id number]    --------------------    Assessment / Plan:  Clinically, Azalea is ready to proceed with cycle 1 of adjuvant Taxotere/Cytoxan for early-stage, hormone positive, HER2 negative breast cancer.  We spent the bulk of our time reviewing anticipatory guidance which includes management of alopecia, chemo related nausea, neutropenic fevers, chemo related fatigue, chemo related neuropathy, allergic reactions among others.  We are planning 4 cycles.  Genetics visit upcoming as well.  Full support for medical marijuana use during treatment as well.    King Dyson MD    --------------------    Interval History:  Azalea returns for follow up of breast cancer accompanied by her .  All in all, she remains well.  Port placement went fine.  No concerns today and ready to proceed.    --------------------    Physical Exam:  VS: /80 (BP Location: Right arm, Patient Position: Chair, Cuff Size: Adult Regular)   Pulse 70   Ht 1.651 m (5' 5\")   Wt 70.8 kg (156 lb)   LMP 2023   SpO2 100%   BMI 25.96 kg/m    GEN: Well appearing.    Labs / Imaging:  We reviewed CBC, CMP.      Again, thank you for allowing me to participate in the care of your patient.        Sincerely,        King Dyson MD    "

## 2023-06-26 NOTE — PROGRESS NOTES
Infusion Nursing Note:  Azalea Mckeon presents today for C1 D1 Taxotere/ Cytoxan + Onpro.    Patient seen by provider today: Yes: Dr. Dyson   present during visit today: Not Applicable.    Note: Patient new to infusion center today. Reviewed routines, chemotherapy and what to expect during her time here today. Answered all questions & both patient and  Clovis verbalized understanding.       Patient has take home meds from and reviewed with pharmacist on how to use them.       Intravenous Access:  Implanted Port.    Treatment Conditions:  Lab Results   Component Value Date    HGB 13.4 06/26/2023    WBC 6.5 06/26/2023    ANEU 7.4 03/12/2013    ANEUTAUTO 3.8 06/26/2023     06/26/2023      Lab Results   Component Value Date     06/26/2023    POTASSIUM 4.1 06/26/2023    CR 0.75 06/26/2023    MINNA 9.3 06/26/2023    BILITOTAL 0.3 06/26/2023    ALBUMIN 4.5 06/26/2023    ALT 33 06/26/2023    AST 26 06/26/2023     Results reviewed, labs MET treatment parameters, ok to proceed with treatment.      Post Infusion Assessment:  Patient tolerated infusion without incident.  Blood return noted pre and post infusion.  No evidence of extravasations.  Access discontinued per protocol.     ONPRO  Was placed on patient's: left side of abdomen.    Was placed at 1240 PM    Podpal used: Yes    ONPRO injector device Lot number: [id number]    Patient education included: what patient can expect after application, what colored lights mean on the device, when to remove device, when and where to call with questions or issues, all patients questions answered and that Neulasta administration will occur at 3:40 pm on 6/27.    Patient tolerated administration well.        Discharge Plan:   Discharge instructions reviewed with: Patient.  Patient and/or family verbalized understanding of discharge instructions and all questions answered.  Patient discharged in stable condition accompanied by: self.  Departure Mode:  Ambulatory.      Sylvie Foster RN

## 2023-06-26 NOTE — PROGRESS NOTES
"[facility] Hematology / Oncology  Progress Note  Name: Azalea Mckeon  :  1980    MRN:  [id number]    --------------------    Assessment / Plan:  Clinically, Azalea is ready to proceed with cycle 1 of adjuvant Taxotere/Cytoxan for early-stage, hormone positive, HER2 negative breast cancer.  We spent the bulk of our time reviewing anticipatory guidance which includes management of alopecia, chemo related nausea, neutropenic fevers, chemo related fatigue, chemo related neuropathy, allergic reactions among others.  We are planning 4 cycles.  Genetics visit upcoming as well.  Full support for medical marijuana use during treatment as well.    King Dyson MD    --------------------    Interval History:  Azalea returns for follow up of breast cancer accompanied by her .  All in all, she remains well.  Port placement went fine.  No concerns today and ready to proceed.    --------------------    Physical Exam:  VS: /80 (BP Location: Right arm, Patient Position: Chair, Cuff Size: Adult Regular)   Pulse 70   Ht 1.651 m (5' 5\")   Wt 70.8 kg (156 lb)   LMP 2023   SpO2 100%   BMI 25.96 kg/m    GEN: Well appearing.    Labs / Imaging:  We reviewed CBC, CMP.  "

## 2023-06-26 NOTE — NURSING NOTE
"Oncology Rooming Note    June 26, 2023 9:10 AM   Azalea Mckeon is a 43 year old female who presents for:    Chief Complaint   Patient presents with     Oncology Clinic Visit     Prior to tx     Initial Vitals: /80 (BP Location: Right arm, Patient Position: Chair, Cuff Size: Adult Regular)   Pulse 70   Ht 1.651 m (5' 5\")   Wt 70.8 kg (156 lb)   LMP 06/01/2023   SpO2 100%   BMI 25.96 kg/m   Estimated body mass index is 25.96 kg/m  as calculated from the following:    Height as of this encounter: 1.651 m (5' 5\").    Weight as of this encounter: 70.8 kg (156 lb). Body surface area is 1.8 meters squared.  No Pain (0) Comment: Data Unavailable   Patient's last menstrual period was 06/01/2023.  Allergies reviewed: Yes  Medications reviewed: Yes    Medications: Medication refills not needed today.  Pharmacy name entered into [email or web address]: [id number] [place], [place] - [address] [place] [address]    Clinical concerns: NO Dr. Dyson was notified.      Gemini Kim CMA              "

## 2023-06-26 NOTE — PROGRESS NOTES
Social Work - Psychosocial Assessment  [facility]    Assessment of living situation, support system, financial status, functional status, coping, stressors, and need for resources completed. Social work intervention provided as needed.  Patient Demographics/Appt Information:     Patient Name: Azalea Mckeon Goes By: Azalea    AMY/Age: 1980 (43 year old)    Visit Type: in person  Referral Source: n/a  Reason for Referral:  C1D1  Collaborated With:  Azalea Tristan's  Clovis      Diagnosis: Azalea Mckeon was diagnosed with RIGHT Stage IA ER+/PA+/Her2- invasive ductal carcinoma s/p bilateral mastectomy with expander placement who presented to [facility] on 23.    Additional Patient Demographics:   Address:    [address] [place] [address]  Permanent Living Situation:   Lives with spouse and children, House and No living situation identified  Transportation Mode:   Private Car and No transportation concerns  Family Constellation and Support Network:   Parents, Spouse, Friends and Support system is strong  Community services receiving at home: n/a  /Care Coordinator: n/a  Relationship Status:     Cultural and Jew Factors:   Not discussed at today's visit    Insurance/Financial/Legal Info:   Payor: UCARE / Plan: [employer] INDIVIDUAL FAMILY PLANS WITH [employer] / Product Type: HMO /   Insurance:   No Insurance issues identified  Sources of Income:   No income concerns identified  Financial Concerns: None Identified  How does the patient describe their current finances?  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 's income is supporting family.  Legal Issues:   No legal concerns identified    Patient Education/Needs/Concerns:  Education/Development Level:   No development issues identified  Cognitive Concerns: n/a  Special Needs:   n/a    Mental/Chemical  Health:   Mental Health:   No mental health issues identified    ADLs/IADLs/Mobility Info:  Dependent ADLs/Mobility:   Independent with ADLs  Dependent IADLs:   Independent with IADLs    Advanced Care Planning:   Decision Making:   Self  Health Care Documents:   Not discussed at today's visit   Other Legal documents:   n/a    Interventions:    - Provided brief psychosocial assessment of patient/family coping, resource needs and current supports.  - Provided supportive listening and counseling as needed.  - Facilitated service linkage with clinic and community resources as needed: [facility]ccorm[facility], [employer], [employer], [facility]. Submitted request for [employer] for 3 children (13, 12, 10 y/o)  - Collaborated with healthcare team and professionals in community to meet patient/family needs as appropriate.  - Addressed all of patient/family s concerns at this time.    Assessment/Plan:    Strengths:   Has good family support, Has stable finances with savings, Has housing stability, Goal-oriented outlook/preparing for the future and No safety concerns     Concerns/Resource needs/Goals: Resources provided. SW contact provided. No additional needs indicated at this time. SW will remain available as needed and appropriate.     Provided patient/family with contact information and availability.      Kristina Zavala, MSW, LICSW, OSW-C (she/her)  Clinical , Adult Oncology  Phone: [phone]    [place] (EDWARDO, W, F)  Polly (Thu)

## 2023-06-29 ENCOUNTER — PATIENT OUTREACH (OUTPATIENT)
Dept: ONCOLOGY | Facility: CLINIC | Age: 43
End: 2023-06-29
Payer: COMMERCIAL

## 2023-06-29 NOTE — PROGRESS NOTES
[facility]: Cancer Care Long Assessment    Discussion with Patient:                                                      Post initial treatment follow up    Antiemetics: patient using one zofran in the morning for nausea, seems to be control symptoms all day.                              Dates of Treament:                                                      Infusion given in last 28 days     Administered MAR Action Medication Dose Rate Visit    06/26/2023 11:12 New Bag DOCEtaxel (TAXOTERE) 134 mg in sodium chloride 0.9% in non-PVC container 281.7 mL infusion 134 mg 281.7 mL/hr Infusion Therapy Visit on 06/26/2023 in [facility]    06/26/2023 12:20 New Bag cyclophosphamide (CYTOXAN) 1,000 mg in sodium chloride 0.9 % 325 mL infusion 1,000 mg 650 mL/hr Infusion Therapy Visit on 06/26/2023 in [facility]          Assessment:                                                      Assessment completed with:: Patient    Constitutional  Fatigue: Fatigue relieved by rest  Fever: Absent or within normal limits    Respiratory  Cough: Absent or within normal limits  Dyspnea: Absent or within normal limits    Gastrointestinal  Anorexia: Absent or within normal limits  Nausea: Loss of appetite without alteration in eating habits  Vomiting: Absent or within normal limits  Dehydration: Absent or within normal limits  Mucositis Oral: Asymptomatic or mild symptoms OR intervention not indicated  Constipation: Occasional or intermittent symptoms OR occasional use of stool softeners, laxatives, dietary modification, or enema  Diarrhea: Absent or within normal limits (diarrhea initially, but resolved)    Genitourinary  Patient Reported Genitourinary Symptoms?: No    Integumentary  Rash Maculo-Papular: Absent or within normal limits    Pain  Patient Reported Pain?: Yes, but is new or different pain  Pain Score: Moderate Pain (5)  Pain Loc: Neck    Patient  Coping  Accepting;Open/discussion    Clinic Utilization  Patient Aware of Next Appointment?: Yes    Other Patient Concerns  Other Patient Reported Concerns: No    Intervention/Education provided during outreach:                                                       Post-treatment call as noted above.  Patient does report diarrhea initially that resolved and now has not had a bowel movement in the last day.   Advised if constipation persists to try stool softeners that she has available following surgery.  If not effective advised to try a dose of Miralax.  Also encouraged pushing fluids and activity to help move bowels.  If still no results to contact clinic.  Patient verbalizes understanding and agrees.      Patient to follow up as scheduled at next appt  Patient to call/[employer] message with updates  Confirmed patient has clinic and triage numbers           Sofi Martinez RN

## 2023-06-29 NOTE — PROGRESS NOTES
Attempted to reach patient for post-treatment follow up, message left requesting return call.       Sofi Martinez RN

## 2023-06-30 ENCOUNTER — OFFICE VISIT (OUTPATIENT)
Dept: SURGERY | Facility: CLINIC | Age: 43
End: 2023-06-30
Payer: COMMERCIAL

## 2023-06-30 DIAGNOSIS — Z98.890 S/P BREAST RECONSTRUCTION, BILATERAL: Primary | ICD-10-CM

## 2023-06-30 PROCEDURE — 99024 POSTOP FOLLOW-UP VISIT: CPT | Performed by: PLASTIC SURGERY

## 2023-06-30 NOTE — LETTER
6/30/2023         RE: Azalea Mckeon  [address] [facility] [address]        Dear Colleague,    Thank you for referring your patient, Azalea Mckeon, to the [facility]. Please see a copy of my visit note below.    PRESENTING COMPLAINT:  Postoperative visit status post bilateral breast reconstruction for right-sided breast cancer after undergoing bilateral nipple non-sparing mastectomy on 04/24/2023.    HISTORY OF PRESENTING COMPLAINT:  Ms. Mckeon is 43 years old, here for regular postoperative visit.  Overall, doing well.  Her rash has improved.  She has been getting fills.  She will require radiation therapy, but is undergoing chemotherapy currently.    PHYSICAL EXAMINATION:  Vital signs stable.  She is afebrile, in no obvious distress.  The breasts are healed.    ASSESSMENT AND PLAN:  Based on the above findings, a diagnosis of bilateral breast reconstruction for breast cancer was made.  Plan now is to continue to expand to the size the patient wants, get radiation and see me back thereafter.  All her questions were answered.  She was happy with the visit.  All exam and discussion done in presence of my resident, Cleo Anderson.        Again, thank you for allowing me to participate in the care of your patient.        Sincerely,        EDWARDO Ferreira MD

## 2023-06-30 NOTE — NURSING NOTE
Azalea Mckeon's chief complaint for this visit includes:  Chief Complaint   Patient presents with     RECHECK     Assess post breast fill     PCP: No Ref-Primary, Physician    Referring Provider:  No referring provider defined for this encounter.    [facility] 06/01/2023   Data Unavailable        Allergies   Allergen Reactions     Percocet [Oxycodone-Acetaminophen] Itching     Latex Rash         Do you need any medication refills at today's visit? [facility] Visit Facilitator- Surgical Specialties

## 2023-06-30 NOTE — PROGRESS NOTES
PRESENTING COMPLAINT:  Postoperative visit status post bilateral breast reconstruction for right-sided breast cancer after undergoing bilateral nipple non-sparing mastectomy on 04/24/2023.    HISTORY OF PRESENTING COMPLAINT:  Ms. Mckeon is 43 years old, here for regular postoperative visit.  Overall, doing well.  Her rash has improved.  She has been getting fills.  She will require radiation therapy, but is undergoing chemotherapy currently.    PHYSICAL EXAMINATION:  Vital signs stable.  She is afebrile, in no obvious distress.  The breasts are healed.    ASSESSMENT AND PLAN:  Based on the above findings, a diagnosis of bilateral breast reconstruction for breast cancer was made.  Plan now is to continue to expand to the size the patient wants, get radiation and see me back thereafter.  All her questions were answered.  She was happy with the visit.  All exam and discussion done in presence of my resident, Cleo Anderson.

## 2023-07-01 ENCOUNTER — NURSE TRIAGE (OUTPATIENT)
Dept: NURSING | Facility: CLINIC | Age: 43
End: 2023-07-01
Payer: COMMERCIAL

## 2023-07-01 NOTE — TELEPHONE ENCOUNTER
Nurse Triage SBAR    Is this a 2nd Level Triage? YES, LICENSED PRACTITIONER REVIEW IS REQUIRED    Situation: Bone pain    Background: Patient had chemotherapy on 6/26/23 along with neulasta injection and is having severe bone pain today. Patient is rating her pain 8/10. 1000 mg tylenol has been ineffective for relief. Patient has tried THC, this was also ineffective. Patient has also taken her zyrtec.     Assessment: Bone Pain    Protocol Recommended Disposition:   See HCP Within 4 Hours (Or PCP Triage)    Recommendation: Patient to take the dilaudid she has on hand from a previous prescription. Dr Dyson will send in another prescription to pharmacy. Remind patient to take her zyrtec. Inform patient that neulasta dosage will be decreased for future injection. Patient verbalized understanding of care advice.    Isaura Joy RN on 7/1/2023 at 7:00 PM         Paged to provider Dr. Dyson @ [phone]      Does the patient meet one of the following criteria for ADS visit consideration? No    Provider Recommendation Follow Up:   Reached patient/caregiver. Informed of provider's recommendations. Patient verbalized understanding and agrees with the plan.         Reason for Disposition    [1] SEVERE pain (e.g., excruciating, unable to do any normal activities) AND [2] not improved 2 hours after pain medicine    Additional Information    Negative: Difficult to awaken or acting confused (e.g., disoriented, slurred speech)    Negative: Sounds like a life-threatening emergency to the triager    Negative: Dark (cola colored) or red-colored urine    Negative: [1] Drinking very little AND [2] dehydration suspected (e.g., no urine > 12 hours, very dry mouth, very lightheaded)    Negative: Patient sounds very sick or weak to the triager    Protocols used: MUSCLE ACHES AND BODY PAIN-A-AH

## 2023-07-02 DIAGNOSIS — Z17.0 MALIGNANT NEOPLASM OF OVERLAPPING SITES OF RIGHT BREAST IN FEMALE, ESTROGEN RECEPTOR POSITIVE (H): ICD-10-CM

## 2023-07-02 DIAGNOSIS — C50.811 MALIGNANT NEOPLASM OF OVERLAPPING SITES OF RIGHT BREAST IN FEMALE, ESTROGEN RECEPTOR POSITIVE (H): ICD-10-CM

## 2023-07-02 RX ORDER — HYDROMORPHONE HYDROCHLORIDE 2 MG/1
2 TABLET ORAL EVERY 8 HOURS PRN
Qty: 30 TABLET | Refills: 0 | Status: SHIPPED | OUTPATIENT
Start: 2023-07-02 | End: 2023-12-12

## 2023-07-02 NOTE — TELEPHONE ENCOUNTER
Patient calling back again because the pharmacy did not receive a new prescription. After reviewing the chart, it does not look like anything was sent in. FNA will page to on-call provider again.     Spoke to Dr Dyson who sent in the prescription while on the phone with FNA.     Called patient back and let her know this was sent in. No additional questions.     Justin Weston RN on 7/2/2023 at 11:23 AM

## 2023-07-05 NOTE — PROGRESS NOTES
7/6/2023    Virtual Visit Details  Type of service:  Video Visit   Originating Location (pt. Location): Home  Distant Location (provider location):  Off-site  Platform used for Video Visit: Scout     Referring Provider: Mitra Souza MD    Presenting Information:   I met with Azalea Mckeon today for her video genetic counseling visit through the Cancer Risk Management Program to discuss her personal history of breast cancer and family history of breast cancer. She is here today to review this history, cancer screening recommendations, and available genetic testing options.    Personal History:  Azalea is a 43 year old female. She was recently diagnosed with breast cancer. On 2/3/2023, a mammogram found possible calcifications in the upper right breast. A diagnostic mammogram and ultrasound on 3/2/2023 showed fine pleomorphic calcifications. On 3/17/2023, a biopsy revealed invasive ductal carcinoma (ER/MD +, HER2 -) with associated DCIS. Azalea underwent a bilateral mastectomy on 4/24/2023. She is currently undergoing chemotherapy.     Azalea already had some genetic testing ordered by Dr. Souza via the Breast Actionable Panel offered by the Molecular Diagnostics Laboratory at [facility]. Azalea is negative for mutations in the EDWARD, BARD1, BRCA1, BRCA2, CDH1, CHEK2, NF1, PALB2, PTEN, STK11, and TP53 genes by sequencing and deletion/duplication analysis. No mutations were found in any of the 11 genes analyzed. We reviewed her negative results in detail today.     In her hormonal-based medical history, she had her first menstrual period at age 13, her first child at age 28, and is  premenopausal. Due to an ovarian cyst, Azalea reports that she underwent a right oophorectomy in 1996. Azalea still has her left ovary, fallopian tubes and uterus in place, and reports no ovarian cancer screening to date. She reports no history of hormone replacement therapy. Azalea reports oral contraceptive use for approximately 5  years. She has not had a colonoscopy. She denies any history of dermatological exams. She does not regularly do any other cancer screening at this time. Azalea reported a history of smoking for approximately 10 years and no current alcohol use.    Family History: (Please see scanned pedigree for detailed family history information)    Maternal great aunt (Azalae's maternal grandmother's sister) was diagnosed with breast cancer in her mid 60's.    Her two daughters (Azalea's first cousins once removed) were diagnosed with breast cancer in their late 40's. They both underwent bilateral mastectomies. It was reported that they had positive genetic testing, but it is unclear which gene they tested positive for. They are currently in their 50's/60's.     Of note, there is no reported history of cancer on her paternal side of her family.     Her maternal ethnicity is [place]. Her paternal ethnicity is [place].  There is no known Ashkenazi Congregation ancestry on either side of her family. There is no reported consanguinity.    Discussion:    Azalea's personal history of breast cancer and family history of breast cancer is suggestive of a hereditary cancer syndrome.    We reviewed the features of sporadic, familial, and hereditary cancers. We discussed the natural history and genetics of several hereditary cancer syndromes, including Hereditary Breast and Ovarian Cancer Syndrome (HBOC).     The most common cause of hereditary breast and ovarian cancer is HBOC, which is caused by mutations in the BRCA1 and BRCA2 genes. Individuals with HBOC syndrome are at increased risk for several different cancers, including breast, ovarian, male breast, prostate, melanoma, and pancreatic cancer. HBOC typically presents with multiple family members diagnosed with breast cancer before age 50 and/or ovarian cancer.     A detailed handout regarding information about HBOC and related hereditary cancer syndromes will be provided to Azalea via [employer]  and can be found in the after visit summary. Topics included: inheritance pattern, cancer risks, cancer screening recommendations, and also risks, benefits and limitations of testing.    In looking at Azalea's personal and family history, it is possible that a cancer susceptibility gene is present due to her personal history of breast cancer at age 43.    Based on her personal and family history, Azalea meets current National Comprehensive Cancer Network (NCCN) criteria for genetic testing of high-penetrance breast cancer susceptibility genes (including BRCA1, BRCA2, CDH1, PALB2, PTEN, and TP53).     We reviewed Azalea's negative genetic testing results for the Breast Actionable panel ordered through [facility].     She was negative for mutations in the EDWARD, BARD1, BRCA1, BRCA2, CDH1, CHEK2, NF1, PALB2, PTEN, STK11, and TP53 genes by sequencing and deletion/duplication analysis. No mutations were found in any of the 11 genes analyzed.     We discussed that Azalea did not pass on an identifiable mutation in these genes to her children based on this test result.  Mutations in these genes do not skip generations.      Azalea's previous testing was a more limited breast cancer panel. There are several other breast cancer susceptibility genes that weren't included in her testing. It is possible that a mutation could be identified in Azalea in one of these other breast cancer associated genes. Therefore, it would be appropriate for Azalea to consider more comprehensive genetic testing related to genes associated with breast cancer to better understand her risk for hereditary cancer.    We reviewed genetic testing options for Azalea based on her personal and family history: a panel of genes associated with an increased risk for hereditary breast and gynecologic cancers, or larger panel options to include genes associated with increased risk for multiple different cancer types. Azalea  expressed interest in the Hereditary Cancer Comprehensive 40-gene panel through [facility].     Genetic testing is available for 40 genes associated with hereditary cancer: APC, EDWARD, AXIN2, BAP1, BARD1, BMPR1A, BRCA1, BRCA2, BRIP1, CDH1, CDK4, CDKN2A, CHEK2, DICER1, EPCAM, GREM1, HOXB13, MITF, MLH1, MRE11, MSH2, MSH3, MSH6, MUTYH, NBN, NF1, NTHL1, PALB2, PMS2, POLD1, POLE, POT1, PTEN, RAD50, RAD51C, RAD51D, SMAD4, SMARCA4, STK11, and TP53).    We discussed that many of the genes in the panel are associated with specific hereditary cancer syndromes and published management guidelines: Hereditary Breast and Ovarian Cancer syndrome (BRCA1, BRCA2), Albarran syndrome (MLH1, MSH2, MSH6, PMS2, EPCAM), Familial Adenomatous Polyposis (APC), Hereditary Diffuse Gastric Cancer (CDH1), Familial Atypical Multiple Mole Melanoma syndrome (CDK4, CDKN2A), Juvenile Polyposis syndrome (BMPR1A, SMAD4), Cowden syndrome (PTEN), Li Fraumeni syndrome (TP53), Peutz-Jeghers syndrome (STK11), MUTYH Associated Polyposis (MUTYH), and Neurofibromatosis type 1 (NF1).     The EDWARD, AXIN2, BRIP1, CHEK2, GREM1, MSH3, NBN, NTHL1, PALB2, POLD1, POLE, RAD51C, and RAD51D genes are associated with increased cancer risk and have published management guidelines for certain cancers.      The remaining genes (BAP1, BARD1, DICER1, HOXB13, MITF, MRE11, POT1, RAD50, and SMARCA4) are associated with increased cancer risk and may allow us to make medical recommendations when mutations are identified.      Azalea's blood has already been drawn for testing and is currently at the lab.    Verbal consent was given over video and written on the consent form. Turnaround time is approximately 4 weeks.    Medical Management: For Azalea, we reviewed that the information from genetic testing may determine:    additional cancer screening for which Azalea may qualify (i.e. more frequent colonoscopies, more frequent dermatologic exams,  etc.),    options for risk reducing surgeries Azalea could consider (i.e. surgery to remove her left ovary and/or uterus, etc.),      and targeted chemotherapies for Azalea's  active cancer, or if she were to develop certain cancers in the future (i.e. immunotherapy for individuals with Albarran syndrome, PARP inhibitors, etc.).     These recommendations and possible targeted chemotherapies will be discussed in detail once genetic testing is completed.     Plan:  1) Today Azalea elected to proceed with additional genetic testing via the Hereditary Cancer Comprehensive 40-gene panel through [facility]. Therefore, consent was reviewed and verbally obtained for this testing.  2) Her blood has already been drawn and is currently at the lab.    3) The results should be available in 4 weeks.  4) Azalea will either have a telephone visit or video visit to discuss the results.  Additional recommendations about screening will be made at that time.    Azalea is 43 year old and is being evaluated via a billable video visit.    Time spent on video: 30 minutes    Rain Lou MS, [facility]  Licensed, Certified Genetic Counselor  Phone: [phone]

## 2023-07-06 ENCOUNTER — VIRTUAL VISIT (OUTPATIENT)
Dept: ONCOLOGY | Facility: CLINIC | Age: 43
End: 2023-07-06
Attending: SURGERY
Payer: COMMERCIAL

## 2023-07-06 DIAGNOSIS — Z17.0 MALIGNANT NEOPLASM OF OVERLAPPING SITES OF RIGHT BREAST IN FEMALE, ESTROGEN RECEPTOR POSITIVE (H): Primary | ICD-10-CM

## 2023-07-06 DIAGNOSIS — C50.811 MALIGNANT NEOPLASM OF OVERLAPPING SITES OF RIGHT BREAST IN FEMALE, ESTROGEN RECEPTOR POSITIVE (H): Primary | ICD-10-CM

## 2023-07-06 DIAGNOSIS — Z80.3 FAMILY HISTORY OF MALIGNANT NEOPLASM OF BREAST: ICD-10-CM

## 2023-07-06 PROCEDURE — 96040 HC GENETIC COUNSELING, EACH 30 MINUTES: CPT | Mod: GT,95

## 2023-07-06 NOTE — LETTER
Cancer Risk Management  Program Locations    [facility]  [facility]  Mailing Address  Cancer Risk Management Program  [address]  [place] [address]    New patient appointments  [phone]    July 6, 2023    Azalea Mckeon  [address] [address] [facility] [place] [address]    Dear Azalea,    It was a pleasure talking with you via your virtual genetic counseling visit on 7/6/2023.  Below is a copy of the progress note from that recent visit through the Cancer Risk Management Program.  If you have any additional questions, please feel free to contact me.  Referring Provider: Mitra Souza MD    Presenting Information:   I met with Azalea Mckeon today for her video genetic counseling visit through the Cancer Risk Management Program to discuss her personal history of breast cancer and family history of breast cancer. She is here today to review this history, cancer screening recommendations, and available genetic testing options.    Personal History:  Azalea is a 43 year old female. She was recently diagnosed with breast cancer. On 2/3/2023, a mammogram found possible calcifications in the upper right breast. A diagnostic mammogram and ultrasound on 3/2/2023 showed fine pleomorphic calcifications. On 3/17/2023, a biopsy revealed invasive ductal carcinoma (ER/RI +, HER2 -) with associated DCIS. Azalea underwent a bilateral mastectomy on 4/24/2023. She is currently undergoing chemotherapy.     Azalea already had some genetic testing ordered by Dr. Souza via the Breast Actionable Panel offered by the Molecular Diagnostics Laboratory at [facility]. Azalea is negative for mutations in the EDWARD, BARD1, BRCA1, BRCA2, CDH1, CHEK2, NF1, PALB2, PTEN, STK11, and TP53 genes by sequencing and deletion/duplication analysis. No  mutations were found in any of the 11 genes analyzed. We reviewed her negative results in detail today.     In her hormonal-based medical history, she had her first menstrual period at age 13, her first child at age 28, and is  premenopausal. Due to an ovarian cyst, Azalea reports that she underwent a right oophorectomy in 1996. Azalea still has her left ovary, fallopian tubes and uterus in place, and reports no ovarian cancer screening to date. She reports no history of hormone replacement therapy. Azalea reports oral contraceptive use for approximately 5 years. She has not had a colonoscopy. She denies any history of dermatological exams. She does not regularly do any other cancer screening at this time. Azalea reported a history of smoking for approximately 10 years and no current alcohol use.    Family History: (Please see scanned pedigree for detailed family history information)    Maternal great aunt (Azalea's maternal grandmother's sister) was diagnosed with breast cancer in her mid 60's.    Her two daughters (Azalea's first cousins once removed) were diagnosed with breast cancer in their late 40's. They both underwent bilateral mastectomies. It was reported that they had positive genetic testing, but it is unclear which gene they tested positive for. They are currently in their 50's/60's.     Of note, there is no reported history of cancer on her paternal side of her family.     Her maternal ethnicity is Serbian. Her paternal ethnicity is Citizen of [place].  There is no known Ashkenazi Muslim ancestry on either side of her family. There is no reported consanguinity.    Discussion:    Azalea's personal history of breast cancer and family history of breast cancer is suggestive of a hereditary cancer syndrome.    We reviewed the features of sporadic, familial, and hereditary cancers. We discussed the natural history and genetics of several hereditary cancer syndromes, including Hereditary Breast and Ovarian Cancer Syndrome  (HBOC).     The most common cause of hereditary breast and ovarian cancer is HBOC, which is caused by mutations in the BRCA1 and BRCA2 genes. Individuals with HBOC syndrome are at increased risk for several different cancers, including breast, ovarian, male breast, prostate, melanoma, and pancreatic cancer. HBOC typically presents with multiple family members diagnosed with breast cancer before age 50 and/or ovarian cancer.     A detailed handout regarding information about HBOC and related hereditary cancer syndromes will be provided to Azalea via [employer] and can be found in the after visit summary. Topics included: inheritance pattern, cancer risks, cancer screening recommendations, and also risks, benefits and limitations of testing.    In looking at Azalea's personal and family history, it is possible that a cancer susceptibility gene is present due to her personal history of breast cancer at age 43.    Based on her personal and family history, Azalea meets current National Comprehensive Cancer Network (NCCN) criteria for genetic testing of high-penetrance breast cancer susceptibility genes (including BRCA1, BRCA2, CDH1, PALB2, PTEN, and TP53).     We reviewed Azalea's negative genetic testing results for the Breast Actionable panel ordered through [facility].     She was negative for mutations in the EDWARD, BARD1, BRCA1, BRCA2, CDH1, CHEK2, NF1, PALB2, PTEN, STK11, and TP53 genes by sequencing and deletion/duplication analysis. No mutations were found in any of the 11 genes analyzed.     We discussed that Azalea did not pass on an identifiable mutation in these genes to her children based on this test result.  Mutations in these genes do not skip generations.      Azalea's previous testing was a more limited breast cancer panel. There are several other breast cancer susceptibility genes that weren't included in her testing. It is possible that a mutation could be identified in  Azalea in one of these other breast cancer associated genes. Therefore, it would be appropriate for Azalea to consider more comprehensive genetic testing related to genes associated with breast cancer to better understand her risk for hereditary cancer.    We reviewed genetic testing options for Azalea based on her personal and family history: a panel of genes associated with an increased risk for hereditary breast and gynecologic cancers, or larger panel options to include genes associated with increased risk for multiple different cancer types. Azalea expressed interest in the Hereditary Cancer Comprehensive 40-gene panel through [facility].     Genetic testing is available for 40 genes associated with hereditary cancer: APC, EDWARD, AXIN2, BAP1, BARD1, BMPR1A, BRCA1, BRCA2, BRIP1, CDH1, CDK4, CDKN2A, CHEK2, DICER1, EPCAM, GREM1, HOXB13, MITF, MLH1, MRE11, MSH2, MSH3, MSH6, MUTYH, NBN, NF1, NTHL1, PALB2, PMS2, POLD1, POLE, POT1, PTEN, RAD50, RAD51C, RAD51D, SMAD4, SMARCA4, STK11, and TP53).    We discussed that many of the genes in the panel are associated with specific hereditary cancer syndromes and published management guidelines: Hereditary Breast and Ovarian Cancer syndrome (BRCA1, BRCA2), Albarran syndrome (MLH1, MSH2, MSH6, PMS2, EPCAM), Familial Adenomatous Polyposis (APC), Hereditary Diffuse Gastric Cancer (CDH1), Familial Atypical Multiple Mole Melanoma syndrome (CDK4, CDKN2A), Juvenile Polyposis syndrome (BMPR1A, SMAD4), Cowden syndrome (PTEN), Li Fraumeni syndrome (TP53), Peutz-Jeghers syndrome (STK11), MUTYH Associated Polyposis (MUTYH), and Neurofibromatosis type 1 (NF1).     The EDWARD, AXIN2, BRIP1, CHEK2, GREM1, MSH3, NBN, NTHL1, PALB2, POLD1, POLE, RAD51C, and RAD51D genes are associated with increased cancer risk and have published management guidelines for certain cancers.      The remaining genes (BAP1, BARD1, DICER1, HOXB13, MITF, MRE11, POT1, RAD50, and SMARCA4)  are associated with increased cancer risk and may allow us to make medical recommendations when mutations are identified.      Azalea's blood has already been drawn for testing and is currently at the lab.    Verbal consent was given over video and written on the consent form. Turnaround time is approximately 4 weeks.    Medical Management: For Azalea, we reviewed that the information from genetic testing may determine:    additional cancer screening for which Azalea may qualify (i.e. more frequent colonoscopies, more frequent dermatologic exams, etc.),    options for risk reducing surgeries Azalea could consider (i.e. surgery to remove her left ovary and/or uterus, etc.),      and targeted chemotherapies for Azalea's  active cancer, or if she were to develop certain cancers in the future (i.e. immunotherapy for individuals with Albarran syndrome, PARP inhibitors, etc.).     These recommendations and possible targeted chemotherapies will be discussed in detail once genetic testing is completed.     Plan:  1) Today Azalea elected to proceed with additional genetic testing via the Hereditary Cancer Comprehensive 40-gene panel through [facility]. Therefore, consent was reviewed and verbally obtained for this testing.  2) Her blood has already been drawn and is currently at the lab.    3) The results should be available in 4 weeks.  4) Azalea will either have a telephone visit or video visit to discuss the results.  Additional recommendations about screening will be made at that time.    Azalea is 43 year old and is being evaluated via a billable video visit.    Time spent on video: 30 minutes    Rain Lou MS, [facility]  Licensed, Certified Genetic Counselor  Phone: [phone]

## 2023-07-06 NOTE — PATIENT INSTRUCTIONS
Assessing Cancer Risk  Cancer is a common diagnosis which impacts many families.  Individuals may develop cancer due to environmental factors (such as exposures and lifestyle), aging, genetic predisposition, or a combination of these factors.      Only about 5-10% of cancers are thought to be due to an inherited cancer susceptibility gene.    These families often have:  Several people with the same or related types of cancer  Cancers diagnosed at a young age (before age 50)  Individuals with more than one primary cancer  Multiple generations of the family affected with cancer    Comprehensive Breast and Gynecologic Cancer Panel  We each inherit two copies of every gene in our bodies: one from our mother, and one from our father. Each gene has a specific job to do.  When a gene has a mistake or  mutation  in it, it does not work like it should.     Some people may be candidates for genetic testing of more than one gene.  For these families, genetic testing using a cancer panel may be offered. These panels will test different genes at once known to increase the risk for breast, ovarian, uterine, and/or other cancers.    This handout will review common hereditary breast and gynecologic cancer syndromes. The genes that will be discussed in this handout are: EDWARD, BRCA1, BRCA2, BRIP1, CDH1, CHEK2, MLH1, MSH2, MSH6, PMS2, EPCAM, PTEN, PALB2, RAD51C, RAD51D, and TP53.    The purpose of this handout is to serve as a brief summary of the breast and gynecologic cancer risk genes that have published clinical management guidelines for individuals who are found to carry a mutation. Inheriting a mutation does not mean a person will develop cancer, but it does significantly increase their risk above the general population risk.     ______________________________________________________________________________    Hereditary Breast and Ovarian Cancer Syndrome (BRCA1 and BRCA2)  A single mutation in one of the copies of BRCA1 or  BRCA2 increases the risk for breast and ovarian cancer, among others.  The risk for pancreatic cancer and melanoma may also be slightly increased in some families.  The chart below shows the chance that someone with a BRCA mutation would develop cancer in his or her lifetime1,2,3,4.       Lifetime Cancer Risks    General Population BRCA1  BRCA2   Breast  12% >60% >60%   Ovarian  1-2% 39-58% 13-29%   Prostate 12% 7-26% 19-61%   Male Breast 0.1% 0.2-1.2% 1.8-7.1%   Pancreas 1-2% Up to 5% 5-10%     A person s ethnic background is also important to consider, as individuals of Ashkenazi Restorationism ancestry have a higher chance of having a BRCA gene mutation.  There are three BRCA mutations that occur more frequently in this population.      Albarran Syndrome (MLH1, MSH2, MSH6, PMS2, and EPCAM)  Currently five genes are known to cause Albarran Syndrome: MLH1, MSH2, MSH6, PMS2, and EPCAM.  A single mutation in one of the Albarran Syndrome genes increases the risk for colon, endometrial, ovarian, and stomach cancers.  Other cancers that occur less commonly in Albarran Syndrome include urinary tract, skin, and brain cancers.  The chart below shows the chance that a person with Albarran syndrome would develop cancer in his or her lifetime5.      Lifetime Cancer Risks    General Population Albarran Syndrome   Colon 5% 10-61%   Endometrial 3% 13-57%   Ovarian 1-2% 1-38%   Stomach <1% 1-9%   *Cancer risk varies depending on Albarran syndrome gene found      Cowden Syndrome (PTEN)  Cowden syndrome is a hereditary condition that increases the risk for breast, thyroid, endometrial, colon, and kidney cancer.  Cowden syndrome is caused by a mutation in the PTEN gene.  A single mutation in one of the copies of PTEN causes Cowden syndrome and increases cancer risk.  The chart below shows the chance that someone with a PTEN mutation would develop cancer in their lifetime6,7.  Other benign features seen in some individuals with Cowden syndrome include benign  skin lesions (facial papules, keratoses, lipomas), learning disability, autism, thyroid nodules, colon polyps, and larger head size.     Lifetime Cancer Risks    General Population Cowden   Breast 12% 40-60%*   Thyroid 1% Up to 38%   Renal 1-2% Up to 35%   Endometrial 3% Up to 28%   Colon 5% Up to 9%   Melanoma 2-3% Up to 6%   *Emerging data suggests the risk for breast cancer could be greater than 60%               Li-Fraumeni Syndrome (TP53)  Li-Fraumeni Syndrome (LFS) is a cancer predisposition syndrome caused by a mutation in the TP53 gene. A single mutation in one of the copies of TP53 increases the risk for multiple cancers. Individuals with LFS are at an increased risk for developing cancer at a young age. The lifetime risk for development of a LFS-associated cancer is 50% by age 30 and 90% by age 60.   Core Cancers: Sarcomas, Breast, Brain, Lung, Leukemias/Lymphomas, Adrenocortical carcinomas  Other Cancers: Gastrointestinal, Thyroid, Skin, Genitourinary       Hereditary Diffuse Gastric Cancer (CDH1)  Currently, one gene is known to cause hereditary diffuse gastric cancer (HDGC): CDH1.  Individuals with HDGC are at increased risk for diffuse gastric cancer and lobular breast cancer. Of people diagnosed with HDGC, 30-50% have a mutation in the CDH1 gene.  This suggests there are likely other genes that may cause HDGC that have not been identified yet.      Lifetime Cancer Risks    General Population HDGC   Diffuse Gastric  <1% ~80%   Breast 12% 41-60%       Additional Genes    EDWARD  EDWARD is a moderate-risk breast cancer gene. Women who have a mutation in EDWARD can have between a 2-4 fold increased risk for breast cancer compared to the general population8. EDWARD mutations have also been associated with increased risk for pancreatic cancer between 5-10%9. Individuals who inherit two EDWARD mutations have a condition called ataxia-telangiectasia (AT).  This rare autosomal recessive condition affects the nervous system  and immune system, and is associated with progressive cerebellar ataxia beginning in childhood. Individuals with ataxia-telangiectasia often have a weakened immune system and have an increased risk for childhood cancers.    PALB2  Mutations in PALB2 have been shown to increase the risk of breast cancer up to 41-60% in some families; where individuals fall within this risk range is dependent upon family [id number]. PALB2 mutations have also been associated with increased risk for pancreatic cancer between 5-10%.  Individuals who inherit two PALB2 mutations--one from their mother and one from their father--have a condition called Fanconi Anemia.  This rare autosomal recessive condition is associated with short stature, developmental delay, bone marrow failure, and increased risk for childhood cancers.    CHEK2   CHEK2 is a moderate-risk breast cancer gene.  Women who have a mutation in CHEK2 have around a 2-4 fold increased risk for breast cancer compared to the general population, and this risk may be higher depending upon family history.11,12,13 The risk of colon cancer may be twice as high as the general population risk of colon cancer of 5%. Mutations in CHEK2 have also been shown to increase the risk of other cancers, including prostate, however these cancer risks are currently not well understood.    BRIP1, RAD51C and RAD51D  Mutations in RAD51C and RAD51D have been shown to increase the risk of ovarian cancer and breast cancer 14,. Mutations in BRIP1 have been shown to increase the risk of ovarian cancer and possibly female breast cancer 15 .       Lifetime Cancer Risk    General Population        BRIP1   RAD51C  RAD51D   Breast 12% Not well defined 20-40% 20-40%   Ovarian 1-2% 5-15% 10-15% 10-20%     ______________________________________________________________  Inheritance  All of the cancer syndromes reviewed above are inherited in an autosomal dominant pattern.  This means that if a parent has a mutation,  each of their children will have a 50% chance of inheriting that same mutation. Therefore, each child --male or female-- would have a 50% chance of being at increased risk for developing cancer.    Image obtained from Genetics Home Reference, 2013     Mutations in some genes can occur de brittni, which means that a person s mutation occurred for the first time in them and was not inherited from a parent.  Now that they have the mutation, however, it can be passed on to future generations.    Genetic Testing  Genetic testing involves a blood test and will look for any harmful mutations that are associated with increased cancer risk.  If possible, it is recommended that the person(s) who has had cancer be tested before other family members.  That person will give us the most useful information about whether or not a specific gene is associated with the cancer in the family.    Results  There are three possible results of genetic testing:  Positive--a harmful mutation was identified in one or more of the genes  Negative--no mutations were identified in any of the genes tested  Variant of unknown significance--a variation in one of the genes was identified, but it is unclear how this impacts cancer risk in the family    Advantages and Disadvantages   There are advantages and disadvantages to genetic testing.    Advantages  May clarify your cancer risk  Can help you make medical decisions  May explain the cancers in your family  May give useful information to your family members (if you share your results)    Disadvantages  Possible negative emotional impact of learning about inherited cancer risk  Uncertainty in interpreting a negative test result in some situations  Possible genetic discrimination concerns (see below)    Genetic Information Nondiscrimination Act (SCOOBY)  The Genetic Information Nondiscrimination Act of 2008 (SCOOBY) is a federal law that protects individuals from health insurance or employment discrimination  based on a genetic test result alone (with some exceptions, including employers with fewer than 15 employees, and ).  Although rare, SCOOBY  does not cover discrimination protections in terms of life insurance, long term care, or disability insurances.  Visit the National Human [employer] [place] website to learn more.    Reducing Cancer Risk  All of the genes described in this handout have nationally recognized cancer screening guidelines that would be recommended for individuals who test positive.  In addition to increased cancer screening, surgeries may be offered or recommended to reduce cancer risk.  Recommendations are based upon an individual s genetic test result as well as their personal and family history of cancer.    Questions to Think About Regarding Genetic Testing:  What effect will the test result have on me and my relationship with my family members if I have an inherited gene mutation?  If I don t have a gene mutation?  Should I share my test results, and how will my family react to this news, which may also affect them?  Are my children ready to learn new information that may one day affect their own health?    Hereditary Cancer Resources    FORCE: Facing Our Risk of Cancer Empowered facingourrisk.org   Bright Pink bebrightpink.org   Li-Fraumeni Syndrome Association lfsassociation.org   PTEN World PTENworld.com   No stomach for cancer, Inc. nostomachforcancer.org   Stomach cancer relief network Scrnet.org   Collaborative Group of the Americas on Inherited Colorectal Cancer (CGA) cgaicc.com    Cancer Care cancercare.org   American Cancer Society (ACS) cancer.org   National Cancer [place] (NCI) cancer.gov     Please call us if you have any questions or concerns.   Cancer Risk Management Program [phone] [[email or web address]]-CANCER ([phone])  Braxton Wong, MS [facility] [place]  [phone]  Rain Lou, MS, [facility] [phone]  Nedra Crane, MS, [facility] [place]  [phone]  Kassandra Garcia, MS, [facility]  [phone]  Monica Salvador,  MS, [facility]  [phone]  Eva Schneider, MS, [facility] [place] [phone]  Ghazala Hood, MS, [facility] [phone]    References  Amparo Frye PDP, Glory S, Yun GEE, Araceli JE, Saranya JL, Abdirizak N, Giselle H, Carlos O, Rasta A, Pasini B, Radinidia P, Manjordon S, Alem DM, Suarez N, Andrey E, Paola H, Khanna E, Betsy J, Gronjerry J, Carl B, Tulinius H, Thorlacius S, Eerola H, Nevanlinna H, Ga K, Gabriela OP. Average risks of breast and ovarian cancer associated with BRCA1 or BRCA2 mutations detected in case series unselected for family history: a combined analysis of 222 studies. Am J Hum Ninfa. 2003;72:1117-30.  Janice N, Barbara M, Ariel G.  BRCA1 and BRCA2 Hereditary Breast and Ovarian Cancer. Gene Reviews online. 2013.  Kory YC, Yusuf S, Susan G, Torres S. Breast cancer risk among male BRCA1 and BRCA2 mutation carriers. J Natl Cancer Inst. 2007;99:1811-4.  Bishnu HUMPHRIES, Brittany I, Rasheed J, Tierra E, Talha ER, Zehra F. Risk of breast cancer in male BRCA2 carriers. J Med Ninfa. 2010;47:710-1.  National Comprehensive Cancer Network. Clinical practice guidelines in oncology, colorectal cancer screening. Available online (registration required). 2015.  Ventura MH, Brigitte J, James J, Kaylee BRAVO, Miles MS, Eng C. Lifetime cancer risks in individuals with germline PTEN mutations. Clin Cancer Res. 2012;18:400-7.  Cristiane R. Cowden Syndrome: A Critical Review of the Clinical Literature. J Ninfa . 2009:18:13-27.  Rosy SULLIVAN, Adeel KOENIG, Reg S, Deborah P, Joaquin T, Neil M, Srinath B, Monica H, Stephen R, Romy K, Jeronimo L, Bishnu HUMPHRIES, Alem KOENIG, Ravindra DF, Bryanna MR, The Breast Cancer Susceptibility Collaboration (UK) & Monique MUNIZ. EDWARD mutations that cause ataxia-telangiectasia are breast cancer susceptibility alleles. Nature Genetics. 2006;38:873-875  Adryan N , Lizandro Y, Margy J, López L, Saranya WEZTEL , Josephine ML, Jeanine S, Jono AG, Jas S, Amanda ML, Gama J , Eduardo R, Macario PARIS, Sahrla  JR, Elsie VE, Rubén M, Vopeggystein B, Kiley N, Saundra RH, Natacha KW, and Paige AP. EDWARD mutations in patients with hereditary pancreatic cancer. Cancer Discover. 2012;2:41-46  Ugo SOTO., et al. Breast-Cancer Risk in Families with Mutations in PALB2. NEJM. 2014; 371(6):497-506.  CHEK2 Breast Cancer Case-Control Consortium. CHEK2*1100delC and susceptibility to breast cancer: A collaborative analysis involving 10,860 breast cancer cases and 9,065 controls from 10 studies. Am J Hum Ninfa, 74 (2004), pp. 9191-0472  Johsua T, Michael S, Chirag K, et al. Spectrum of Mutations in BRCA1, BRCA2, CHEK2, and TP53 in Families at High Risk of Breast Cancer. KOFI. 2006;295(12):8809-9225.   Marybeth C, Carmenza D, Sue SULLIVAN, et al. Risk of breast cancer in women with a CHEK2 mutation with and without a family history of breast cancer. J Clin Oncol. 2011;29:6740-3161.  Song H, Talats E, Ramus SJ, et al. Contribution of germline mutations in the RAD51B, RAD51C, and RAD51D genes to ovarian cancer in the population. J Clin Oncol. 2015;33(26):5128-3957. Doi:10.1200/JCO.2015.61.2408.  Sara T, Sanford DF, Harriet P, et al. Mutations in BRIP1 confer high risk of ovarian cancer. Christianne Ninfa. 2011;43(11):7595-7220. doi:10.1038/ng.955.

## 2023-07-06 NOTE — NURSING NOTE
Is the patient currently in the state of [place]? YES    Visit mode:VIDEO    If the visit is dropped, the patient can be reconnected by: VIDEO VISIT:  Send e-mail to at [email or web address]    Will anyone else be joining the visit? No  (If patient encounters technical issues they should call [phone])    How would you like to obtain your AVS? MyChart    Are changes needed to the allergy or medication list? N/A    Rooming Documentation: Assigned questionnaire(s) completed .    Reason for visit: Consult     LENORE Slaughter

## 2023-07-06 NOTE — Clinical Note
7/6/2023         RE: Azalea Mckeon  [address] [facility] [address]        Dear Colleague,    Thank you for referring your patient, Azalea Mckeon, to the [facility]. Please see a copy of my visit note below.    7/6/2023    Virtual Visit Details  Type of service:  Video Visit   Originating Location (pt. Location): Home  Distant Location (provider location):  Off-site  Platform used for Video Visit: [facility]     Referring Provider: Mitra Souza MD    Presenting Information:   I met with Azalea Mckeon today for her video genetic counseling visit through the Cancer Risk Management Program to discuss her personal history of breast cancer and family history of breast cancer. She is here today to review this history, cancer screening recommendations, and available genetic testing options.    Personal History:  Azalea is a 43 year old female. She was recently diagnosed with breast cancer. On 2/3/2023, a mammogram found possible calcifications in the upper right breast. A diagnostic mammogram and ultrasound on 3/2/2023 showed fine pleomorphic calcifications. On 3/17/2023, a biopsy revealed invasive ductal carcinoma (ER/OH +, HER2 -) with associated DCIS. Azalea underwent a bilateral mastectomy on 4/24/2023. She is currently undergoing chemotherapy.     Azalea already had some genetic testing ordered by Dr. Souza via the Breast Actionable Panel offered by the Molecular Diagn[facility] at [facility]. Azalea is negative for mutations in the EDWARD, BARD1, BRCA1, BRCA2, CDH1, CHEK2, NF1, PALB2, PTEN, STK11, and TP53 genes by sequencing and deletion/duplication analysis. No mutations were found in any of the 11 genes analyzed. We reviewed her negative results in detail today.     In her hormonal-based medical history, she had her first menstrual period at age 13, her first child at age 28, and is  premenopausal. Due to an ovarian cyst, Azalea reports that she underwent a right  oophorectomy in 1996. Azalea still has her left ovary, fallopian tubes and uterus in place, and reports no ovarian cancer screening to date. She reports no history of hormone replacement therapy. Azalea reports oral contraceptive use for approximately 5 years. She has not had a colonoscopy. She denies any history of dermatological exams. She does not regularly do any other cancer screening at this time. Azalea reported a history of smoking for approximately 10 years and no current alcohol use.    Family History: (Please see scanned pedigree for detailed family history information)    Maternal great aunt (Azalea's maternal grandmother's sister) was diagnosed with breast cancer in her mid 60's.    Her two daughters (Azalea's first cousins once removed) were diagnosed with breast cancer in their late 40's. They both underwent bilateral mastectomies. It was reported that they had positive genetic testing, but it is unclear which gene they tested positive for. They are currently in their 50's/60's.     Of note, there is no reported history of cancer on her paternal side of her family.     Her maternal ethnicity is [place]ag[place]. Her paternal ethnicity is [place].  There is no known Ashkenazi Islam ancestry on either side of her family. There is no reported consanguinity.    Discussion:    Azalea's personal history of breast cancer and family history of breast cancer is suggestive of a hereditary cancer syndrome.    We reviewed the features of sporadic, familial, and hereditary cancers. We discussed the natural history and genetics of several hereditary cancer syndromes, including Hereditary Breast and Ovarian Cancer Syndrome (HBOC).     The most common cause of hereditary breast and ovarian cancer is HBOC, which is caused by mutations in the BRCA1 and BRCA2 genes. Individuals with HBOC syndrome are at increased risk for several different cancers, including breast, ovarian, male breast, prostate, melanoma, and pancreatic cancer.  HBOC typically presents with multiple family members diagnosed with breast cancer before age 50 and/or ovarian cancer.     A detailed handout regarding information about HBOC and related hereditary cancer syndromes will be provided to Azalea via Snap[employer]ointments and can be found in the after visit summary. Topics included: inheritance pattern, cancer risks, cancer screening recommendations, and also risks, benefits and limitations of testing.    In looking at Azalea's personal and family history, it is possible that a cancer susceptibility gene is present due to her personal history of breast cancer at age 43.    Based on her personal and family history, Azalea meets current National Comprehensive Cancer Network (NCCN) criteria for genetic testing of high-penetrance breast cancer susceptibility genes (including BRCA1, BRCA2, CDH1, PALB2, PTEN, and TP53).     We reviewed Azalea's negative genetic testing results for the Breast Actionable panel ordered through [facility].     She was negative for mutations in the EDWARD, BARD1, BRCA1, BRCA2, CDH1, CHEK2, NF1, PALB2, PTEN, STK11, and TP53 genes by sequencing and deletion/duplication analysis. No mutations were found in any of the 11 genes analyzed.     We discussed that Azalea did not pass on an identifiable mutation in these genes to her children based on this test result.  Mutations in these genes do not skip generations.      Azalea's previous testing was a more limited breast cancer panel. There are several other breast cancer susceptibility genes that weren't included in her testing. It is possible that a mutation could be identified in Azalea in one of these other breast cancer associated genes. Therefore, it would be appropriate for Azalea to consider more comprehensive genetic testing related to genes associated with breast cancer to better understand her risk for hereditary cancer.    We reviewed genetic testing options for Azalea based on  her personal and family history: a panel of genes associated with an increased risk for hereditary breast and gynecologic cancers, or larger panel options to include genes associated with increased risk for multiple different cancer types. Azalea expressed interest in the Hereditary Cancer Comprehensive 40-gene panel through [facility].     Genetic testing is available for 40 genes associated with hereditary cancer: APC, EDWARD, AXIN2, BAP1, BARD1, BMPR1A, BRCA1, BRCA2, BRIP1, CDH1, CDK4, CDKN2A, CHEK2, DICER1, EPCAM, GREM1, HOXB13, MITF, MLH1, MRE11, MSH2, MSH3, MSH6, MUTYH, NBN, NF1, NTHL1, PALB2, PMS2, POLD1, POLE, POT1, PTEN, RAD50, RAD51C, RAD51D, SMAD4, SMARCA4, STK11, and TP53).    We discussed that many of the genes in the panel are associated with specific hereditary cancer syndromes and published management guidelines: Hereditary Breast and Ovarian Cancer syndrome (BRCA1, BRCA2), Albarran syndrome (MLH1, MSH2, MSH6, PMS2, EPCAM), Familial Adenomatous Polyposis (APC), Hereditary Diffuse Gastric Cancer (CDH1), Familial Atypical Multiple Mole Melanoma syndrome (CDK4, CDKN2A), Juvenile Polyposis syndrome (BMPR1A, SMAD4), Cowden syndrome (PTEN), Li Fraumeni syndrome (TP53), Peutz-Jeghers syndrome (STK11), MUTYH Associated Polyposis (MUTYH), and Neurofibromatosis type 1 (NF1).     The EDWARD, AXIN2, BRIP1, CHEK2, GREM1, MSH3, NBN, NTHL1, PALB2, POLD1, POLE, RAD51C, and RAD51D genes are associated with increased cancer risk and have published management guidelines for certain cancers.      The remaining genes (BAP1, BARD1, DICER1, HOXB13, MITF, MRE11, POT1, RAD50, and SMARCA4) are associated with increased cancer risk and may allow us to make medical recommendations when mutations are identified.      Azalea's blood has already been drawn for testing and is currently at the lab.    Verbal consent was given over video and written on the consent form. Turnaround time is approximately 4  weeks.    Medical Management: For Azalea, we reviewed that the information from genetic testing may determine:    additional cancer screening for which Azalea may qualify (i.e. more frequent colonoscopies, more frequent dermatologic exams, etc.),    options for risk reducing surgeries Azalea could consider (i.e. surgery to remove her left ovary and/or uterus, etc.),      and targeted chemotherapies for Azalea's  active cancer, or if she were to develop certain cancers in the future (i.e. immunotherapy for individuals with Albarran syndrome, PARP inhibitors, etc.).     These recommendations and possible targeted chemotherapies will be discussed in detail once genetic testing is completed.     Plan:  1) Today Azalea elected to proceed with additional genetic testing via the Hereditary Cancer Comprehensive 40-gene panel through [facility]. Therefore, consent was reviewed and verbally obtained for this testing.  2) Her blood has already been drawn and is currently at the lab.    3) The results should be available in 4 weeks.  4) Azalea will either have a telephone visit or video visit to discuss the results.  Additional recommendations about screening will be made at that time.    Azalea is 43 year old and is being evaluated via a billable video visit.    Time spent on video: 30 minutes    Rain Lou MS, [facility] [place] [facility]  Licensed, Certified Genetic Counselor  Phone: [phone]      Again, thank you for allowing me to participate in the care of your patient.        Sincerely,        Rain Lou GC

## 2023-07-10 PROCEDURE — G0452 MOLECULAR PATHOLOGY INTERPR: HCPCS | Mod: 26 | Performed by: PATHOLOGY

## 2023-07-11 ENCOUNTER — THERAPY VISIT (OUTPATIENT)
Dept: PHYSICAL THERAPY | Facility: CLINIC | Age: 43
End: 2023-07-11
Attending: PLASTIC SURGERY
Payer: COMMERCIAL

## 2023-07-11 DIAGNOSIS — Z98.890 S/P BREAST RECONSTRUCTION, BILATERAL: ICD-10-CM

## 2023-07-11 DIAGNOSIS — I89.0 LYMPHEDEMA: ICD-10-CM

## 2023-07-11 PROCEDURE — 97140 MANUAL THERAPY 1/> REGIONS: CPT | Mod: GP | Performed by: PHYSICAL THERAPIST

## 2023-07-11 PROCEDURE — 97161 PT EVAL LOW COMPLEX 20 MIN: CPT | Mod: GP | Performed by: PHYSICAL THERAPIST

## 2023-07-11 NOTE — PROGRESS NOTES
PHYSICAL THERAPY EVALUATION  Type of Visit: Evaluation    See electronic medical record for Abuse and Falls Screening details.    Subjective      Presenting condition or subjective complaint:    Date of onset:      Dates & types of surgery:  4/24/23 - see below    Prior therapy history for the same diagnosis, illness or injury:   prior therapy; pt is      Prior Level of Function   Transfers: Independent  Ambulation: Independent    Living Environment  Social support:     Type of home:     Stairs to enter the home:         Help at home:    Equipment owned:       Employment:        Patient goals for therapy:      Pain assessment: Pain present - generalized pain from Lunesta shots; discomfort and tightness during overhead activity and ADL     Objective       EDEMA EVALUATION  Additional history:  Body part affected by edema:    If cancer related, treatment:    Sensation problems in hands/feet:      Edema etiology: Cancer with lymph node dissection, Chemo, Radiation, Surgery (last chemo is end of august, 1 month recovery and then radiation to start, will have 25 rounds 5days/week for 5 weeks)    FUNCTIONAL SCALES  LLIS = 23    Cognitive Status Examination  Orientation: Oriented to person, place and time   Level of Consciousness: Alert  Follows Commands and Answers Questions: 100% of the time  Personal Safety and Judgement: Intact  Memory: Intact    EDEMA  Skin Condition:  RUE skin all intact with no palpable edema ; R-distal axilla with mild non-pitting edema present; moderate decreased soft tissue mobility at entire RUQ and tight R pec noted with increased tenderness at palpation; R-lateral breast pucker noted with +edema, +tightness/fullness  Scar:  B mastectomy scar with immediate reconstrcution  Capillary Refill: Symmetrical  Radial Pulse: Symmetrical  Stemmer Sign: -  Ulceration: No    GIRTH MEASUREMENTS: Refer to separate girth measurement flowsheet.     VOLUME UE  Right UE (mL) 1588.72   Left UE (mL) 1586.57   UE  Volume Comparison RUE volume greater than LUE volume   % Difference 0.1%     RANGE OF MOTION:  RUE and LUE each at flex 135; RUE abduction 112 and LUE abduction 128; Delbert ER WNL; no scapular winging noted  STRENGTH: UE Strength WNL  POSTURE: WNL  PALPATION: tenderness with deeper manual assessment techniques in R-axilla, superior R chest and R pec  ACTIVITIES OF DAILY LIVING: tightness with overhead activity and ADL  GAIT/LOCOMOTION: independent  BALANCE: WNL  SENSATION:  decreased slightly at RUQ  VASCULAR:  no concerns  COORDINATION: WNL  MUSCLE TONE: WNL    Assessment & Plan   CLINICAL IMPRESSIONS   Medical Diagnosis:      Treatment Diagnosis:     Impression/Assessment: Patient is a 43 year old female with complaints of bilateral UQ and chest tightness and decreased ROM.  The following significant findings have been identified: Pain, Decreased ROM/flexibility, Edema and decreased soft tissue mobility  . These impairments interfere with their ability to perform self care tasks, work tasks, recreational activities and household chores as compared to previous level of function.     Clinical Decision Making (Complexity):   Clinical Presentation: Stable/Uncomplicated  Clinical Presentation Rationale: based on medical and personal factors listed in PT evaluation  Clinical Decision Making (Complexity): Low complexity    PLAN OF CARE  Treatment Interventions:  Interventions: Manual Therapy, Therapeutic Exercise, Self-Care/Home Management, Gradient Compression Bandaging, MFR, MLD and STM    Long Term Goals            Frequency of Treatment:    Duration of Treatment:      Recommended Referrals to Other Professionals:  none  Education Assessment:        Risks and benefits of evaluation/treatment have been explained.   Patient/Family/caregiver agrees with Plan of Care.     Evaluation Time:   10      Signing Clinician: Carlotta Sarmiento, PT, DPT, CLT        [facility]                                                                                    OUTPATIENT PHYSICAL THERAPY    PLAN OF TREATMENT FOR OUTPATIENT REHABILITATION   Patient's Last Name, First Name, Azalea Herron YOB: 1980   Provider's Name   [facility]   Medical Record No.  [id number]     Onset Date: 4/24/23 (Date of referral) Start of Care Date: 7/11/23     Medical Diagnosis:  S/P breast reconstruction, bilateral       PT Treatment Diagnosis:  RUE/RUQ lymphedema; Bilateral chest/UQ tightness and decreased BUE ROM  Plan of Treatment  Frequency/Duration: 2x/week for 12 weeks    Certification date from 7/11/23 - 10/2-23       See note for plan of treatment details and functional goals     Carlotta Sarmiento, PT, DPT, CLT                         I CERTIFY THE NEED FOR THESE SERVICES FURNISHED UNDER        THIS PLAN OF TREATMENT AND WHILE UNDER MY CARE     (Physician attestation of this document indicates review and certification of the therapy plan).              Referring Provider:  EDWARDO Ferreira MD    Initial Assessment  See Epic Evaluation-

## 2023-07-12 ENCOUNTER — ALLIED HEALTH/NURSE VISIT (OUTPATIENT)
Dept: SURGERY | Facility: CLINIC | Age: 43
End: 2023-07-12
Payer: COMMERCIAL

## 2023-07-12 DIAGNOSIS — Z98.890 S/P BREAST RECONSTRUCTION, BILATERAL: Primary | ICD-10-CM

## 2023-07-12 PROCEDURE — 99207 PR NO CHARGE NURSE ONLY: CPT | Performed by: PHYSICIAN ASSISTANT

## 2023-07-12 NOTE — PATIENT INSTRUCTIONS
It was great seeing you today for your Tissue Expansion visit. Please review the information below. If you have any additional questions please do not hesitate to call Viji RN or Comfort RN at [phone]        What to expect with Tissue Expansion     *You may experience minor discomfort for the following 12 to 24 hours      after each tissue expansion. This discomfort usually subsides 2 to 3 days      after each tissue expansion.    *The tissue expander may shift after the 1st or 2nd expansion.    *You may experience more discomfort on one side than the other if you      have bilateral tissue expanders placed.    * Your posture may change causing you to have some upper and/or mid      back pain as you're expanded.    * Your shoulder range of motion may become stiff requiring you to continue to     perform the shoulder exercises during the tissue expansion process to     prevent shoulder stiffness and a frozen shoulder.    * You may find it difficult to sleep because you feel tight across the chest      and shoulders.    * You may find it difficult to fit into certain types of tight fitting clothing.      You may need to wear oversized shirts until the final exchange of the      tissue expander (s).    * You may feel chest persist tightness or heaviness secondary to being     expanded. This usually subsides with time.    * The tissue expanders will remain in place for a minimum of 8 weeks after     completing the last tissue expansion. This will allow for the soft tissues     (i.e. skin and muscle) to heal and recover from being stretched before the     second surgery takes place for the exchange of the tissue expander.     *You'll see your surgeon when we think you have achieve your desired     breast size to determine if you need additional expansions and for     surgical planning.    How can I treat discomfort?   * Ibuprofen (or other NSAIDs) 600 mg by mouth every 6 to 8 hours with     Food starting the  morning of each tissue expansion and continue to take     around the clock for 3 to 5 days as needed for discomfort. You can start     this 2 weeks after surgery. If you cannot take Ibuprofen okay to take Tylenol as     directed   * Cool compresses wrapped in a towel  can be applied for 20 min every 2 hours if      Needed    How well the desired breast size is determined by a few things:      *On an individual basis.    *There is no scientific way to determine the exact breast size. It will be     determined by a number of different factors i.e.         -How well you tolerate each tissue expansion.         -How well your skin reacts to the expansions.         -The size of the other breast (if a unilateral tissue expander).         -Previous surgeries or amount of scarring         -We recommend trying on a desired bra size as you approach your desired      breast size to see how well the bra size fits.      When to Call:  *If you have increasing swelling or bruising.  *If swelling and redness persist after a few days.  *If you have increased redness along the incision.  *If you have severe or increased pain not relieved by medication.  *If you have any side effects to medications; such as, rash, nauseas,      headache, vomiting, etc.  *f you have an oral temperature of 100.5 degrees or higher.  *If you have any yellow or greenish drainage from the incisions or notice a      foul smell.  *If you have bleeding from the incisions that is difficult to control with      light pressure.

## 2023-07-12 NOTE — NURSING NOTE
Azalea Mckeon comes into clinic today at the request of Dr. Ferreira ordering provider for bilateral tissue expansion.    Per  RN to fill bilateral breast expanders. 45ccs of NS filled into right breast expander and 70ccs of NS filled into left breast expander.    Total fill volume for right breast expander is 245ccs. Total fill volume for left breast expander 250ccs.  Bilateral expanders feel full to the point where additional fluid injected does not seem appropriate.  I had my colleague Viji Llamas RN come in to verify.      Bacitracin and a bandaid applied to the injection sites. Pt tolerated the breast fill without any issues.     Pt advised to monitor the injection sites for any increased redness, drainage, pain or swelling and call the clinic back if any of these issues develop. Pt to follow up after radiation with Dr. Ferreira.  She will call when nearing end of radiation.  Update sent to Dr. Ferreira via [employer].     This service provided today was under the supervising provider of the janes Jesus, who was available if needed.      Comfort Beckford RN         Chart review:    6/21: Total fill volume for right breast expander is 200cc. Total fill volume for left breast expander 180cc.   7/12: Total fill volume for right breast expander is now 245ccs.  Total fill volume for left breast expander is 250ccs.

## 2023-07-17 ENCOUNTER — LAB (OUTPATIENT)
Dept: ONCOLOGY | Facility: CLINIC | Age: 43
End: 2023-07-17
Payer: COMMERCIAL

## 2023-07-17 ENCOUNTER — INFUSION THERAPY VISIT (OUTPATIENT)
Dept: INFUSION THERAPY | Facility: CLINIC | Age: 43
End: 2023-07-17
Payer: COMMERCIAL

## 2023-07-17 ENCOUNTER — ONCOLOGY VISIT (OUTPATIENT)
Dept: ONCOLOGY | Facility: CLINIC | Age: 43
End: 2023-07-17
Payer: COMMERCIAL

## 2023-07-17 VITALS
SYSTOLIC BLOOD PRESSURE: 115 MMHG | BODY MASS INDEX: 26.82 KG/M2 | OXYGEN SATURATION: 98 % | WEIGHT: 161 LBS | HEIGHT: 65 IN | DIASTOLIC BLOOD PRESSURE: 77 MMHG | HEART RATE: 82 BPM

## 2023-07-17 DIAGNOSIS — T45.1X5A CHEMOTHERAPY-INDUCED NEUTROPENIA (H): Primary | ICD-10-CM

## 2023-07-17 DIAGNOSIS — C50.811 MALIGNANT NEOPLASM OF OVERLAPPING SITES OF RIGHT BREAST IN FEMALE, ESTROGEN RECEPTOR POSITIVE (H): ICD-10-CM

## 2023-07-17 DIAGNOSIS — R74.8 ELEVATED LIVER ENZYMES: ICD-10-CM

## 2023-07-17 DIAGNOSIS — Z17.0 MALIGNANT NEOPLASM OF OVERLAPPING SITES OF RIGHT BREAST IN FEMALE, ESTROGEN RECEPTOR POSITIVE (H): ICD-10-CM

## 2023-07-17 DIAGNOSIS — C50.811 MALIGNANT NEOPLASM OF OVERLAPPING SITES OF RIGHT BREAST IN FEMALE, ESTROGEN RECEPTOR POSITIVE (H): Primary | ICD-10-CM

## 2023-07-17 DIAGNOSIS — D70.1 CHEMOTHERAPY-INDUCED NEUTROPENIA (H): Primary | ICD-10-CM

## 2023-07-17 DIAGNOSIS — T45.1X5A CHEMOTHERAPY-INDUCED NEUTROPENIA (H): ICD-10-CM

## 2023-07-17 DIAGNOSIS — D70.1 CHEMOTHERAPY-INDUCED NEUTROPENIA (H): ICD-10-CM

## 2023-07-17 DIAGNOSIS — Z80.3 FAMILY HISTORY OF MALIGNANT NEOPLASM OF BREAST: ICD-10-CM

## 2023-07-17 DIAGNOSIS — Z17.0 MALIGNANT NEOPLASM OF OVERLAPPING SITES OF RIGHT BREAST IN FEMALE, ESTROGEN RECEPTOR POSITIVE (H): Primary | ICD-10-CM

## 2023-07-17 LAB
ALBUMIN SERPL BCG-MCNC: 4.3 G/DL (ref 3.5–5.2)
ALP SERPL-CCNC: 39 U/L (ref 35–104)
ALT SERPL W P-5'-P-CCNC: 164 U/L (ref 0–50)
ANION GAP SERPL CALCULATED.3IONS-SCNC: 10 MMOL/L (ref 7–15)
AST SERPL W P-5'-P-CCNC: 68 U/L (ref 0–45)
BASOPHILS # BLD AUTO: 0.1 10E3/UL (ref 0–0.2)
BASOPHILS NFR BLD AUTO: 1 %
BILIRUB SERPL-MCNC: 0.2 MG/DL
BUN SERPL-MCNC: 12 MG/DL (ref 6–20)
CALCIUM SERPL-MCNC: 9.2 MG/DL (ref 8.6–10)
CHLORIDE SERPL-SCNC: 109 MMOL/L (ref 98–107)
CREAT SERPL-MCNC: 0.72 MG/DL (ref 0.51–0.95)
DEPRECATED HCO3 PLAS-SCNC: 22 MMOL/L (ref 22–29)
EOSINOPHIL # BLD AUTO: 0 10E3/UL (ref 0–0.7)
EOSINOPHIL NFR BLD AUTO: 1 %
ERYTHROCYTE [DISTWIDTH] IN BLOOD BY AUTOMATED COUNT: 13.2 % (ref 10–15)
GFR SERPL CREATININE-BSD FRML MDRD: >90 ML/MIN/1.73M2
GLUCOSE SERPL-MCNC: 102 MG/DL (ref 70–99)
HCT VFR BLD AUTO: 36.4 % (ref 35–47)
HGB BLD-MCNC: 12.1 G/DL (ref 11.7–15.7)
IMM GRANULOCYTES # BLD: 0 10E3/UL
IMM GRANULOCYTES NFR BLD: 1 %
LYMPHOCYTES # BLD AUTO: 1.1 10E3/UL (ref 0.8–5.3)
LYMPHOCYTES NFR BLD AUTO: 21 %
MCH RBC QN AUTO: 29.9 PG (ref 26.5–33)
MCHC RBC AUTO-ENTMCNC: 33.2 G/DL (ref 31.5–36.5)
MCV RBC AUTO: 90 FL (ref 78–100)
MONOCYTES # BLD AUTO: 0.4 10E3/UL (ref 0–1.3)
MONOCYTES NFR BLD AUTO: 8 %
NEUTROPHILS # BLD AUTO: 3.7 10E3/UL (ref 1.6–8.3)
NEUTROPHILS NFR BLD AUTO: 68 %
NRBC # BLD AUTO: 0 10E3/UL
NRBC BLD AUTO-RTO: 0 /100
PLATELET # BLD AUTO: 224 10E3/UL (ref 150–450)
POTASSIUM SERPL-SCNC: 4.1 MMOL/L (ref 3.4–5.3)
PROT SERPL-MCNC: 6.5 G/DL (ref 6.4–8.3)
RBC # BLD AUTO: 4.05 10E6/UL (ref 3.8–5.2)
SODIUM SERPL-SCNC: 141 MMOL/L (ref 136–145)
WBC # BLD AUTO: 5.4 10E3/UL (ref 4–11)

## 2023-07-17 PROCEDURE — 99215 OFFICE O/P EST HI 40 MIN: CPT | Mod: 25 | Performed by: NURSE PRACTITIONER

## 2023-07-17 PROCEDURE — 85025 COMPLETE CBC W/AUTO DIFF WBC: CPT | Performed by: INTERNAL MEDICINE

## 2023-07-17 PROCEDURE — 96413 CHEMO IV INFUSION 1 HR: CPT | Performed by: NURSE PRACTITIONER

## 2023-07-17 PROCEDURE — 99207 PR NO CHARGE LOS: CPT

## 2023-07-17 PROCEDURE — 96417 CHEMO IV INFUS EACH ADDL SEQ: CPT | Performed by: NURSE PRACTITIONER

## 2023-07-17 PROCEDURE — 80053 COMPREHEN METABOLIC PANEL: CPT | Performed by: INTERNAL MEDICINE

## 2023-07-17 PROCEDURE — 96375 TX/PRO/DX INJ NEW DRUG ADDON: CPT | Performed by: NURSE PRACTITIONER

## 2023-07-17 PROCEDURE — 96377 APPLICATON ON-BODY INJECTOR: CPT | Mod: 59 | Performed by: NURSE PRACTITIONER

## 2023-07-17 PROCEDURE — 96367 TX/PROPH/DG ADDL SEQ IV INF: CPT | Performed by: NURSE PRACTITIONER

## 2023-07-17 RX ORDER — HEPARIN SODIUM (PORCINE) LOCK FLUSH IV SOLN 100 UNIT/ML 100 UNIT/ML
5 SOLUTION INTRAVENOUS ONCE
Status: COMPLETED | OUTPATIENT
Start: 2023-07-17 | End: 2023-07-17

## 2023-07-17 RX ORDER — ONDANSETRON 2 MG/ML
8 INJECTION INTRAMUSCULAR; INTRAVENOUS ONCE
Status: COMPLETED | OUTPATIENT
Start: 2023-07-17 | End: 2023-07-17

## 2023-07-17 RX ORDER — HEPARIN SODIUM (PORCINE) LOCK FLUSH IV SOLN 100 UNIT/ML 100 UNIT/ML
5 SOLUTION INTRAVENOUS
Status: DISCONTINUED | OUTPATIENT
Start: 2023-07-17 | End: 2023-07-17 | Stop reason: HOSPADM

## 2023-07-17 RX ADMIN — HEPARIN SODIUM (PORCINE) LOCK FLUSH IV SOLN 100 UNIT/ML 5 ML: 100 SOLUTION at 11:23

## 2023-07-17 RX ADMIN — Medication 250 ML: at 09:12

## 2023-07-17 RX ADMIN — HEPARIN SODIUM (PORCINE) LOCK FLUSH IV SOLN 100 UNIT/ML 5 ML: 100 SOLUTION at 08:08

## 2023-07-17 RX ADMIN — ONDANSETRON 8 MG: 2 INJECTION INTRAMUSCULAR; INTRAVENOUS at 09:20

## 2023-07-17 ASSESSMENT — PAIN SCALES - GENERAL: PAINLEVEL: NO PAIN (0)

## 2023-07-17 NOTE — PROGRESS NOTES
Port accessed with no incidient. Site cleaned with chloraprep for 30 seconds. Site dry and accessed with appropriate port needle. Sterile dressing applied. Blood return noted and lab tubes drawn. Port flushed with saline and heparin. Patient tolerated port draw well.   Sienna Jordan RN

## 2023-07-17 NOTE — PROGRESS NOTES
Oncology Follow Up Visit: July 17, 2023    Oncologist: Dr. 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 On 2/3/2023, a mammogram found possible calcifications in the upper right breast. A diagnostic mammogram and ultrasound on 3/2/2023 showed fine pleomorphic calcifications. On 3/17/2023, a biopsy revealed invasive ductal carcinoma (ER/IL +, HER2 -) with associated DCIS. Azalea underwent a bilateral mastectomy on 4/24/2023- multifocal IDC (upper outer, upper inner quadrant) largest tumor measuring 16 mm, grade 3, no LVSI, negative invasive margins, with 10cm of DCIS (grade 3, comedonecrosis), with multiple positive DCIS margins (anterior, superior), close posterior margin, 0/1 LN, pT1cN0.   *Oncotype score was 22   *Breast Actionable Panel - negative for mutations in the EDWARD, BARD1, BRCA1, BRCA2, CDH1, CHEK2, NF1, PALB2, PTEN, STK11, and TP53 genes by sequencing and deletion/duplication analysis. No mutations were found in any of the 11 genes analyzed.   Treatment:   4/24/2023 Bilateral Mastectomy with reconstruction - expanders present bilaterally.  6/26/2023 Began adjuvant TC    Interval History: Ms. Mckeon comes to clinic with mother Em,  for review of first cycle of TC for treatment of her breast cancer. She is due for cycle 2 today. Pt is reporting she did well with few side effects though did noted a couple waves of nausea first week but shares that the nausea medication once daily took care of all concerns and she was able to eat well and feels she has not lost weight.  She had no complaints by the second week.  Mother did stay with her during the first week and did take care of most of the children duties since she has  and 3 children's ages 13,12 and 10.  Bowel and bladder were completely normal.  Denies any other new symptoms of headaches chest  "pain shortness of breath or skin rashes-did have a rash prior to treatment that was treated with steroids and did not return.  Rest of comprehensive and complete ROS is reviewed and is negative.   Past Medical History:   Diagnosis Date     Cancer (H)     right breast, s/p bilateral mastectomy     NO ACTIVE PROBLEMS      Current Outpatient Medications   Medication     HYDROmorphone (DILAUDID) 2 MG tablet     lidocaine-prilocaine (EMLA) 2.5-2.5 % external cream     methocarbamol (ROBAXIN) 500 MG tablet     ondansetron (ZOFRAN ODT) 4 MG ODT tab     ondansetron (ZOFRAN) 8 MG tablet     prochlorperazine (COMPAZINE) 10 MG tablet     dexamethasone (DECADRON) 4 MG tablet     No current facility-administered medications for this visit.     Allergies   Allergen Reactions     Percocet [Oxycodone-Acetaminophen] Itching     Latex Rash       Physical Exam:/77 (BP Location: Left arm, Patient Position: Chair, Cuff Size: Adult Regular)   Pulse 82   Ht 1.651 m (5' 5\")   Wt 73 kg (161 lb)   LMP 06/01/2023   SpO2 98%   BMI 26.79 kg/m     ECOG PS- 0  Constitutional: Alert, healthy, and in no distress.   ENT: Eyes bright , No mouth sores  Neck: Supple, No adenopathy.  Cardiac: Heart rate and rhythm is regular and strong without murmur  Respiratory: Breathing easy. Lung sounds clear to auscultation  Breasts: Bilaterally post mastectomy breasts with expanders noted with some inflammation noted over the breasts bilaterally-and states she still working with therapy and is trying to do some massage.  No signs of infection.  GI: Abdomen is soft, non-tender, BS normal. No masses or organomegaly  MS: Muscle tone normal, extremities normal with no edema.   Skin: No suspicious lesions or rashes  Neuro: Sensory grossly WNL, gait normal.   Lymph: Normal ant/post cervical, axillary, supraclavicular nodes  Psych: Mentation appears normal and affect normal/bright with good conversation.    Laboratory/Imaging Results:   Results for orders " placed or performed in visit on 07/17/23   Comprehensive metabolic panel     Status: Abnormal   Result Value Ref Range    Sodium 141 136 - 145 mmol/L    Potassium 4.1 3.4 - 5.3 mmol/L    Chloride 109 (H) 98 - 107 mmol/L    Carbon Dioxide (CO2) 22 22 - 29 mmol/L    Anion Gap 10 7 - 15 mmol/L    Urea Nitrogen 12.0 6.0 - 20.0 mg/dL    Creatinine 0.72 0.51 - 0.95 mg/dL    Calcium 9.2 8.6 - 10.0 mg/dL    Glucose 102 (H) 70 - 99 mg/dL    Alkaline Phosphatase 39 35 - 104 U/L    AST 68 (H) 0 - 45 U/L     (H) 0 - 50 U/L    Protein Total 6.5 6.4 - 8.3 g/dL    Albumin 4.3 3.5 - 5.2 g/dL    Bilirubin Total 0.2 <=1.2 mg/dL    GFR Estimate >90 >60 mL/min/1.73m2   CBC with platelets and differential     Status: None   Result Value Ref Range    WBC Count 5.4 4.0 - 11.0 10e3/uL    RBC Count 4.05 3.80 - 5.20 10e6/uL    Hemoglobin 12.1 11.7 - 15.7 g/dL    Hematocrit 36.4 35.0 - 47.0 %    MCV 90 78 - 100 fL    MCH 29.9 26.5 - 33.0 pg    MCHC 33.2 31.5 - 36.5 g/dL    RDW 13.2 10.0 - 15.0 %    Platelet Count 224 150 - 450 10e3/uL    % Neutrophils 68 %    % Lymphocytes 21 %    % Monocytes 8 %    % Eosinophils 1 %    % Basophils 1 %    % Immature Granulocytes 1 %    NRBCs per 100 WBC 0 <1 /100    Absolute Neutrophils 3.7 1.6 - 8.3 10e3/uL    Absolute Lymphocytes 1.1 0.8 - 5.3 10e3/uL    Absolute Monocytes 0.4 0.0 - 1.3 10e3/uL    Absolute Eosinophils 0.0 0.0 - 0.7 10e3/uL    Absolute Basophils 0.1 0.0 - 0.2 10e3/uL    Absolute Immature Granulocytes 0.0 <=0.4 10e3/uL    Absolute NRBCs 0.0 10e3/uL   CBC with platelets differential     Status: None    Narrative    The following orders were created for panel order CBC with platelets differential.  Procedure                               Abnormality         Status                     ---------                               -----------         ------                     CBC with platelets and d...[[id number]]                      Final result                 Please view results for these  tests on the individual orders.       Assessment and Plan:   Early-stage, hormone positive, HER2 negative Right breast cancer- Here for review prior to cycle 2 of TC and did share that she had some nausea early in cycle that was easily cared for by oral antiemetic once daily.  She has not lost any weight and has had no other concerns that she noted with first cycle and is happy about this.  We will note with labs today that she had elevated ALT AST up to 3 times the upper limit of normal and the ALT. Bilirubin is normal.  Discussed with pharmacy and we will proceed today with no change in plan however with the start of next visit if we are seeing further elevation in plan we will have to dose reduce both Taxotere and Cytoxan.  Discussed with patient to abstain from alcohol and limit her NSAIDs and try and use Tylenol only if needed.  She will also stop use of a tea with turmeric and brijesh.  But will continue with the THC Gummies for bedtime.  She will continue to push fluids.  We will continue use of G-CSF for prevention of chemotherapy-induced neutropenia which did cause some bone pain for 4 days in plan with use of hydromorphone short term.   Patient has had genetic testing today has known distant family history of breast cancer in cousins and great aunt but has young daughter at home and may consider further testing in the future.  *She will contact us with new symptoms.  And we will see her prior to cycle 3 with full lab review prior to treatment  The total time of this encounter amounted to 50 minutes. This time included face-to-face time spent elevated liver enzymes with the patient, prep work, ordering tests, and performing post visit documentation.    Shabnam Sharif,Cnp

## 2023-07-17 NOTE — NURSING NOTE
"Oncology Rooming Note    July 17, 2023 8:24 AM   Azalea Mckeon is a 43 year old female who presents for:    Chief Complaint   Patient presents with     Oncology Clinic Visit     Prior to tx     Initial Vitals: /77 (BP Location: Left arm, Patient Position: Chair, Cuff Size: Adult Regular)   Pulse 82   Ht 1.651 m (5' 5\")   Wt 73 kg (161 lb)   LMP 06/01/2023   SpO2 98%   BMI 26.79 kg/m   Estimated body mass index is 26.79 kg/m  as calculated from the following:    Height as of this encounter: 1.651 m (5' 5\").    Weight as of this encounter: 73 kg (161 lb). Body surface area is 1.83 meters squared.  No Pain (0) Comment: Data Unavailable   Patient's last menstrual period was 06/01/2023.  Allergies reviewed: Yes  Medications reviewed: Yes    Medications: Medication refills not needed today.  Pharmacy name entered into [employer]: [id number] [place], [place] - [address]    Clinical concerns: NO Shabnam was notified.      Gemini Kim CMA              "

## 2023-07-17 NOTE — LETTER
7/17/2023         RE: Azalea Mckeon  [id number] [facility] [id number]        Dear Colleague,    Thank you for referring your patient, Azalea Mckeon, to the [facility]. Please see a copy of my visit note below.    Oncology Follow Up Visit: July 17, 2023    Oncologist: Dr. 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 On 2/3/2023, a mammogram found possible calcifications in the upper right breast. A diagnostic mammogram and ultrasound on 3/2/2023 showed fine pleomorphic calcifications. On 3/17/2023, a biopsy revealed invasive ductal carcinoma (ER/AR +, HER2 -) with associated DCIS. Azalea underwent a bilateral mastectomy on 4/24/2023- multifocal IDC (upper outer, upper inner quadrant) largest tumor measuring 16 mm, grade 3, no LVSI, negative invasive margins, with 10cm of DCIS (grade 3, comedonecrosis), with multiple positive DCIS margins (anterior, superior), close posterior margin, 0/1 LN, pT1cN0.   *Oncotype score was 22   *Breast Actionable Panel - negative for mutations in the EDWARD, BARD1, BRCA1, BRCA2, CDH1, CHEK2, NF1, PALB2, PTEN, STK11, and TP53 genes by sequencing and deletion/duplication analysis. No mutations were found in any of the 11 genes analyzed.   Treatment:   4/24/2023 Bilateral Mastectomy with reconstruction - expanders present bilaterally.  6/26/2023 Began adjuvant TC    Interval History: Ms. Mckeon comes to clinic with mother Em,  for review of first cycle of TC for treatment of her breast cancer. She is due for cycle 2 today. Pt is reporting she did well with few side effects though did noted a couple waves of nausea first week but shares that the nausea medication once daily took care of all concerns and she was able to eat well and feels she has not lost weight.  She had no complaints by the  "second week.  Mother did stay with her during the first week and did take care of most of the children duties since she has  and 3 children's ages 13,12 and 10.  Bowel and bladder were completely normal.  Denies any other new symptoms of headaches chest pain shortness of breath or skin rashes-did have a rash prior to treatment that was treated with steroids and did not return.  Rest of comprehensive and complete ROS is reviewed and is negative.   Past Medical History:   Diagnosis Date     Cancer (H)     right breast, s/p bilateral mastectomy     NO ACTIVE PROBLEMS      Current Outpatient Medications   Medication     HYDROmorphone (DILAUDID) 2 MG tablet     lidocaine-prilocaine (EMLA) 2.5-2.5 % external cream     methocarbamol (ROBAXIN) 500 MG tablet     ondansetron (ZOFRAN ODT) 4 MG ODT tab     ondansetron (ZOFRAN) 8 MG tablet     prochlorperazine (COMPAZINE) 10 MG tablet     dexamethasone (DECADRON) 4 MG tablet     No current facility-administered medications for this visit.     Allergies   Allergen Reactions     Percocet [Oxycodone-Acetaminophen] Itching     Latex Rash       Physical Exam:/77 (BP Location: Left arm, Patient Position: Chair, Cuff Size: Adult Regular)   Pulse 82   Ht 1.651 m (5' 5\")   Wt 73 kg (161 lb)   LMP 06/01/2023   SpO2 98%   BMI 26.79 kg/m     ECOG PS- 0  Constitutional: Alert, healthy, and in no distress.   ENT: Eyes bright , No mouth sores  Neck: Supple, No adenopathy.  Cardiac: Heart rate and rhythm is regular and strong without murmur  Respiratory: Breathing easy. Lung sounds clear to auscultation  Breasts: Bilaterally post mastectomy breasts with expanders noted with some inflammation noted over the breasts bilaterally-and states she still working with therapy and is trying to do some massage.  No signs of infection.  GI: Abdomen is soft, non-tender, BS normal. No masses or organomegaly  MS: Muscle tone normal, extremities normal with no edema.   Skin: No suspicious " lesions or rashes  Neuro: Sensory grossly WNL, gait normal.   Lymph: Normal ant/post cervical, axillary, supraclavicular nodes  Psych: Mentation appears normal and affect normal/bright with good conversation.    Laboratory/Imaging Results:   Results for orders placed or performed in visit on 07/17/23   Comprehensive metabolic panel     Status: Abnormal   Result Value Ref Range    Sodium 141 136 - 145 mmol/L    Potassium 4.1 3.4 - 5.3 mmol/L    Chloride 109 (H) 98 - 107 mmol/L    Carbon Dioxide (CO2) 22 22 - 29 mmol/L    Anion Gap 10 7 - 15 mmol/L    Urea Nitrogen 12.0 6.0 - 20.0 mg/dL    Creatinine 0.72 0.51 - 0.95 mg/dL    Calcium 9.2 8.6 - 10.0 mg/dL    Glucose 102 (H) 70 - 99 mg/dL    Alkaline Phosphatase 39 35 - 104 U/L    AST 68 (H) 0 - 45 U/L     (H) 0 - 50 U/L    Protein Total 6.5 6.4 - 8.3 g/dL    Albumin 4.3 3.5 - 5.2 g/dL    Bilirubin Total 0.2 <=1.2 mg/dL    GFR Estimate >90 >60 mL/min/1.73m2   CBC with platelets and differential     Status: None   Result Value Ref Range    WBC Count 5.4 4.0 - 11.0 10e3/uL    RBC Count 4.05 3.80 - 5.20 10e6/uL    Hemoglobin 12.1 11.7 - 15.7 g/dL    Hematocrit 36.4 35.0 - 47.0 %    MCV 90 78 - 100 fL    MCH 29.9 26.5 - 33.0 pg    MCHC 33.2 31.5 - 36.5 g/dL    RDW 13.2 10.0 - 15.0 %    Platelet Count 224 150 - 450 10e3/uL    % Neutrophils 68 %    % Lymphocytes 21 %    % Monocytes 8 %    % Eosinophils 1 %    % Basophils 1 %    % Immature Granulocytes 1 %    NRBCs per 100 WBC 0 <1 /100    Absolute Neutrophils 3.7 1.6 - 8.3 10e3/uL    Absolute Lymphocytes 1.1 0.8 - 5.3 10e3/uL    Absolute Monocytes 0.4 0.0 - 1.3 10e3/uL    Absolute Eosinophils 0.0 0.0 - 0.7 10e3/uL    Absolute Basophils 0.1 0.0 - 0.2 10e3/uL    Absolute Immature Granulocytes 0.0 <=0.4 10e3/uL    Absolute NRBCs 0.0 10e3/uL   CBC with platelets differential     Status: None    Narrative    The following orders were created for panel order CBC with platelets differential.  Procedure                                Abnormality         Status                     ---------                               -----------         ------                     CBC with platelets and d...[[id number]]                      Final result                 Please view results for these tests on the individual orders.       Assessment and Plan:   Early-stage, hormone positive, HER2 negative Right breast cancer- Here for review prior to cycle 2 of TC and did share that she had some nausea early in cycle that was easily cared for by oral antiemetic once daily.  She has not lost any weight and has had no other concerns that she noted with first cycle and is happy about this.  We will note with labs today that she had elevated ALT AST up to 3 times the upper limit of normal and the ALT. Bilirubin is normal.  Discussed with pharmacy and we will proceed today with no change in plan however with the start of next visit if we are seeing further elevation in plan we will have to dose reduce both Taxotere and Cytoxan.  Discussed with patient to abstain from alcohol and limit her NSAIDs and try and use Tylenol only if needed.  She will also stop use of a tea with turmeric and brijesh.  But will continue with the THC Gummies for bedtime.  She will continue to push fluids.  We will continue use of G-CSF for prevention of chemotherapy-induced neutropenia.  Patient has had genetic testing today has known distant family history of breast cancer in cousins and great aunt but has young daughter at home and may consider further testing in the future.  *She will contact us with new symptoms.  And we will see her prior to cycle 3 with full lab review prior to treatment  The total time of this encounter amounted to 50 minutes. This time included face-to-face time spent elevated liver enzymes with the patient, prep work, ordering tests, and performing post visit documentation.    Shabnam Sharif,Cnp        Again, thank you for allowing me to participate in the care  of your patient.        Sincerely,        Shabnam Sharif, FRANSISCO, APRN CNP

## 2023-07-17 NOTE — PROGRESS NOTES
Infusion Nursing Note:  Azalea Mckeon presents today for C2D1 Taxotere, Cytoxan, and Onpro.    Patient seen by provider today: Yes: Shabnam Sharif NP    present during visit today: Not Applicable.    Note: The patient reports feeling well today. She reports dealing with significant bone pain after receiving the Onpro with the first cycle. Discussed taking Claritin daily and patient states she she has PRN PO Dilaudid for bone pain.      - Shabnam Sharif NP aware and she discussed this with Pharmacy - okay to proceed with chemo today.       Intravenous Access:  Implanted Port.    Treatment Conditions:  Lab Results   Component Value Date    HGB 12.1 07/17/2023    WBC 5.4 07/17/2023    ANEU 7.4 03/12/2013    ANEUTAUTO 3.7 07/17/2023     07/17/2023      Lab Results   Component Value Date     07/17/2023    POTASSIUM 4.1 07/17/2023    CR 0.72 07/17/2023    MINNA 9.2 07/17/2023    BILITOTAL 0.2 07/17/2023    ALBUMIN 4.3 07/17/2023     (H) 07/17/2023    AST 68 (H) 07/17/2023     Results reviewed, labs MET treatment parameters, ok to proceed with treatment.      Post Infusion Assessment:  Patient tolerated infusion without incident.  Blood return noted pre and post infusion.  Site patent and intact, free from redness, edema or discomfort.  No evidence of extravasations.  Access discontinued per protocol.     ONPRO  Was placed on patient's: right side of abdomen.    Was placed at 11:00 AM    Podpal used: Yes    ONPRO injector device Lot number: [id number]    Patient education included: what patient can expect after application, what colored lights mean on the device, when to remove device, when and where to call with questions or issues, all patients questions answered and that Neulasta administration will occur at 2:00 pm.    Patient tolerated administration well.       Discharge Plan:   [email or web address]  Patient will return 8/7/23 for next appointment. Future appts have been  reviewed and crosschecked with appt note and plan.   Patient discharged in stable condition accompanied by: mother.  Departure Mode: Ambulatory.      Jennifer Lobo RN

## 2023-07-17 NOTE — Clinical Note
Please note her ALT is at 3 x ULN with start of cycle 2.  She will stop and herbal drink with Tumeric and brijesh and abstain from alcohol and we will see about next cycle.  Shabnam

## 2023-07-25 ENCOUNTER — THERAPY VISIT (OUTPATIENT)
Dept: PHYSICAL THERAPY | Facility: CLINIC | Age: 43
End: 2023-07-25
Attending: PLASTIC SURGERY
Payer: COMMERCIAL

## 2023-07-25 DIAGNOSIS — I89.0 LYMPHEDEMA: Primary | ICD-10-CM

## 2023-07-25 PROCEDURE — 97140 MANUAL THERAPY 1/> REGIONS: CPT | Mod: GP | Performed by: REHABILITATION PRACTITIONER

## 2023-07-27 ENCOUNTER — THERAPY VISIT (OUTPATIENT)
Dept: PHYSICAL THERAPY | Facility: CLINIC | Age: 43
End: 2023-07-27
Attending: PLASTIC SURGERY
Payer: COMMERCIAL

## 2023-07-27 DIAGNOSIS — I89.0 LYMPHEDEMA: Primary | ICD-10-CM

## 2023-07-27 PROCEDURE — 97140 MANUAL THERAPY 1/> REGIONS: CPT | Mod: GP | Performed by: REHABILITATION PRACTITIONER

## 2023-08-01 ENCOUNTER — THERAPY VISIT (OUTPATIENT)
Dept: PHYSICAL THERAPY | Facility: CLINIC | Age: 43
End: 2023-08-01
Attending: PLASTIC SURGERY
Payer: COMMERCIAL

## 2023-08-01 DIAGNOSIS — I89.0 LYMPHEDEMA: Primary | ICD-10-CM

## 2023-08-01 PROCEDURE — 97140 MANUAL THERAPY 1/> REGIONS: CPT | Mod: GP | Performed by: REHABILITATION PRACTITIONER

## 2023-08-01 NOTE — PROGRESS NOTES
"Virtual Visit Details  Type of service:  Video Visit   Originating Location (pt. Location): Home  Distant Location (provider location):  Off-site  Platform used for Video Visit: Kiya    8/2/2023    Referring Provider: Mitra Souza MD     Presenting Information:  I spoke to Azalea today to discuss her genetic testing results. Her blood was drawn on 3/29/2023. The Hereditary Cancer Comprehensive 40-gene panel was ordered from [facility]. This testing was done because of Azalea's Azalea's personal history of breast cancer and family history of breast cancer.    Genetic Testing Result: NEGATIVE  Azalea tested negative for mutations in the APC, EDWARD, AXIN2, BAP1, BARD1, BMPR1A, BRCA1, BRCA2, BRIP1, CDH1, CDK4, CDKN2A, CHEK2, DICER1, EPCAM, GREM1, HOXB13, MITF, MLH1, MRE11A, MSH2, MSH3, MSH6, MUTYH, NBN, NF1, NTHL1, PALB2, PMS2, POLD1, POLE, POT1, PTEN, RAD50, RAD51C, RAD51D, SMAD4, SMARCA4, STK11, and TP53 gene. No mutations were found in any of the 40 genes analyzed. This test involved sequencing and deletion/duplication analysis of all genes with the exceptions of EPCAM and GREM1 (deletions/duplications only). Please see a copy of the scanned test report for complete details regarding testing methodology/limitations.    A copy of the test report can be found in the Laboratory tab, dated 7/10/2023, and named \"Next generation sequencing\".     Interpretation:  We discussed several different interpretations of this negative test result.    One explanation may be that there is a different gene or combination of genes and environment that are associated with the cancers in this family.  It is possible that her relatives diagnosed with cancer did have a mutation and she did not inherit it.  There is also a small possibility that there is a mutation in one of these genes, and the testing laboratory could not find it with their current testing methods.       Screening:  Based on " this negative test result, it is important for Azalea and her relatives to refer back to the family history for appropriate cancer screening.    Azalea should continue to follow all breast cancer treatment and future follow-up recommendations as made by her oncology team.  Azalea s close female relatives remain at increased risk for breast cancer given their family history. Breast cancer screening is generally recommended to begin approximately 10 years younger than the earliest age of breast cancer diagnosis in the family, or at age 40, whichever comes first. In this family, screening may begin at age 33. Breast screening options should be discussed with an individual's primary care provider and a genetic counselor, to determine at what age to begin screening, what screening is appropriate, and if additional screening (such as breast MRI) is necessary based on personal/family history factors.  Other population cancer screening options, such as those recommended by the American Cancer Society and the National Comprehensive Cancer Network (NCCN), are also appropriate for Azalea and her family. These screening recommendations may change if there are changes to Azalea's personal and/or family history of cancer.   Final screening recommendations should be made by each individual's primary care provider.    Inheritance:  We reviewed autosomal dominant inheritance. We discussed that Azalea did not pass on an identifiable mutation in these genes to her children based on this test result.  Mutations in these genes do not skip generations.      Additional Testing Considerations:  Although Azalea's genetic testing result was negative, other relatives may still carry a gene mutation associated with breast cancer. Genetic counseling is recommended for close relatives of Azalea's maternal first cousins once removed who were diagnosed with breast cancer and reportedly had positive genetic testing to discuss genetic testing options. If  any of these relatives do pursue genetic testing, Azalea is encouraged to contact me so that we may review the impact of their test results on her.    Summary:  We do not have an explanation for Azalea's personal history of cancer. While no genetic changes were identified, Azalea may still be at risk for certain cancers due to family history, environmental factors, or other genetic causes not identified by this test.  Because of that, it is important that she continue with cancer screening based on her personal and family history as discussed above.    Genetic testing is rapidly advancing, and new cancer susceptibility genes will most likely be identified in the future.  Therefore, I encouraged Azalea to contact me regularly or if there are changes in her personal or family history.  This may change how we assess her cancer risk, screening, and the testing we would offer.    Plan:  1. A copy of the test results will be mailed to Azalea.  2. She plans to follow-up with her other providers.  3. She should contact me regularly, or sooner if her family history changes.    If Azalea has any further questions, I encouraged her to contact me at [phone].     Time spent on video: 5 minutes.    Rain Lou MS, [facility] [place]  Licensed, Certified Genetic Counselor  Phone: [phone]

## 2023-08-02 ENCOUNTER — VIRTUAL VISIT (OUTPATIENT)
Dept: ONCOLOGY | Facility: CLINIC | Age: 43
End: 2023-08-02
Payer: COMMERCIAL

## 2023-08-02 DIAGNOSIS — Z17.0 MALIGNANT NEOPLASM OF OVERLAPPING SITES OF RIGHT BREAST IN FEMALE, ESTROGEN RECEPTOR POSITIVE (H): Primary | ICD-10-CM

## 2023-08-02 DIAGNOSIS — C50.811 MALIGNANT NEOPLASM OF OVERLAPPING SITES OF RIGHT BREAST IN FEMALE, ESTROGEN RECEPTOR POSITIVE (H): Primary | ICD-10-CM

## 2023-08-02 DIAGNOSIS — Z80.3 FAMILY HISTORY OF MALIGNANT NEOPLASM OF BREAST: ICD-10-CM

## 2023-08-02 PROCEDURE — 999N000069 HC STATISTIC GENETIC COUNSELING, < 16 MIN: Mod: 95

## 2023-08-02 NOTE — PATIENT INSTRUCTIONS
Negative Genetic Test Result    Genetic Testing  Genetic testing involved a blood or saliva test which looked at the genetic information in select genes for variants associated with cancer risk.  This testing may have included analysis of a single gene due to a known variant in the family, multiple genes most associated with the cancers in a family, or an expanded panel of genes related to many types of cancers.     Results  There are several possible genetic test results, including:   Positive--a harmful mutation (also known as a  pathogenic  or  likely pathogenic  variant) was identified in a gene associated with increased cancer risk.  These risks, as well as medical management options, depend on the specific genetic variant identified.    Negative--no variants were identified in the genes analyzed   Variant of unknown significance--a variant was identified in one or more genes, though it is currently unclear how this impacts cancer risk in the family.  Genetic testing labs are working to collect evidence about these uncertain variants and may provide updates in the future.    What Does a Negative Genetic Test Result Mean?  A negative genetic test results means that no genetic changes (variants) were detected in the genes tested. While no inherited risk factors for cancer were identified, you and your family may be at risk for certain cancers due to family history, environmental factors, or other genetic causes not identified by this test.  It is also possible that your family may still be at risk of carrying a genetic risk factor which you did not inherit. Your genetic counselor can help interpret the result for you and your relatives.      It is important to note which genes were included in your test. A list of these genes can be found on your test result.    Screening Recommendations  A combination of personal and family history factors may inform cancer risk and medical management recommendations.   Population cancer screening options, such as those recommended by the American Cancer Society and the National Comprehensive Cancer Network (NCCN) are appropriate for many families at average risk for cancer.  However, earlier and/or more frequent screening may be recommended based on personal factors (lifestyle, exposures, medications, screening results), family history of cancer, and sometimes genetic factors.  These cancer risk management options should be discussed in more detail with an individual's medical providers.      Please call us if you have any questions or concerns.   Cancer Risk Management Program (Appointments: [phone])  Braxton Wong, MS [facility]  [phone]  Rain Lou, MS, [facility] [phone]  Nedra Crane, MS, [facility]  [phone]  Kassandra Garcia, MS, [facility] [place]  [phone]  Monica Salvador, MS, [facility] [place]  [phone]  Eva Schneider, MS, [facility] [phone]  Ghazala Hood, MS, [facility] [phone]

## 2023-08-02 NOTE — LETTER
"Cancer Risk Management  Program Locations    [facility]  [facility]  [facility]  [facility]  Mailing Address  Cancer Risk Management Program  [address]  [place] [address]    New patient appointments  [phone]    August 2, 2023    Azalea Mckeon  [address] [address] [address]    Dear Azalea,    It was a pleasure talking with you via your virtual genetic counseling visit on 8/2/2023.  Below is a copy of the progress note from that recent visit through the Cancer Risk Management Program.  If you have any additional questions, please feel free to contact me.    Referring Provider: Mitra Souza MD     Presenting Information:  I spoke to Azalea today to discuss her genetic testing results. Her blood was drawn on 3/29/2023. The Hereditary Cancer Comprehensive 40-gene panel was ordered from [facility]. This testing was done because of Azalea's Azalea's personal history of breast cancer and family history of breast cancer.    Genetic Testing Result: NEGATIVE  Azalea tested negative for mutations in the APC, EDWARD, AXIN2, BAP1, BARD1, BMPR1A, BRCA1, BRCA2, BRIP1, CDH1, CDK4, CDKN2A, CHEK2, DICER1, EPCAM, GREM1, HOXB13, MITF, MLH1, MRE11A, MSH2, MSH3, MSH6, MUTYH, NBN, NF1, NTHL1, PALB2, PMS2, POLD1, POLE, POT1, PTEN, RAD50, RAD51C, RAD51D, SMAD4, SMARCA4, STK11, and TP53 gene. No mutations were found in any of the 40 genes analyzed. This test involved sequencing and deletion/duplication analysis of all genes with the exceptions of EPCAM and GREM1 (deletions/duplications only). Please see a copy of the scanned test report for complete details regarding testing methodology/limitations.    A copy of the test report can be found in the Laboratory tab, dated 7/10/2023, and named \"Next " "generation sequencing\".     Interpretation:  We discussed several different interpretations of this negative test result.    One explanation may be that there is a different gene or combination of genes and environment that are associated with the cancers in this family.  It is possible that her relatives diagnosed with cancer did have a mutation and she did not inherit it.  There is also a small possibility that there is a mutation in one of these genes, and the testing laboratory could not find it with their current testing methods.       Screening:  Based on this negative test result, it is important for Azalea and her relatives to refer back to the family history for appropriate cancer screening.    Azalea should continue to follow all breast cancer treatment and future follow-up recommendations as made by her oncology team.  Azalea s close female relatives remain at increased risk for breast cancer given their family history. Breast cancer screening is generally recommended to begin approximately 10 years younger than the earliest age of breast cancer diagnosis in the family, or at age 40, whichever comes first. In this family, screening may begin at age 33. Breast screening options should be discussed with an individual's primary care provider and a genetic counselor, to determine at what age to begin screening, what screening is appropriate, and if additional screening (such as breast MRI) is necessary based on personal/family history factors.  Other population cancer screening options, such as those recommended by the American Cancer Society and the National Comprehensive Cancer Network (NCCN), are also appropriate for Azalea and her family. These screening recommendations may change if there are changes to Azalea's personal and/or family history of cancer.   Final screening recommendations should be made by each individual's primary care provider.    Inheritance:  We reviewed autosomal dominant inheritance. We " discussed that Azalea did not pass on an identifiable mutation in these genes to her children based on this test result.  Mutations in these genes do not skip generations.      Additional Testing Considerations:  Although Azalea's genetic testing result was negative, other relatives may still carry a gene mutation associated with breast cancer. Genetic counseling is recommended for close relatives of Azalea's maternal first cousins once removed who were diagnosed with breast cancer and reportedly had positive genetic testing to discuss genetic testing options. If any of these relatives do pursue genetic testing, Azalea is encouraged to contact me so that we may review the impact of their test results on her.    Summary:  We do not have an explanation for Azalea's personal history of cancer. While no genetic changes were identified, Azalea may still be at risk for certain cancers due to family history, environmental factors, or other genetic causes not identified by this test.  Because of that, it is important that she continue with cancer screening based on her personal and family history as discussed above.    Genetic testing is rapidly advancing, and new cancer susceptibility genes will most likely be identified in the future.  Therefore, I encouraged Azalea to contact me regularly or if there are changes in her personal or family history.  This may change how we assess her cancer risk, screening, and the testing we would offer.    Plan:  1. A copy of the test results will be mailed to Azalea.  2. She plans to follow-up with her other providers.  3. She should contact me regularly, or sooner if her family history changes.    If Azalea has any further questions, I encouraged her to contact me at [phone].     Rain Lou MS, [facility] [place]  Licensed, Certified Genetic Counselor  Phone: [phone]

## 2023-08-02 NOTE — Clinical Note
"    8/2/2023         RE: Azalea Mckeon  [address] [address] [address]        Dear Colleague,    Thank you for referring your patient, Azalea Mckeon, to the [facility]. Please see a copy of my visit note below.    Virtual Visit Details  Type of service:  Video Visit   Originating Location (pt. Location): Home  Distant Location (provider location):  Off-site  Platform used for Video Visit: AmWell    8/2/2023    Referring Provider: Mitra Souza MD     Presenting Information:  I spoke to Azalea today to discuss her genetic testing results. Her blood was drawn on 3/29/2023. The Hereditary Cancer Comprehensive 40-gene panel was ordered from [facility]. This testing was done because of Azalea's Azalea's personal history of breast cancer and family history of breast cancer.    Genetic Testing Result: NEGATIVE  Azalea tested negative for mutations in the APC, EDWARD, AXIN2, BAP1, BARD1, BMPR1A, BRCA1, BRCA2, BRIP1, CDH1, CDK4, CDKN2A, CHEK2, DICER1, EPCAM, GREM1, HOXB13, MITF, MLH1, MRE11A, MSH2, MSH3, MSH6, MUTYH, NBN, NF1, NTHL1, PALB2, PMS2, POLD1, POLE, POT1, PTEN, RAD50, RAD51C, RAD51D, SMAD4, SMARCA4, STK11, and TP53 gene. No mutations were found in any of the 40 genes analyzed. This test involved sequencing and deletion/duplication analysis of all genes with the exceptions of EPCAM and GREM1 (deletions/duplications only). Please see a copy of the scanned test report for complete details regarding testing methodology/limitations.    A copy of the test report can be found in the Laboratory tab, dated 7/10/2023, and named \"Next generation sequencing\".     Interpretation:  We discussed several different interpretations of this negative test result.    One explanation may be that there is a different gene or combination of genes and environment that are associated with the cancers in this family.  It is possible that her relatives diagnosed " with cancer did have a mutation and she did not inherit it.  There is also a small possibility that there is a mutation in one of these genes, and the testing laboratory could not find it with their current testing methods.       Screening:  Based on this negative test result, it is important for Azalea and her relatives to refer back to the family history for appropriate cancer screening.    Azalea should continue to follow all breast cancer treatment and future follow-up recommendations as made by her oncology team.  Azalea s close female relatives remain at increased risk for breast cancer given their family history. Breast cancer screening is generally recommended to begin approximately 10 years younger than the earliest age of breast cancer diagnosis in the family, or at age 40, whichever comes first. In this family, screening may begin at age 33. Breast screening options should be discussed with an individual's primary care provider and a genetic counselor, to determine at what age to begin screening, what screening is appropriate, and if additional screening (such as breast MRI) is necessary based on personal/family history factors.  Other population cancer screening options, such as those recommended by the American Cancer Society and the National Comprehensive Cancer Network (NCCN), are also appropriate for Azalea and her family. These screening recommendations may change if there are changes to Azalea's personal and/or family history of cancer.   Final screening recommendations should be made by each individual's primary care provider.    Inheritance:  We reviewed autosomal dominant inheritance. We discussed that Azalea did not pass on an identifiable mutation in these genes to her children based on this test result.  Mutations in these genes do not skip generations.      Additional Testing Considerations:  Although Azalea's genetic testing result was negative, other relatives may still carry a gene mutation  associated with breast cancer. Genetic counseling is recommended for close relatives of Azalea's maternal first cousins once removed who were diagnosed with breast cancer and reportedly had positive genetic testing to discuss genetic testing options. If any of these relatives do pursue genetic testing, Azalea is encouraged to contact me so that we may review the impact of their test results on her.    Summary:  We do not have an explanation for Azalea's personal history of cancer. While no genetic changes were identified, Azalea may still be at risk for certain cancers due to family history, environmental factors, or other genetic causes not identified by this test.  Because of that, it is important that she continue with cancer screening based on her personal and family history as discussed above.    Genetic testing is rapidly advancing, and new cancer susceptibility genes will most likely be identified in the future.  Therefore, I encouraged Azalea to contact me regularly or if there are changes in her personal or family history.  This may change how we assess her cancer risk, screening, and the testing we would offer.    Plan:  1. A copy of the test results will be mailed to Azalea.  2. She plans to follow-up with her other providers.  3. She should contact me regularly, or sooner if her family history changes.    If Azalea has any further questions, I encouraged her to contact me at [phone].     Time spent on video: *** minutes.    Rain Lou MS, [facility] [place]  Licensed, Certified Genetic Counselor  Phone: [phone]        Again, thank you for allowing me to participate in the care of your patient.        Sincerely,        Rain Lou GC

## 2023-08-02 NOTE — NURSING NOTE
Is the patient currently in the state of [place]? YES    Visit mode:VIDEO    If the visit is dropped, the patient can be reconnected by: VIDEO VISIT: Send to e-mail at: [email or web address]    Will anyone else be joining the visit? NO      How would you like to obtain your AVS? MyChart    Are changes needed to the allergy or medication list? NO    Reason for visit: RECHECK    Elizabeth Nogueira VF

## 2023-08-03 ENCOUNTER — THERAPY VISIT (OUTPATIENT)
Dept: PHYSICAL THERAPY | Facility: CLINIC | Age: 43
End: 2023-08-03
Attending: PLASTIC SURGERY
Payer: COMMERCIAL

## 2023-08-03 DIAGNOSIS — I89.0 LYMPHEDEMA: Primary | ICD-10-CM

## 2023-08-03 PROCEDURE — 97140 MANUAL THERAPY 1/> REGIONS: CPT | Mod: GP | Performed by: REHABILITATION PRACTITIONER

## 2023-08-07 ENCOUNTER — ONCOLOGY VISIT (OUTPATIENT)
Dept: ONCOLOGY | Facility: CLINIC | Age: 43
End: 2023-08-07
Payer: COMMERCIAL

## 2023-08-07 ENCOUNTER — INFUSION THERAPY VISIT (OUTPATIENT)
Dept: INFUSION THERAPY | Facility: CLINIC | Age: 43
End: 2023-08-07
Attending: INTERNAL MEDICINE
Payer: COMMERCIAL

## 2023-08-07 ENCOUNTER — LAB (OUTPATIENT)
Dept: ONCOLOGY | Facility: CLINIC | Age: 43
End: 2023-08-07
Payer: COMMERCIAL

## 2023-08-07 VITALS
RESPIRATION RATE: 16 BRPM | BODY MASS INDEX: 26.99 KG/M2 | HEART RATE: 76 BPM | HEIGHT: 65 IN | DIASTOLIC BLOOD PRESSURE: 74 MMHG | WEIGHT: 162 LBS | SYSTOLIC BLOOD PRESSURE: 107 MMHG | TEMPERATURE: 98.3 F | OXYGEN SATURATION: 97 %

## 2023-08-07 DIAGNOSIS — C50.811 MALIGNANT NEOPLASM OF OVERLAPPING SITES OF RIGHT BREAST IN FEMALE, ESTROGEN RECEPTOR POSITIVE (H): Primary | ICD-10-CM

## 2023-08-07 DIAGNOSIS — C50.811 MALIGNANT NEOPLASM OF OVERLAPPING SITES OF RIGHT BREAST IN FEMALE, ESTROGEN RECEPTOR POSITIVE (H): ICD-10-CM

## 2023-08-07 DIAGNOSIS — T45.1X5A CHEMOTHERAPY-INDUCED NEUTROPENIA (H): ICD-10-CM

## 2023-08-07 DIAGNOSIS — Z17.0 MALIGNANT NEOPLASM OF OVERLAPPING SITES OF RIGHT BREAST IN FEMALE, ESTROGEN RECEPTOR POSITIVE (H): Primary | ICD-10-CM

## 2023-08-07 DIAGNOSIS — D70.1 CHEMOTHERAPY-INDUCED NEUTROPENIA (H): ICD-10-CM

## 2023-08-07 DIAGNOSIS — Z17.0 MALIGNANT NEOPLASM OF OVERLAPPING SITES OF RIGHT BREAST IN FEMALE, ESTROGEN RECEPTOR POSITIVE (H): ICD-10-CM

## 2023-08-07 LAB
ALBUMIN SERPL BCG-MCNC: 4.5 G/DL (ref 3.5–5.2)
ALP SERPL-CCNC: 45 U/L (ref 35–104)
ALT SERPL W P-5'-P-CCNC: 78 U/L (ref 0–50)
ANION GAP SERPL CALCULATED.3IONS-SCNC: 10 MMOL/L (ref 7–15)
AST SERPL W P-5'-P-CCNC: 45 U/L (ref 0–45)
BASOPHILS # BLD AUTO: 0.1 10E3/UL (ref 0–0.2)
BASOPHILS NFR BLD AUTO: 1 %
BILIRUB SERPL-MCNC: 0.3 MG/DL
BUN SERPL-MCNC: 16.1 MG/DL (ref 6–20)
CALCIUM SERPL-MCNC: 9.6 MG/DL (ref 8.6–10)
CHLORIDE SERPL-SCNC: 108 MMOL/L (ref 98–107)
CREAT SERPL-MCNC: 0.78 MG/DL (ref 0.51–0.95)
DEPRECATED HCO3 PLAS-SCNC: 22 MMOL/L (ref 22–29)
EOSINOPHIL # BLD AUTO: 0 10E3/UL (ref 0–0.7)
EOSINOPHIL NFR BLD AUTO: 0 %
ERYTHROCYTE [DISTWIDTH] IN BLOOD BY AUTOMATED COUNT: 14.6 % (ref 10–15)
GFR SERPL CREATININE-BSD FRML MDRD: >90 ML/MIN/1.73M2
GLUCOSE SERPL-MCNC: 103 MG/DL (ref 70–99)
HCT VFR BLD AUTO: 35.7 % (ref 35–47)
HGB BLD-MCNC: 12.1 G/DL (ref 11.7–15.7)
IMM GRANULOCYTES # BLD: 0.1 10E3/UL
IMM GRANULOCYTES NFR BLD: 1 %
LYMPHOCYTES # BLD AUTO: 1.1 10E3/UL (ref 0.8–5.3)
LYMPHOCYTES NFR BLD AUTO: 14 %
MCH RBC QN AUTO: 30.3 PG (ref 26.5–33)
MCHC RBC AUTO-ENTMCNC: 33.9 G/DL (ref 31.5–36.5)
MCV RBC AUTO: 89 FL (ref 78–100)
MONOCYTES # BLD AUTO: 0.6 10E3/UL (ref 0–1.3)
MONOCYTES NFR BLD AUTO: 7 %
NEUTROPHILS # BLD AUTO: 5.9 10E3/UL (ref 1.6–8.3)
NEUTROPHILS NFR BLD AUTO: 77 %
NRBC # BLD AUTO: 0 10E3/UL
NRBC BLD AUTO-RTO: 0 /100
PLATELET # BLD AUTO: 178 10E3/UL (ref 150–450)
POTASSIUM SERPL-SCNC: 4.1 MMOL/L (ref 3.4–5.3)
PROT SERPL-MCNC: 6.9 G/DL (ref 6.4–8.3)
RBC # BLD AUTO: 4 10E6/UL (ref 3.8–5.2)
SODIUM SERPL-SCNC: 140 MMOL/L (ref 136–145)
WBC # BLD AUTO: 7.7 10E3/UL (ref 4–11)

## 2023-08-07 PROCEDURE — 96367 TX/PROPH/DG ADDL SEQ IV INF: CPT | Performed by: INTERNAL MEDICINE

## 2023-08-07 PROCEDURE — 96417 CHEMO IV INFUS EACH ADDL SEQ: CPT | Performed by: INTERNAL MEDICINE

## 2023-08-07 PROCEDURE — 85025 COMPLETE CBC W/AUTO DIFF WBC: CPT | Performed by: INTERNAL MEDICINE

## 2023-08-07 PROCEDURE — 96413 CHEMO IV INFUSION 1 HR: CPT | Performed by: INTERNAL MEDICINE

## 2023-08-07 PROCEDURE — 96377 APPLICATON ON-BODY INJECTOR: CPT | Mod: 59 | Performed by: INTERNAL MEDICINE

## 2023-08-07 PROCEDURE — 99214 OFFICE O/P EST MOD 30 MIN: CPT | Mod: 25 | Performed by: NURSE PRACTITIONER

## 2023-08-07 PROCEDURE — 96375 TX/PRO/DX INJ NEW DRUG ADDON: CPT | Performed by: INTERNAL MEDICINE

## 2023-08-07 PROCEDURE — 80053 COMPREHEN METABOLIC PANEL: CPT | Performed by: INTERNAL MEDICINE

## 2023-08-07 RX ORDER — DEXAMETHASONE 4 MG/1
8 TABLET ORAL 2 TIMES DAILY WITH MEALS
Qty: 24 TABLET | Refills: 0 | Status: SHIPPED | OUTPATIENT
Start: 2023-08-07 | End: 2023-12-12

## 2023-08-07 RX ORDER — HEPARIN SODIUM (PORCINE) LOCK FLUSH IV SOLN 100 UNIT/ML 100 UNIT/ML
5 SOLUTION INTRAVENOUS
Status: DISCONTINUED | OUTPATIENT
Start: 2023-08-07 | End: 2023-08-07 | Stop reason: HOSPADM

## 2023-08-07 RX ORDER — HEPARIN SODIUM (PORCINE) LOCK FLUSH IV SOLN 100 UNIT/ML 100 UNIT/ML
500 SOLUTION INTRAVENOUS ONCE
Status: COMPLETED | OUTPATIENT
Start: 2023-08-07 | End: 2023-08-07

## 2023-08-07 RX ORDER — ONDANSETRON 2 MG/ML
8 INJECTION INTRAMUSCULAR; INTRAVENOUS ONCE
Status: COMPLETED | OUTPATIENT
Start: 2023-08-07 | End: 2023-08-07

## 2023-08-07 RX ADMIN — HEPARIN SODIUM (PORCINE) LOCK FLUSH IV SOLN 100 UNIT/ML 5 ML: 100 SOLUTION at 12:25

## 2023-08-07 RX ADMIN — Medication 250 ML: at 09:54

## 2023-08-07 RX ADMIN — ONDANSETRON 8 MG: 2 INJECTION INTRAMUSCULAR; INTRAVENOUS at 09:58

## 2023-08-07 RX ADMIN — HEPARIN SODIUM (PORCINE) LOCK FLUSH IV SOLN 100 UNIT/ML 500 UNITS: 100 SOLUTION at 08:29

## 2023-08-07 ASSESSMENT — PAIN SCALES - GENERAL: PAINLEVEL: NO PAIN (0)

## 2023-08-07 NOTE — NURSING NOTE
"Oncology Rooming Note    August 7, 2023 9:07 AM   Azalea Mckeon is a 43 year old female who presents for:    Chief Complaint   Patient presents with    Oncology Clinic Visit     Prior to treatment       Initial Vitals: /74 (BP Location: Right arm)   Pulse 76   Temp 98.3  F (36.8  C) (Oral)   Resp 16   Ht 1.651 m (5' 5\")   Wt 73.5 kg (162 lb)   LMP 06/01/2023   SpO2 97%   BMI 26.96 kg/m   Estimated body mass index is 26.96 kg/m  as calculated from the following:    Height as of this encounter: 1.651 m (5' 5\").    Weight as of this encounter: 73.5 kg (162 lb). Body surface area is 1.84 meters squared.  No Pain (0) Comment: Data Unavailable   Patient's last menstrual period was 06/01/2023.  Allergies reviewed: Yes  Medications reviewed: Yes    Medications: MEDICATION REFILLS NEEDED TODAY. Provider was notified.  Pharmacy name entered into [facility]: [id number] [place], [place] - [address]    Clinical concerns: No new concerns         Nancy Mccurdy LPN              "

## 2023-08-07 NOTE — LETTER
8/7/2023         RE: Azalea Mckeon  [id number] [facility] [address]        Dear Colleague,    Thank you for referring your patient, Azalea Mckeon, to the [facility]. Please see a copy of my visit note below.    Oncology Follow Up Visit: August 7, 2023    Oncologist: Dr. 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 On 2/3/2023, a mammogram found possible calcifications in the upper right breast. A diagnostic mammogram and ultrasound on 3/2/2023 showed fine pleomorphic calcifications. On 3/17/2023, a biopsy revealed invasive ductal carcinoma (ER/CO +, HER2 -) with associated DCIS. Azalea underwent a bilateral mastectomy on 4/24/2023- multifocal IDC (upper outer, upper inner quadrant) largest tumor measuring 16 mm, grade 3, no LVSI, negative invasive margins, with 10cm of DCIS (grade 3, comedonecrosis), with multiple positive DCIS margins (anterior, superior), close posterior margin, 0/1 LN, pT1cN0.   *Oncotype score was 22   *Breast Actionable Panel - negative for mutations in the EDWARD, BARD1, BRCA1, BRCA2, CDH1, CHEK2, NF1, PALB2, PTEN, STK11, and TP53 genes by sequencing and deletion/duplication analysis. No mutations were found in any of the 11 genes analyzed.   Treatment:   4/24/2023 Bilateral Mastectomy with reconstruction - expanders present bilaterally.  6/26/2023 Began adjuvant TC    Interval History: Ms. Mckeon comes to clinic with friend, for review of use of TC for treatment of her breast cancer. She is now due for C3. Pt states she has a few tough days at beginning of plan with fatigue and some nausea- does take antiemetic medication starting day 2 x 3-4 days.  She reports she is not losing weight and continues to eat well with very good second week of plan however the third week can be noted to have some  "fatigue with occasional nausea for what ever reason.  She denies that bowels have changed and bladder is not showing any blood or changes.  She is denies fevers headaches chest pain shortness of breath or new pains.  Working with PT for lymph edema and feels it is improving.  Rest of comprehensive and complete ROS is reviewed and is negative.   Past Medical History:   Diagnosis Date     Cancer (H)     right breast, s/p bilateral mastectomy     NO ACTIVE PROBLEMS      Current Outpatient Medications   Medication     dexAMETHasone (DECADRON) 4 MG tablet     HYDROmorphone (DILAUDID) 2 MG tablet     lidocaine-prilocaine (EMLA) 2.5-2.5 % external cream     methocarbamol (ROBAXIN) 500 MG tablet     ondansetron (ZOFRAN ODT) 4 MG ODT tab     ondansetron (ZOFRAN) 8 MG tablet     prochlorperazine (COMPAZINE) 10 MG tablet     No current facility-administered medications for this visit.     Allergies   Allergen Reactions     Percocet [Oxycodone-Acetaminophen] Itching     Latex Rash       Physical Exam:/74 (BP Location: Right arm)   Pulse 76   Temp 98.3  F (36.8  C) (Oral)   Resp 16   Ht 1.651 m (5' 5\")   Wt 73.5 kg (162 lb)   LMP 06/01/2023   SpO2 97%   BMI 26.96 kg/m     ECOG PS- 0  Constitutional: Alert, healthy, and in no distress.   ENT: Eyes bright , No mouth sores  Neck: Supple, No adenopathy.  Cardiac: Heart rate and rhythm is regular and strong without murmur  Respiratory: Breathing easy. Lung sounds clear to auscultation  Breasts: Bilaterally post mastectomy breasts with expanders noted with some inflammation noted over the breasts bilaterally-and states she still working with therapy and is trying to do some massage and is improving. no signs of infection.  GI: Abdomen is soft, non-tender, BS normal. No masses or organomegaly  MS: Muscle tone normal, extremities normal with no edema.   Skin: No suspicious lesions or rashes-now is shaved head with significant hair loss.  Neuro: Sensory grossly WNL, gait " normal.   Lymph: Normal ant/post cervical, axillary, supraclavicular nodes  Psych: Mentation appears normal and affect normal/bright with good conversation.    Laboratory/Imaging Results:   Results for orders placed or performed in visit on 08/07/23   Comprehensive metabolic panel     Status: Abnormal   Result Value Ref Range    Sodium 140 136 - 145 mmol/L    Potassium 4.1 3.4 - 5.3 mmol/L    Chloride 108 (H) 98 - 107 mmol/L    Carbon Dioxide (CO2) 22 22 - 29 mmol/L    Anion Gap 10 7 - 15 mmol/L    Urea Nitrogen 16.1 6.0 - 20.0 mg/dL    Creatinine 0.78 0.51 - 0.95 mg/dL    Calcium 9.6 8.6 - 10.0 mg/dL    Glucose 103 (H) 70 - 99 mg/dL    Alkaline Phosphatase 45 35 - 104 U/L    AST 45 0 - 45 U/L    ALT 78 (H) 0 - 50 U/L    Protein Total 6.9 6.4 - 8.3 g/dL    Albumin 4.5 3.5 - 5.2 g/dL    Bilirubin Total 0.3 <=1.2 mg/dL    GFR Estimate >90 >60 mL/min/1.73m2   CBC with platelets and differential     Status: None   Result Value Ref Range    WBC Count 7.7 4.0 - 11.0 10e3/uL    RBC Count 4.00 3.80 - 5.20 10e6/uL    Hemoglobin 12.1 11.7 - 15.7 g/dL    Hematocrit 35.7 35.0 - 47.0 %    MCV 89 78 - 100 fL    MCH 30.3 26.5 - 33.0 pg    MCHC 33.9 31.5 - 36.5 g/dL    RDW 14.6 10.0 - 15.0 %    Platelet Count 178 150 - 450 10e3/uL    % Neutrophils 77 %    % Lymphocytes 14 %    % Monocytes 7 %    % Eosinophils 0 %    % Basophils 1 %    % Immature Granulocytes 1 %    NRBCs per 100 WBC 0 <1 /100    Absolute Neutrophils 5.9 1.6 - 8.3 10e3/uL    Absolute Lymphocytes 1.1 0.8 - 5.3 10e3/uL    Absolute Monocytes 0.6 0.0 - 1.3 10e3/uL    Absolute Eosinophils 0.0 0.0 - 0.7 10e3/uL    Absolute Basophils 0.1 0.0 - 0.2 10e3/uL    Absolute Immature Granulocytes 0.1 <=0.4 10e3/uL    Absolute NRBCs 0.0 10e3/uL   CBC with platelets differential     Status: None    Narrative    The following orders were created for panel order CBC with platelets differential.  Procedure                               Abnormality         Status                      ---------                               -----------         ------                     CBC with platelets and d...[[id number]]                      Final result                 Please view results for these tests on the individual orders.         Assessment and Plan:   Early-stage, hormone positive, HER2 negative Right breast cancer-patient continues use of TC every 3 weeks and is currently due for cycle 3 of plan of 4 cycles.   -Noting significant nausea with first week and continues with antiemetics regularly which is appropriate.  -Fatigue also noted in the first and third weeks of plan.  And states she is eating well  -Very mild ALT elevation noted today and AST is completely normal-passes for continuation of treatment.  -Continue with G-CSF for prevention of chemotherapy-using Dilaudid as needed for up to 3 days and plan for bone pain.  Patient has had genetic testing today has known distant family history of breast cancer in cousins and great aunt but has young daughter at home and may consider further testing in the future.  We will plan on seeing her prior to cycle 4 for review of symptoms.  The total time of this encounter amounted to 30 minutes. This time included face-to-face time spent elevated liver enzymes with the patient, prep work, ordering tests, and performing post visit documentation.    Shabnam Sharif Cnp      Again, thank you for allowing me to participate in the care of your patient.        Sincerely,        Shabnam Sharif NP, APRN CNP

## 2023-08-07 NOTE — PROGRESS NOTES
Oncology Follow Up Visit: August 7, 2023    Oncologist: Dr. 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 On 2/3/2023, a mammogram found possible calcifications in the upper right breast. A diagnostic mammogram and ultrasound on 3/2/2023 showed fine pleomorphic calcifications. On 3/17/2023, a biopsy revealed invasive ductal carcinoma (ER/GA +, HER2 -) with associated DCIS. Azalea underwent a bilateral mastectomy on 4/24/2023- multifocal IDC (upper outer, upper inner quadrant) largest tumor measuring 16 mm, grade 3, no LVSI, negative invasive margins, with 10cm of DCIS (grade 3, comedonecrosis), with multiple positive DCIS margins (anterior, superior), close posterior margin, 0/1 LN, pT1cN0.   *Oncotype score was 22   *Breast Actionable Panel - negative for mutations in the EDWARD, BARD1, BRCA1, BRCA2, CDH1, CHEK2, NF1, PALB2, PTEN, STK11, and TP53 genes by sequencing and deletion/duplication analysis. No mutations were found in any of the 11 genes analyzed.   Treatment:   4/24/2023 Bilateral Mastectomy with reconstruction - expanders present bilaterally.  6/26/2023 Began adjuvant TC    Interval History: Ms. Mckeon comes to clinic with friend, for review of use of TC for treatment of her breast cancer. She is now due for C3. Pt states she has a few tough days at beginning of plan with fatigue and some nausea- does take antiemetic medication starting day 2 x 3-4 days.  She reports she is not losing weight and continues to eat well with very good second week of plan however the third week can be noted to have some fatigue with occasional nausea for what ever reason.  She denies that bowels have changed and bladder is not showing any blood or changes.  She is denies fevers headaches chest pain shortness of breath or new pains.  Working with PT for lymph edema and feels it  "is improving.  Rest of comprehensive and complete ROS is reviewed and is negative.   Past Medical History:   Diagnosis Date    Cancer (H)     right breast, s/p bilateral mastectomy    NO ACTIVE PROBLEMS      Current Outpatient Medications   Medication    dexAMETHasone (DECADRON) 4 MG tablet    HYDROmorphone (DILAUDID) 2 MG tablet    lidocaine-prilocaine (EMLA) 2.5-2.5 % external cream    methocarbamol (ROBAXIN) 500 MG tablet    ondansetron (ZOFRAN ODT) 4 MG ODT tab    ondansetron (ZOFRAN) 8 MG tablet    prochlorperazine (COMPAZINE) 10 MG tablet     No current facility-administered medications for this visit.     Allergies   Allergen Reactions    Percocet [Oxycodone-Acetaminophen] Itching    Latex Rash       Physical Exam:/74 (BP Location: Right arm)   Pulse 76   Temp 98.3  F (36.8  C) (Oral)   Resp 16   Ht 1.651 m (5' 5\")   Wt 73.5 kg (162 lb)   LMP 06/01/2023   SpO2 97%   BMI 26.96 kg/m     ECOG PS- 0  Constitutional: Alert, healthy, and in no distress.   ENT: Eyes bright , No mouth sores  Neck: Supple, No adenopathy.  Cardiac: Heart rate and rhythm is regular and strong without murmur  Respiratory: Breathing easy. Lung sounds clear to auscultation  Breasts: Bilaterally post mastectomy breasts with expanders noted with some inflammation noted over the breasts bilaterally-and states she still working with therapy and is trying to do some massage and is improving. no signs of infection.  GI: Abdomen is soft, non-tender, BS normal. No masses or organomegaly  MS: Muscle tone normal, extremities normal with no edema.   Skin: No suspicious lesions or rashes-now is shaved head with significant hair loss.  Neuro: Sensory grossly WNL, gait normal.   Lymph: Normal ant/post cervical, axillary, supraclavicular nodes  Psych: Mentation appears normal and affect normal/bright with good conversation.    Laboratory/Imaging Results:   Results for orders placed or performed in visit on 08/07/23   Comprehensive metabolic " panel     Status: Abnormal   Result Value Ref Range    Sodium 140 136 - 145 mmol/L    Potassium 4.1 3.4 - 5.3 mmol/L    Chloride 108 (H) 98 - 107 mmol/L    Carbon Dioxide (CO2) 22 22 - 29 mmol/L    Anion Gap 10 7 - 15 mmol/L    Urea Nitrogen 16.1 6.0 - 20.0 mg/dL    Creatinine 0.78 0.51 - 0.95 mg/dL    Calcium 9.6 8.6 - 10.0 mg/dL    Glucose 103 (H) 70 - 99 mg/dL    Alkaline Phosphatase 45 35 - 104 U/L    AST 45 0 - 45 U/L    ALT 78 (H) 0 - 50 U/L    Protein Total 6.9 6.4 - 8.3 g/dL    Albumin 4.5 3.5 - 5.2 g/dL    Bilirubin Total 0.3 <=1.2 mg/dL    GFR Estimate >90 >60 mL/min/1.73m2   CBC with platelets and differential     Status: None   Result Value Ref Range    WBC Count 7.7 4.0 - 11.0 10e3/uL    RBC Count 4.00 3.80 - 5.20 10e6/uL    Hemoglobin 12.1 11.7 - 15.7 g/dL    Hematocrit 35.7 35.0 - 47.0 %    MCV 89 78 - 100 fL    MCH 30.3 26.5 - 33.0 pg    MCHC 33.9 31.5 - 36.5 g/dL    RDW 14.6 10.0 - 15.0 %    Platelet Count 178 150 - 450 10e3/uL    % Neutrophils 77 %    % Lymphocytes 14 %    % Monocytes 7 %    % Eosinophils 0 %    % Basophils 1 %    % Immature Granulocytes 1 %    NRBCs per 100 WBC 0 <1 /100    Absolute Neutrophils 5.9 1.6 - 8.3 10e3/uL    Absolute Lymphocytes 1.1 0.8 - 5.3 10e3/uL    Absolute Monocytes 0.6 0.0 - 1.3 10e3/uL    Absolute Eosinophils 0.0 0.0 - 0.7 10e3/uL    Absolute Basophils 0.1 0.0 - 0.2 10e3/uL    Absolute Immature Granulocytes 0.1 <=0.4 10e3/uL    Absolute NRBCs 0.0 10e3/uL   CBC with platelets differential     Status: None    Narrative    The following orders were created for panel order CBC with platelets differential.  Procedure                               Abnormality         Status                     ---------                               -----------         ------                     CBC with platelets and d...[[id number]]                      Final result                 Please view results for these tests on the individual orders.         Assessment and Plan:    Early-stage, hormone positive, HER2 negative Right breast cancer-patient continues use of TC every 3 weeks and is currently due for cycle 3 of plan of 4 cycles.   -Noting significant nausea with first week and continues with antiemetics regularly which is appropriate.  -Fatigue also noted in the first and third weeks of plan.  And states she is eating well  -Very mild ALT elevation noted today and AST is completely normal-passes for continuation of treatment.  -Continue with G-CSF for prevention of chemotherapy-using Dilaudid as needed for up to 3 days and plan for bone pain.  Patient has had genetic testing today has known distant family history of breast cancer in cousins and great aunt but has young daughter at home and may consider further testing in the future.  We will plan on seeing her prior to cycle 4 for review of symptoms.  The total time of this encounter amounted to 30 minutes. This time included face-to-face time spent elevated liver enzymes with the patient, prep work, ordering tests, and performing post visit documentation.    Shabnam Sharif,Cnp

## 2023-08-07 NOTE — PROGRESS NOTES
See IV flow sheet for details of port access. Labs sent: cbc, cmp. Port needle flushed per protocol (see MAR) and needle left intact for chemotherapy to follow.    Cleo Matthews RN

## 2023-08-07 NOTE — PROGRESS NOTES
Infusion Nursing Note:  Azalea Mckeon presents today for C3D1 Taxotere/Cytoxan & onpro.    Patient seen by provider today: Yes: Shabnam NP   present during visit today: Not Applicable.    Note: Patient reports feeling well overall.   Patient reports taking dexamethasone as prescribed.     ONPRO  Was placed on patient's: left side of abdomen.    Was placed at 11:55 AM    Podpal used: No    ONPRO injector device Lot number: [id number]     Patient education included: what patient can expect after application, what colored lights mean on the device, when to remove device, when and where to call with questions or issues, all patients questions answered, and that Neulasta administration will occur at 1500 on 8/8.    Patient tolerated administration well.  Intravenous Access:  Implanted Port.    Treatment Conditions:  Lab Results   Component Value Date    HGB 12.1 08/07/2023    WBC 7.7 08/07/2023    ANEU 7.4 03/12/2013    ANEUTAUTO 5.9 08/07/2023     08/07/2023        Lab Results   Component Value Date     08/07/2023    POTASSIUM 4.1 08/07/2023    CR 0.78 08/07/2023    MINNA 9.6 08/07/2023    BILITOTAL 0.3 08/07/2023    ALBUMIN 4.5 08/07/2023    ALT 78 (H) 08/07/2023    AST 45 08/07/2023       Results reviewed, labs MET treatment parameters, ok to proceed with treatment.      Post Infusion Assessment:  Patient tolerated infusion without incident.  Blood return noted pre and post infusion.  Site patent and intact, free from redness, edema or discomfort.  No evidence of extravasations.  Access discontinued per protocol.       Discharge Plan:   AVS to patient via MYCHART.  Patient will return 8/28 for next appointment.   Patient discharged in stable condition accompanied by: sister.  Departure Mode: Ambulatory.      Ivana Chang RN

## 2023-08-08 ENCOUNTER — THERAPY VISIT (OUTPATIENT)
Dept: PHYSICAL THERAPY | Facility: CLINIC | Age: 43
End: 2023-08-08
Attending: PLASTIC SURGERY
Payer: COMMERCIAL

## 2023-08-08 DIAGNOSIS — I89.0 LYMPHEDEMA: Primary | ICD-10-CM

## 2023-08-08 PROCEDURE — 97140 MANUAL THERAPY 1/> REGIONS: CPT | Mod: GP | Performed by: PHYSICAL THERAPIST

## 2023-08-15 ENCOUNTER — THERAPY VISIT (OUTPATIENT)
Dept: PHYSICAL THERAPY | Facility: CLINIC | Age: 43
End: 2023-08-15
Attending: PLASTIC SURGERY
Payer: COMMERCIAL

## 2023-08-15 ENCOUNTER — OFFICE VISIT (OUTPATIENT)
Dept: SURGERY | Facility: CLINIC | Age: 43
End: 2023-08-15
Payer: COMMERCIAL

## 2023-08-15 DIAGNOSIS — Z17.0 MALIGNANT NEOPLASM OF OVERLAPPING SITES OF RIGHT BREAST IN FEMALE, ESTROGEN RECEPTOR POSITIVE (H): Primary | ICD-10-CM

## 2023-08-15 DIAGNOSIS — I89.0 LYMPHEDEMA: Primary | ICD-10-CM

## 2023-08-15 DIAGNOSIS — C50.811 MALIGNANT NEOPLASM OF OVERLAPPING SITES OF RIGHT BREAST IN FEMALE, ESTROGEN RECEPTOR POSITIVE (H): Primary | ICD-10-CM

## 2023-08-15 PROCEDURE — 97140 MANUAL THERAPY 1/> REGIONS: CPT | Mod: GP | Performed by: PHYSICAL THERAPIST

## 2023-08-15 PROCEDURE — 99213 OFFICE O/P EST LOW 20 MIN: CPT | Performed by: SURGERY

## 2023-08-15 ASSESSMENT — PAIN SCALES - GENERAL: PAINLEVEL: NO PAIN (0)

## 2023-08-15 NOTE — NURSING NOTE
Azalea Mckeon's chief complaint for this visit includes:  Chief Complaint   Patient presents with    Follow Up     3 month follow up - no concerns today     PCP: No Ref-Primary, Physician    Referring Provider:  No referring provider defined for this encounter.    There were no vitals taken for this visit.  No Pain (0)        Allergies   Allergen Reactions    Percocet [Oxycodone-Acetaminophen] Itching    Latex Rash         Do you need any medication refills at today's visit? No    Xuan Figueroa, [facility]

## 2023-08-15 NOTE — LETTER
8/15/2023         RE: Azalea Mckeon  [id number] [facility] [address]        Dear Colleague,    Thank you for referring your patient, Azalea Mckeon, to the [facility]. Please see a copy of my visit note below.    FOLLOW-UP  Aug 15, 2023    Azalea Mckeon is a 43 year old female who returns for follow-up for .     Cancer Staging   Malignant neoplasm of overlapping sites of right breast in female, estrogen receptor positive (H)  Staging form: Breast, AJCC 8th Edition  - Clinical: Stage IIB (cT3, cN0, cM0, G3, ER+, ME+, HER2-) - Signed by Mitra Souza MD on 3/29/2023  - Pathologic: Stage IA (pT1c, pN0, cM0, G3, ER+, ME+, HER2-) - Signed by Mitra Souza MD on 8/15/2023    Treatment to date:  1. Genetic testing - negative for pathogenic variants in EDWARD, BARD1, BRCA1, BRCA2, CDH1, CHEK2, NF1, PALB2, PTEN, STK11, and TP53 genes   2. Right skin-sparing mastectomy, right axillary sentinel lymph node mapping and biopsy, left risk-reducing skin-sparing mastectomy (4/24/2023)  3. Oncotype DX 22  4. Adjuvant taxotere/cytoxan (6/26/2023 to ongoing) - Dr Dyson  5. Plans for adjuvant post-mastectomy radiation therapy after adjuvant systemic therapy - Dr Linares    HPI:    Since her last visit, she has been undergoing adjuvant systemic therapy under the care of Dr Dyson. She is tolerating this well She has met with Dr Linares as well and plans on post-mastectomy radiation therapy.      Her range of motion is quite limited bilaterally by tightness across her chest. She is working with physical therapy.      There were no vitals taken for this visit.   Physical Exam  Constitutional:       Appearance: She is well-developed.   Pulmonary:      Effort: No respiratory distress.   Chest:      Comments: Bilateral surgical absence of breasts. Incisions well healed. No nodularity bilaterally. Vascular access port on the right.  Skin:     General: Skin is warm and dry.           INVESTIGATIONS:  None    ASSESSMENT:    Azalea Mckeon is a 43 year old female with right breast cancer, s/p surgery and currently undergoing adjuvant systemic therapy.    She is doing well. We discussed that her port may have to be removed prior to the start of radiation therapy given it is ipsilateral to the cancer but I will touch base with Dr Linares.    I will see her for follow up in 6 months.    Total time spent with the patient was 15 minutes, of which 75% was counseling.     PLAN:  Completing adjuvant systemic therapy with Dr Dyson  Follow up with Dr Linares for adjuvant radiation therapy  Port likely will have to be removed prior to radiation but I have sent a message to Dr Linares about this  Follow up with me in 6 months    Mitra Souza MD MS [facility] FACS  Associate Professor of Surgery  Division of Surgical Oncology  [facility]     20 minutes spent on the date of the encounter doing chart review, patient visit, documentation, care coordination, and discussion with other physician(s).      Again, thank you for allowing me to participate in the care of your patient.        Sincerely,        Mitra Souza MD

## 2023-08-15 NOTE — PROGRESS NOTES
FOLLOW-UP  Aug 15, 2023    Azalea Mckeon is a 43 year old female who returns for follow-up for .     Cancer Staging   Malignant neoplasm of overlapping sites of right breast in female, estrogen receptor positive (H)  Staging form: Breast, AJCC 8th Edition  - Clinical: Stage IIB (cT3, cN0, cM0, G3, ER+, VT+, HER2-) - Signed by Mitra Souza MD on 3/29/2023  - Pathologic: Stage IA (pT1c, pN0, cM0, G3, ER+, VT+, HER2-) - Signed by Mitra Souza MD on 8/15/2023    Treatment to date:  1. Genetic testing - negative for pathogenic variants in EDWARD, BARD1, BRCA1, BRCA2, CDH1, CHEK2, NF1, PALB2, PTEN, STK11, and TP53 genes   2. Right skin-sparing mastectomy, right axillary sentinel lymph node mapping and biopsy, left risk-reducing skin-sparing mastectomy (4/24/2023)  3. Oncotype DX 22  4. Adjuvant taxotere/cytoxan (6/26/2023 to ongoing) - Dr Dyson  5. Plans for adjuvant post-mastectomy radiation therapy after adjuvant systemic therapy - Dr Linares    HPI:    Since her last visit, she has been undergoing adjuvant systemic therapy under the care of Dr Dyson. She is tolerating this well She has met with Dr Linares as well and plans on post-mastectomy radiation therapy.      Her range of motion is quite limited bilaterally by tightness across her chest. She is working with physical therapy.      There were no vitals taken for this visit.   Physical Exam  Constitutional:       Appearance: She is well-developed.   Pulmonary:      Effort: No respiratory distress.   Chest:      Comments: Bilateral surgical absence of breasts. Incisions well healed. No nodularity bilaterally. Vascular access port on the right.  Skin:     General: Skin is warm and dry.          INVESTIGATIONS:  None    ASSESSMENT:    Azalea Mckeon is a 43 year old female with right breast cancer, s/p surgery and currently undergoing adjuvant systemic therapy.    She is doing well. We discussed that her port may have to be removed prior to the start of  radiation therapy given it is ipsilateral to the cancer but I will touch base with Dr Linares.    I will see her for follow up in 6 months.    Total time spent with the patient was 15 minutes, of which 75% was counseling.     PLAN:  Completing adjuvant systemic therapy with Dr Dyson  Follow up with Dr Linares for adjuvant radiation therapy  Port likely will have to be removed prior to radiation but I have sent a message to Dr Linares about this  Follow up with me in 6 months    Mitra Souza MD MS [facility] FACS  Associate Professor of Surgery  Division of Surgical Oncology  [facility]     20 minutes spent on the date of the encounter doing chart review, patient visit, documentation, care coordination, and discussion with other physician(s).

## 2023-08-17 ENCOUNTER — THERAPY VISIT (OUTPATIENT)
Dept: PHYSICAL THERAPY | Facility: CLINIC | Age: 43
End: 2023-08-17
Attending: PLASTIC SURGERY
Payer: COMMERCIAL

## 2023-08-17 DIAGNOSIS — I89.0 LYMPHEDEMA: Primary | ICD-10-CM

## 2023-08-17 PROCEDURE — 97140 MANUAL THERAPY 1/> REGIONS: CPT | Mod: GP | Performed by: REHABILITATION PRACTITIONER

## 2023-08-22 ENCOUNTER — THERAPY VISIT (OUTPATIENT)
Dept: PHYSICAL THERAPY | Facility: CLINIC | Age: 43
End: 2023-08-22
Attending: PLASTIC SURGERY
Payer: COMMERCIAL

## 2023-08-22 DIAGNOSIS — I89.0 LYMPHEDEMA: Primary | ICD-10-CM

## 2023-08-22 PROCEDURE — 97140 MANUAL THERAPY 1/> REGIONS: CPT | Mod: GP | Performed by: REHABILITATION PRACTITIONER

## 2023-08-24 ENCOUNTER — THERAPY VISIT (OUTPATIENT)
Dept: PHYSICAL THERAPY | Facility: CLINIC | Age: 43
End: 2023-08-24
Attending: PLASTIC SURGERY
Payer: COMMERCIAL

## 2023-08-24 DIAGNOSIS — I89.0 LYMPHEDEMA: Primary | ICD-10-CM

## 2023-08-24 PROCEDURE — 97140 MANUAL THERAPY 1/> REGIONS: CPT | Mod: GP | Performed by: PHYSICAL THERAPIST

## 2023-08-25 ENCOUNTER — TELEPHONE (OUTPATIENT)
Dept: SURGERY | Facility: CLINIC | Age: 43
End: 2023-08-25
Payer: COMMERCIAL

## 2023-08-25 NOTE — TELEPHONE ENCOUNTER
Staff message sent to procedure scheduling team requesting assistance with scheduling a 6 month appt with Dr.Hui muir in Feb of 2024. RN noted pt scheduled on 2/6/24..Viji Llamas RN

## 2023-08-28 ENCOUNTER — INFUSION THERAPY VISIT (OUTPATIENT)
Dept: INFUSION THERAPY | Facility: CLINIC | Age: 43
End: 2023-08-28
Attending: INTERNAL MEDICINE
Payer: COMMERCIAL

## 2023-08-28 ENCOUNTER — ONCOLOGY VISIT (OUTPATIENT)
Dept: ONCOLOGY | Facility: CLINIC | Age: 43
End: 2023-08-28
Payer: COMMERCIAL

## 2023-08-28 ENCOUNTER — LAB (OUTPATIENT)
Dept: ONCOLOGY | Facility: CLINIC | Age: 43
End: 2023-08-28
Payer: COMMERCIAL

## 2023-08-28 VITALS
BODY MASS INDEX: 27.41 KG/M2 | TEMPERATURE: 98.6 F | HEART RATE: 72 BPM | OXYGEN SATURATION: 98 % | HEIGHT: 65 IN | RESPIRATION RATE: 16 BRPM | WEIGHT: 164.5 LBS | DIASTOLIC BLOOD PRESSURE: 76 MMHG | SYSTOLIC BLOOD PRESSURE: 110 MMHG

## 2023-08-28 DIAGNOSIS — Z17.0 MALIGNANT NEOPLASM OF OVERLAPPING SITES OF RIGHT BREAST IN FEMALE, ESTROGEN RECEPTOR POSITIVE (H): Primary | ICD-10-CM

## 2023-08-28 DIAGNOSIS — D70.1 CHEMOTHERAPY-INDUCED NEUTROPENIA (H): ICD-10-CM

## 2023-08-28 DIAGNOSIS — T45.1X5A CHEMOTHERAPY-INDUCED NEUTROPENIA (H): ICD-10-CM

## 2023-08-28 DIAGNOSIS — R74.8 ELEVATED LIVER ENZYMES: ICD-10-CM

## 2023-08-28 DIAGNOSIS — C50.811 MALIGNANT NEOPLASM OF OVERLAPPING SITES OF RIGHT BREAST IN FEMALE, ESTROGEN RECEPTOR POSITIVE (H): Primary | ICD-10-CM

## 2023-08-28 LAB
ALBUMIN SERPL BCG-MCNC: 4.5 G/DL (ref 3.5–5.2)
ALP SERPL-CCNC: 39 U/L (ref 35–104)
ALT SERPL W P-5'-P-CCNC: 93 U/L (ref 0–50)
ANION GAP SERPL CALCULATED.3IONS-SCNC: 10 MMOL/L (ref 7–15)
AST SERPL W P-5'-P-CCNC: 51 U/L (ref 0–45)
BASOPHILS # BLD AUTO: 0 10E3/UL (ref 0–0.2)
BASOPHILS NFR BLD AUTO: 1 %
BILIRUB SERPL-MCNC: 0.4 MG/DL
BUN SERPL-MCNC: 10.2 MG/DL (ref 6–20)
CALCIUM SERPL-MCNC: 9.4 MG/DL (ref 8.6–10)
CHLORIDE SERPL-SCNC: 107 MMOL/L (ref 98–107)
CREAT SERPL-MCNC: 0.72 MG/DL (ref 0.51–0.95)
DEPRECATED HCO3 PLAS-SCNC: 23 MMOL/L (ref 22–29)
EOSINOPHIL # BLD AUTO: 0 10E3/UL (ref 0–0.7)
EOSINOPHIL NFR BLD AUTO: 0 %
ERYTHROCYTE [DISTWIDTH] IN BLOOD BY AUTOMATED COUNT: 16 % (ref 10–15)
GFR SERPL CREATININE-BSD FRML MDRD: >90 ML/MIN/1.73M2
GLUCOSE SERPL-MCNC: 104 MG/DL (ref 70–99)
HCT VFR BLD AUTO: 33.3 % (ref 35–47)
HGB BLD-MCNC: 11.4 G/DL (ref 11.7–15.7)
IMM GRANULOCYTES # BLD: 0 10E3/UL
IMM GRANULOCYTES NFR BLD: 1 %
LYMPHOCYTES # BLD AUTO: 0.9 10E3/UL (ref 0.8–5.3)
LYMPHOCYTES NFR BLD AUTO: 17 %
MCH RBC QN AUTO: 30.6 PG (ref 26.5–33)
MCHC RBC AUTO-ENTMCNC: 34.2 G/DL (ref 31.5–36.5)
MCV RBC AUTO: 89 FL (ref 78–100)
MONOCYTES # BLD AUTO: 0.3 10E3/UL (ref 0–1.3)
MONOCYTES NFR BLD AUTO: 6 %
NEUTROPHILS # BLD AUTO: 4 10E3/UL (ref 1.6–8.3)
NEUTROPHILS NFR BLD AUTO: 75 %
NRBC # BLD AUTO: 0 10E3/UL
NRBC BLD AUTO-RTO: 0 /100
PLATELET # BLD AUTO: 169 10E3/UL (ref 150–450)
POTASSIUM SERPL-SCNC: 4 MMOL/L (ref 3.4–5.3)
PROT SERPL-MCNC: 6.5 G/DL (ref 6.4–8.3)
RBC # BLD AUTO: 3.73 10E6/UL (ref 3.8–5.2)
SODIUM SERPL-SCNC: 140 MMOL/L (ref 136–145)
WBC # BLD AUTO: 5.3 10E3/UL (ref 4–11)

## 2023-08-28 PROCEDURE — 96417 CHEMO IV INFUS EACH ADDL SEQ: CPT | Performed by: INTERNAL MEDICINE

## 2023-08-28 PROCEDURE — 85025 COMPLETE CBC W/AUTO DIFF WBC: CPT | Performed by: INTERNAL MEDICINE

## 2023-08-28 PROCEDURE — 99214 OFFICE O/P EST MOD 30 MIN: CPT | Mod: 25 | Performed by: INTERNAL MEDICINE

## 2023-08-28 PROCEDURE — 96413 CHEMO IV INFUSION 1 HR: CPT | Performed by: INTERNAL MEDICINE

## 2023-08-28 PROCEDURE — 96375 TX/PRO/DX INJ NEW DRUG ADDON: CPT | Performed by: INTERNAL MEDICINE

## 2023-08-28 PROCEDURE — 96377 APPLICATON ON-BODY INJECTOR: CPT | Mod: 59 | Performed by: INTERNAL MEDICINE

## 2023-08-28 PROCEDURE — 80053 COMPREHEN METABOLIC PANEL: CPT | Performed by: INTERNAL MEDICINE

## 2023-08-28 PROCEDURE — 96367 TX/PROPH/DG ADDL SEQ IV INF: CPT | Performed by: INTERNAL MEDICINE

## 2023-08-28 PROCEDURE — 99207 PR NO CHARGE LOS: CPT

## 2023-08-28 RX ORDER — HEPARIN SODIUM (PORCINE) LOCK FLUSH IV SOLN 100 UNIT/ML 100 UNIT/ML
5 SOLUTION INTRAVENOUS ONCE
Status: COMPLETED | OUTPATIENT
Start: 2023-08-28 | End: 2023-08-28

## 2023-08-28 RX ORDER — HEPARIN SODIUM (PORCINE) LOCK FLUSH IV SOLN 100 UNIT/ML 100 UNIT/ML
5 SOLUTION INTRAVENOUS
Status: DISCONTINUED | OUTPATIENT
Start: 2023-08-28 | End: 2023-08-28 | Stop reason: HOSPADM

## 2023-08-28 RX ORDER — ONDANSETRON 2 MG/ML
8 INJECTION INTRAMUSCULAR; INTRAVENOUS ONCE
Status: COMPLETED | OUTPATIENT
Start: 2023-08-28 | End: 2023-08-28

## 2023-08-28 RX ORDER — TAMOXIFEN CITRATE 20 MG/1
20 TABLET ORAL DAILY
Qty: 90 TABLET | Refills: 3 | Status: SHIPPED | OUTPATIENT
Start: 2023-08-28 | End: 2023-12-12

## 2023-08-28 RX ADMIN — Medication 250 ML: at 09:05

## 2023-08-28 RX ADMIN — HEPARIN SODIUM (PORCINE) LOCK FLUSH IV SOLN 100 UNIT/ML 5 ML: 100 SOLUTION at 08:05

## 2023-08-28 RX ADMIN — HEPARIN SODIUM (PORCINE) LOCK FLUSH IV SOLN 100 UNIT/ML 5 ML: 100 SOLUTION at 11:24

## 2023-08-28 RX ADMIN — ONDANSETRON 8 MG: 2 INJECTION INTRAMUSCULAR; INTRAVENOUS at 09:07

## 2023-08-28 ASSESSMENT — PAIN SCALES - GENERAL: PAINLEVEL: NO PAIN (0)

## 2023-08-28 NOTE — LETTER
"    2023         RE: Azaela Mckeon  [address] [facility] [address]        Dear Colleague,    Thank you for referring your patient, Azalea Mckeon, to the [facility] [place]. Please see a copy of my visit note below.    [facility] Hematology / Oncology  Progress Note  Name: Azalea Mckeon  :  1980    MRN:  [id number]    --------------------    Assessment / Plan:  Proceed with cycle 4-4 adjuvant Taxotere/Cytoxan for early-stage, hormone positive, HER2 negative, high Oncotype breast cancer.  Blood counts looking stable; mild anemia should improve with time.  Chemistries appear stable.  Genetic testing was negative with 40 gene panel.  Port can come out following completion of chemotherapy in several weeks.  Planning adjuvant radiotherapy with Dr. Linares.  Monitor vasomotor symptoms and amenorrhea for possible transient versus permanent menopause while on chemo.  Planning reconstruction.  Planning tamoxifen 10 years; start 2 weeks after completing radiation; reviewed side effects including but not limited to vasomotor symptoms, arthralgias, body changes, cognitive changes, mood changes, cardiovascular risk, bone health as well as VTE; sister had PE.    King Dyson MD    --------------------    Interval History:  Azalea returns for follow up of breast cancer accompanied by her .  All in all, she is doing quite well.  No major health concerns.  Some muscle soreness from chemo that is tolerable.  Several hot flashes per day with no recent periods possibly suggestive of menopause.  Amazingly no neuropathy to date.  A little bit of chemo brain if she goes to chemotherapy.    --------------------    Physical Exam:  VS: /76 (BP Location: Left arm)   Pulse 72   Temp 98.6  F (37  C) (Oral)   Resp 16   Ht 1.651 m (5' 5\")   Wt 74.6 kg (164 lb 8 oz)   SpO2 98%   BMI 27.37 kg/m    GEN: Well appearing.    Labs / Imaging:  We reviewed CBC, CMP.      Again, " thank you for allowing me to participate in the care of your patient.        Sincerely,        King Dyson MD

## 2023-08-28 NOTE — NURSING NOTE
"Oncology Rooming Note    August 28, 2023 8:26 AM   Azalea Mckeon is a 43 year old female who presents for:    Chief Complaint   Patient presents with    Oncology Clinic Visit     Prior to treatment       Initial Vitals: /76 (BP Location: Left arm)   Pulse 72   Temp 98.6  F (37  C) (Oral)   Resp 16   Ht 1.651 m (5' 5\")   Wt 74.6 kg (164 lb 8 oz)   SpO2 98%   BMI 27.37 kg/m   Estimated body mass index is 27.37 kg/m  as calculated from the following:    Height as of this encounter: 1.651 m (5' 5\").    Weight as of this encounter: 74.6 kg (164 lb 8 oz). Body surface area is 1.85 meters squared.  No Pain (0) Comment: Data Unavailable   No LMP recorded. (Menstrual status: Chemotherapy).  Allergies reviewed: Yes  Medications reviewed: Yes    Medications: Medication refills not needed today.  Pharmacy name entered into [employer] [facility]: [id number] [place], [place] [address]    Clinical concerns: No new concerns         Nancy Mccurdy LPN              "

## 2023-08-28 NOTE — PROGRESS NOTES
Infusion Nursing Note:  Azalea Mckeon presents today for C4D1 Taxotere, Cytoxan, and Onpro.    Patient seen by provider today: Yes: Dr. Dyson   present during visit today: Not Applicable.    Note: The patient reports feeling well today. She reports experiencing muscle heaviness/aches which require her to get up and move frequently. She also notes intermittent hot flashes.    Intravenous Access:  Implanted Port.    Treatment Conditions:  Lab Results   Component Value Date    HGB 11.4 (L) 08/28/2023    WBC 5.3 08/28/2023    ANEU 7.4 03/12/2013    ANEUTAUTO 4.0 08/28/2023     08/28/2023        CMP not resulted prior to starting chemo due to analyzer issue - okay to start chemo without CMP resulted per Dr. Dyson. .    Post Infusion Assessment:  Patient tolerated infusion without incident.  Blood return noted pre and post infusion.  Site patent and intact, free from redness, edema or discomfort.  No evidence of extravasations.  Access discontinued per protocol.     ONPRO  Was placed on patient's: left side of abdomen.    Was placed at 1100 AM    Podpal used: Yes    ONPRO injector device Lot number: [id number]    Patient education included: what patient can expect after application, what colored lights mean on the device, when to remove device, when and where to call with questions or issues, all patients questions answered, and that Neulasta administration will occur at 1400.    Patient tolerated administration well.       Discharge Plan:   AVS to patient via MYCHART.  Patient notes she is done with chemo. She will return as directed for next appointment.   Patient discharged in stable condition accompanied by: .  Departure Mode: Ambulatory.      Jennifer Lobo RN

## 2023-08-28 NOTE — PROGRESS NOTES
[facility] Hematology / Oncology  Progress Note  Name: Azalea Mckeon  :  1980    MRN:  [id number]    --------------------    Assessment / Plan:  Stage 1A (pT1c pN0 cM0), hormone-positive, HER2-negative, right-sided breast cancer:  # 2023 Presented w/ abnormal mammogram.  # 2023 Status post bilateral mastectomy w/ sentinel node and reconstruction; path w/ multifocal disease, largest 16 mm, grade 3 disease, neg margins for IDC, neg node ()   # 2023 Oncotype score 22; adjuvant chemotherapy indicated.  # 2023 - Aug 2023 Adjuvant Taxotere / Cytoxan x 4 cycles.  # 2023 Negative hereditary breast cancer panel (40 genes).    Proceed with cycle 4-4 adjuvant Taxotere/Cytoxan for early-stage, hormone positive, HER2 negative, high Oncotype breast cancer.  Blood counts looking stable; mild anemia should improve with time.  Chemistries appear stable.  Genetic testing was negative with 40 gene panel.  Port can come out following completion of chemotherapy in several weeks.  Planning adjuvant radiotherapy with Dr. Linares.  Monitor vasomotor symptoms and amenorrhea for possible transient versus permanent menopause while on chemo.  Planning reconstruction.  Planning tamoxifen 10 years; start 2 weeks after completing radiation; reviewed side effects including but not limited to vasomotor symptoms, arthralgias, body changes, cognitive changes, mood changes, cardiovascular risk, bone health as well as VTE; sister had PE.    King Dyson MD    --------------------    Interval History:  Azalea returns for follow up of breast cancer accompanied by her .  All in all, she is doing quite well.  No major health concerns.  Some muscle soreness from chemo that is tolerable.  Several hot flashes per day with no recent periods possibly suggestive of menopause.  Amazingly no neuropathy to date.  A little bit of chemo brain if she goes to chemotherapy.    --------------------    Physical Exam:  VS:  "/76 (BP Location: Left arm)   Pulse 72   Temp 98.6  F (37  C) (Oral)   Resp 16   Ht 1.651 m (5' 5\")   Wt 74.6 kg (164 lb 8 oz)   SpO2 98%   BMI 27.37 kg/m    GEN: Well appearing.    Labs / Imaging:  We reviewed CBC, CMP.  "

## 2023-08-29 ENCOUNTER — THERAPY VISIT (OUTPATIENT)
Dept: PHYSICAL THERAPY | Facility: CLINIC | Age: 43
End: 2023-08-29
Attending: PLASTIC SURGERY
Payer: COMMERCIAL

## 2023-08-29 DIAGNOSIS — I89.0 LYMPHEDEMA: Primary | ICD-10-CM

## 2023-08-29 PROCEDURE — 97140 MANUAL THERAPY 1/> REGIONS: CPT | Mod: GP | Performed by: REHABILITATION PRACTITIONER

## 2023-09-07 ENCOUNTER — THERAPY VISIT (OUTPATIENT)
Dept: PHYSICAL THERAPY | Facility: CLINIC | Age: 43
End: 2023-09-07
Attending: PLASTIC SURGERY
Payer: COMMERCIAL

## 2023-09-07 DIAGNOSIS — I89.0 LYMPHEDEMA: Primary | ICD-10-CM

## 2023-09-07 PROCEDURE — 97140 MANUAL THERAPY 1/> REGIONS: CPT | Mod: GP | Performed by: PHYSICAL THERAPIST

## 2023-09-11 NOTE — TELEPHONE ENCOUNTER
RECORDS STATUS - BREAST    RECORDS REQUESTED FROM: Epic   DATE REQUESTED: 9/11   NOTES DETAILS STATUS   OFFICE NOTE from referring provider [facility] Dr. Mitra Souza   OFFICE NOTE from medical oncologist [facility] Dr. King Dyson: 8/28/23   OFFICE NOTE from surgeon [facility] Dr. Souza: 8/15/23   DISCHARGE SUMMARY from hospital [facility] 4/24/23   OPERATIVE REPORT Epic 4/24/23: Mastectomy   MEDICATION LIST [facility] 8/28/23   CHEMOTHERAPY [facility] 8/28/23   LABS     PATHOLOGY REPORTS  (Tissue diagnosis, Stage, ER/MI percentage positive and intensity of staining, HER2 IHC, FISH, and all biopsies from breast and any distant metastasis)                 Epic 4/24/23, 3/17/23: Surg Path   GENONOMIC TESTING     TYPE:   (Next Generation Sequencing, including Foundation One testing, and Oncotype score) Epic 7/7/23: NGS   IMAGING (NEED IMAGES & REPORT)     CT SCANS PACS Epic   MAMMO PACS Epic   ULTRASOUND PACS Epic       Destruction Type: electrodesiccation

## 2023-09-14 ENCOUNTER — THERAPY VISIT (OUTPATIENT)
Dept: PHYSICAL THERAPY | Facility: CLINIC | Age: 43
End: 2023-09-14
Attending: PLASTIC SURGERY
Payer: COMMERCIAL

## 2023-09-14 DIAGNOSIS — I89.0 LYMPHEDEMA: Primary | ICD-10-CM

## 2023-09-14 PROCEDURE — 97140 MANUAL THERAPY 1/> REGIONS: CPT | Mod: GP | Performed by: PHYSICAL THERAPIST

## 2023-09-15 ENCOUNTER — OFFICE VISIT (OUTPATIENT)
Dept: RADIATION ONCOLOGY | Facility: CLINIC | Age: 43
End: 2023-09-15
Payer: COMMERCIAL

## 2023-09-15 ENCOUNTER — TELEPHONE (OUTPATIENT)
Dept: SURGERY | Facility: CLINIC | Age: 43
End: 2023-09-15

## 2023-09-15 ENCOUNTER — PRE VISIT (OUTPATIENT)
Dept: RADIATION ONCOLOGY | Facility: CLINIC | Age: 43
End: 2023-09-15

## 2023-09-15 VITALS
BODY MASS INDEX: 27.29 KG/M2 | RESPIRATION RATE: 16 BRPM | DIASTOLIC BLOOD PRESSURE: 74 MMHG | WEIGHT: 164 LBS | OXYGEN SATURATION: 98 % | SYSTOLIC BLOOD PRESSURE: 105 MMHG | TEMPERATURE: 98.7 F | HEART RATE: 93 BPM

## 2023-09-15 DIAGNOSIS — C50.919 BREAST CA (H): Primary | ICD-10-CM

## 2023-09-15 PROCEDURE — 99215 OFFICE O/P EST HI 40 MIN: CPT | Performed by: SURGERY

## 2023-09-15 ASSESSMENT — PAIN SCALES - GENERAL: PAINLEVEL: NO PAIN (0)

## 2023-09-15 NOTE — NURSING NOTE
INITIAL PATIENT ASSESSMENT      Diagnosis: breast cancer (right breast)    Prior radiation therapy: None    Prior chemotherapy:   Protocol: Taxotere/Cytoxan  Facility: [facility] Dr. Dyson  Dates: 6/26/2023 - 8/28/2023      Pain Eval:  Denies    Psychosocial  Living arrangements: lives at home in [place], [place] and presents to clinic with  [place]  Fall Risk: independent  MIPS Falls Risk Screening Completed: Yes Result: Negative   referral needs: Not needed    Advanced Directive: Yes - Location: patient has copy  Implantable Cardiac Device: No  Authorization To Share Protected Health Information: YES - Date: 5/31/2023      LMP: patient reports menstrual cycle stopped with first cycle of chemotherapy  Onset of menopause: pre-menopausal  Abnormal vaginal bleeding/discharge: No  Are you pregnant? No  Urine Pregnancy Testing Needed: Yes - to be completed at time of CT Simulation    Review of Systems     Constitutional: Negative.    HENT: Negative.     Eyes: Negative.    Respiratory: Negative.     Cardiovascular: Negative.    Gastrointestinal: Negative.    Genitourinary: Negative.    Musculoskeletal: Negative.    Skin: Negative.    Neurological: Negative.    Endo/Heme/Allergies: Negative.    Psychiatric/Behavioral: Negative.       Nurse face-to-face time: Level 5:  over 15 min face to face time.    Jen Yuan RN BSN OCN CBCN

## 2023-09-15 NOTE — LETTER
9/15/2023         RE: Azalea Mckeon  [address] [facility] [address]        Dear Colleague,    Thank you for referring your patient, Azalea Mckeon, to the [facility] [place]. Please see a copy of my visit note below.       Department of Radiation Oncology  [facility]: Cancer Center  [facility]  [address] [place] [id number]  [phone]       Consultation Note    Name: Azalea Mckeon MRN: [id number]   : 1980   Date of Service: Sep 15, 2023 Referring: Dr. Souza      Reason for consultation: right invasive ductal carcinoma s/p mastectomy     History of Present Illness     Ms. Mckeon is a 43 year old female with RIGHT Stage IA ER+/NY+/Her2- invasive ductal carcinoma s/p bilateral mastectomy with expander placement (23) with pathology demonstrating multifocal IDC (upper outer, upper inner quadrant) largest tumor measuring 16 mm, grade 3, no LVSI, negative invasive margins, with 10cm of DCIS (grade 3, comedonecrosis), with multiple positive DCIS margins (anterior, superior), close posterior margin, 0/1 LN, pT1cN0. Her oncotype score was 22 and she will be receiving chemotherapy (Dr. Dyson).    Briefly, her oncologic history is as follows:    Patient underwent a screening mammogram on 2/3/2023 which demonstrated heterogenously dense breasts bilaterally, with calcifications noted in the upper right breast.      She went diagnostic mammogram and ultrasound on 3/2/2023 which demonstrated fine pleomorphic calcifications spanning the upper inner and upper outer quadrant measuring up to 10 cm in length, along with a asymmetry at the 11 o'clock position with architectural distortion.  Ultrasound identified vague areas of shadowing in the upper outer and upper inner quadrant, with a focal area of shadowing at 11 o'clock position, 5 cm from the nipple, measuring up to 9 mm in size.  This is characterized as a  BI-RADS 4 abnormality.    She underwent right breast stereotactic biopsy of right breast mass, with pathology returning as invasive ductal carcinoma, grade 3, at least 9 mm in linear extent associated with high-grade DCIS, ER positive/DC positive/HER2 negative.      She underwent bilateral mastectomy with expander placement on 4/24/2023 (Dr. Souza), with pathology demonstrating multifocal IDC (upper outer, upper inner quadrant) largest tumor measuring 16 mm, grade 3, no LVSI, negative invasive margins, with 10cm of DCIS (grade 3, comedonecrosis), with multiple positive DCIS margins (anterior, superior), close posterior margin, 0/1 LN, pT1cN0.     She was evaluated medical oncology, and her oncotype score was 22 and she will be receiving chemotherapy under care of Dr. Dyson.    Although she is technically in early stage invasive ductal carcinoma who is underwent a mastectomy, given her age, extent of DCIS with a positive margin of undetermined location, her case was discussed at breast tumor board with no recommendation for additional surgery, and a strong consideration for adjuvant radiotherapy.    She has now completed adjuvant chemotherapy, completed on 8/28/23 under care of Dr. Dyson. Overall she tolerated this well.             She has also completed expander filling under care of Dr. Ferreira, with approx. 250cc on both sides.      Overall, patient denies any breast tenderness, pain, drainage, fevers, chills, extremity range of motion difficulties, chest pain, dyspnea, or weight loss.     Past Medical History:   Past Medical History:   Diagnosis Date     Breast cancer (H) 03/17/2023    Right Breast     Cancer (H)     right breast, s/p bilateral mastectomies     Personal history of chemotherapy     Taxotere + Cytoxan (6/26/2023 - 8/28/2023) - Dr. Dyson       Past Surgical History:   Past Surgical History:   Procedure Laterality Date     BREAST BIOPSY, RT/LT Right 03/17/2023     GYN SURGERY  01/01/2010          GYN SURGERY  2011         GYN SURGERY  2013    C/S     GYN SURGERY  2013    C/S     HC REMOVAL OF OVARIAN CYST(S)  1999     MASTECTOMY PARTIAL WITH SENTINEL NODE Bilateral 2023    Procedure: BILATERAL Skin-Sparing Mastectomy, BILATERAL breast implant removal, RIGHT Axillary [place] Lymph Node Biopsy;  Surgeon: Mitra Souza MD;  Location: [facility] OR     RECONSTRUCT BREAST, INSERT TISSUE EXPANDER BILATERAL, COMBINED Bilateral 2023    Procedure: bilateral breast reconstruction, insert tissue expander bilateral, Spy **Latex Allergy**;  Surgeon: EDWARDO Ferreira MD;  Location: [facility] OR     TUBAL LIGATION  2013       Chemotherapy History:  No prior chemotherapy    Radiation History:  No prior RT    Pregnant: No  Implanted Cardiac Devices: No    Medications:  Current Outpatient Medications   Medication     dexAMETHasone (DECADRON) 4 MG tablet     HYDROmorphone (DILAUDID) 2 MG tablet     lidocaine-prilocaine (EMLA) 2.5-2.5 % external cream     methocarbamol (ROBAXIN) 500 MG tablet     ondansetron (ZOFRAN ODT) 4 MG ODT tab     ondansetron (ZOFRAN) 8 MG tablet     prochlorperazine (COMPAZINE) 10 MG tablet     tamoxifen (NOLVADEX) 20 MG tablet     No current facility-administered medications for this visit.         Allergies:     Allergies   Allergen Reactions     Percocet [Oxycodone-Acetaminophen] Itching     Latex Rash       Social History:  Tobacco: not smoking  Alcohol: occasional  Employment: not currently working    Family History:  Family History   Problem Relation Age of Onset     Anxiety Disorder Mother      Diabetes Father      Substance Abuse Father      Bleeding Disorder Sister      Pulmonary Embolism Sister         33     Depression Sister      Autism Spectrum Disorder Daughter      Anxiety Disorder Daughter      Breast Cancer Other         Distant 2nd cousin per patient report     Cancer No family hx of      Hypertension No family hx of       Cerebrovascular Disease No family hx of      Thyroid Disease No family hx of      Glaucoma No family hx of      Macular Degeneration No family hx of      Ovarian Cancer No family hx of        Review of Systems   A 10-point review of systems was performed. Pertinent findings are noted in the HPI.    Physical Exam   ECOG Status: 1    Vitals:  /74 (BP Location: Left arm, Patient Position: Chair, Cuff Size: Adult Regular)   Pulse 93   Temp 98.7  F (37.1  C) (Oral)   Resp 16   Wt 74.4 kg (164 lb)   SpO2 98%   BMI 27.29 kg/m      Gen: Alert, in NAD  Head: NC/AT  Eyes: PERRL, EOMI, sclera anicteric  Ears: No external auricular lesions  Nose/sinus: No rhinorrhea or epistaxis  Breast: incisions CDI;, no upper extremity range of motion limitation     Oral cavity/oropharynx: MMM, no visible oral cavity lesions  Neck: Full ROM, supple, no palpable adenopathy  Pulm: No wheezing, stridor or respiratory distress  CV: Extremities are warm and well-perfused, no cyanosis, no pedal edema  Abdominal: Normal bowel sounds, soft, nontender, no masses  Musculoskeletal: Normal bulk and tone  Skin: Normal color and turgor  Neuro: A/Ox3, CN II-XII intact, normal gait    Imaging/Path/Labs   Imaging: per HPI, personally reviewed and in agreement     Path: per HPI, personally reviewed and in agreement     Labs: per HPI, personally reviewed and in agreement     Assessment      Ms. Mckeon is a 43 year old female with RIGHT Stage IA ER+/CO+/Her2- invasive ductal carcinoma s/p bilateral mastectomy with expander placement (4/24/23) with pathology demonstrating multifocal IDC (upper outer, upper inner quadrant) largest tumor measuring 16 mm, grade 3, no LVSI, negative invasive margins, with 10cm of DCIS (grade 3, comedonecrosis), with multiple positive DCIS margins (anterior, superior), close posterior margin, 0/1 LN, pT1cN0. Her oncotype score was 22 and she will be receiving chemotherapy (Dr. Dyson).    In general, we reviewed the NCCN  Guidelines for invasive breast cancer following total mastectomy with surgical axillary lymph node staging. If four or more axillary nodes are involved, radiation therapy to the chest wall plus infraclavicular region, supraclavicular area, internal mammary nodes, and any part of the axillary bed at risk is a category 1 recommendation (based on high-level evidence and uniform consensus of the NCCN). In the setting of one to three axillary nodes are involved, the recommendation is to strongly consider radiation to the chest wall plus the regional carmella sites as above. In patients with negative axillary lymph nodes and tumor >5cm, the recommendation is to consider radiation to the chest wall and regional lymph nodes.  In patients with negative axillary lymph nodes and tumor less than 5 cm in size, and negative but close < 1mm margins, 1 may consider RT to the chest wall.  In patients with negative axillary lymph nodes and tumor less than 5 cm and margins greater than or equal to 1 mm, typically no radiation is recommended.These recommendations are supported by various literature (Jesse et al, EBCTCG [place]-analysis, Lancet 2005, update Lancet 2014; Overgaapam et al., 82b and 82c Combined Analysis, Radiother Oncol 2007).    Thus, in this particular scenario, she does not have the typical national cancer network guideline strong indication for postmastectomy radiation. However, we also discussed risk factors for increased locoregional recurrence after mastectomy in node negative patients and possible implications for PMRT (Tio et al., IJROBP, 2005).     Further, we the implications of positive margins for DCIS in the setting of mastectomy (Shaun et al, IJROBP, 2011).  Although the 5-year local regional recurrence risk is relatively low at approximately 7%, her age, premenopausal status, grade 3,multifocality of tumor, extent of DCIS (10cm), positive DCIS margin (with inability to clear surgically), , all increase her  cumulative lifetime risk of local regional recurrence.  Thus, although an extrapolation, I would estimate her 5-year local regional risk to be somewhere in the 10 to 15% range.    Further, her case was discussed at breast tumor conference, with strong recommendation for pursuing adjuvant radiotherapy to the chest wall.    Further, we discussed the logistics of treatment planning and delivery in detail with the patient. We also discussed side effects, including short term risks of fatigue and skin reaction, and long term risks of pneumonitis, lung fibrosis, soft tissue fibrosis, skin changes, rib fractures, cardiac damage, secondary cancers, lymphedema, and thyroid dysfunction, and implant failure. We described the use of 3D-conformal radiotherapy to minimize dose to the normal tissues, while adequately covering the target tissues, and the ability of this technique to decrease potential for toxicity.     Plan     1. After extensive discussion, patient wishes to proceed with adjuvant RIGHT post mastectomy radiation. Plan for 50Gy/25fx to the chest wall, to hopefully decrease change of implant failure.     2. Patient has already met with medical oncology (Dr. Dyson) and has a oncotype of 22 and completed receive adjuvant  chemotherapy 8/28/23.     3. From reconstruction standpoint, recommended delay reconstruction.  She has had expanders filled bilaterally under care of Dr. Ferreira, with 250cc on each side. I recommended recommend deflating contralateral breast (LEFT) completely to avoid radiation to that side, and recommend reducing ipsilateral breast (RIGHT) to comfortably low value (approx. 150 ml remaining).  After this, we will schedule patient for CT simulation. [facility] prior to CT simulation.    All benefits and risks discussed, and patient is in agreement with the oncologic plan discussed above.     Thank you for allowing me to participate in your patient's care.  If you should require any additional  information, please do not hesitate in contacting me.        Jona Linares MD  Department of Radiation Oncology  [facility]       Again, thank you for allowing me to participate in the care of your patient.        Sincerely,        Jona Linares MD

## 2023-09-15 NOTE — TELEPHONE ENCOUNTER
Received a message from Jen VALLECILLO in radiation oncology that pt needs to have all of the fluid removed from her left expander and 100cc from the right. Pt scheduled .Viji Llamas RN              [10:32 AM] Jen Yuan    all from the left and around 100 from the right    [10:33 AM] Jen Yuan    Looks like she had right around 250 in both of them

## 2023-09-15 NOTE — PATIENT INSTRUCTIONS
What to expect at your Simulation visit:    You will meet with a Radiation Therapist and other team members who will be doing a planning session called a  simulation  with you. This process will determine your daily treatment.    ~ You will lie on a flat table and have a treatment planning CT scan.  It is important during the scan to hold very still and breathe normally.    ~ Your therapist may construct a body mold to help you hold still for your treatments.    ~ If you are having treatment to the head or neck area you will be fitted with a plastic mesh mask that fits very snugly over your face and neck.     ~ Your therapist will be taking some digital photos that will go in your treatment chart.      ~Your therapist will make marks on your skin and take measurements. Your therapist may ask you about making small tattoos (a permanent small dot) over these marks.  These marks are used to position you daily for your radiation therapy treatments. Please do not wash off any marks until all of your radiation therapy treatments are complete unless you are instructed to do so by your therapist.    ~ Once the simulation is completed it can take from 3 to 10 business days before you start radiation therapy treatments.    ~ You may meet with a nurse who will go over management of treatment side effects and self care during your treatments. The nurse will help to plan care with other departments and physicians if needed.       Please contact [facility] RN with questions or concerns following today's appointment: [phone].       Please feel free to leave a detailed message if your call is not answered.    If your call is not received before 3:00 PM, it may not be returned until the following business day.    If you are receiving radiation treatment and need assistance after 3:00 PM or on the weekends, please call [phone] and ask to speak to the radiation oncologist on-call.    Thank  you!    eJn VALLECILLO

## 2023-09-15 NOTE — PROGRESS NOTES
Department of Radiation Oncology  [facility]    Health: Cancer Center  [facility]  [address] [place] [id number] [phone]       Consultation Note    Name: Azalea Mckeon MRN: [id number]   : 1980   Date of Service: Sep 15, 2023 Referring: Dr. Souza      Reason for consultation: right invasive ductal carcinoma s/p mastectomy     History of Present Illness     Ms. Mckeon is a 43 year old female with RIGHT Stage IA ER+/OH+/Her2- invasive ductal carcinoma s/p bilateral mastectomy with expander placement (23) with pathology demonstrating multifocal IDC (upper outer, upper inner quadrant) largest tumor measuring 16 mm, grade 3, no LVSI, negative invasive margins, with 10cm of DCIS (grade 3, comedonecrosis), with multiple positive DCIS margins (anterior, superior), close posterior margin, 0/1 LN, pT1cN0. Her oncotype score was 22 and she will be receiving chemotherapy (Dr. Dyson).    Briefly, her oncologic history is as follows:    Patient underwent a screening mammogram on 2/3/2023 which demonstrated heterogenously dense breasts bilaterally, with calcifications noted in the upper right breast.      She went diagnostic mammogram and ultrasound on 3/2/2023 which demonstrated fine pleomorphic calcifications spanning the upper inner and upper outer quadrant measuring up to 10 cm in length, along with a asymmetry at the 11 o'clock position with architectural distortion.  Ultrasound identified vague areas of shadowing in the upper outer and upper inner quadrant, with a focal area of shadowing at 11 o'clock position, 5 cm from the nipple, measuring up to 9 mm in size.  This is characterized as a BI-RADS 4 abnormality.    She underwent right breast stereotactic biopsy of right breast mass, with pathology returning as invasive ductal carcinoma, grade 3, at least 9 mm in linear extent associated with high-grade DCIS, ER positive/OH positive/HER2 negative.       She underwent bilateral mastectomy with expander placement on 2023 (Dr. Souza), with pathology demonstrating multifocal IDC (upper outer, upper inner quadrant) largest tumor measuring 16 mm, grade 3, no LVSI, negative invasive margins, with 10cm of DCIS (grade 3, comedonecrosis), with multiple positive DCIS margins (anterior, superior), close posterior margin, 0/1 LN, pT1cN0.     She was evaluated medical oncology, and her oncotype score was 22 and she will be receiving chemotherapy under care of Dr. Dyson.    Although she is technically in early stage invasive ductal carcinoma who is underwent a mastectomy, given her age, extent of DCIS with a positive margin of undetermined location, her case was discussed at breast tumor board with no recommendation for additional surgery, and a strong consideration for adjuvant radiotherapy.    She has now completed adjuvant chemotherapy, completed on 23 under care of Dr. Dyson. Overall she tolerated this well.             She has also completed expander filling under care of Dr. Ferreira, with approx. 250cc on both sides.      Overall, patient denies any breast tenderness, pain, drainage, fevers, chills, extremity range of motion difficulties, chest pain, dyspnea, or weight loss.     Past Medical History:   Past Medical History:   Diagnosis Date    Breast cancer (H) 2023    Right Breast    Cancer (H)     right breast, s/p bilateral mastectomies    Personal history of chemotherapy     Taxotere + Cytoxan (2023 - 2023) - Dr. Dyson       Past Surgical History:   Past Surgical History:   Procedure Laterality Date    BREAST BIOPSY, RT/LT Right 2023    GYN SURGERY  2010        GYN SURGERY  2011        GYN SURGERY  2013    C/S    GYN SURGERY  2013    C/S    HC REMOVAL OF OVARIAN CYST(S)  1999    MASTECTOMY PARTIAL WITH SENTINEL NODE Bilateral 2023    Procedure: BILATERAL Skin-Sparing  Mastectomy, BILATERAL breast implant removal, RIGHT Axillary Lawrence Lymph Node Biopsy;  Surgeon: Mitra Souza MD;  Location: [facility] OR    RECONSTRUCT BREAST, INSERT TISSUE EXPANDER BILATERAL, COMBINED Bilateral 04/24/2023    Procedure: bilateral breast reconstruction, insert tissue expander bilateral, Spy **Latex Allergy**;  Surgeon: EDWARDO Ferreira MD;  Location: [facility] OR    TUBAL LIGATION  01/01/2013       Chemotherapy History:  No prior chemotherapy    Radiation History:  No prior RT    Pregnant: No  Implanted Cardiac Devices: No    Medications:  Current Outpatient Medications   Medication    dexAMETHasone (DECADRON) 4 MG tablet    HYDROmorphone (DILAUDID) 2 MG tablet    lidocaine-prilocaine (EMLA) 2.5-2.5 % external cream    methocarbamol (ROBAXIN) 500 MG tablet    ondansetron (ZOFRAN ODT) 4 MG ODT tab    ondansetron (ZOFRAN) 8 MG tablet    prochlorperazine (COMPAZINE) 10 MG tablet    tamoxifen (NOLVADEX) 20 MG tablet     No current facility-administered medications for this visit.         Allergies:     Allergies   Allergen Reactions    Percocet [Oxycodone-Acetaminophen] Itching    Latex Rash       Social History:  Tobacco: not smoking  Alcohol: occasional  Employment: not currently working    Family History:  Family History   Problem Relation Age of Onset    Anxiety Disorder Mother     Diabetes Father     Substance Abuse Father     Bleeding Disorder Sister     Pulmonary Embolism Sister         33    Depression Sister     Autism Spectrum Disorder Daughter     Anxiety Disorder Daughter     Breast Cancer Other         Distant 2nd cousin per patient report    Cancer No family hx of     Hypertension No family hx of     Cerebrovascular Disease No family hx of     Thyroid Disease No family hx of     Glaucoma No family hx of     Macular Degeneration No family hx of     Ovarian Cancer No family hx of        Review of Systems   A 10-point review of systems was performed. Pertinent findings are noted in the  HPI.    Physical Exam   ECOG Status: 1    Vitals:  /74 (BP Location: Left arm, Patient Position: Chair, Cuff Size: Adult Regular)   Pulse 93   Temp 98.7  F (37.1  C) (Oral)   Resp 16   Wt 74.4 kg (164 lb)   SpO2 98%   BMI 27.29 kg/m      Gen: Alert, in NAD  Head: NC/AT  Eyes: PERRL, EOMI, sclera anicteric  Ears: No external auricular lesions  Nose/sinus: No rhinorrhea or epistaxis  Breast: incisions CDI;, no upper extremity range of motion limitation     Oral cavity/oropharynx: MMM, no visible oral cavity lesions  Neck: Full ROM, supple, no palpable adenopathy  Pulm: No wheezing, stridor or respiratory distress  CV: Extremities are warm and well-perfused, no cyanosis, no pedal edema  Abdominal: Normal bowel sounds, soft, nontender, no masses  Musculoskeletal: Normal bulk and tone  Skin: Normal color and turgor  Neuro: A/Ox3, CN II-XII intact, normal gait    Imaging/Path/Labs   Imaging: per HPI, personally reviewed and in agreement     Path: per HPI, personally reviewed and in agreement     Labs: per HPI, personally reviewed and in agreement     Assessment      Ms. Mckeon is a 43 year old female with RIGHT Stage IA ER+/NJ+/Her2- invasive ductal carcinoma s/p bilateral mastectomy with expander placement (4/24/23) with pathology demonstrating multifocal IDC (upper outer, upper inner quadrant) largest tumor measuring 16 mm, grade 3, no LVSI, negative invasive margins, with 10cm of DCIS (grade 3, comedonecrosis), with multiple positive DCIS margins (anterior, superior), close posterior margin, 0/1 LN, pT1cN0. Her oncotype score was 22 and she will be receiving chemotherapy (Dr. Dyson).    In general, we reviewed the NCCN Guidelines for invasive breast cancer following total mastectomy with surgical axillary lymph node staging. If four or more axillary nodes are involved, radiation therapy to the chest wall plus infraclavicular region, supraclavicular area, internal mammary nodes, and any part of the  axillary bed at risk is a category 1 recommendation (based on high-level evidence and uniform consensus of the NCCN). In the setting of one to three axillary nodes are involved, the recommendation is to strongly consider radiation to the chest wall plus the regional carmella sites as above. In patients with negative axillary lymph nodes and tumor >5cm, the recommendation is to consider radiation to the chest wall and regional lymph nodes.  In patients with negative axillary lymph nodes and tumor less than 5 cm in size, and negative but close < 1mm margins, 1 may consider RT to the chest wall.  In patients with negative axillary lymph nodes and tumor less than 5 cm and margins greater than or equal to 1 mm, typically no radiation is recommended.These recommendations are supported by various literature (Jesse et al, EBCTCG [place]-analysis, Lancet 2005, update Lancet 2014; Federico et al., 82b and 82c Combined Analysis, Radiother Oncol 2007).    Thus, in this particular scenario, she does not have the typical national cancer network guideline strong indication for postmastectomy radiation. However, we also discussed risk factors for increased locoregional recurrence after mastectomy in node negative patients and possible implications for PMRT (Tio et al., IJROBP, 2005).     Further, we the implications of positive margins for DCIS in the setting of mastectomy (Shaun et al, IJROBP, 2011).  Although the 5-year local regional recurrence risk is relatively low at approximately 7%, her age, premenopausal status, grade 3,multifocality of tumor, extent of DCIS (10cm), positive DCIS margin (with inability to clear surgically), , all increase her cumulative lifetime risk of local regional recurrence.  Thus, although an extrapolation, I would estimate her 5-year local regional risk to be somewhere in the 10 to 15% range.    Further, her case was discussed at breast tumor conference, with strong recommendation for pursuing adjuvant  radiotherapy to the chest wall.    Further, we discussed the logistics of treatment planning and delivery in detail with the patient. We also discussed side effects, including short term risks of fatigue and skin reaction, and long term risks of pneumonitis, lung fibrosis, soft tissue fibrosis, skin changes, rib fractures, cardiac damage, secondary cancers, lymphedema, and thyroid dysfunction, and implant failure. We described the use of 3D-conformal radiotherapy to minimize dose to the normal tissues, while adequately covering the target tissues, and the ability of this technique to decrease potential for toxicity.     Plan     1. After extensive discussion, patient wishes to proceed with adjuvant RIGHT post mastectomy radiation. Plan for 50Gy/25fx to the chest wall, to hopefully decrease change of implant failure.     2. Patient has already met with medical oncology (Dr. Dyson) and has a oncotype of 22 and completed receive adjuvant  chemotherapy 8/28/23.     3. From reconstruction standpoint, recommended delay reconstruction.  She has had expanders filled bilaterally under care of Dr. Ferreira, with 250cc on each side. I recommended recommend deflating contralateral breast (LEFT) completely to avoid radiation to that side, and recommend reducing ipsilateral breast (RIGHT) to comfortably low value (approx. 150 ml remaining).  After this, we will schedule patient for CT simulation[place] [facility] [place].    All benefits and risks discussed, and patient is in agreement with the oncologic plan discussed above.     Thank you for allowing me to participate in your patient's care.  If you should require any additional information, please do not hesitate in contacting me.        Jona Linares MD  Department of Radiation Oncology  [facility]

## 2023-09-19 ENCOUNTER — THERAPY VISIT (OUTPATIENT)
Dept: PHYSICAL THERAPY | Facility: CLINIC | Age: 43
End: 2023-09-19
Attending: PLASTIC SURGERY
Payer: COMMERCIAL

## 2023-09-19 DIAGNOSIS — I89.0 LYMPHEDEMA: Primary | ICD-10-CM

## 2023-09-19 PROCEDURE — 97140 MANUAL THERAPY 1/> REGIONS: CPT | Mod: GP | Performed by: REHABILITATION PRACTITIONER

## 2023-09-20 ENCOUNTER — ALLIED HEALTH/NURSE VISIT (OUTPATIENT)
Dept: NURSING | Facility: CLINIC | Age: 43
End: 2023-09-20
Payer: COMMERCIAL

## 2023-09-20 DIAGNOSIS — Z17.0 MALIGNANT NEOPLASM OF OVERLAPPING SITES OF RIGHT BREAST IN FEMALE, ESTROGEN RECEPTOR POSITIVE (H): Primary | ICD-10-CM

## 2023-09-20 DIAGNOSIS — C50.811 MALIGNANT NEOPLASM OF OVERLAPPING SITES OF RIGHT BREAST IN FEMALE, ESTROGEN RECEPTOR POSITIVE (H): Primary | ICD-10-CM

## 2023-09-20 PROCEDURE — 99207 PR NO CHARGE NURSE ONLY: CPT | Performed by: DERMATOLOGY

## 2023-09-20 NOTE — NURSING NOTE
Azalea Mckeon comes into clinic today at the request of Dr. Ferreira Ordering Provider for tissue expander fluid removal.      Removed approximately 250ccs from left tissue expander and 100ccs from right tissue expander.    Amount remaining in left expander 0ccs, amount in right tissue expander 150ccs.      Pt tolerated procedure well.  Instructed to reach out to us when radiation is almost over, or we will attempt to monitor progress.      This service provided today was under the supervising provider of the day Dr. Stephens, who was available if needed.    Comfort Beckford RN

## 2023-09-22 ENCOUNTER — APPOINTMENT (OUTPATIENT)
Dept: RADIATION ONCOLOGY | Facility: CLINIC | Age: 43
End: 2023-09-22
Payer: COMMERCIAL

## 2023-09-22 ENCOUNTER — LAB (OUTPATIENT)
Dept: LAB | Facility: CLINIC | Age: 43
End: 2023-09-22
Payer: COMMERCIAL

## 2023-09-22 DIAGNOSIS — C50.919 BREAST CA (H): ICD-10-CM

## 2023-09-22 LAB — HCG UR QL: NEGATIVE

## 2023-09-22 PROCEDURE — 81025 URINE PREGNANCY TEST: CPT

## 2023-09-22 PROCEDURE — 77332 RADIATION TREATMENT AID(S): CPT | Performed by: SURGERY

## 2023-09-22 PROCEDURE — 77263 THER RADIOLOGY TX PLNG CPLX: CPT | Performed by: SURGERY

## 2023-09-22 PROCEDURE — 77290 THER RAD SIMULAJ FIELD CPLX: CPT | Performed by: SURGERY

## 2023-09-26 ENCOUNTER — THERAPY VISIT (OUTPATIENT)
Dept: PHYSICAL THERAPY | Facility: CLINIC | Age: 43
End: 2023-09-26
Attending: PLASTIC SURGERY
Payer: COMMERCIAL

## 2023-09-26 DIAGNOSIS — I89.0 LYMPHEDEMA: Primary | ICD-10-CM

## 2023-09-26 PROCEDURE — 97140 MANUAL THERAPY 1/> REGIONS: CPT | Mod: GP | Performed by: PHYSICAL THERAPIST

## 2023-09-28 ENCOUNTER — THERAPY VISIT (OUTPATIENT)
Dept: PHYSICAL THERAPY | Facility: CLINIC | Age: 43
End: 2023-09-28
Attending: PLASTIC SURGERY
Payer: COMMERCIAL

## 2023-09-28 ENCOUNTER — TELEPHONE (OUTPATIENT)
Dept: ONCOLOGY | Facility: CLINIC | Age: 43
End: 2023-09-28
Payer: COMMERCIAL

## 2023-09-28 DIAGNOSIS — I89.0 LYMPHEDEMA: Primary | ICD-10-CM

## 2023-09-28 PROCEDURE — 97140 MANUAL THERAPY 1/> REGIONS: CPT | Mod: GP | Performed by: REHABILITATION PRACTITIONER

## 2023-09-28 NOTE — TELEPHONE ENCOUNTER
Per order this patient needs a port removal scheduled at [facility]. I LVM  to assist the patient in getting this patient scheduled.    Thanks    Marleni Nixon

## 2023-10-03 ENCOUNTER — THERAPY VISIT (OUTPATIENT)
Dept: PHYSICAL THERAPY | Facility: CLINIC | Age: 43
End: 2023-10-03
Attending: PLASTIC SURGERY
Payer: COMMERCIAL

## 2023-10-03 ENCOUNTER — APPOINTMENT (OUTPATIENT)
Dept: RADIATION ONCOLOGY | Facility: CLINIC | Age: 43
End: 2023-10-03
Payer: COMMERCIAL

## 2023-10-03 DIAGNOSIS — I89.0 LYMPHEDEMA: Primary | ICD-10-CM

## 2023-10-03 PROCEDURE — 97140 MANUAL THERAPY 1/> REGIONS: CPT | Mod: GP | Performed by: PHYSICAL THERAPIST

## 2023-10-03 PROCEDURE — 77295 3-D RADIOTHERAPY PLAN: CPT | Performed by: SURGERY

## 2023-10-03 PROCEDURE — 77300 RADIATION THERAPY DOSE PLAN: CPT | Performed by: SURGERY

## 2023-10-03 PROCEDURE — 77334 RADIATION TREATMENT AID(S): CPT | Performed by: SURGERY

## 2023-10-04 ENCOUNTER — ALLIED HEALTH/NURSE VISIT (OUTPATIENT)
Dept: RADIATION ONCOLOGY | Facility: CLINIC | Age: 43
End: 2023-10-04
Payer: COMMERCIAL

## 2023-10-04 ENCOUNTER — APPOINTMENT (OUTPATIENT)
Dept: RADIATION ONCOLOGY | Facility: CLINIC | Age: 43
End: 2023-10-04
Payer: COMMERCIAL

## 2023-10-04 DIAGNOSIS — C50.919 BREAST CA (H): Primary | ICD-10-CM

## 2023-10-04 PROCEDURE — 77280 THER RAD SIMULAJ FIELD SMPL: CPT | Performed by: SURGERY

## 2023-10-04 PROCEDURE — 99207 PR NO CHARGE NURSE ONLY: CPT | Performed by: SURGERY

## 2023-10-04 NOTE — PATIENT INSTRUCTIONS
Please contact [facility] RN with questions or concerns following today's appointment: [phone].       Please feel free to leave a detailed message if your call is not answered.    If your call is not received before 3:00 PM, it may not be returned until the following business day.    If you are receiving radiation treatment and need assistance after 3:00 PM or on the weekends, please call [phone] and ask to speak to the radiation oncologist on-call.    Thank you!    Jen VALLECILLO

## 2023-10-04 NOTE — NURSING NOTE
Teaching Flowsheet   Relevant Diagnosis: breast cancer  Teaching Topic: radiation therapy     Person(s) involved in teaching:   Patient, Mother, and      Motivation Level:  Asks Questions: Yes  Eager to Learn: Yes  Cooperative: Yes  Receptive (willing/able to accept information): Yes  Any cultural factors/Orthodoxy beliefs that may influence understanding or compliance? No       Patient and Family demonstrates understanding of the following:  Reason for the appointment, diagnosis and treatment plan: Yes  Knowledge of proper use of medications and conditions for which they are ordered (with special attention to potential side effects or drug interactions): Yes  Which situations necessitate calling provider and whom to contact: Yes       Teaching Concerns Addressed:   Comments: radiation therapy side effects: fatigue, skin changes and skin cares.     Proper use and care of  (medical equip, care aids, etc.): NA  Nutritional needs and diet plan: Yes  Pain management techniques: Yes  Wound Care: Yes  How and/when to access community resources: Yes     Instructional Materials Used/Given: radiation therapy side effects: fatigue, skin changes and skin cares     Time spent with patient: 15 minutes.    Jen Yuan RN BSN OCN CBCN

## 2023-10-05 ENCOUNTER — APPOINTMENT (OUTPATIENT)
Dept: RADIATION ONCOLOGY | Facility: CLINIC | Age: 43
End: 2023-10-05
Payer: COMMERCIAL

## 2023-10-05 ENCOUNTER — THERAPY VISIT (OUTPATIENT)
Dept: PHYSICAL THERAPY | Facility: CLINIC | Age: 43
End: 2023-10-05
Attending: PLASTIC SURGERY
Payer: COMMERCIAL

## 2023-10-05 DIAGNOSIS — I89.0 LYMPHEDEMA: Primary | ICD-10-CM

## 2023-10-05 PROCEDURE — 77332 RADIATION TREATMENT AID(S): CPT | Performed by: SURGERY

## 2023-10-05 PROCEDURE — 97140 MANUAL THERAPY 1/> REGIONS: CPT | Mod: GP | Performed by: REHABILITATION PRACTITIONER

## 2023-10-05 PROCEDURE — 77412 RADIATION TX DELIVERY LVL 3: CPT | Performed by: SURGERY

## 2023-10-06 ENCOUNTER — APPOINTMENT (OUTPATIENT)
Dept: RADIATION ONCOLOGY | Facility: CLINIC | Age: 43
End: 2023-10-06
Payer: COMMERCIAL

## 2023-10-06 PROCEDURE — 77412 RADIATION TX DELIVERY LVL 3: CPT | Performed by: SURGERY

## 2023-10-09 ENCOUNTER — APPOINTMENT (OUTPATIENT)
Dept: RADIATION ONCOLOGY | Facility: CLINIC | Age: 43
End: 2023-10-09
Payer: COMMERCIAL

## 2023-10-09 PROCEDURE — 77412 RADIATION TX DELIVERY LVL 3: CPT | Performed by: RADIOLOGY

## 2023-10-10 ENCOUNTER — THERAPY VISIT (OUTPATIENT)
Dept: PHYSICAL THERAPY | Facility: CLINIC | Age: 43
End: 2023-10-10
Attending: PLASTIC SURGERY
Payer: COMMERCIAL

## 2023-10-10 ENCOUNTER — APPOINTMENT (OUTPATIENT)
Dept: RADIATION ONCOLOGY | Facility: CLINIC | Age: 43
End: 2023-10-10
Payer: COMMERCIAL

## 2023-10-10 DIAGNOSIS — I89.0 LYMPHEDEMA: Primary | ICD-10-CM

## 2023-10-10 PROCEDURE — 77412 RADIATION TX DELIVERY LVL 3: CPT | Performed by: RADIOLOGY

## 2023-10-10 PROCEDURE — 97140 MANUAL THERAPY 1/> REGIONS: CPT | Mod: GP | Performed by: PHYSICAL THERAPIST

## 2023-10-11 ENCOUNTER — APPOINTMENT (OUTPATIENT)
Dept: RADIATION ONCOLOGY | Facility: CLINIC | Age: 43
End: 2023-10-11
Payer: COMMERCIAL

## 2023-10-11 ENCOUNTER — OFFICE VISIT (OUTPATIENT)
Dept: RADIATION ONCOLOGY | Facility: CLINIC | Age: 43
End: 2023-10-11
Payer: COMMERCIAL

## 2023-10-11 VITALS
BODY MASS INDEX: 27.27 KG/M2 | SYSTOLIC BLOOD PRESSURE: 105 MMHG | HEART RATE: 68 BPM | RESPIRATION RATE: 18 BRPM | OXYGEN SATURATION: 100 % | TEMPERATURE: 98 F | DIASTOLIC BLOOD PRESSURE: 71 MMHG | WEIGHT: 163.9 LBS

## 2023-10-11 DIAGNOSIS — Z17.0 MALIGNANT NEOPLASM OF RIGHT BREAST IN FEMALE, ESTROGEN RECEPTOR POSITIVE, UNSPECIFIED SITE OF BREAST (H): Primary | ICD-10-CM

## 2023-10-11 DIAGNOSIS — C50.911 MALIGNANT NEOPLASM OF RIGHT BREAST IN FEMALE, ESTROGEN RECEPTOR POSITIVE, UNSPECIFIED SITE OF BREAST (H): Primary | ICD-10-CM

## 2023-10-11 PROCEDURE — 77417 THER RADIOLOGY PORT IMAGE(S): CPT | Performed by: SURGERY

## 2023-10-11 PROCEDURE — 77336 RADIATION PHYSICS CONSULT: CPT | Performed by: SURGERY

## 2023-10-11 PROCEDURE — 77427 RADIATION TX MANAGEMENT X5: CPT | Performed by: SURGERY

## 2023-10-11 PROCEDURE — 77412 RADIATION TX DELIVERY LVL 3: CPT | Performed by: SURGERY

## 2023-10-11 PROCEDURE — 99207 PR DROP WITH A PROCEDURE: CPT | Performed by: SURGERY

## 2023-10-11 ASSESSMENT — PAIN SCALES - GENERAL: PAINLEVEL: NO PAIN (0)

## 2023-10-11 NOTE — LETTER
10/11/2023         RE: Azalea Mckeon  [address] [address] [id number]        Dear Colleague,    Thank you for referring your patient, Azalea Mckeon, to the [facility] [place]. Please see a copy of my visit note below.    [facility]  SPECIALIZING IN BREAKTHROUGHS  Radiation Oncology    On Treatment Visit Note      Azalea Mckeon      Date: Oct 11, 2023   MRN: [id number]   : 1980  Diagnosis: breast cancer ER (+)        ID: Ms. Mckeon is a 43 year old female with RIGHT Stage IA ER+/CA+/Her2- invasive ductal carcinoma s/p bilateral mastectomy with expander placement (23) with pathology demonstrating multifocal IDC (upper outer, upper inner quadrant) largest tumor measuring 16 mm, grade 3, no LVSI, negative invasive margins, with 10cm of DCIS (grade 3, comedonecrosis), with multiple positive DCIS margins (anterior, superior), close posterior margin, 0/1 LN, pT1cN0. Her oncotype score was 22 and she will be receiving chemotherapy (Dr. Dyson).     In general, we reviewed the NCCN Guidelines for invasive breast cancer following total mastectomy with surgical axillary lymph node staging. If four or more axillary nodes are involved, radiation therapy to the chest wall plus infraclavicular region, supraclavicular area, internal mammary nodes, and any part of the axillary bed at risk is a category 1 recommendation (based on high-level evidence and uniform consensus of the NCCN). In the setting of one to three axillary nodes are involved, the recommendation is to strongly consider radiation to the chest wall plus the regional carmella sites as above. In patients with negative axillary lymph nodes and tumor >5cm, the recommendation is to consider radiation to the chest wall and regional lymph nodes.  In patients with negative axillary lymph nodes and tumor less than 5 cm in size, and negative but close < 1mm margins, 1 may consider RT to the chest  wall.  In patients with negative axillary lymph nodes and tumor less than 5 cm and margins greater than or equal to 1 mm, typically no radiation is recommended.These recommendations are supported by various literature (Jesse et al, EBCTCG [place]-analysis, Lancet 2005, update Lancet 2014; Federico et al., 82b and 82c Combined Analysis, Radiother Oncol 2007).     Thus, in this particular scenario, she does not have the typical national cancer network guideline strong indication for postmastectomy radiation. However, we also discussed risk factors for increased locoregional recurrence after mastectomy in node negative patients and possible implications for PMRT (Tio et al., IJROBP, 2005).      Further, we the implications of positive margins for DCIS in the setting of mastectomy (Shaun et al, IJROBP, 2011).  Although the 5-year local regional recurrence risk is relatively low at approximately 7%, her age, premenopausal status, grade 3,multifocality of tumor, extent of DCIS (10cm), positive DCIS margin (with inability to clear surgically), , all increase her cumulative lifetime risk of local regional recurrence.  Thus, although an extrapolation, I would estimate her 5-year local regional risk to be somewhere in the 10 to 15% range.     Further, her case was discussed at breast tumor conference, with strong recommendation for pursuing adjuvant radiotherapy to the chest wall.    Reason for Visit:  On Radiation Treatment Visit       Treatment Summary to Date  Treatment Site: right chest wall Current Dose: 1000/5000 cGy Fractions: 5/25        Chemotherapy  Chemo concurrent with radx?: No (chemo completed on 8/28/2023 - Dr. Dyson)    Subjective:   No complaints, tolerating RT well overall.     Nursing ROS:   Nutrition Alteration  Diet Type: Patient's Preference  Skin  Skin Reaction: 1 - Faint erythema or dry desquamation (no desquamation)  Skin Intervention: patient reports applying Aquaphor two times daily         Cardiovascular  Respiratory effort: 1 - Normal - without distress        Psychosocial  Mood - Anxiety: 0 - Normal  Mood - Depression: 0 - Normal  Psychosocial Note: energy level at baseline  Pain Assessment  0-10 Pain Scale: 0      Objective:   /71 (BP Location: Left arm, Patient Position: Chair, Cuff Size: Adult Regular)   Pulse 68   Temp 98  F (36.7  C) (Oral)   Resp 18   Wt 74.3 kg (163 lb 14.4 oz)   SpO2 100%   BMI 27.27 kg/m    Gen: Appears well, in no acute distress  Skin: faint erythema, no skin breakdown  CV/Resp: rrr, breathing comfortably on room air  Neuro: CN 2-12 grossly intact, UE/LE full strength     Labs:  CBC RESULTS:   Recent Labs   Lab Test 08/28/23  0757   WBC 5.3   RBC 3.73*   HGB 11.4*   HCT 33.3*   MCV 89   MCH 30.6   MCHC 34.2   RDW 16.0*        ELECTROLYTES:  Recent Labs   Lab Test 08/28/23  0757      POTASSIUM 4.0   CHLORIDE 107   MINNA 9.4   CO2 23   BUN 10.2   CR 0.72   *       Assessment:    Tolerating radiation therapy well.  All questions and concerns addressed.      Plan:   Continue current therapy.        Mosaiq chart and setup information reviewed  Ports checked and MVCT/IGRT images checked    Medication Review  Med list reviewed with patient?: Yes  Med list printed and given: Offered and declined    Educational Topic Discussed  Education Instructions: radiation therapy side effects: fatigue, skin changes and skin cares      Jona Linares MD  Radiation Oncology   [facility]  Clinic: [phone]        Again, thank you for allowing me to participate in the care of your patient.        Sincerely,        Jona Linares MD

## 2023-10-11 NOTE — PATIENT INSTRUCTIONS
Please contact [facility] RN with questions or concerns following today's appointment: [phone].       Please feel free to leave a detailed message if your call is not answered.    If your call is not received before 3:00 PM, it may not be returned until the following business day.    If you are receiving radiation treatment and need assistance after 3:00 PM or on the weekends, please call [phone] and ask to speak to the radiation oncologist on-call.    Thank you!    Jen VALLECILLO

## 2023-10-11 NOTE — PROGRESS NOTES
[facility] PHYSICIANS  SPECIALIZING IN BREAKTHROUGHS  Radiation Oncology    On Treatment Visit Note      Azalea Mckeon      Date: Oct 11, 2023   MRN: [id number]   : 1980  Diagnosis: breast cancer ER (+)        ID: Ms. Mckeon is a 43 year old female with RIGHT Stage IA ER+/DC+/Her2- invasive ductal carcinoma s/p bilateral mastectomy with expander placement (23) with pathology demonstrating multifocal IDC (upper outer, upper inner quadrant) largest tumor measuring 16 mm, grade 3, no LVSI, negative invasive margins, with 10cm of DCIS (grade 3, comedonecrosis), with multiple positive DCIS margins (anterior, superior), close posterior margin, 0/1 LN, pT1cN0. Her oncotype score was 22 and she will be receiving chemotherapy (Dr. Dyson).     In general, we reviewed the NCCN Guidelines for invasive breast cancer following total mastectomy with surgical axillary lymph node staging. If four or more axillary nodes are involved, radiation therapy to the chest wall plus infraclavicular region, supraclavicular area, internal mammary nodes, and any part of the axillary bed at risk is a category 1 recommendation (based on high-level evidence and uniform consensus of the NCCN). In the setting of one to three axillary nodes are involved, the recommendation is to strongly consider radiation to the chest wall plus the regional carmella sites as above. In patients with negative axillary lymph nodes and tumor >5cm, the recommendation is to consider radiation to the chest wall and regional lymph nodes.  In patients with negative axillary lymph nodes and tumor less than 5 cm in size, and negative but close < 1mm margins, 1 may consider RT to the chest wall.  In patients with negative axillary lymph nodes and tumor less than 5 cm and margins greater than or equal to 1 mm, typically no radiation is recommended.These recommendations are supported by various literature (Jesse et al, EBCTCG [place]-analysis, Lancet 2005,  update Lancet 2014; Federico et al., 82b and 82c Combined Analysis, Radiother Oncol 2007).     Thus, in this particular scenario, she does not have the typical national cancer network guideline strong indication for postmastectomy radiation. However, we also discussed risk factors for increased locoregional recurrence after mastectomy in node negative patients and possible implications for PMRT (Tio et al., IJROBP, 2005).      Further, we the implications of positive margins for DCIS in the setting of mastectomy (Shaun et al, IJROBP, 2011).  Although the 5-year local regional recurrence risk is relatively low at approximately 7%, her age, premenopausal status, grade 3,multifocality of tumor, extent of DCIS (10cm), positive DCIS margin (with inability to clear surgically), , all increase her cumulative lifetime risk of local regional recurrence.  Thus, although an extrapolation, I would estimate her 5-year local regional risk to be somewhere in the 10 to 15% range.     Further, her case was discussed at breast tumor conference, with strong recommendation for pursuing adjuvant radiotherapy to the chest wall.    Reason for Visit:  On Radiation Treatment Visit       Treatment Summary to Date  Treatment Site: right chest wall Current Dose: 1000/5000 cGy Fractions: 5/25        Chemotherapy  Chemo concurrent with radx?: No (chemo completed on 8/28/2023 - Dr. Dyson)    Subjective:   No complaints, tolerating RT well overall.     Nursing ROS:   Nutrition Alteration  Diet Type: Patient's Preference  Skin  Skin Reaction: 1 - Faint erythema or dry desquamation (no desquamation)  Skin Intervention: patient reports applying Aquaphor two times daily        Cardiovascular  Respiratory effort: 1 - Normal - without distress        Psychosocial  Mood - Anxiety: 0 - Normal  Mood - Depression: 0 - Normal  Psychosocial Note: energy level at baseline  Pain Assessment  0-10 Pain Scale: 0      Objective:   /71 (BP Location: Left  arm, Patient Position: Chair, Cuff Size: Adult Regular)   Pulse 68   Temp 98  F (36.7  C) (Oral)   Resp 18   Wt 74.3 kg (163 lb 14.4 oz)   SpO2 100%   BMI 27.27 kg/m    Gen: Appears well, in no acute distress  Skin: faint erythema, no skin breakdown  CV/Resp: rrr, breathing comfortably on room air  Neuro: CN 2-12 grossly intact, UE/LE full strength     Labs:  CBC RESULTS:   Recent Labs   Lab Test 08/28/23  0757   WBC 5.3   RBC 3.73*   HGB 11.4*   HCT 33.3*   MCV 89   MCH 30.6   MCHC 34.2   RDW 16.0*        ELECTROLYTES:  Recent Labs   Lab Test 08/28/23  0757      POTASSIUM 4.0   CHLORIDE 107   MINNA 9.4   CO2 23   BUN 10.2   CR 0.72   *       Assessment:    Tolerating radiation therapy well.  All questions and concerns addressed.      Plan:   Continue current therapy.        Mosaiq chart and setup information reviewed  Ports checked and MVCT/IGRT images checked    Medication Review  Med list reviewed with patient?: Yes  Med list printed and given: Offered and declined    Educational Topic Discussed  Education Instructions: radiation therapy side effects: fatigue, skin changes and skin cares      Jona Linares MD  Radiation Oncology   [facility]  Clinic: [phone]

## 2023-10-12 ENCOUNTER — APPOINTMENT (OUTPATIENT)
Dept: RADIATION ONCOLOGY | Facility: CLINIC | Age: 43
End: 2023-10-12
Payer: COMMERCIAL

## 2023-10-12 ENCOUNTER — THERAPY VISIT (OUTPATIENT)
Dept: PHYSICAL THERAPY | Facility: CLINIC | Age: 43
End: 2023-10-12
Attending: PLASTIC SURGERY
Payer: COMMERCIAL

## 2023-10-12 DIAGNOSIS — I89.0 LYMPHEDEMA: Primary | ICD-10-CM

## 2023-10-12 PROCEDURE — 77412 RADIATION TX DELIVERY LVL 3: CPT | Performed by: SURGERY

## 2023-10-12 PROCEDURE — 97140 MANUAL THERAPY 1/> REGIONS: CPT | Mod: GP | Performed by: REHABILITATION PRACTITIONER

## 2023-10-13 ENCOUNTER — APPOINTMENT (OUTPATIENT)
Dept: RADIATION ONCOLOGY | Facility: CLINIC | Age: 43
End: 2023-10-13
Payer: COMMERCIAL

## 2023-10-13 PROCEDURE — 77412 RADIATION TX DELIVERY LVL 3: CPT | Performed by: SURGERY

## 2023-10-16 ENCOUNTER — APPOINTMENT (OUTPATIENT)
Dept: RADIATION ONCOLOGY | Facility: CLINIC | Age: 43
End: 2023-10-16
Payer: COMMERCIAL

## 2023-10-16 PROCEDURE — 77412 RADIATION TX DELIVERY LVL 3: CPT | Performed by: RADIOLOGY

## 2023-10-17 ENCOUNTER — THERAPY VISIT (OUTPATIENT)
Dept: PHYSICAL THERAPY | Facility: CLINIC | Age: 43
End: 2023-10-17
Attending: PLASTIC SURGERY
Payer: COMMERCIAL

## 2023-10-17 ENCOUNTER — APPOINTMENT (OUTPATIENT)
Dept: RADIATION ONCOLOGY | Facility: CLINIC | Age: 43
End: 2023-10-17
Payer: COMMERCIAL

## 2023-10-17 DIAGNOSIS — I89.0 LYMPHEDEMA: Primary | ICD-10-CM

## 2023-10-17 PROCEDURE — 77412 RADIATION TX DELIVERY LVL 3: CPT | Performed by: RADIOLOGY

## 2023-10-17 PROCEDURE — 97140 MANUAL THERAPY 1/> REGIONS: CPT | Mod: GP | Performed by: REHABILITATION PRACTITIONER

## 2023-10-18 ENCOUNTER — APPOINTMENT (OUTPATIENT)
Dept: RADIATION ONCOLOGY | Facility: CLINIC | Age: 43
End: 2023-10-18
Payer: COMMERCIAL

## 2023-10-18 ENCOUNTER — OFFICE VISIT (OUTPATIENT)
Dept: RADIATION ONCOLOGY | Facility: CLINIC | Age: 43
End: 2023-10-18
Payer: COMMERCIAL

## 2023-10-18 VITALS
WEIGHT: 163 LBS | BODY MASS INDEX: 27.12 KG/M2 | HEART RATE: 69 BPM | SYSTOLIC BLOOD PRESSURE: 128 MMHG | TEMPERATURE: 97.9 F | RESPIRATION RATE: 18 BRPM | DIASTOLIC BLOOD PRESSURE: 79 MMHG | OXYGEN SATURATION: 99 %

## 2023-10-18 DIAGNOSIS — Z17.0 MALIGNANT NEOPLASM OF RIGHT BREAST IN FEMALE, ESTROGEN RECEPTOR POSITIVE, UNSPECIFIED SITE OF BREAST (H): Primary | ICD-10-CM

## 2023-10-18 DIAGNOSIS — C50.911 MALIGNANT NEOPLASM OF RIGHT BREAST IN FEMALE, ESTROGEN RECEPTOR POSITIVE, UNSPECIFIED SITE OF BREAST (H): Primary | ICD-10-CM

## 2023-10-18 PROCEDURE — 99207 PR DROP WITH A PROCEDURE: CPT | Performed by: SURGERY

## 2023-10-18 PROCEDURE — 77412 RADIATION TX DELIVERY LVL 3: CPT | Performed by: SURGERY

## 2023-10-18 PROCEDURE — 77336 RADIATION PHYSICS CONSULT: CPT | Performed by: SURGERY

## 2023-10-18 PROCEDURE — 77427 RADIATION TX MANAGEMENT X5: CPT | Performed by: SURGERY

## 2023-10-18 PROCEDURE — 77417 THER RADIOLOGY PORT IMAGE(S): CPT | Performed by: SURGERY

## 2023-10-18 ASSESSMENT — PAIN SCALES - GENERAL: PAINLEVEL: NO PAIN (0)

## 2023-10-18 NOTE — PROGRESS NOTES
[facility] PHYSICIANS  SPECIALIZING IN BREAKTHROUGHS  Radiation Oncology    On Treatment Visit Note      Azalea Mckeon      Date: Oct 18, 2023   MRN: [id number]   : 1980  Diagnosis: breast cancer ER (+)        ID: Ms. Mckeon is a 43 year old female with RIGHT Stage IA ER+/UT+/Her2- invasive ductal carcinoma s/p bilateral mastectomy with expander placement (23) with pathology demonstrating multifocal IDC (upper outer, upper inner quadrant) largest tumor measuring 16 mm, grade 3, no LVSI, negative invasive margins, with 10cm of DCIS (grade 3, comedonecrosis), with multiple positive DCIS margins (anterior, superior), close posterior margin, 0/1 LN, pT1cN0. Her oncotype score was 22 and she will be receiving chemotherapy (Dr. Dyson).     In general, we reviewed the NCCN Guidelines for invasive breast cancer following total mastectomy with surgical axillary lymph node staging. If four or more axillary nodes are involved, radiation therapy to the chest wall plus infraclavicular region, supraclavicular area, internal mammary nodes, and any part of the axillary bed at risk is a category 1 recommendation (based on high-level evidence and uniform consensus of the NCCN). In the setting of one to three axillary nodes are involved, the recommendation is to strongly consider radiation to the chest wall plus the regional carmella sites as above. In patients with negative axillary lymph nodes and tumor >5cm, the recommendation is to consider radiation to the chest wall and regional lymph nodes.  In patients with negative axillary lymph nodes and tumor less than 5 cm in size, and negative but close < 1mm margins, 1 may consider RT to the chest wall.  In patients with negative axillary lymph nodes and tumor less than 5 cm and margins greater than or equal to 1 mm, typically no radiation is recommended.These recommendations are supported by various literature (Jesse et al, EBCTCG [place]-analysis, Lancet 2005,  update Lancet 2014; Virygaapam et al., 82b and 82c Combined Analysis, Radiother Oncol 2007).     Thus, in this particular scenario, she does not have the typical national cancer network guideline strong indication for postmastectomy radiation. However, we also discussed risk factors for increased locoregional recurrence after mastectomy in node negative patients and possible implications for PMRT (Tio et al., IJROBP, 2005).      Further, we the implications of positive margins for DCIS in the setting of mastectomy (Shaun et al, IJROBP, 2011).  Although the 5-year local regional recurrence risk is relatively low at approximately 7%, her age, premenopausal status, grade 3,multifocality of tumor, extent of DCIS (10cm), positive DCIS margin (with inability to clear surgically), , all increase her cumulative lifetime risk of local regional recurrence.  Thus, although an extrapolation, I would estimate her 5-year local regional risk to be somewhere in the 10 to 15% range.     Further, her case was discussed at breast tumor conference, with strong recommendation for pursuing adjuvant radiotherapy to the chest wall.    Reason for Visit:  On Radiation Treatment Visit       Treatment Summary to Date  Treatment Site: right chest wall Current Dose: 2000/5000 cGy Fractions: 10/25        Chemotherapy  Chemo concurrent with radx?: No (chemo completed on 8/28/2023 - Dr. Dyson)    Subjective:   No complaints, tolerating RT well overall.  Complains of irritation, pruritus and drainage, and skin lesions on bilateral lower feet, she will be arranging PCP Visit for this.     Nursing ROS:   Nutrition Alteration  Diet Type: Patient's Preference  Skin  Skin Reaction: 2 - Moderate to brisk erythema, patchy moist desquamation, mostly confined to skin folds and creases, moderate edema (no desquamation)  Skin Intervention: patient reports applying Aquaphor two times daily  Skin Note: patient reports new skin lesions of bilateral ankles and  feet with pruritis and now with lower facial pruritis without lesions - Dr. Linares evaluated today and recommenations for follow-up with PCP        Cardiovascular  Respiratory effort: 1 - Normal - without distress        Psychosocial  Mood - Anxiety: 0 - Normal  Mood - Depression: 0 - Normal  Psychosocial Note: energy level at baseline  Pain Assessment  0-10 Pain Scale: 0      Objective:   /79 (BP Location: Left arm, Patient Position: Chair, Cuff Size: Adult Regular)   Pulse 69   Temp 97.9  F (36.6  C) (Oral)   Resp 18   Wt 73.9 kg (163 lb)   SpO2 99%   BMI 27.12 kg/m    Gen: Appears well, in no acute distress  Skin: mild erythema, no skin breakdown  CV/Resp: rrr, breathing comfortably on room air  Neuro: CN 2-12 grossly intact, UE/LE full strength     Labs:  CBC RESULTS:   Recent Labs   Lab Test 08/28/23  0757   WBC 5.3   RBC 3.73*   HGB 11.4*   HCT 33.3*   MCV 89   MCH 30.6   MCHC 34.2   RDW 16.0*        ELECTROLYTES:  Recent Labs   Lab Test 08/28/23  0757      POTASSIUM 4.0   CHLORIDE 107   MINNA 9.4   CO2 23   BUN 10.2   CR 0.72   *       Assessment:    Tolerating radiation therapy well.  All questions and concerns addressed.      Plan:   Continue current therapy.   Follow up with PCP for b/l LE/ankle lesions, pruritis       Mosaiq chart and setup information reviewed  Ports checked and MVCT/IGRT images checked    Medication Review  Med list reviewed with patient?: Yes  Med list printed and given: Offered and declined    Educational Topic Discussed  Education Instructions: radiation therapy side effects: fatigue, skin changes and skin cares      Jona Linares MD  Radiation Oncology   [facility]  Clinic: [phone]

## 2023-10-18 NOTE — PATIENT INSTRUCTIONS
Please contact [facility] RN with questions or concerns following today's appointment: [phone].       Please feel free to leave a detailed message if your call is not answered.    If your call is not received before 3:00 PM, it may not be returned until the following business day.    If you are receiving radiation treatment and need assistance after 3:00 PM or on the weekends, please call [phone] and ask to speak to the radiation oncologist on-call.    Thank you!    Jen VALLECILLO

## 2023-10-18 NOTE — LETTER
10/18/2023         RE: Azalea Mckeon  [address] [address] [facility] [address]        Dear Colleague,    Thank you for referring your patient, Azalea Mckeon, to the [facility] RADIATION [facility]COL[facility] [place]. Please see a copy of my visit note below.    [facility] PHYSICIANS  SPECIALIZING IN BREAKTHROUGHS  Radiation Oncology    On Treatment Visit Note      Azalea Mckeon      Date: Oct 18, 2023   MRN: [id number]   : 1980  Diagnosis: breast cancer ER (+)        ID: Ms. Mckeon is a 43 year old female with RIGHT Stage IA ER+/AL+/Her2- invasive ductal carcinoma s/p bilateral mastectomy with expander placement (23) with pathology demonstrating multifocal IDC (upper outer, upper inner quadrant) largest tumor measuring 16 mm, grade 3, no LVSI, negative invasive margins, with 10cm of DCIS (grade 3, comedonecrosis), with multiple positive DCIS margins (anterior, superior), close posterior margin, 0/1 LN, pT1cN0. Her oncotype score was 22 and she will be receiving chemotherapy (Dr. Dyson).     In general, we reviewed the NCCN Guidelines for invasive breast cancer following total mastectomy with surgical axillary lymph node staging. If four or more axillary nodes are involved, radiation therapy to the chest wall plus infraclavicular region, supraclavicular area, internal mammary nodes, and any part of the axillary bed at risk is a category 1 recommendation (based on high-level evidence and uniform consensus of the NCCN). In the setting of one to three axillary nodes are involved, the recommendation is to strongly consider radiation to the chest wall plus the regional carmella sites as above. In patients with negative axillary lymph nodes and tumor >5cm, the recommendation is to consider radiation to the chest wall and regional lymph nodes.  In patients with negative axillary lymph nodes and tumor less than 5 cm in size, and negative but close < 1mm margins, 1 may consider RT to the chest  wall.  In patients with negative axillary lymph nodes and tumor less than 5 cm and margins greater than or equal to 1 mm, typically no radiation is recommended.These recommendations are supported by various literature (Jesse et al, EBCTCG [place]-analysis, Lancet 2005, update Lancet 2014; Federico et al., 82b and 82c Combined Analysis, Radiother Oncol 2007).     Thus, in this particular scenario, she does not have the typical national cancer network guideline strong indication for postmastectomy radiation. However, we also discussed risk factors for increased locoregional recurrence after mastectomy in node negative patients and possible implications for PMRT (Tio et al., IJROBP, 2005).      Further, we the implications of positive margins for DCIS in the setting of mastectomy (Shaun et al, IJROBP, 2011).  Although the 5-year local regional recurrence risk is relatively low at approximately 7%, her age, premenopausal status, grade 3,multifocality of tumor, extent of DCIS (10cm), positive DCIS margin (with inability to clear surgically), , all increase her cumulative lifetime risk of local regional recurrence.  Thus, although an extrapolation, I would estimate her 5-year local regional risk to be somewhere in the 10 to 15% range.     Further, her case was discussed at breast tumor conference, with strong recommendation for pursuing adjuvant radiotherapy to the chest wall.    Reason for Visit:  On Radiation Treatment Visit       Treatment Summary to Date  Treatment Site: right chest wall Current Dose: 2000/5000 cGy Fractions: 10/25        Chemotherapy  Chemo concurrent with radx?: No (chemo completed on 8/28/2023 - Dr. Dyson)    Subjective:   No complaints, tolerating RT well overall.  Complains of irritation, pruritus and drainage, and skin lesions on bilateral lower feet, she will be arranging PCP Visit for this.     Nursing ROS:   Nutrition Alteration  Diet Type: Patient's Preference  Skin  Skin Reaction: 2 -  Moderate to brisk erythema, patchy moist desquamation, mostly confined to skin folds and creases, moderate edema (no desquamation)  Skin Intervention: patient reports applying Aquaphor two times daily  Skin Note: patient reports new skin lesions of bilateral ankles and feet with pruritis and now with lower facial pruritis without lesions - Dr. Linares evaluated today and recommenations for follow-up with PCP        Cardiovascular  Respiratory effort: 1 - Normal - without distress        Psychosocial  Mood - Anxiety: 0 - Normal  Mood - Depression: 0 - Normal  Psychosocial Note: energy level at baseline  Pain Assessment  0-10 Pain Scale: 0      Objective:   /79 (BP Location: Left arm, Patient Position: Chair, Cuff Size: Adult Regular)   Pulse 69   Temp 97.9  F (36.6  C) (Oral)   Resp 18   Wt 73.9 kg (163 lb)   SpO2 99%   BMI 27.12 kg/m    Gen: Appears well, in no acute distress  Skin: mild erythema, no skin breakdown  CV/Resp: rrr, breathing comfortably on room air  Neuro: CN 2-12 grossly intact, UE/LE full strength     Labs:  CBC RESULTS:   Recent Labs   Lab Test 08/28/23  0757   WBC 5.3   RBC 3.73*   HGB 11.4*   HCT 33.3*   MCV 89   MCH 30.6   MCHC 34.2   RDW 16.0*        ELECTROLYTES:  Recent Labs   Lab Test 08/28/23  0757      POTASSIUM 4.0   CHLORIDE 107   MINNA 9.4   CO2 23   BUN 10.2   CR 0.72   *       Assessment:    Tolerating radiation therapy well.  All questions and concerns addressed.      Plan:   Continue current therapy.   Follow up with PCP for b/l LE/ankle lesions, pruritis       Mosaiq chart and setup information reviewed  Ports checked and MVCT/IGRT images checked    Medication Review  Med list reviewed with patient?: Yes  Med list printed and given: Offered and declined    Educational Topic Discussed  Education Instructions: radiation therapy side effects: fatigue, skin changes and skin cares      Jona Linares MD  Radiation Oncology   [facility]  [facility]: [phone]        Again, thank you for allowing me to participate in the care of your patient.        Sincerely,        Jona Linares MD

## 2023-10-19 ENCOUNTER — THERAPY VISIT (OUTPATIENT)
Dept: PHYSICAL THERAPY | Facility: CLINIC | Age: 43
End: 2023-10-19
Attending: PLASTIC SURGERY
Payer: COMMERCIAL

## 2023-10-19 ENCOUNTER — APPOINTMENT (OUTPATIENT)
Dept: RADIATION ONCOLOGY | Facility: CLINIC | Age: 43
End: 2023-10-19
Payer: COMMERCIAL

## 2023-10-19 DIAGNOSIS — I89.0 LYMPHEDEMA: Primary | ICD-10-CM

## 2023-10-19 PROCEDURE — 77412 RADIATION TX DELIVERY LVL 3: CPT | Performed by: SURGERY

## 2023-10-19 PROCEDURE — 97140 MANUAL THERAPY 1/> REGIONS: CPT | Mod: GP | Performed by: REHABILITATION PRACTITIONER

## 2023-10-20 ENCOUNTER — APPOINTMENT (OUTPATIENT)
Dept: RADIATION ONCOLOGY | Facility: CLINIC | Age: 43
End: 2023-10-20
Payer: COMMERCIAL

## 2023-10-20 PROCEDURE — 77412 RADIATION TX DELIVERY LVL 3: CPT | Performed by: SURGERY

## 2023-10-23 ENCOUNTER — APPOINTMENT (OUTPATIENT)
Dept: RADIATION ONCOLOGY | Facility: CLINIC | Age: 43
End: 2023-10-23
Payer: COMMERCIAL

## 2023-10-23 PROCEDURE — 77412 RADIATION TX DELIVERY LVL 3: CPT | Performed by: RADIOLOGY

## 2023-10-24 ENCOUNTER — APPOINTMENT (OUTPATIENT)
Dept: RADIATION ONCOLOGY | Facility: CLINIC | Age: 43
End: 2023-10-24
Payer: COMMERCIAL

## 2023-10-24 ENCOUNTER — THERAPY VISIT (OUTPATIENT)
Dept: PHYSICAL THERAPY | Facility: CLINIC | Age: 43
End: 2023-10-24
Attending: PLASTIC SURGERY
Payer: COMMERCIAL

## 2023-10-24 DIAGNOSIS — I89.0 LYMPHEDEMA: Primary | ICD-10-CM

## 2023-10-24 PROCEDURE — 77412 RADIATION TX DELIVERY LVL 3: CPT | Performed by: RADIOLOGY

## 2023-10-24 PROCEDURE — 97140 MANUAL THERAPY 1/> REGIONS: CPT | Mod: GP | Performed by: REHABILITATION PRACTITIONER

## 2023-10-24 NOTE — PROGRESS NOTES
PLAN  Continue therapy per current plan of care.    Beginning/End Dates of Progress Note Reporting Period:  07/11/23 to 10/10/2023    Referring Provider:  EDWARDO Ferreira MD           10/10/23 0500   Appointment Info   Signing clinician's name / credentials Carlotta Sarmiento, PT, DPT, CLT   Visits Used 18   Medical Diagnosis S/P breast reconstruction, bilateral   PT Tx Diagnosis RUE/RUQ lymphedema; Bilateral chest/UQ tightness and decreased BUE ROM   Other pertinent information Radiation will be 10/5 - 11/8   Progress Note/Certification   Onset of illness/injury or Date of Surgery 04/24/23  (date of surgery)   Therapy Frequency 2x/week   Predicted Duration 12 weeks   Progress Note Due Date 10/10/23   Progress Note Completed Date 07/11/23       Present No   GOALS   PT Goals 2;3;4   PT Goal 1   Goal Identifier stg   Goal Description pt to be independent with daily BUE/chest stretches and exercises to increase ROM and ease of overhead activity and ADL   Rationale to maximize safety and independence with performance of ADLs and functional tasks;to maximize safety and independence with self cares   Target Date 08/10/23   Date Met 08/08/23   PT Goal 2   Goal Identifier stg   Goal Description pt to be indendent with daily self-MFR of RUQ to assist with increasing ROM for ease and independence of overhead activity and ADL   Rationale to maximize safety and independence with performance of ADLs and functional tasks;to maximize safety and independence with self cares   Target Date 08/10/23   Date Met 08/08/23   PT Goal 3   Goal Identifier ltg   Goal Description pt to increase bilateral UE flexion to 160 degrees to increase ease and comfort of overhead activity and ADL   Rationale to maximize safety and independence with performance of ADLs and functional tasks;to maximize safety and independence with self cares   Target Date 10/09/23   Date Met 10/03/23   PT Goal 4   Goal Identifier ltg   Goal  Description pt to be independent with longterm lymphedema management via HEP, elevation, skin cares, self MLD, self MFR and compression garment wear/use (as/if needed) for optimal longterm management   Rationale to maximize safety and independence with performance of ADLs and functional tasks   Target Date 01/01/24   Subjective Report   Subjective Report radiation is going well so far, i've been using Aquaphor every day   Objective Measures   Objective Measures Objective Measure 1;Objective Measure 2   Objective Measure 1   Objective Measure girth   Details from 8/15/23 to 10/3/23: RUE -0.3% (stable)   Objective Measure 2   Objective Measure AROM in supine   Details R UE: Flexion 160*, *, L UE: Flexion 160*, *   Treatment Interventions (PT)   Interventions Manual Therapy   Manual Therapy   Manual Therapy: Mobilization, MFR, MLD, friction massage minutes (03523) 60   Manual Therapy 1 - Details With pt in supine, manual techniques performed to B sides: STM (pinch an inch, S, and C strokes and alternating circles) and MFR with UE resting on pillow for comfort, to Bilateral sides: pecs, anterior/lateral tops of shoulders, chest deep sweeps, rib sweeps, supra clavicle area; L UE only: all along medial UE from shoulder to elbow due to increased tightness with palpation, pt in R and L SL STM to Bilateral UT's, pt tolerated well with periodic rest breaks for comfort; 5min of theragun with large head, level 1 at end of session with Delbert traps in sitting   Skilled Intervention CDT education; MFR; STM; theragun   Patient Response/Progress comfort throughout; looseing post treatment   Plan   Home program daily self stretches   Updates to plan of care 2x/wk   Plan for next session periodic measurements and ROM, bilateral upper quadrant MFR and STM; R-sided breast scar massage,  add scapular manual mvmt to prevent adhesions   Total Session Time   Timed Code Treatment Minutes 60   Total Treatment Time (sum of timed  and untimed services) 60

## 2023-10-25 ENCOUNTER — OFFICE VISIT (OUTPATIENT)
Dept: RADIATION ONCOLOGY | Facility: CLINIC | Age: 43
End: 2023-10-25
Payer: COMMERCIAL

## 2023-10-25 ENCOUNTER — APPOINTMENT (OUTPATIENT)
Dept: RADIATION ONCOLOGY | Facility: CLINIC | Age: 43
End: 2023-10-25
Payer: COMMERCIAL

## 2023-10-25 VITALS
DIASTOLIC BLOOD PRESSURE: 74 MMHG | TEMPERATURE: 98.2 F | WEIGHT: 162.1 LBS | HEART RATE: 80 BPM | RESPIRATION RATE: 18 BRPM | SYSTOLIC BLOOD PRESSURE: 117 MMHG | OXYGEN SATURATION: 98 % | BODY MASS INDEX: 26.97 KG/M2

## 2023-10-25 DIAGNOSIS — Z17.0 MALIGNANT NEOPLASM OF RIGHT BREAST IN FEMALE, ESTROGEN RECEPTOR POSITIVE, UNSPECIFIED SITE OF BREAST (H): Primary | ICD-10-CM

## 2023-10-25 DIAGNOSIS — C50.911 MALIGNANT NEOPLASM OF RIGHT BREAST IN FEMALE, ESTROGEN RECEPTOR POSITIVE, UNSPECIFIED SITE OF BREAST (H): Primary | ICD-10-CM

## 2023-10-25 PROCEDURE — 77427 RADIATION TX MANAGEMENT X5: CPT | Performed by: SURGERY

## 2023-10-25 PROCEDURE — 77412 RADIATION TX DELIVERY LVL 3: CPT | Performed by: SURGERY

## 2023-10-25 PROCEDURE — 77417 THER RADIOLOGY PORT IMAGE(S): CPT | Performed by: SURGERY

## 2023-10-25 PROCEDURE — 99207 PR DROP WITH A PROCEDURE: CPT | Performed by: SURGERY

## 2023-10-25 PROCEDURE — 77336 RADIATION PHYSICS CONSULT: CPT | Performed by: SURGERY

## 2023-10-25 ASSESSMENT — PAIN SCALES - GENERAL: PAINLEVEL: NO PAIN (0)

## 2023-10-25 NOTE — PATIENT INSTRUCTIONS
Please contact [facility] RN with questions or concerns following today's appointment: [phone].       Please feel free to leave a detailed message if your call is not answered.    If your call is not received before 3:00 PM, it may not be returned until the following business day.    If you are receiving radiation treatment and need assistance after 3:00 PM or on the weekends, please call [phone] and ask to speak to the radiation oncologist on-call.    Thank you!    Jen VALLECILLO

## 2023-10-25 NOTE — PROGRESS NOTES
[facility] PHYSICIANS  SPECIALIZING IN BREAKTHROUGHS  Radiation Oncology    On Treatment Visit Note      Azalea Mckeon      Date: Oct 25, 2023   MRN: [id number]   : 1980  Diagnosis: breast cancer ER (+)        ID: Ms. Mckeon is a 43 year old female with RIGHT Stage IA ER+/MI+/Her2- invasive ductal carcinoma s/p bilateral mastectomy with expander placement (23) with pathology demonstrating multifocal IDC (upper outer, upper inner quadrant) largest tumor measuring 16 mm, grade 3, no LVSI, negative invasive margins, with 10cm of DCIS (grade 3, comedonecrosis), with multiple positive DCIS margins (anterior, superior), close posterior margin, 0/1 LN, pT1cN0. Her oncotype score was 22 and she will be receiving chemotherapy (Dr. Dyson).     In general, we reviewed the NCCN Guidelines for invasive breast cancer following total mastectomy with surgical axillary lymph node staging. If four or more axillary nodes are involved, radiation therapy to the chest wall plus infraclavicular region, supraclavicular area, internal mammary nodes, and any part of the axillary bed at risk is a category 1 recommendation (based on high-level evidence and uniform consensus of the NCCN). In the setting of one to three axillary nodes are involved, the recommendation is to strongly consider radiation to the chest wall plus the regional carmella sites as above. In patients with negative axillary lymph nodes and tumor >5cm, the recommendation is to consider radiation to the chest wall and regional lymph nodes.  In patients with negative axillary lymph nodes and tumor less than 5 cm in size, and negative but close < 1mm margins, 1 may consider RT to the chest wall.  In patients with negative axillary lymph nodes and tumor less than 5 cm and margins greater than or equal to 1 mm, typically no radiation is recommended.These recommendations are supported by various literature (Jesse et al, EBCTCG [place]-analysis, Lancet 2005,  update Lancet 2014; Federico et al., 82b and 82c Combined Analysis, Radiother Oncol 2007).     Thus, in this particular scenario, she does not have the typical national cancer network guideline strong indication for postmastectomy radiation. However, we also discussed risk factors for increased locoregional recurrence after mastectomy in node negative patients and possible implications for PMRT (Tio et al., IJROBP, 2005).      Further, we the implications of positive margins for DCIS in the setting of mastectomy (Shaun et al, IJROBP, 2011).  Although the 5-year local regional recurrence risk is relatively low at approximately 7%, her age, premenopausal status, grade 3,multifocality of tumor, extent of DCIS (10cm), positive DCIS margin (with inability to clear surgically), , all increase her cumulative lifetime risk of local regional recurrence.  Thus, although an extrapolation, I would estimate her 5-year local regional risk to be somewhere in the 10 to 15% range.     Further, her case was discussed at breast tumor conference, with strong recommendation for pursuing adjuvant radiotherapy to the chest wall.    Reason for Visit:  On Radiation Treatment Visit       Treatment Summary to Date  Treatment Site: right chest wall Current Dose: 3000/5000 cGy Fractions: 15/25          Subjective:   No complaints, tolerating RT well overall. Last week, Complained of irritation, pruritus and drainage, and skin lesions on bilateral lower feet,  and also developed facial swelling. She saw PCP, who recommended steroids, which has improved her symptoms. The etiology of this is unclear.     Objective:   /74 (BP Location: Left arm, Patient Position: Sitting, Cuff Size: Adult Small)   Pulse 80   Temp 98.2  F (36.8  C) (Oral)   Resp 18   Wt 73.5 kg (162 lb 1.6 oz)   SpO2 98%   BMI 26.97 kg/m    Gen: Appears well, in no acute distress  Skin: mild erythema, no skin breakdown  CV/Resp: rrr, breathing comfortably on room  air  Neuro: CN 2-12 grossly intact, UE/LE full strength     Labs:  CBC RESULTS:   Recent Labs   Lab Test 08/28/23  0757   WBC 5.3   RBC 3.73*   HGB 11.4*   HCT 33.3*   MCV 89   MCH 30.6   MCHC 34.2   RDW 16.0*        ELECTROLYTES:  Recent Labs   Lab Test 08/28/23  0757      POTASSIUM 4.0   CHLORIDE 107   MINNA 9.4   CO2 23   BUN 10.2   CR 0.72   *       Assessment:    Tolerating radiation therapy well.  All questions and concerns addressed.      Plan:   Continue current therapy.        [employer] chart and setup information reviewed  Ports checked and MVCT/IGRT images checked       Jona Linares MD  Radiation Oncology   [facility]: [phone]

## 2023-10-25 NOTE — LETTER
10/25/2023         RE: Azalea Mckeon  [address] [address] [address]        Dear Colleague,    Thank you for referring your patient, Azalea Mckeon, to the [facility] RADIATION ONCOLOGY [place]. Please see a copy of my visit note below.    [facility] PHYSICIANS  SPECIALIZING IN BREAKTHROUGHS  Radiation Oncology    On Treatment Visit Note      Azalea Mckeon      Date: Oct 25, 2023   MRN: [id number]   : 1980  Diagnosis: breast cancer ER (+)        ID: Ms. Mckeon is a 43 year old female with RIGHT Stage IA ER+/OK+/Her2- invasive ductal carcinoma s/p bilateral mastectomy with expander placement (23) with pathology demonstrating multifocal IDC (upper outer, upper inner quadrant) largest tumor measuring 16 mm, grade 3, no LVSI, negative invasive margins, with 10cm of DCIS (grade 3, comedonecrosis), with multiple positive DCIS margins (anterior, superior), close posterior margin, 0/1 LN, pT1cN0. Her oncotype score was 22 and she will be receiving chemotherapy (Dr. Dyson).     In general, we reviewed the NCCN Guidelines for invasive breast cancer following total mastectomy with surgical axillary lymph node staging. If four or more axillary nodes are involved, radiation therapy to the chest wall plus infraclavicular region, supraclavicular area, internal mammary nodes, and any part of the axillary bed at risk is a category 1 recommendation (based on high-level evidence and uniform consensus of the NCCN). In the setting of one to three axillary nodes are involved, the recommendation is to strongly consider radiation to the chest wall plus the regional carmella sites as above. In patients with negative axillary lymph nodes and tumor >5cm, the recommendation is to consider radiation to the chest wall and regional lymph nodes.  In patients with negative axillary lymph nodes and tumor less than 5 cm in size, and negative but close < 1mm margins, 1 may consider RT to the chest  wall.  In patients with negative axillary lymph nodes and tumor less than 5 cm and margins greater than or equal to 1 mm, typically no radiation is recommended.These recommendations are supported by various literature (Jesse et al, EBCTCG [place]-analysis, Lancet 2005, update Lancet 2014; Federico et al., 82b and 82c Combined Analysis, Radiother Oncol 2007).     Thus, in this particular scenario, she does not have the typical national cancer network guideline strong indication for postmastectomy radiation. However, we also discussed risk factors for increased locoregional recurrence after mastectomy in node negative patients and possible implications for PMRT (Tio et al., IJROBP, 2005).      Further, we the implications of positive margins for DCIS in the setting of mastectomy (Shaun et al, IJROBP, 2011).  Although the 5-year local regional recurrence risk is relatively low at approximately 7%, her age, premenopausal status, grade 3,multifocality of tumor, extent of DCIS (10cm), positive DCIS margin (with inability to clear surgically), , all increase her cumulative lifetime risk of local regional recurrence.  Thus, although an extrapolation, I would estimate her 5-year local regional risk to be somewhere in the 10 to 15% range.     Further, her case was discussed at breast tumor conference, with strong recommendation for pursuing adjuvant radiotherapy to the chest wall.    Reason for Visit:  On Radiation Treatment Visit       Treatment Summary to Date  Treatment Site: right chest wall Current Dose: 3000/5000 cGy Fractions: 15/25          Subjective:   No complaints, tolerating RT well overall. Last week, Complained of irritation, pruritus and drainage, and skin lesions on bilateral lower feet,  and also developed facial swelling. She saw PCP, who recommended steroids, which has improved her symptoms. The etiology of this is unclear.     Objective:   /74 (BP Location: Left arm, Patient Position: Sitting, Cuff  Size: Adult Small)   Pulse 80   Temp 98.2  F (36.8  C) (Oral)   Resp 18   Wt 73.5 kg (162 lb 1.6 oz)   SpO2 98%   BMI 26.97 kg/m    Gen: Appears well, in no acute distress  Skin: mild erythema, no skin breakdown  CV/Resp: rrr, breathing comfortably on room air  Neuro: CN 2-12 grossly intact, UE/LE full strength     Labs:  CBC RESULTS:   Recent Labs   Lab Test 08/28/23  0757   WBC 5.3   RBC 3.73*   HGB 11.4*   HCT 33.3*   MCV 89   MCH 30.6   MCHC 34.2   RDW 16.0*        ELECTROLYTES:  Recent Labs   Lab Test 08/28/23  0757      POTASSIUM 4.0   CHLORIDE 107   MINNA 9.4   CO2 23   BUN 10.2   CR 0.72   *       Assessment:    Tolerating radiation therapy well.  All questions and concerns addressed.      Plan:   Continue current therapy.        Mosaiq chart and setup information reviewed  Ports checked and MVCT/IGRT images checked       Jona Linares MD  Radiation Oncology   [facility]: [phone]        Again, thank you for allowing me to participate in the care of your patient.        Sincerely,        Jona Linares MD

## 2023-10-26 ENCOUNTER — APPOINTMENT (OUTPATIENT)
Dept: RADIATION ONCOLOGY | Facility: CLINIC | Age: 43
End: 2023-10-26
Payer: COMMERCIAL

## 2023-10-26 ENCOUNTER — THERAPY VISIT (OUTPATIENT)
Dept: PHYSICAL THERAPY | Facility: CLINIC | Age: 43
End: 2023-10-26
Attending: PLASTIC SURGERY
Payer: COMMERCIAL

## 2023-10-26 DIAGNOSIS — I89.0 LYMPHEDEMA: Primary | ICD-10-CM

## 2023-10-26 PROCEDURE — 97140 MANUAL THERAPY 1/> REGIONS: CPT | Mod: GP | Performed by: REHABILITATION PRACTITIONER

## 2023-10-26 PROCEDURE — 77412 RADIATION TX DELIVERY LVL 3: CPT | Performed by: SURGERY

## 2023-10-27 ENCOUNTER — APPOINTMENT (OUTPATIENT)
Dept: RADIATION ONCOLOGY | Facility: CLINIC | Age: 43
End: 2023-10-27
Payer: COMMERCIAL

## 2023-10-27 PROCEDURE — 77412 RADIATION TX DELIVERY LVL 3: CPT | Performed by: SURGERY

## 2023-10-30 ENCOUNTER — APPOINTMENT (OUTPATIENT)
Dept: RADIATION ONCOLOGY | Facility: CLINIC | Age: 43
End: 2023-10-30
Payer: COMMERCIAL

## 2023-10-30 PROCEDURE — 77412 RADIATION TX DELIVERY LVL 3: CPT | Performed by: RADIOLOGY

## 2023-10-31 ENCOUNTER — APPOINTMENT (OUTPATIENT)
Dept: RADIATION ONCOLOGY | Facility: CLINIC | Age: 43
End: 2023-10-31
Payer: COMMERCIAL

## 2023-10-31 ENCOUNTER — THERAPY VISIT (OUTPATIENT)
Dept: PHYSICAL THERAPY | Facility: CLINIC | Age: 43
End: 2023-10-31
Attending: PLASTIC SURGERY
Payer: COMMERCIAL

## 2023-10-31 DIAGNOSIS — I89.0 LYMPHEDEMA: Primary | ICD-10-CM

## 2023-10-31 PROCEDURE — 97140 MANUAL THERAPY 1/> REGIONS: CPT | Mod: GP | Performed by: PHYSICAL THERAPIST

## 2023-10-31 PROCEDURE — 77412 RADIATION TX DELIVERY LVL 3: CPT | Performed by: RADIOLOGY

## 2023-11-01 ENCOUNTER — OFFICE VISIT (OUTPATIENT)
Dept: RADIATION ONCOLOGY | Facility: CLINIC | Age: 43
End: 2023-11-01
Payer: COMMERCIAL

## 2023-11-01 ENCOUNTER — APPOINTMENT (OUTPATIENT)
Dept: RADIATION ONCOLOGY | Facility: CLINIC | Age: 43
End: 2023-11-01
Payer: COMMERCIAL

## 2023-11-01 VITALS
OXYGEN SATURATION: 98 % | DIASTOLIC BLOOD PRESSURE: 80 MMHG | BODY MASS INDEX: 27.22 KG/M2 | WEIGHT: 163.6 LBS | HEART RATE: 69 BPM | RESPIRATION RATE: 18 BRPM | SYSTOLIC BLOOD PRESSURE: 121 MMHG | TEMPERATURE: 97.9 F

## 2023-11-01 DIAGNOSIS — Z17.0 MALIGNANT NEOPLASM OF RIGHT BREAST IN FEMALE, ESTROGEN RECEPTOR POSITIVE, UNSPECIFIED SITE OF BREAST (H): Primary | ICD-10-CM

## 2023-11-01 DIAGNOSIS — C50.911 MALIGNANT NEOPLASM OF RIGHT BREAST IN FEMALE, ESTROGEN RECEPTOR POSITIVE, UNSPECIFIED SITE OF BREAST (H): Primary | ICD-10-CM

## 2023-11-01 PROCEDURE — 77336 RADIATION PHYSICS CONSULT: CPT | Performed by: SURGERY

## 2023-11-01 PROCEDURE — 77412 RADIATION TX DELIVERY LVL 3: CPT | Performed by: SURGERY

## 2023-11-01 PROCEDURE — 77427 RADIATION TX MANAGEMENT X5: CPT | Performed by: SURGERY

## 2023-11-01 PROCEDURE — 77417 THER RADIOLOGY PORT IMAGE(S): CPT | Performed by: SURGERY

## 2023-11-01 PROCEDURE — 99207 PR DROP WITH A PROCEDURE: CPT | Performed by: SURGERY

## 2023-11-01 RX ORDER — TRIAMCINOLONE ACETONIDE 5 MG/G
CREAM TOPICAL
COMMUNITY
Start: 2023-10-21 | End: 2023-12-12

## 2023-11-01 RX ORDER — PREDNISONE 20 MG/1
TABLET ORAL
COMMUNITY
Start: 2023-10-21 | End: 2023-12-12

## 2023-11-01 ASSESSMENT — PAIN SCALES - GENERAL: PAINLEVEL: MILD PAIN (2)

## 2023-11-01 NOTE — PROGRESS NOTES
[facility] PHYSICIANS  SPECIALIZING IN BREAKTHROUGHS  Radiation Oncology    On Treatment Visit Note      Azalea Mckeon      Date: 2023   MRN: [id number]   : 1980  Diagnosis: breast cancer ER (+)        ID: Ms. Mckeon is a 43 year old female with RIGHT Stage IA ER+/MD+/Her2- invasive ductal carcinoma s/p bilateral mastectomy with expander placement (23) with pathology demonstrating multifocal IDC (upper outer, upper inner quadrant) largest tumor measuring 16 mm, grade 3, no LVSI, negative invasive margins, with 10cm of DCIS (grade 3, comedonecrosis), with multiple positive DCIS margins (anterior, superior), close posterior margin, 0/1 LN, pT1cN0. Her oncotype score was 22 and she will be receiving chemotherapy (Dr. Dyson).     In general, we reviewed the NCCN Guidelines for invasive breast cancer following total mastectomy with surgical axillary lymph node staging. If four or more axillary nodes are involved, radiation therapy to the chest wall plus infraclavicular region, supraclavicular area, internal mammary nodes, and any part of the axillary bed at risk is a category 1 recommendation (based on high-level evidence and uniform consensus of the NCCN). In the setting of one to three axillary nodes are involved, the recommendation is to strongly consider radiation to the chest wall plus the regional carmella sites as above. In patients with negative axillary lymph nodes and tumor >5cm, the recommendation is to consider radiation to the chest wall and regional lymph nodes.  In patients with negative axillary lymph nodes and tumor less than 5 cm in size, and negative but close < 1mm margins, 1 may consider RT to the chest wall.  In patients with negative axillary lymph nodes and tumor less than 5 cm and margins greater than or equal to 1 mm, typically no radiation is recommended.These recommendations are supported by various literature (Jesse et al, EBCTCG [place]-analysis, Lancet 2005,  update Lancet 2014; Federico et al., 82b and 82c Combined Analysis, Radiother Oncol 2007).     Thus, in this particular scenario, she does not have the typical national cancer network guideline strong indication for postmastectomy radiation. However, we also discussed risk factors for increased locoregional recurrence after mastectomy in node negative patients and possible implications for PMRT (Tio et al., IJROBP, 2005).      Further, we the implications of positive margins for DCIS in the setting of mastectomy (Shaun et al, IJROBP, 2011).  Although the 5-year local regional recurrence risk is relatively low at approximately 7%, her age, premenopausal status, grade 3,multifocality of tumor, extent of DCIS (10cm), positive DCIS margin (with inability to clear surgically), , all increase her cumulative lifetime risk of local regional recurrence.  Thus, although an extrapolation, I would estimate her 5-year local regional risk to be somewhere in the 10 to 15% range.     Further, her case was discussed at breast tumor conference, with strong recommendation for pursuing adjuvant radiotherapy to the chest wall.    Reason for Visit:  On Radiation Treatment Visit       Treatment Summary to Date  Treatment Site: right chest wall Current Dose: 4000/5000 cGy Fractions: 20/25        Chemotherapy  Chemo concurrent with radx?: No (chemo completed on 8/28/2023 - Dr. Dyson)    Subjective:   No complaints, tolerating RT well overall.    Nursing ROS:   Nutrition Alteration  Diet Type: Patient's Preference  Skin  Skin Reaction: 2 - Moderate to brisk erythema, patchy moist desquamation, mostly confined to skin folds and creases, moderate edema (no desquamation)  Skin Intervention: patient reports applying Aquaphor two times daily, reviewed increasing to three times per day to right axilla  Skin Note: reviewed skin cares for right axilla        Cardiovascular  Respiratory effort: 1 - Normal - without distress        Psychosocial  Mood - Anxiety: 0 - Normal  Mood - Depression: 0 - Normal  Psychosocial Note: energy level at baseline  Pain Assessment  0-10 Pain Scale: 2  Pain Descriptors: Sore (right axilla)  Pain Treatment: reviewed use of ice packs as needed and reviewed use of Tylenol or Ibuprofen po as needed      Objective:   /80 (BP Location: Left arm, Patient Position: Chair, Cuff Size: Adult Regular)   Pulse 69   Temp 97.9  F (36.6  C) (Oral)   Resp 18   Wt 74.2 kg (163 lb 9.6 oz)   SpO2 98%   BMI 27.22 kg/m    Gen: Appears well, in no acute distress  Skin: moderate erythema, no skin breakdown   CV/Resp: rrr, breathing comfortably on room air  Neuro: CN 2-12 grossly intact, UE/LE full strength     Labs:  CBC RESULTS:   Recent Labs   Lab Test 08/28/23  0757   WBC 5.3   RBC 3.73*   HGB 11.4*   HCT 33.3*   MCV 89   MCH 30.6   MCHC 34.2   RDW 16.0*        ELECTROLYTES:  Recent Labs   Lab Test 08/28/23  0757      POTASSIUM 4.0   CHLORIDE 107   MINNA 9.4   CO2 23   BUN 10.2   CR 0.72   *       Assessment:    Tolerating radiation therapy well.  All questions and concerns addressed.      Plan:   Continue current therapy.        Mosaiq chart and setup information reviewed  Ports checked and MVCT/IGRT images checked    Medication Review  Med list reviewed with patient?: Yes  Med list printed and given: Offered and declined    Educational Topic Discussed  Additional Instructions: patient following with PCP for neck redness and leg blisters - currently taking Prednisone as prescribed and Triam Cream as needed  Education Instructions: radiation therapy side effects: fatigue, skin changes and skin cares      Jona Linares MD  Radiation Oncology   [facility]  Clinic: [phone]

## 2023-11-01 NOTE — LETTER
2023         RE: Azalea Mckeon  [address] [address] [address]        Dear Colleague,    Thank you for referring your patient, Azalea Mckeon, to the [facility] RADIATION ONCOLOGY [place]. Please see a copy of my visit note below.    [facility] PHYSICIANS  SPECIALIZING IN BREAKTHROUGHS  Radiation Oncology    On Treatment Visit Note      Azalea Mckeon      Date: 2023   MRN: [id number]   : 1980  Diagnosis: breast cancer ER (+)        ID: Ms. Mckeon is a 43 year old female with RIGHT Stage IA ER+/NC+/Her2- invasive ductal carcinoma s/p bilateral mastectomy with expander placement (23) with pathology demonstrating multifocal IDC (upper outer, upper inner quadrant) largest tumor measuring 16 mm, grade 3, no LVSI, negative invasive margins, with 10cm of DCIS (grade 3, comedonecrosis), with multiple positive DCIS margins (anterior, superior), close posterior margin, 0/1 LN, pT1cN0. Her oncotype score was 22 and she will be receiving chemotherapy (Dr. Dyson).     In general, we reviewed the NCCN Guidelines for invasive breast cancer following total mastectomy with surgical axillary lymph node staging. If four or more axillary nodes are involved, radiation therapy to the chest wall plus infraclavicular region, supraclavicular area, internal mammary nodes, and any part of the axillary bed at risk is a category 1 recommendation (based on high-level evidence and uniform consensus of the NCCN). In the setting of one to three axillary nodes are involved, the recommendation is to strongly consider radiation to the chest wall plus the regional carmella sites as above. In patients with negative axillary lymph nodes and tumor >5cm, the recommendation is to consider radiation to the chest wall and regional lymph nodes.  In patients with negative axillary lymph nodes and tumor less than 5 cm in size, and negative but close < 1mm margins, 1 may consider RT to the chest wall.   In patients with negative axillary lymph nodes and tumor less than 5 cm and margins greater than or equal to 1 mm, typically no radiation is recommended.These recommendations are supported by various literature (Jesse et al, EBCTCG [place]-analysis, Lancet 2005, update Lancet 2014; Federico et al., 82b and 82c Combined Analysis, Radiother Oncol 2007).     Thus, in this particular scenario, she does not have the typical national cancer network guideline strong indication for postmastectomy radiation. However, we also discussed risk factors for increased locoregional recurrence after mastectomy in node negative patients and possible implications for PMRT (Tio et al., IJROBP, 2005).      Further, we the implications of positive margins for DCIS in the setting of mastectomy (Shaun et al, IJROBP, 2011).  Although the 5-year local regional recurrence risk is relatively low at approximately 7%, her age, premenopausal status, grade 3,multifocality of tumor, extent of DCIS (10cm), positive DCIS margin (with inability to clear surgically), , all increase her cumulative lifetime risk of local regional recurrence.  Thus, although an extrapolation, I would estimate her 5-year local regional risk to be somewhere in the 10 to 15% range.     Further, her case was discussed at breast tumor conference, with strong recommendation for pursuing adjuvant radiotherapy to the chest wall.    Reason for Visit:  On Radiation Treatment Visit       Treatment Summary to Date  Treatment Site: right chest wall Current Dose: 4000/5000 cGy Fractions: 20/25        Chemotherapy  Chemo concurrent with radx?: No (chemo completed on 8/28/2023 - Dr. Dyson)    Subjective:   No complaints, tolerating RT well overall.    Nursing ROS:   Nutrition Alteration  Diet Type: Patient's Preference  Skin  Skin Reaction: 2 - Moderate to brisk erythema, patchy moist desquamation, mostly confined to skin folds and creases, moderate edema (no desquamation)  Skin  Intervention: patient reports applying Aquaphor two times daily, reviewed increasing to three times per day to right axilla  Skin Note: reviewed skin cares for right axilla        Cardiovascular  Respiratory effort: 1 - Normal - without distress       Psychosocial  Mood - Anxiety: 0 - Normal  Mood - Depression: 0 - Normal  Psychosocial Note: energy level at baseline  Pain Assessment  0-10 Pain Scale: 2  Pain Descriptors: Sore (right axilla)  Pain Treatment: reviewed use of ice packs as needed and reviewed use of Tylenol or Ibuprofen po as needed      Objective:   /80 (BP Location: Left arm, Patient Position: Chair, Cuff Size: Adult Regular)   Pulse 69   Temp 97.9  F (36.6  C) (Oral)   Resp 18   Wt 74.2 kg (163 lb 9.6 oz)   SpO2 98%   BMI 27.22 kg/m    Gen: Appears well, in no acute distress  Skin: moderate erythema, no skin breakdown   CV/Resp: rrr, breathing comfortably on room air  Neuro: CN 2-12 grossly intact, UE/LE full strength     Labs:  CBC RESULTS:   Recent Labs   Lab Test 08/28/23  0757   WBC 5.3   RBC 3.73*   HGB 11.4*   HCT 33.3*   MCV 89   MCH 30.6   MCHC 34.2   RDW 16.0*        ELECTROLYTES:  Recent Labs   Lab Test 08/28/23  0757      POTASSIUM 4.0   CHLORIDE 107   MINNA 9.4   CO2 23   BUN 10.2   CR 0.72   *       Assessment:    Tolerating radiation therapy well.  All questions and concerns addressed.      Plan:   Continue current therapy.        Mosaiq chart and setup information reviewed  Ports checked and MVCT/IGRT images checked    Medication Review  Med list reviewed with patient?: Yes  Med list printed and given: Offered and declined    Educational Topic Discussed  Additional Instructions: patient following with PCP for neck redness and leg blisters - currently taking Prednisone as prescribed and Triam Cream as needed  Education Instructions: radiation therapy side effects: fatigue, skin changes and skin cares      Jona Linares MD  Radiation Oncology   [facility]  [facility]: [phone]         Again, thank you for allowing me to participate in the care of your patient.        Sincerely,        Jona Linares MD

## 2023-11-01 NOTE — PATIENT INSTRUCTIONS
Please contact [facility] RN with questions or concerns following today's appointment: [phone].       Please feel free to leave a detailed message if your call is not answered.    If your call is not received before 3:00 PM, it may not be returned until the following business day.    If you are receiving radiation treatment and need assistance after 3:00 PM or on the weekends, please call [phone] and ask to speak to the radiation oncologist on-call.    Thank you!    Jen VALLECILLO    
Render Risk Assessment In Note?: no
Detail Level: Simple
Additional Notes: PT used the Fluorouracil on the nose in May was able to treat for the full 2 weeks.

## 2023-11-02 ENCOUNTER — APPOINTMENT (OUTPATIENT)
Dept: RADIATION ONCOLOGY | Facility: CLINIC | Age: 43
End: 2023-11-02
Payer: COMMERCIAL

## 2023-11-02 ENCOUNTER — OFFICE VISIT (OUTPATIENT)
Dept: OBGYN | Facility: CLINIC | Age: 43
End: 2023-11-02
Payer: COMMERCIAL

## 2023-11-02 VITALS
DIASTOLIC BLOOD PRESSURE: 79 MMHG | TEMPERATURE: 98.7 F | HEIGHT: 65 IN | RESPIRATION RATE: 18 BRPM | SYSTOLIC BLOOD PRESSURE: 110 MMHG | WEIGHT: 166 LBS | BODY MASS INDEX: 27.66 KG/M2 | HEART RATE: 93 BPM

## 2023-11-02 DIAGNOSIS — Z12.4 CERVICAL CANCER SCREENING: Primary | ICD-10-CM

## 2023-11-02 DIAGNOSIS — N95.2 VAGINAL ATROPHY: ICD-10-CM

## 2023-11-02 DIAGNOSIS — Z00.00 ANNUAL PHYSICAL EXAM: ICD-10-CM

## 2023-11-02 DIAGNOSIS — R21 ATYPICAL RASH: ICD-10-CM

## 2023-11-02 PROCEDURE — 99213 OFFICE O/P EST LOW 20 MIN: CPT | Mod: 25 | Performed by: PHYSICIAN ASSISTANT

## 2023-11-02 PROCEDURE — G0145 SCR C/V CYTO,THINLAYER,RESCR: HCPCS | Performed by: PHYSICIAN ASSISTANT

## 2023-11-02 PROCEDURE — 99386 PREV VISIT NEW AGE 40-64: CPT | Performed by: PHYSICIAN ASSISTANT

## 2023-11-02 PROCEDURE — 87624 HPV HI-RISK TYP POOLED RSLT: CPT | Performed by: PHYSICIAN ASSISTANT

## 2023-11-02 PROCEDURE — 77412 RADIATION TX DELIVERY LVL 3: CPT | Performed by: SURGERY

## 2023-11-02 RX ORDER — ESTRADIOL 0.1 MG/G
CREAM VAGINAL
Qty: 1 G | Refills: 0 | OUTPATIENT
Start: 2023-11-02

## 2023-11-02 NOTE — PROGRESS NOTES
SUBJECTIVE:   CC: Azalea Mckeon is an 43 year old woman who presents for preventive health visit.       Patient has been advised of split billing requirements and indicates understanding: Yes  Healthy Habits:  Do you get at least three servings of calcium containing foods daily (dairy, green leafy vegetables, etc.)? yes  Amount of exercise or daily activities, outside of work: 0 day(s) per week  Problems taking medications regularly No  Medication side effects: Yes   Have you had an eye exam in the past two years? yes  Do you see a dentist twice per year? 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 Patient had bilateral mastectomy with chemo and radiation. She will be starting tamoxifen in 2 weeks and plans to take for the next 10 years.   Genetic testing - negative for pathogenic variants in EDWARD, BARD1, BRCA1, BRCA2, CDH1, CHEK2, NF1, PALB2, PTEN, STK11, and TP53 genes     Patient overall has good spirits. She is requesting a rheumatology referral for atypical rash that has presented twice during treatment and her oncologist is concerned there may be underlying autoimmune issues. Patient states she has not had her menses since she started chemo and has been dealing with hot flash symptoms since. Symptoms include vaginal dryness, dyspareunia, hot flashes.     Today's PHQ-2 Score:       11/2/2023     9:08 AM 3/6/2015    11:44 AM   PHQ-2 ( 1999 [employer])   Q1: Little interest or pleasure in doing things 0 2   Q2: Feeling down, depressed or hopeless 0 0   PHQ-2 Score 0 2       Abuse: Current or Past(Physical, Sexual or Emotional)- No  Do you feel safe in your environment? Yes    Have you ever done Advance Care Planning? (For example, a Health Directive, POLST, or a discussion with a medical provider or your loved ones about your wishes): Yes, advance  care planning is on file.    Social History     Tobacco Use    Smoking status: Former     Packs/day: 0.50     Years: 10.00     Additional pack years: 0.00     Total pack years: 5.00     Types: Cigarettes     Quit date: 2010     Years since quittin.8     Passive exposure: Past    Smokeless tobacco: Never   Substance Use Topics    Alcohol use: No     If you drink alcohol do you typically have >3 drinks per day or >7 drinks per week? No                     Reviewed orders with patient.  Reviewed health maintenance and updated orders accordingly - Yes  Labs reviewed in EPIC  BP Readings from Last 3 Encounters:   23 110/79   23 121/80   10/25/23 117/74    Wt Readings from Last 3 Encounters:   23 75.3 kg (166 lb)   23 74.2 kg (163 lb 9.6 oz)   10/25/23 73.5 kg (162 lb 1.6 oz)                  Patient Active Problem List   Diagnosis    CARDIOVASCULAR SCREENING; LDL GOAL LESS THAN 160    Anxiety    Malignant neoplasm of overlapping sites of right breast in female, estrogen receptor positive (H)    Chemotherapy-induced neutropenia     Lymphedema     Past Surgical History:   Procedure Laterality Date    BREAST BIOPSY, RT/LT Right 2023    GYN SURGERY  2010        GYN SURGERY  2011        GYN SURGERY  2013    C/S    GYN SURGERY  2013    C/S    HC REMOVAL OF OVARIAN CYST(S)  1999    MASTECTOMY PARTIAL WITH SENTINEL NODE Bilateral 2023    Procedure: BILATERAL Skin-Sparing Mastectomy, BILATERAL breast implant removal, RIGHT Axillary [place] Lymph Node Biopsy;  Surgeon: Mitra Souza MD;  Location: [facility] OR    RECONSTRUCT BREAST, INSERT TISSUE EXPANDER BILATERAL, COMBINED Bilateral 2023    Procedure: bilateral breast reconstruction, insert tissue expander bilateral, Spy **Latex Allergy**;  Surgeon: EDWARDO Ferreira MD;  Location: [facility] OR    TUBAL LIGATION  2013       Social History     Tobacco Use    Smoking status:  Former     Packs/day: 0.50     Years: 10.00     Additional pack years: 0.00     Total pack years: 5.00     Types: Cigarettes     Quit date: 2010     Years since quittin.8     Passive exposure: Past    Smokeless tobacco: Never   Substance Use Topics    Alcohol use: No     Family History   Problem Relation Age of Onset    Anxiety Disorder Mother     Diabetes Father     Substance Abuse Father     Bleeding Disorder Sister     Pulmonary Embolism Sister         33    Depression Sister     Autism Spectrum Disorder Daughter     Anxiety Disorder Daughter     Breast Cancer Other         Distant 2nd cousin per patient report    Cancer No family hx of     Hypertension No family hx of     Cerebrovascular Disease No family hx of     Thyroid Disease No family hx of     Glaucoma No family hx of     Macular Degeneration No family hx of     Ovarian Cancer No family hx of          Current Outpatient Medications   Medication Sig Dispense Refill    conjugated estrogens (PREMARIN) 0.625 MG/GM vaginal cream Place 0.5 g vaginally daily for 2 weeks then use twice weekly 30 g 3    dexAMETHasone (DECADRON) 4 MG tablet Take 2 tablets (8 mg) by mouth 2 times daily (with meals) Start evening of Docetaxel infusion and continue for a total of 3 doses. (Patient not taking: Reported on 8/15/2023) 24 tablet 0    HYDROmorphone (DILAUDID) 2 MG tablet Take 1 tablet (2 mg) by mouth every 8 hours as needed for pain (Patient not taking: Reported on 8/15/2023) 30 tablet 0    lidocaine-prilocaine (EMLA) 2.5-2.5 % external cream Apply topically as needed for other (30-45 minutes prior to port access) (Patient not taking: Reported on 9/15/2023) 30 g 2    methocarbamol (ROBAXIN) 500 MG tablet Take 1 tablet (500 mg) by mouth 4 times daily as needed for muscle spasms (Patient not taking: Reported on 8/15/2023) 20 tablet 0    ondansetron (ZOFRAN ODT) 4 MG ODT tab Take 1 tablet (4 mg) by mouth every 8 hours as needed for nausea (Patient not taking:  Reported on 8/15/2023) 10 tablet 0    ondansetron (ZOFRAN) 8 MG tablet Take 1 tablet (8 mg) by mouth every 8 hours as needed for nausea (vomiting) (Patient not taking: Reported on 9/15/2023) 30 tablet 2    predniSONE (DELTASONE) 20 MG tablet 3 TABLETS DAILY FOR 3 DAYS, 2 TABS DAILY X3 DAYS, 1 TAB DAILY X3 DAYS, 1/2 TAB PO X4 DAYS THEN STOP (Patient not taking: Reported on 11/2/2023)      prochlorperazine (COMPAZINE) 10 MG tablet Take 1 tablet (10 mg) by mouth every 6 hours as needed for nausea or vomiting (Patient not taking: Reported on 9/15/2023) 30 tablet 2    tamoxifen (NOLVADEX) 20 MG tablet Take 1 tablet (20 mg) by mouth daily Start 2 weeks after completing radiation (Patient not taking: Reported on 9/15/2023) 90 tablet 3    triamcinolone (ARISTOCORT HP) 0.5 % external cream APPLY A THIN LAYER TO THE AFFECTED AREA(S) 2X DAILY AS NEEDED FOR RASH OR ITCHING (Patient not taking: Reported on 11/2/2023)       Allergies   Allergen Reactions    Percocet [Oxycodone-Acetaminophen] Itching    Latex Rash     Recent Labs   Lab Test 08/28/23  0757 08/07/23  0828 07/17/23  0807 06/09/23  1418 04/14/21  0833   ALT 93* 78* 164*   < >  --    CR 0.72 0.78 0.72   < > 0.78   GFRESTIMATED >90 >90 >90   < > >90   GFRESTBLACK  --   --   --   --  >90   POTASSIUM 4.0 4.1 4.1   < > 3.9    < > = values in this interval not displayed.        FHS-7:       2/3/2023    11:19 AM 3/2/2023     9:52 AM   Breast CA Risk Assessment (FHS-7)   Did any of your first-degree relatives have breast or ovarian cancer? No No   Did any of your relatives have bilateral breast cancer? No No   Did any man in your family have breast cancer? No No   Did any woman in your family have breast and ovarian cancer? No No   Did any woman in your family have breast cancer before age 50 y? No No   Do you have 2 or more relatives with breast and/or ovarian cancer? No No   Do you have 2 or more relatives with breast and/or bowel cancer? No No     click delete button to  remove this line now  Patient currently in treatment for breast CA follow up and breast evaluation will be done through oncology  Pertinent mammograms are reviewed under the imaging tab.    Pertinent mammograms are reviewed under the imaging tab.  History of abnormal Pap smear: NO - age 30- 65 PAP every 3 years recommended      3/12/2013    11:12 AM   PAP / HPV   PAP (Historical) NIL      Reviewed and updated as needed this visit by clinical staff   Tobacco  Allergies               Reviewed and updated as needed this visit by Provider                 Past Medical History:   Diagnosis Date    Breast cancer (H) 2023    Right Breast    Cancer (H)     right breast, s/p bilateral mastectomies    Personal history of chemotherapy     Taxotere + Cytoxan (2023 - 2023) - Dr. Dyson      Past Surgical History:   Procedure Laterality Date    BREAST BIOPSY, RT/LT Right 2023    GYN SURGERY  2010        GYN SURGERY  2011        GYN SURGERY  2013    C/S    GYN SURGERY  2013    C/S    HC REMOVAL OF OVARIAN CYST(S)  1999    MASTECTOMY PARTIAL WITH SENTINEL NODE Bilateral 2023    Procedure: BILATERAL Skin-Sparing Mastectomy, BILATERAL breast implant removal, RIGHT Axillary [place] Lymph Node Biopsy;  Surgeon: Mitra Souza MD;  Location: [facility] OR    RECONSTRUCT BREAST, INSERT TISSUE EXPANDER BILATERAL, COMBINED Bilateral 2023    Procedure: bilateral breast reconstruction, insert tissue expander bilateral, Spy **Latex Allergy**;  Surgeon: EDWARDO Ferreira MD;  Location: [facility] OR    TUBAL LIGATION  2013     OB History    Para Term  AB Living   4 3 3 0 1 3   SAB IAB Ectopic Multiple Live Births   1 0 0 0 3      # Outcome Date GA Lbr Abdiaziz/2nd Weight Sex Delivery Anes PTL Lv   4 Term 13 40w0d  4.054 kg (8 lb 15 oz) F CS-LTranv   JU      Name: Sushila   3 Term  40w0d   F CS-Unspec   JU      Name: Eva   2 Term  "2010 40w0d   M CS-Unspec   JU      Name: Boo   1 SAB 2005               ROS:  CONSTITUTIONAL: NEGATIVE for fever, chills, change in weight  INTEGUMENTARU/SKIN: NEGATIVE for worrisome rashes, moles or lesions  EYES: NEGATIVE for vision changes or irritation  ENT: NEGATIVE for ear, mouth and throat problems  RESP: NEGATIVE for significant cough or SOB  BREAST: bilateral mastectomy with current breast CA treatment.   CV: NEGATIVE for chest pain, palpitations or peripheral edema  GI: NEGATIVE for nausea, abdominal pain, heartburn, or change in bowel habits  : POSITIVE dyspareunia and vaginal dryness noted. No urinary or bowel issues.  Periods have stopped since starting chemo and radiation. LMP 6/1/23  MUSCULOSKELETAL: NEGATIVE for significant arthralgias or myalgia  NEURO: NEGATIVE for weakness, dizziness or paresthesias  ENDOCRINE: POSITIVE hot flashes, new skin rash and reactions noted while on chemo and radiation for breast CA treatment.   PSYCHIATRIC: NEGATIVE for changes in mood or affect    OBJECTIVE:   /79 (BP Location: Right arm, Patient Position: Chair, Cuff Size: Adult Regular)   Pulse 93   Temp 98.7  F (37.1  C) (Tympanic)   Resp 18   Ht 1.651 m (5' 5\")   Wt 75.3 kg (166 lb)   BMI 27.62 kg/m    EXAM:  GENERAL: healthy, alert and no distress  EYES: Eyes grossly normal to inspection, PERRL and conjunctivae and sclerae normal  HENT: ear canals and TM's normal, nose and mouth without ulcers or lesions  NECK: no adenopathy, no asymmetry, masses, or scars and thyroid normal to palpation  RESP: lungs clear to auscultation - no rales, rhonchi or wheezes  BREAST: exam not done today  CV: regular rate and rhythm, normal S1 S2, no S3 or S4, no murmur, click or rub, no peripheral edema and peripheral pulses strong  ABDOMEN: soft, nontender, no hepatosplenomegaly, no masses and bowel sounds normal   (female): normal female external genitalia, normal urethral meatus, vaginal mucosa pink, moist, with " "vaginal atrophy noted. Normal cervix/adnexa/uterus without masses or discharge  RECTAL: normal sphincter tone, no rectal masses  MS: no gross musculoskeletal defects noted, no edema  SKIN: positive erythema/rash to the right breast.   NEURO: Normal strength and tone, mentation intact and speech normal  PSYCH: mentation appears normal, affect normal/bright  LYMPH: no cervical, supraclavicular, axillary, or inguinal adenopathy    Diagnostic Test Results:  Labs reviewed in Epic    ASSESSMENT/PLAN:   (Z12.4) Cervical cancer screening  (primary encounter diagnosis)  Comment: pap smear obtained. Recommend yearly pelvic exams   Plan: Pap screen with HPV - recommended age 30 - 65         years            (N95.2) Vaginal atrophy  Comment: secondary to chemo and radiation putting patient into symptoms of menopause  Plan: conjugated estrogens (PREMARIN) 0.625 MG/GM         vaginal cream        Recommend recheck in 2-3 months to see how symptoms are doing.     (Z00.00) Annual physical exam  Comment: discussed with patient that tamoxifen treatment can increase risk for endometrial cancer. Discussed that patient will need to be monitored closely for this and discussed other options like IUD to help decrease risk for endometrial cancer while on Tamoxifen.   Plan: conjugated estrogens (PREMARIN) 0.625 MG/GM         vaginal cream, Adult Rheumatology          Referral        Declined Tdap and influenza vaccines today.     (R21) Atypical rash  Comment: referral to rheumatology placed.   Plan: Adult Rheumatology  Referral          Patient has been advised of split billing requirements and indicates understanding: Yes  COUNSELING:   Reviewed preventive health counseling, as reflected in patient instructions       Regular exercise       Healthy diet/nutrition    Estimated body mass index is 27.62 kg/m  as calculated from the following:    Height as of this encounter: 1.651 m (5' 5\").    Weight as of this encounter: 75.3 " kg (166 lb).    Weight management plan: Discussed healthy diet and exercise guidelines    She reports that she quit smoking about 13 years ago. Her smoking use included cigarettes. She has a 5.00 pack-year smoking history. She has been exposed to tobacco smoke. She has never used smokeless tobacco.      Counseling Resources:  ATP IV Guidelines  Pooled Cohorts Equation Calculator  Breast Cancer Risk Calculator  BRCA-Related Cancer Risk Assessment: FHS-7 Tool  FRAX Risk Assessment  ICSI Preventive Guidelines  Dietary Guidelines for Americans, 2010  USDA's MyPlate  ASA Prophylaxis  Lung CA Screening    [facility] [facility] [place]

## 2023-11-02 NOTE — NURSING NOTE
"Initial /79 (BP Location: Right arm, Patient Position: Chair, Cuff Size: Adult Regular)   Pulse 93   Temp 98.7  F (37.1  C) (Tympanic)   Resp 18   Ht 1.651 m (5' 5\")   Wt 75.3 kg (166 lb)   BMI 27.62 kg/m   Estimated body mass index is 27.62 kg/m  as calculated from the following:    Height as of this encounter: 1.651 m (5' 5\").    Weight as of this encounter: 75.3 kg (166 lb). .    "

## 2023-11-03 ENCOUNTER — APPOINTMENT (OUTPATIENT)
Dept: RADIATION ONCOLOGY | Facility: CLINIC | Age: 43
End: 2023-11-03
Payer: COMMERCIAL

## 2023-11-03 PROCEDURE — 77412 RADIATION TX DELIVERY LVL 3: CPT | Performed by: SURGERY

## 2023-11-06 ENCOUNTER — APPOINTMENT (OUTPATIENT)
Dept: RADIATION ONCOLOGY | Facility: CLINIC | Age: 43
End: 2023-11-06
Payer: COMMERCIAL

## 2023-11-06 LAB
BKR LAB AP GYN ADEQUACY: NORMAL
BKR LAB AP GYN INTERPRETATION: NORMAL
BKR LAB AP HPV REFLEX: NORMAL
BKR LAB AP PREVIOUS ABNORMAL: NORMAL
PATH REPORT.COMMENTS IMP SPEC: NORMAL
PATH REPORT.COMMENTS IMP SPEC: NORMAL
PATH REPORT.RELEVANT HX SPEC: NORMAL

## 2023-11-06 PROCEDURE — 77412 RADIATION TX DELIVERY LVL 3: CPT | Performed by: RADIOLOGY

## 2023-11-07 ENCOUNTER — APPOINTMENT (OUTPATIENT)
Dept: RADIATION ONCOLOGY | Facility: CLINIC | Age: 43
End: 2023-11-07
Payer: COMMERCIAL

## 2023-11-07 PROCEDURE — 77412 RADIATION TX DELIVERY LVL 3: CPT | Performed by: RADIOLOGY

## 2023-11-08 ENCOUNTER — APPOINTMENT (OUTPATIENT)
Dept: RADIATION ONCOLOGY | Facility: CLINIC | Age: 43
End: 2023-11-08
Payer: COMMERCIAL

## 2023-11-08 ENCOUNTER — PATIENT OUTREACH (OUTPATIENT)
Dept: OBGYN | Facility: CLINIC | Age: 43
End: 2023-11-08

## 2023-11-08 ENCOUNTER — OFFICE VISIT (OUTPATIENT)
Dept: RADIATION ONCOLOGY | Facility: CLINIC | Age: 43
End: 2023-11-08
Payer: COMMERCIAL

## 2023-11-08 ENCOUNTER — DOCUMENTATION ONLY (OUTPATIENT)
Dept: RADIATION ONCOLOGY | Facility: CLINIC | Age: 43
End: 2023-11-08

## 2023-11-08 VITALS
RESPIRATION RATE: 18 BRPM | SYSTOLIC BLOOD PRESSURE: 121 MMHG | OXYGEN SATURATION: 98 % | HEART RATE: 71 BPM | TEMPERATURE: 98.2 F | BODY MASS INDEX: 27.09 KG/M2 | WEIGHT: 162.8 LBS | DIASTOLIC BLOOD PRESSURE: 79 MMHG

## 2023-11-08 DIAGNOSIS — R87.810 CERVICAL HIGH RISK HPV (HUMAN PAPILLOMAVIRUS) TEST POSITIVE: Primary | ICD-10-CM

## 2023-11-08 DIAGNOSIS — Z17.0 MALIGNANT NEOPLASM OF RIGHT BREAST IN FEMALE, ESTROGEN RECEPTOR POSITIVE, UNSPECIFIED SITE OF BREAST (H): Primary | ICD-10-CM

## 2023-11-08 DIAGNOSIS — C50.911 MALIGNANT NEOPLASM OF RIGHT BREAST IN FEMALE, ESTROGEN RECEPTOR POSITIVE, UNSPECIFIED SITE OF BREAST (H): Primary | ICD-10-CM

## 2023-11-08 LAB
HUMAN PAPILLOMA VIRUS 16 DNA: NEGATIVE
HUMAN PAPILLOMA VIRUS 18 DNA: NEGATIVE
HUMAN PAPILLOMA VIRUS FINAL DIAGNOSIS: ABNORMAL
HUMAN PAPILLOMA VIRUS OTHER HR: POSITIVE

## 2023-11-08 PROCEDURE — 99207 PR DROP WITH A PROCEDURE: CPT | Performed by: SURGERY

## 2023-11-08 PROCEDURE — 99207 PR NO BILLABLE SERVICE THIS VISIT: CPT | Performed by: SURGERY

## 2023-11-08 PROCEDURE — 77427 RADIATION TX MANAGEMENT X5: CPT | Performed by: SURGERY

## 2023-11-08 PROCEDURE — 77412 RADIATION TX DELIVERY LVL 3: CPT | Performed by: SURGERY

## 2023-11-08 PROCEDURE — 77336 RADIATION PHYSICS CONSULT: CPT | Performed by: SURGERY

## 2023-11-08 ASSESSMENT — PAIN SCALES - GENERAL: PAINLEVEL: MILD PAIN (2)

## 2023-11-08 NOTE — PROGRESS NOTES
[facility]YS[facility]IANS  SPECIALIZING IN BREAKTHROUGHS  Radiation Oncology    On Treatment Visit Note      Azalea Mckeon      Date: 2023   MRN: [id number]   : 1980  Diagnosis: breast cancer ER (+)        ID:  Ms. Mckeon is a 43 year old female with RIGHT Stage IA ER+/OR+/Her2- invasive ductal carcinoma s/p bilateral mastectomy with expander placement (23) with pathology demonstrating multifocal IDC (upper outer, upper inner quadrant) largest tumor measuring 16 mm, grade 3, no LVSI, negative invasive margins, with 10cm of DCIS (grade 3, comedonecrosis), with multiple positive DCIS margins (anterior, superior), close posterior margin, 0/1 LN, pT1cN0. Her oncotype score was 22 and she will be receiving chemotherapy (Dr. Dyson).     In general, we reviewed the NCCN Guidelines for invasive breast cancer following total mastectomy with surgical axillary lymph node staging. If four or more axillary nodes are involved, radiation therapy to the chest wall plus infraclavicular region, supraclavicular area, internal mammary nodes, and any part of the axillary bed at risk is a category 1 recommendation (based on high-level evidence and uniform consensus of the NCCN). In the setting of one to three axillary nodes are involved, the recommendation is to strongly consider radiation to the chest wall plus the regional carmella sites as above. In patients with negative axillary lymph nodes and tumor >5cm, the recommendation is to consider radiation to the chest wall and regional lymph nodes.  In patients with negative axillary lymph nodes and tumor less than 5 cm in size, and negative but close < 1mm margins, 1 may consider RT to the chest wall.  In patients with negative axillary lymph nodes and tumor less than 5 cm and margins greater than or equal to 1 mm, typically no radiation is recommended.These recommendations are supported by various literature (Jesse et al, EBCTCG [place]-analysis, Lancet 2005,  update Lancet 2014; Virygaapam et al., 82b and 82c Combined Analysis, Radiother Oncol 2007).     Thus, in this particular scenario, she does not have the typical national cancer network guideline strong indication for postmastectomy radiation. However, we also discussed risk factors for increased locoregional recurrence after mastectomy in node negative patients and possible implications for PMRT (Tio et al., IJROBP, 2005).      Further, we the implications of positive margins for DCIS in the setting of mastectomy (Shaun et al, IJROBP, 2011).  Although the 5-year local regional recurrence risk is relatively low at approximately 7%, her age, premenopausal status, grade 3,multifocality of tumor, extent of DCIS (10cm), positive DCIS margin (with inability to clear surgically), , all increase her cumulative lifetime risk of local regional recurrence.  Thus, although an extrapolation, I would estimate her 5-year local regional risk to be somewhere in the 10 to 15% range.     Further, her case was discussed at breast tumor conference, with strong recommendation for pursuing adjuvant radiotherapy to the chest wall.    Reason for Visit:  On Radiation Treatment Visit       Treatment Summary to Date  Treatment Site: right chest wall Current Dose: 5000/5000 cGy Fractions: 25/25        Chemotherapy  Chemo concurrent with radx?: No (chemo completed on 8/28/2023 - Dr. Dyson)    Subjective:   No complaints, tolerating RT well overall.    Nursing ROS:   Nutrition Alteration  Diet Type: Patient's Preference  Skin  Skin Reaction: 2 - Moderate to brisk erythema, patchy moist desquamation, mostly confined to skin folds and creases, moderate edema (no desquamation)  Skin Intervention: patient reports applying Aquaphor 3-4 times per day  Skin Note: reviewed skin changes and skin cares post radiation treatment        Cardiovascular  Respiratory effort: 1 - Normal - without distress        Psychosocial  Mood - Anxiety: 0 - Normal  Mood  - Depression: 0 - Normal  Psychosocial Note: energy level at baseline  Pain Assessment  0-10 Pain Scale: 2  Pain Descriptors: Sore (right axilla)  Pain Treatment: reviewed use of ice packs as needed and reviewed use of Tylenol or Ibuprofen po as needed      Objective:   /79 (BP Location: Left arm, Patient Position: Chair, Cuff Size: Adult Regular)   Pulse 71   Temp 98.2  F (36.8  C) (Oral)   Resp 18   Wt 73.8 kg (162 lb 12.8 oz)   SpO2 98%   BMI 27.09 kg/m    Gen: Appears well, in no acute distress  Skin: moderate erythema, no skin breakdown   CV/Resp: rrr, breathing comfortably on room air  Neuro: CN 2-12 grossly intact, UE/LE full strength     Labs:  CBC RESULTS:   Recent Labs   Lab Test 08/28/23  0757   WBC 5.3   RBC 3.73*   HGB 11.4*   HCT 33.3*   MCV 89   MCH 30.6   MCHC 34.2   RDW 16.0*        ELECTROLYTES:  Recent Labs   Lab Test 08/28/23  0757      POTASSIUM 4.0   CHLORIDE 107   MINNA 9.4   CO2 23   BUN 10.2   CR 0.72   *       Assessment:    Tolerating radiation therapy well.  All questions and concerns addressed.      Plan:   Continue current therapy.   Follow up with radiation oncology in 6 weeks  Follow up with Shabnam Sharif NP 12/18/23    Mosalottie chart and setup information reviewed  Ports checked and MVCT/IGRT images checked    Medication Review  Med list reviewed with patient?: Yes  Med list printed and given: Offered and declined    Educational Topic Discussed  Additional Instructions: follow-up with Shabnam Sharif NP scheduled 12/18/2023, patient to contact Dr. Ferreira's office to schedule follow-up post radiation treatment for tissue expanders and patient verbalized understanding, follow-up with Dr. Kera musa 6 weeks and scheduling will contact patient  Education Instructions: radiation therapy side effects: fatigue, skin changes and skin cares      Jona Linares MD  Radiation Oncology   [facility]  Clinic: [phone]

## 2023-11-08 NOTE — LETTER
2023         RE: Azalea Mckeon  [address] [facility] [address]        Dear Colleague,    Thank you for referring your patient, Azalea Mckeon, to the [facility] RADIATION ONCOLOGY [place]. Please see a copy of my visit note below.    [facility] PHYSICIANS  SPECIALIZING IN BREAKTHROUGHS  Radiation Oncology    On Treatment Visit Note      Azalea Mckeon      Date: 2023   MRN: [id number]   : 1980  Diagnosis: breast cancer ER (+)        ID:  Ms. Mckeon is a 43 year old female with RIGHT Stage IA ER+/NM+/Her2- invasive ductal carcinoma s/p bilateral mastectomy with expander placement (23) with pathology demonstrating multifocal IDC (upper outer, upper inner quadrant) largest tumor measuring 16 mm, grade 3, no LVSI, negative invasive margins, with 10cm of DCIS (grade 3, comedonecrosis), with multiple positive DCIS margins (anterior, superior), close posterior margin, 0/1 LN, pT1cN0. Her oncotype score was 22 and she will be receiving chemotherapy (Dr. Dyson).     In general, we reviewed the NCCN Guidelines for invasive breast cancer following total mastectomy with surgical axillary lymph node staging. If four or more axillary nodes are involved, radiation therapy to the chest wall plus infraclavicular region, supraclavicular area, internal mammary nodes, and any part of the axillary bed at risk is a category 1 recommendation (based on high-level evidence and uniform consensus of the NCCN). In the setting of one to three axillary nodes are involved, the recommendation is to strongly consider radiation to the chest wall plus the regional carmella sites as above. In patients with negative axillary lymph nodes and tumor >5cm, the recommendation is to consider radiation to the chest wall and regional lymph nodes.  In patients with negative axillary lymph nodes and tumor less than 5 cm in size, and negative but close < 1mm margins, 1 may consider RT to the chest wall.   In patients with negative axillary lymph nodes and tumor less than 5 cm and margins greater than or equal to 1 mm, typically no radiation is recommended.These recommendations are supported by various literature (Jesse et al, EBCTCG [place]-analysis, Lancet 2005, update Lancet 2014; Federico et al., 82b and 82c Combined Analysis, Radiother Oncol 2007).     Thus, in this particular scenario, she does not have the typical national cancer network guideline strong indication for postmastectomy radiation. However, we also discussed risk factors for increased locoregional recurrence after mastectomy in node negative patients and possible implications for PMRT (Tio et al., IJROBP, 2005).      Further, we the implications of positive margins for DCIS in the setting of mastectomy (Shaun et al, IJROBP, 2011).  Although the 5-year local regional recurrence risk is relatively low at approximately 7%, her age, premenopausal status, grade 3,multifocality of tumor, extent of DCIS (10cm), positive DCIS margin (with inability to clear surgically), , all increase her cumulative lifetime risk of local regional recurrence.  Thus, although an extrapolation, I would estimate her 5-year local regional risk to be somewhere in the 10 to 15% range.     Further, her case was discussed at breast tumor conference, with strong recommendation for pursuing adjuvant radiotherapy to the chest wall.    Reason for Visit:  On Radiation Treatment Visit       Treatment Summary to Date  Treatment Site: right chest wall Current Dose: 5000/5000 cGy Fractions: 25/25        Chemotherapy  Chemo concurrent with radx?: No (chemo completed on 8/28/2023 - Dr. Dyson)    Subjective:   No complaints, tolerating RT well overall.    Nursing ROS:   Nutrition Alteration  Diet Type: Patient's Preference  Skin  Skin Reaction: 2 - Moderate to brisk erythema, patchy moist desquamation, mostly confined to skin folds and creases, moderate edema (no desquamation)  Skin  Intervention: patient reports applying Aquaphor 3-4 times per day  Skin Note: reviewed skin changes and skin cares post radiation treatment        Cardiovascular  Respiratory effort: 1 - Normal - without distress        Psychosocial  Mood - Anxiety: 0 - Normal  Mood - Depression: 0 - Normal  Psychosocial Note: energy level at baseline  Pain Assessment  0-10 Pain Scale: 2  Pain Descriptors: Sore (right axilla)  Pain Treatment: reviewed use of ice packs as needed and reviewed use of Tylenol or Ibuprofen po as needed      Objective:   /79 (BP Location: Left arm, Patient Position: Chair, Cuff Size: Adult Regular)   Pulse 71   Temp 98.2  F (36.8  C) (Oral)   Resp 18   Wt 73.8 kg (162 lb 12.8 oz)   SpO2 98%   BMI 27.09 kg/m    Gen: Appears well, in no acute distress  Skin: moderate erythema, no skin breakdown   CV/Resp: rrr, breathing comfortably on room air  Neuro: CN 2-12 grossly intact, UE/LE full strength     Labs:  CBC RESULTS:   Recent Labs   Lab Test 08/28/23  0757   WBC 5.3   RBC 3.73*   HGB 11.4*   HCT 33.3*   MCV 89   MCH 30.6   MCHC 34.2   RDW 16.0*        ELECTROLYTES:  Recent Labs   Lab Test 08/28/23  0757      POTASSIUM 4.0   CHLORIDE 107   MINNA 9.4   CO2 23   BUN 10.2   CR 0.72   *       Assessment:    Tolerating radiation therapy well.  All questions and concerns addressed.      Plan:   Continue current therapy.   Follow up with radiation oncology in 6 weeks  Follow up with Shabnam Sharif NP 12/18/23    Mosaiq chart and setup information reviewed  Ports checked and MVCT/IGRT images checked    Medication Review  Med list reviewed with patient?: Yes  Med list printed and given: Offered and declined    Educational Topic Discussed  Additional Instructions: follow-up with Shabnam Sharif NP scheduled 12/18/2023, patient to contact Dr. Ferreira's office to schedule follow-up post radiation treatment for tissue expanders and patient verbalized understanding, follow-up with   Kera musa 6 weeks and scheduling will contact patient  Education Instructions: radiation therapy side effects: fatigue, skin changes and skin cares      Jona Linares MD  Radiation Oncology   [facility]: [phone]        Again, thank you for allowing me to participate in the care of your patient.        Sincerely,        Jona Linares MD

## 2023-11-08 NOTE — PATIENT INSTRUCTIONS
Please contact [facility] RN with questions or concerns following today's appointment: [phone].       Please feel free to leave a detailed message if your call is not answered.    If your call is not received before 3:00 PM, it may not be returned until the following business day.    If you are receiving radiation treatment and need assistance after 3:00 PM or on the weekends, please call [phone] and ask to speak to the radiation oncologist on-call.    Thank you!    Jen VALLECILLO

## 2023-11-08 NOTE — TELEPHONE ENCOUNTER
11/2/23 NIL pap, +HR HPV (not 16/18). Immunosuppressed. Plan: cotest in 1 year   
3x2cm hematoma to occiput, no active bleeding or laceration

## 2023-11-10 ENCOUNTER — TELEPHONE (OUTPATIENT)
Dept: DERMATOLOGY | Facility: CLINIC | Age: 43
End: 2023-11-10
Payer: COMMERCIAL

## 2023-11-10 NOTE — TELEPHONE ENCOUNTER
FUTURE VISIT INFORMATION      FUTURE VISIT INFORMATION:  Date: 12.1.23  Time: 9:00  Location: [facility]  REFERRAL INFORMATION:  Referring provider:  Mannie  Referring providers clinic:  OB/Gyn  Reason for visit/diagnosis  Rash    RECORDS REQUESTED FROM:       Clinic name Comments Records Status Imaging Status   OB/Gyn 11.2.23  [facility] 6.20.23  Chris Epic

## 2023-11-10 NOTE — TELEPHONE ENCOUNTER
Via phone patient was scheduled for the following:    Appointment type: NDA  Provider: Dr. Stephens  Return date: 12-1-23  Specialty phone number: [phone]

## 2023-11-21 ENCOUNTER — THERAPY VISIT (OUTPATIENT)
Dept: PHYSICAL THERAPY | Facility: CLINIC | Age: 43
End: 2023-11-21
Attending: PLASTIC SURGERY
Payer: COMMERCIAL

## 2023-11-21 DIAGNOSIS — I89.0 LYMPHEDEMA: Primary | ICD-10-CM

## 2023-11-21 PROCEDURE — 97140 MANUAL THERAPY 1/> REGIONS: CPT | Mod: GP | Performed by: PHYSICAL THERAPIST

## 2023-11-26 ENCOUNTER — MYC MEDICAL ADVICE (OUTPATIENT)
Dept: SURGERY | Facility: CLINIC | Age: 43
End: 2023-11-26
Payer: COMMERCIAL

## 2023-11-28 ENCOUNTER — THERAPY VISIT (OUTPATIENT)
Dept: PHYSICAL THERAPY | Facility: CLINIC | Age: 43
End: 2023-11-28
Attending: PLASTIC SURGERY
Payer: COMMERCIAL

## 2023-11-28 DIAGNOSIS — I89.0 LYMPHEDEMA: Primary | ICD-10-CM

## 2023-11-28 PROCEDURE — 97140 MANUAL THERAPY 1/> REGIONS: CPT | Mod: GP | Performed by: PHYSICAL THERAPIST

## 2023-11-28 NOTE — CONFIDENTIAL NOTE
Michaela sent to patient regarding appointment for re-expansion    EDWARDO Tirado MD Anderson, Jonelle, LPN  Cc: Viji Llamas, RUTH ANN; Comfort Chawla, RN  Phone Number: [phone]     Yes please and then have her see me in clinic    Thanks    Melida

## 2023-12-01 ENCOUNTER — PRE VISIT (OUTPATIENT)
Dept: DERMATOLOGY | Facility: CLINIC | Age: 43
End: 2023-12-01

## 2023-12-01 ENCOUNTER — MYC MEDICAL ADVICE (OUTPATIENT)
Dept: SURGERY | Facility: CLINIC | Age: 43
End: 2023-12-01

## 2023-12-05 ENCOUNTER — TELEPHONE (OUTPATIENT)
Dept: ONCOLOGY | Facility: CLINIC | Age: 43
End: 2023-12-05
Payer: COMMERCIAL

## 2023-12-05 NOTE — TELEPHONE ENCOUNTER
Patient notified, she will follow up with us upon discharge.     Lisa Martell RN, BSN, PHN, OCN  M.[facility]

## 2023-12-05 NOTE — TELEPHONE ENCOUNTER
Oncology Nurse Triage    Situation:   Azalea is reporting the following symptoms: nausea and vomiting, hand numbness, left sided chest/rib pain.    Background:   Treating Provider:   Dr. Dyson    Date of last office visit: 8/28/23    Recent Treatments: recently completed radiation treatment, started Tamoxifen on 11/26/23.    Assessment:     Onset: Patient developed left sided chest pain near rib area yesterday. Pain is constant, worse with movement, better when she lays flat. Pain feels sharp and rated at 6/10. No SOB, dizziness, or feeling faint. She is having some numbness to her hands. She is also having nausea and vomiting. She is tolerating any food or fluids.    Recommendations:     Advised patient to proceed to the ED for immediate evaluation. Patient agrees with plan. She will follow up with us upon discharge.

## 2023-12-05 NOTE — TELEPHONE ENCOUNTER
Agree w/ recs.    Would recommend we monitor for ED course and possibly arrange earlier ALINA visit if needed.    Hold Tamoxifen.    King Dyson MD.

## 2023-12-06 ENCOUNTER — TELEPHONE (OUTPATIENT)
Dept: ONCOLOGY | Facility: CLINIC | Age: 43
End: 2023-12-06
Payer: COMMERCIAL

## 2023-12-06 NOTE — TELEPHONE ENCOUNTER
Felicia VALLECILLO from [facility] called in on 12/6 looking for a follow up to be scheduled for this patient as she came into the ED on 12/5 dehydrated as well as experiencing nausea and vomiting. She stated she would like to have this patient be seen in person in case additional labs need to be drawn. Patient scheduled for Tuesday, December 12th with Dr. Dyson as that was first available. Felicia VALLECILLO communicating this appointment with patient. - Carla WANG

## 2023-12-06 NOTE — TELEPHONE ENCOUNTER
Called pt to see if she would like tissue re-expansion today.  Pt states she had a bad reaction to Tamoxifen last night and was in the ED.  She was wondering if we could do a re-expansion next week.  I told her I was so sorry she was going through that and she can reach out to us any time next week to schedule.  MyChart or call is fine.      Closing encounter.  Comfort Beckford RN

## 2023-12-12 ENCOUNTER — THERAPY VISIT (OUTPATIENT)
Dept: PHYSICAL THERAPY | Facility: CLINIC | Age: 43
End: 2023-12-12
Attending: PLASTIC SURGERY
Payer: COMMERCIAL

## 2023-12-12 ENCOUNTER — ONCOLOGY VISIT (OUTPATIENT)
Dept: ONCOLOGY | Facility: CLINIC | Age: 43
End: 2023-12-12
Payer: COMMERCIAL

## 2023-12-12 VITALS
OXYGEN SATURATION: 99 % | WEIGHT: 161.1 LBS | SYSTOLIC BLOOD PRESSURE: 111 MMHG | TEMPERATURE: 97.9 F | RESPIRATION RATE: 16 BRPM | HEART RATE: 75 BPM | HEIGHT: 65 IN | BODY MASS INDEX: 26.84 KG/M2 | DIASTOLIC BLOOD PRESSURE: 70 MMHG

## 2023-12-12 DIAGNOSIS — C50.811 MALIGNANT NEOPLASM OF OVERLAPPING SITES OF RIGHT BREAST IN FEMALE, ESTROGEN RECEPTOR POSITIVE (H): Primary | ICD-10-CM

## 2023-12-12 DIAGNOSIS — Z17.0 MALIGNANT NEOPLASM OF OVERLAPPING SITES OF RIGHT BREAST IN FEMALE, ESTROGEN RECEPTOR POSITIVE (H): Primary | ICD-10-CM

## 2023-12-12 DIAGNOSIS — T50.905D ADVERSE EFFECT OF DRUG, SUBSEQUENT ENCOUNTER: ICD-10-CM

## 2023-12-12 DIAGNOSIS — I89.0 LYMPHEDEMA: Primary | ICD-10-CM

## 2023-12-12 PROCEDURE — 99214 OFFICE O/P EST MOD 30 MIN: CPT | Performed by: INTERNAL MEDICINE

## 2023-12-12 PROCEDURE — 97140 MANUAL THERAPY 1/> REGIONS: CPT | Mod: GP | Performed by: PHYSICAL THERAPIST

## 2023-12-12 RX ORDER — TAMOXIFEN CITRATE 10 MG/1
5 TABLET ORAL DAILY
Qty: 45 TABLET | Refills: 0 | Status: SHIPPED | OUTPATIENT
Start: 2023-12-12 | End: 2024-02-23

## 2023-12-12 ASSESSMENT — PAIN SCALES - GENERAL: PAINLEVEL: NO PAIN (0)

## 2023-12-12 NOTE — PROGRESS NOTES
[facility] Hematology / Oncology  Progress Note Dec 12, 2023  Name: Azalea Mckeon  :  1980    MRN:  [id number]    --------------------    Assessment / Plan:  Stage 1A (pT1c pN0 cM0), hormone-positive, HER2-negative, right-sided breast cancer:  # 2023 Presented w/ abnormal mammogram.  # 2023 Status post bilateral mastectomy w/ sentinel node and reconstruction; path w/ multifocal disease, largest 16 mm, grade 3 disease, neg margins for IDC, neg node ()   # 2023 Oncotype score 22; adjuvant chemotherapy indicated.  # 2023 - Aug 2023 Adjuvant Taxotere / Cytoxan x 4 cycles.  # 2023 Negative hereditary breast cancer panel (40 genes).  # 2022 Adjuvant right chest wall radiation 5000 cGy over 25 fractions.    After discussion of the risks and benefits and options we agreed to rechallenge with tamoxifen 5 mg daily.  If tolerated after several months, we could consider increasing to 10 mg.  Given severity of her reaction, I do not suspect she will tolerate 20 mg daily.  The other option for a number of tamoxifen is poorly tolerated would be to either medically or surgically induced menopause and consider an aromatase inhibitor.  Agree with rheumatology consultation for recurrent steroid responsive rash with blistering.  Otherwise chemistries and blood counts appear to have resolved after chemotherapy.  Follow-up 2 months.    King Dyson MD    --------------------    Interval History:  Azalea returns for follow-up of breast cancer accompanied by her .  But 4 days after starting tamoxifen, she began developing mental fog followed by loss of appetite followed by left rib cage discomfort followed by pretty significant nausea and vomiting prompting an emergency room visit.  While there, she had a fairly reassuring visit including a reassuring hepatic panel, chemistry panel, blood count panel, lipase, troponin as well as CT of the abdomen pelvis.  Of note, she was having some  "vasomotor symptoms on tamoxifen and she has not had return of menses since August.  Her port is out.  She is also been managed for some prednisone responsive rash has been coming and going on the thorax face and legs including some blistering on the legs.    Social History:  Social History     Tobacco Use    Smoking status: Former     Packs/day: 0.50     Years: 10.00     Additional pack years: 0.00     Total pack years: 5.00     Types: Cigarettes     Quit date: 2010     Years since quittin.9     Passive exposure: Past    Smokeless tobacco: Never   Substance Use Topics    Alcohol use: No     Family History:  Family History   Problem Relation Age of Onset    Anxiety Disorder Mother     Diabetes Father     Substance Abuse Father     Bleeding Disorder Sister     Pulmonary Embolism Sister         33    Depression Sister     Autism Spectrum Disorder Daughter     Anxiety Disorder Daughter     Breast Cancer Other         Distant 2nd cousin per patient report    Cancer No family hx of     Hypertension No family hx of     Cerebrovascular Disease No family hx of     Thyroid Disease No family hx of     Glaucoma No family hx of     Macular Degeneration No family hx of     Ovarian Cancer No family hx of      Immunizations:  Immunization History   Administered Date(s) Administered    Influenza (IIV3) PF 2012    TDAP Vaccine (Adacel) 2013     Health Maintenance Due   Topic Date Due    HEPATITIS B IMMUNIZATION (1 of 3 - 3-dose series) Never done    COVID-19 Vaccine (1) Never done    Pneumococcal Vaccine: Pediatrics (0 to 5 Years) and At-Risk Patients (6 to 64 Years) (1 - PCV) Never done    DTAP/TDAP/TD IMMUNIZATION (2 - Td or Tdap) 2023    INFLUENZA VACCINE (1) 2023     --------------------    Physical Exam:  VS: /70 (BP Location: Left arm)   Pulse 75   Temp 97.9  F (36.6  C) (Oral)   Resp 16   Ht 1.651 m (5' 5\")   Wt 73.1 kg (161 lb 1.6 oz)   SpO2 99%   BMI 26.81 kg/m  .  GEN: Well " appearing.    Labs / Imaging:  Reviewed CBC, CMP, lipase, troponin.  Reviewed CT AP.

## 2023-12-12 NOTE — LETTER
2023         RE: Azalea Mckeon  [address] [facility] [address]        Dear Colleague,    Thank you for referring your patient, Azalea Mckeon, to the [facility] [place]. Please see a copy of my visit note below.    [facility] Hematology / Oncology  Progress Note Dec 12, 2023  Name: Azalea Mckeon  :  1980    MRN:  [id number]    --------------------    Assessment / Plan:  Stage 1A (pT1c pN0 cM0), hormone-positive, HER2-negative, right-sided breast cancer:  # 2023 Presented w/ abnormal mammogram.  # 2023 Status post bilateral mastectomy w/ sentinel node and reconstruction; path w/ multifocal disease, largest 16 mm, grade 3 disease, neg margins for IDC, neg node ()   # 2023 Oncotype score 22; adjuvant chemotherapy indicated.  # 2023 - Aug 2023 Adjuvant Taxotere / Cytoxan x 4 cycles.  # 2023 Negative hereditary breast cancer panel (40 genes).  # 2022 Adjuvant right chest wall radiation 5000 cGy over 25 fractions.    After discussion of the risks and benefits and options we agreed to rechallenge with tamoxifen 5 mg daily.  If tolerated after several months, we could consider increasing to 10 mg.  Given severity of her reaction, I do not suspect she will tolerate 20 mg daily.  The other option for a number of tamoxifen is poorly tolerated would be to either medically or surgically induced menopause and consider an aromatase inhibitor.  Agree with rheumatology consultation for recurrent steroid responsive rash with blistering.  Otherwise chemistries and blood counts appear to have resolved after chemotherapy.  Follow-up 2 months.    King Dyson MD    --------------------    Interval History:  Azalea returns for follow-up of breast cancer accompanied by her .  But 4 days after starting tamoxifen, she began developing mental fog followed by loss of appetite followed by left rib cage discomfort followed by pretty significant  nausea and vomiting prompting an emergency room visit.  While there, she had a fairly reassuring visit including a reassuring hepatic panel, chemistry panel, blood count panel, lipase, troponin as well as CT of the abdomen pelvis.  Of note, she was having some vasomotor symptoms on tamoxifen and she has not had return of menses since August.  Her port is out.  She is also been managed for some prednisone responsive rash has been coming and going on the thorax face and legs including some blistering on the legs.    Social History:  Social History     Tobacco Use     Smoking status: Former     Packs/day: 0.50     Years: 10.00     Additional pack years: 0.00     Total pack years: 5.00     Types: Cigarettes     Quit date: 2010     Years since quittin.9     Passive exposure: Past     Smokeless tobacco: Never   Substance Use Topics     Alcohol use: No     Family History:  Family History   Problem Relation Age of Onset     Anxiety Disorder Mother      Diabetes Father      Substance Abuse Father      Bleeding Disorder Sister      Pulmonary Embolism Sister         33     Depression Sister      Autism Spectrum Disorder Daughter      Anxiety Disorder Daughter      Breast Cancer Other         Distant 2nd cousin per patient report     Cancer No family hx of      Hypertension No family hx of      Cerebrovascular Disease No family hx of      Thyroid Disease No family hx of      Glaucoma No family hx of      Macular Degeneration No family hx of      Ovarian Cancer No family hx of      Immunizations:  Immunization History   Administered Date(s) Administered     Influenza (IIV3) PF 2012     TDAP Vaccine (Adacel) 2013     Health Maintenance Due   Topic Date Due     HEPATITIS B IMMUNIZATION (1 of 3 - 3-dose series) Never done     COVID-19 Vaccine (1) Never done     Pneumococcal Vaccine: Pediatrics (0 to 5 Years) and At-Risk Patients (6 to 64 Years) (1 - PCV) Never done     DTAP/TDAP/TD IMMUNIZATION (2 - Td or Tdap)  "07/25/2023     INFLUENZA VACCINE (1) 09/01/2023     --------------------    Physical Exam:  VS: /70 (BP Location: Left arm)   Pulse 75   Temp 97.9  F (36.6  C) (Oral)   Resp 16   Ht 1.651 m (5' 5\")   Wt 73.1 kg (161 lb 1.6 oz)   SpO2 99%   BMI 26.81 kg/m  .  GEN: Well appearing.    Labs / Imaging:  Reviewed CBC, CMP, lipase, troponin.  Reviewed CT AP.      Again, thank you for allowing me to participate in the care of your patient.        Sincerely,        King Dyson MD  "

## 2023-12-15 NOTE — PROGRESS NOTES
Radiotherapy Treatment Summary              PATIENT: Azalea Mckeon  MEDICAL RECORD NO: [id number]   : 1980    DIAGNOSIS / ID: Ms. Mckeon is a 43 year old female with RIGHT Stage IA ER+/NC+/Her2- invasive ductal carcinoma s/p bilateral mastectomy with expander placement (23) with pathology demonstrating multifocal IDC (upper outer, upper inner quadrant) largest tumor measuring 16 mm, grade 3, no LVSI, negative invasive margins, with 10cm of DCIS (grade 3, comedonecrosis), with multiple positive DCIS margins (anterior, superior), close posterior margin, 0/1 LN, pT1cN0. Her oncotype score was 22 and she will be receiving chemotherapy (Dr. Dyson).     In general, we reviewed the NCCN Guidelines for invasive breast cancer following total mastectomy with surgical axillary lymph node staging. If four or more axillary nodes are involved, radiation therapy to the chest wall plus infraclavicular region, supraclavicular area, internal mammary nodes, and any part of the axillary bed at risk is a category 1 recommendation (based on high-level evidence and uniform consensus of the NCCN). In the setting of one to three axillary nodes are involved, the recommendation is to strongly consider radiation to the chest wall plus the regional carmella sites as above. In patients with negative axillary lymph nodes and tumor >5cm, the recommendation is to consider radiation to the chest wall and regional lymph nodes.  In patients with negative axillary lymph nodes and tumor less than 5 cm in size, and negative but close < 1mm margins, 1 may consider RT to the chest wall.  In patients with negative axillary lymph nodes and tumor less than 5 cm and margins greater than or equal to 1 mm, typically no radiation is recommended.These recommendations are supported by various literature (Jesse et al, EBCTCG [place]-analysis, Lancet 2005, update Lancet 2014; Overgaapam et al., 82b and 82c Combined Analysis, Radiother Oncol 2007).     Thus,  in this particular scenario, she does not have the typical national cancer network guideline strong indication for postmastectomy radiation. However, we also discussed risk factors for increased locoregional recurrence after mastectomy in node negative patients and possible implications for PMRT (Tio et al., IJROBP, 2005).      Further, we the implications of positive margins for DCIS in the setting of mastectomy (Shaun et al, IJROBP, 2011).  Although the 5-year local regional recurrence risk is relatively low at approximately 7%, her age, premenopausal status, grade 3,multifocality of tumor, extent of DCIS (10cm), positive DCIS margin (with inability to clear surgically), , all increase her cumulative lifetime risk of local regional recurrence.  Thus, although an extrapolation, I would estimate her 5-year local regional risk to be somewhere in the 10 to 15% range.     Further, her case was discussed at breast tumor conference, with strong recommendation for pursuing adjuvant radiotherapy to the chest wall.     INTENT OF RADIOTHERAPY: Adjuvant    CONCURRENT SYSTEMIC THERAPY: No             SITE OF TREATMENT:  right chest wall    DATES  OF TREATMENT: 10/5/23- 11/8/23    TOTAL DOSE OF TREATMENT / FRACTIONS: 50Gy/25fx                                          FOLLOW UP PLAN:  Follow up with Radiation Oncology in 4 to 6 weeks  Follow up with Dr. Dyson 12/12/23    CC  Patient Care Team:  No Ref-Primary, Physician as PCP - General  Rain Lou GC as Genetic Counselor (Genetic Counselor, MS)  King Dyson MD as MD (Hematology & Oncology)  Sofi Martinze, RUTH ANN as Specialty Care Coordinator (Hematology & Oncology)  EDWARDO Ferreira MD as Assigned Surgical Provider  Mildred Perez CNP as Assigned PCP  Jona Linares MD as MD (Radiation Oncology)  Jen Calderon RN as Specialty Care Coordinator (Radiation Oncology)  Jona Linares MD as Assigned Cancer Care Provider       Jona Linares  M.D.  Department of Radiation Oncology  [facility]

## 2023-12-20 ENCOUNTER — ALLIED HEALTH/NURSE VISIT (OUTPATIENT)
Dept: NURSING | Facility: CLINIC | Age: 43
End: 2023-12-20
Payer: COMMERCIAL

## 2023-12-20 DIAGNOSIS — Z98.890 S/P BREAST RECONSTRUCTION, BILATERAL: Primary | ICD-10-CM

## 2023-12-20 PROCEDURE — 99211 OFF/OP EST MAY X REQ PHY/QHP: CPT

## 2023-12-20 NOTE — NURSING NOTE
Azalea Mckeon comes into clinic today at the request of  Ordering Provider for Tissue expansion .    Patient presents to the clinic for re-expansion post radiation.250cc of NS filled into left breast expander. RN attempted to sonny the port site on the right breast expander with the port finder but pt reports significant pain to the skin. RN noted the right breast skin felt slightly warmer than the left. No increased in redness noted to the right breast or swelling. Pt's temp today in clinic was 98.6 orally. Other than fatigue more than usual per pt no other outwardly concerning symptoms. Due to the symptoms presented today RN advised pt see  on Friday and if no further concerns RN can complete the fill that same day. Pt agrees with this plan. Total fill volume for left breast expander 250cc.Total fill volume as of now to Right breast expander is 100cc. Bacitracin and a bandaid applied to the left breast expander injection site. Pt advised to monitor the injection sites for any increased redness, drainage, pain or swelling and call the clinic back if any of these issues develop prior to appointment on Friday. RN notified pt if any worsening symptoms develop to call RN back and not to wait until appt on Friday. Pt voiced understanding. RN direct department number given to pt. . Pt to follow up this Friday 12/22/23 with     This service provided today was under the supervising provider of the day , who was available if needed.    Viji Llamas RN

## 2023-12-21 ENCOUNTER — OFFICE VISIT (OUTPATIENT)
Dept: RADIATION ONCOLOGY | Facility: CLINIC | Age: 43
End: 2023-12-21
Payer: COMMERCIAL

## 2023-12-21 VITALS
HEART RATE: 79 BPM | OXYGEN SATURATION: 100 % | RESPIRATION RATE: 18 BRPM | DIASTOLIC BLOOD PRESSURE: 83 MMHG | SYSTOLIC BLOOD PRESSURE: 123 MMHG | TEMPERATURE: 98.2 F | BODY MASS INDEX: 26.79 KG/M2 | WEIGHT: 161 LBS

## 2023-12-21 DIAGNOSIS — Z17.0 MALIGNANT NEOPLASM OF RIGHT BREAST IN FEMALE, ESTROGEN RECEPTOR POSITIVE, UNSPECIFIED SITE OF BREAST (H): Primary | ICD-10-CM

## 2023-12-21 DIAGNOSIS — C50.911 MALIGNANT NEOPLASM OF RIGHT BREAST IN FEMALE, ESTROGEN RECEPTOR POSITIVE, UNSPECIFIED SITE OF BREAST (H): Primary | ICD-10-CM

## 2023-12-21 PROCEDURE — 99024 POSTOP FOLLOW-UP VISIT: CPT | Performed by: SURGERY

## 2023-12-21 ASSESSMENT — PAIN SCALES - GENERAL: PAINLEVEL: NO PAIN (0)

## 2023-12-21 NOTE — LETTER
2023         RE: Azalea Mckeon  [address] [facility] [address]        Dear Colleague,    Thank you for referring your patient, Azalea Mckeon, to the [facility] RADIATION ONCOLOGY [place]. Please see a copy of my visit note below.         Department of Radiation Oncology  [facility]: Cancer Center  [facility]  [address] [address] [place] [id number]  [phone]       Radiation Oncology Follow-up Visit  Dec 21, 2023      Azalea Mckeon  MRN: [id number]   : 1980     DIAGNOSIS / ID: Ms. Mckeon is a 43 year old female with RIGHT Stage IA ER+/NJ+/Her2- invasive ductal carcinoma s/p bilateral mastectomy with expander placement (23) with pathology demonstrating multifocal IDC (upper outer, upper inner quadrant) largest tumor measuring 16 mm, grade 3, no LVSI, negative invasive margins, with 10cm of DCIS (grade 3, comedonecrosis), with multiple positive DCIS margins (anterior, superior), close posterior margin, 0/1 LN, pT1cN0. Her oncotype score was 22 and she will be receiving chemotherapy (Dr. Dyson).     In general, we reviewed the NCCN Guidelines for invasive breast cancer following total mastectomy with surgical axillary lymph node staging. If four or more axillary nodes are involved, radiation therapy to the chest wall plus infraclavicular region, supraclavicular area, internal mammary nodes, and any part of the axillary bed at risk is a category 1 recommendation (based on high-level evidence and uniform consensus of the NCCN). In the setting of one to three axillary nodes are involved, the recommendation is to strongly consider radiation to the chest wall plus the regional carmella sites as above. In patients with negative axillary lymph nodes and tumor >5cm, the recommendation is to consider radiation to the chest wall and regional lymph nodes.  In patients with negative axillary lymph nodes and tumor less than 5 cm  in size, and negative but close < 1mm margins, 1 may consider RT to the chest wall.  In patients with negative axillary lymph nodes and tumor less than 5 cm and margins greater than or equal to 1 mm, typically no radiation is recommended.These recommendations are supported by various literature (Jesse et al, EBCTCG [place]-analysis, Lancet 2005, update Lancet 2014; Virygaapam et al., 82b and 82c Combined Analysis, Radiother Oncol 2007).     Thus, in this particular scenario, she does not have the typical national cancer network guideline strong indication for postmastectomy radiation. However, we also discussed risk factors for increased locoregional recurrence after mastectomy in node negative patients and possible implications for PMRT (Tio et al., IJROBP, 2005).      Further, we the implications of positive margins for DCIS in the setting of mastectomy (Shaun et al, IJROBP, 2011).  Although the 5-year local regional recurrence risk is relatively low at approximately 7%, her age, premenopausal status, grade 3,multifocality of tumor, extent of DCIS (10cm), positive DCIS margin (with inability to clear surgically), , all increase her cumulative lifetime risk of local regional recurrence.  Thus, although an extrapolation, I would estimate her 5-year local regional risk to be somewhere in the 10 to 15% range.     Further, her case was discussed at breast tumor conference, with strong recommendation for pursuing adjuvant radiotherapy to the chest wall.     INTENT OF RADIOTHERAPY: Adjuvant     CONCURRENT SYSTEMIC THERAPY: No              SITE OF TREATMENT:  right chest wall     DATES  OF TREATMENT: 10/5/23- 11/8/23     TOTAL DOSE OF TREATMENT / FRACTIONS: 50Gy/25fx    INTERVAL SINCE COMPLETION OF RADIATION THERAPY: 6 weeks    SUBJECTIVE:    Overall, patient is doing well since completing radiation. Denies any chest pain, chest tenderness, range of motion difficulties, or signs of arm/hand swelling. She is on  endocrine  therapy per Dr. Dyson and is tolerating this well. She will see Dr. Ferreira 12/22/23 for expander refilling.     PHYSICAL EXAM:  /83 (BP Location: Left arm, Patient Position: Chair, Cuff Size: Adult Regular)   Pulse 79   Temp 98.2  F (36.8  C) (Oral)   Resp 18   Wt 73 kg (161 lb)   SpO2 100%   BMI 26.79 kg/m    Gen: Alert, in NAD  Eyes: PERRL, EOMI, sclera anicteric  HENT     Head: NC/AT     Ears: No external auricular lesions     Nose/sinus: No rhinorrhea or epistaxis     Oral Cavity/Oropharynx: MMM  Neck: Supple, full ROM, no LAD  Breast: CW healing well, incisions CDI, no skin breakdown   Pulm: No wheezing, stridor or respiratory distress  CV: Well-perfused, no cyanosis, no pedal edema  Abdominal: Soft, nontender, nondistended, no hepatomegaly  Back: No step-offs or pain to palpation along the thoracolumbar spine, no CVA tenderness  Musculoskeletal: Normal bulk and tone   Skin: Normal color and turgor  Neurologic: A/Ox3, CN II-XII intact  Psychiatric: Appropriate mood and affect    LABS AND IMAGING:  Reviewed.    IMPRESSION:  Patient is doing well from a radiation toxicity perspective and has healed well since completing radiation.    PLAN:   Follow up with radiation oncology on an as needed basis  Follow up with medical oncology for endocrine therapy with Dr. Dyson  Follow up with Dr. Ferreira for expander refill/reconstruction options    Jona Linares M.D.  Department of Radiation Oncology  [facility]         Again, thank you for allowing me to participate in the care of your patient.        Sincerely,        Jona Linares MD

## 2023-12-21 NOTE — PATIENT INSTRUCTIONS
Please contact [facility] RN with questions or concerns following today's appointment: [phone].       Please feel free to leave a detailed message if your call is not answered.    If your call is not received before 3:00 PM, it may not be returned until the following business day.    If you are receiving radiation treatment and need assistance after 3:00 PM or on the weekends, please call [phone] and ask to speak to the radiation oncologist on-call.    Thank you!    Jen VALLECILLO

## 2023-12-21 NOTE — NURSING NOTE
RADIATION ONCOLOGY BREAST FOLLOW-UP VISIT    Patient Name: Azalea Mckeon      : 1980     Age: 43 year old        ______________________________________________________________________________       Chief Complaint   Patient presents with    Cancer     Radiation oncology return visit with Dr. Linares     /83 (BP Location: Left arm, Patient Position: Chair, Cuff Size: Adult Regular)   Pulse 79   Temp 98.2  F (36.8  C) (Oral)   Resp 18   Wt 73 kg (161 lb)   SpO2 100%   BMI 26.79 kg/m        History of Radiation Treatment:  Site: right chest wall  Total Dose: 5,000 cGy  Date Completed: 2023  Clinic: [facility]  Physician: Dr. Jona Linares      Pain:  Denies    Labs:  Other Labs: No    Imaging:  None    Fatigue:   Grade 0: No toxicity    Skin:  No Concerns, patient continues with Aquaphor    Range of Motion:   No Concerns    Lymphedema:  Following with lymphedema therapy        Medical Oncologist: Dr. Dyson    Endocrine Therapy: patient reports taking Tamoxifen po as prescribed      Future Appointments:      Dr. Stephens (derm-autoimmune) Appointment Date: 2024   Dr. Dyson Appointment Date: 2024    Appointment Date:           Nurse face-to-face time: Level 5:  over 15 min face to face time.    Jen Yuan, RN BSN OCN CBCN

## 2023-12-22 ENCOUNTER — OFFICE VISIT (OUTPATIENT)
Dept: SURGERY | Facility: CLINIC | Age: 43
End: 2023-12-22
Payer: COMMERCIAL

## 2023-12-22 DIAGNOSIS — Z98.890 S/P BREAST RECONSTRUCTION, BILATERAL: Primary | ICD-10-CM

## 2023-12-22 PROCEDURE — 99214 OFFICE O/P EST MOD 30 MIN: CPT | Performed by: PLASTIC SURGERY

## 2023-12-22 NOTE — NURSING NOTE
Azalea Mckeon's chief complaint for this visit includes:  Chief Complaint   Patient presents with    RECHECK     check right breast with expander prior to filling     PCP: No Ref-Primary, Physician    Referring Provider:  No referring provider defined for this encounter.    There were no vitals taken for this visit.  Data Unavailable        Allergies   Allergen Reactions    Percocet [Oxycodone-Acetaminophen] Itching    Latex Rash         Do you need any medication refills at today's visit? No    Jocelynn cordova Clinic Assistant- Surgical Specialties

## 2023-12-22 NOTE — LETTER
12/22/2023         RE: Azalea Mckeon  [address] [facility] [address]        Dear Colleague,    Thank you for referring your patient, Azalea Mckeon, to the [facility]. Please see a copy of my visit note below.       PRESENTING COMPLAINT:  Follow up visit for bilateral breast reconstruction for breast cancer     HISTORY OF PRESENTING COMPLAINT: The patient is here for follow up.  The patient is being seen in the presence of my nurse.     The patient underwent bilateral skin sparing mastectomy and expander based reconstruction for right-sided breast cancer on 4/24/2023.  In the interim she has undergone radiation therapy.  Here to discuss next stages and management.    On exam: Vital signs stable afebrile.  Says significant radiation changes to the right side.       ASSESSMENT AND PLAN:  Based upon the above findings, the patient is here for follow up.     Plan is to proceed with expansion now that her radiation is completed.  And then potentially proceed with either implant-based reconstruction or an latissimus flap with implant or latissimus only reconstruction.  I will see her back in a few weeks to plan this definitively.    All questions were answered.  The patient was happy with the visit.    Total time spent in the encounter today including chart review visit itself postvisit paperwork was 30 minutes.      Again, thank you for allowing me to participate in the care of your patient.        Sincerely,        EDWARDO Ferreira MD

## 2023-12-22 NOTE — NURSING NOTE
200cc of NS filled into right breast expander and 50cc of NS filled into left breast expander. Total fill volume for right breast expander is 300cc. Total fill volume for left breast expander 300cc.  Bacitracin and a bandaid applied to the injection sites. Pt tolerated the breast fill without any issues. Pt advised to monitor the injection sites for any increased redness, drainage, pain or swelling and call the clinic back if any of these issues develop. Follow up visit with  scheduled in February..Viji Llamas RN

## 2023-12-26 NOTE — PROGRESS NOTES
PRESENTING COMPLAINT:  Follow up visit for bilateral breast reconstruction for breast cancer     HISTORY OF PRESENTING COMPLAINT: The patient is here for follow up.  The patient is being seen in the presence of my nurse.     The patient underwent bilateral skin sparing mastectomy and expander based reconstruction for right-sided breast cancer on 4/24/2023.  In the interim she has undergone radiation therapy.  Here to discuss next stages and management.    On exam: Vital signs stable afebrile.  Says significant radiation changes to the right side.       ASSESSMENT AND PLAN:  Based upon the above findings, the patient is here for follow up.     Plan is to proceed with expansion now that her radiation is completed.  And then potentially proceed with either implant-based reconstruction or an latissimus flap with implant or latissimus only reconstruction.  I will see her back in a few weeks to plan this definitively.    All questions were answered.  The patient was happy with the visit.    Total time spent in the encounter today including chart review visit itself postvisit paperwork was 30 minutes.

## 2023-12-28 ENCOUNTER — THERAPY VISIT (OUTPATIENT)
Dept: PHYSICAL THERAPY | Facility: CLINIC | Age: 43
End: 2023-12-28
Attending: PLASTIC SURGERY
Payer: COMMERCIAL

## 2023-12-28 DIAGNOSIS — I89.0 LYMPHEDEMA: Primary | ICD-10-CM

## 2023-12-28 PROCEDURE — 97140 MANUAL THERAPY 1/> REGIONS: CPT | Mod: GP | Performed by: PHYSICAL THERAPIST

## 2024-01-09 ENCOUNTER — THERAPY VISIT (OUTPATIENT)
Dept: PHYSICAL THERAPY | Facility: CLINIC | Age: 44
End: 2024-01-09
Attending: PLASTIC SURGERY
Payer: MEDICAID

## 2024-01-09 DIAGNOSIS — I89.0 LYMPHEDEMA: Primary | ICD-10-CM

## 2024-01-09 PROCEDURE — 97140 MANUAL THERAPY 1/> REGIONS: CPT | Mod: GP | Performed by: PHYSICAL THERAPIST

## 2024-01-11 ENCOUNTER — OFFICE VISIT (OUTPATIENT)
Dept: DERMATOLOGY | Facility: CLINIC | Age: 44
End: 2024-01-11
Payer: MEDICAID

## 2024-01-11 ENCOUNTER — LAB (OUTPATIENT)
Dept: LAB | Facility: CLINIC | Age: 44
End: 2024-01-11
Payer: MEDICAID

## 2024-01-11 DIAGNOSIS — L30.9 DERMATITIS: ICD-10-CM

## 2024-01-11 DIAGNOSIS — Z85.3 HISTORY OF BREAST CANCER: ICD-10-CM

## 2024-01-11 DIAGNOSIS — Z17.0 MALIGNANT NEOPLASM OF OVERLAPPING SITES OF RIGHT BREAST IN FEMALE, ESTROGEN RECEPTOR POSITIVE (H): ICD-10-CM

## 2024-01-11 DIAGNOSIS — C50.811 MALIGNANT NEOPLASM OF OVERLAPPING SITES OF RIGHT BREAST IN FEMALE, ESTROGEN RECEPTOR POSITIVE (H): ICD-10-CM

## 2024-01-11 DIAGNOSIS — R21 BULLOUS RASH: Primary | ICD-10-CM

## 2024-01-11 DIAGNOSIS — R21 BULLOUS RASH: ICD-10-CM

## 2024-01-11 LAB
ALBUMIN SERPL BCG-MCNC: 4.6 G/DL (ref 3.5–5.2)
ALP SERPL-CCNC: 37 U/L (ref 40–150)
ALT SERPL W P-5'-P-CCNC: 32 U/L (ref 0–50)
ANION GAP SERPL CALCULATED.3IONS-SCNC: 9 MMOL/L (ref 7–15)
AST SERPL W P-5'-P-CCNC: 25 U/L (ref 0–45)
BASOPHILS # BLD AUTO: 0 10E3/UL (ref 0–0.2)
BASOPHILS NFR BLD AUTO: 0 %
BILIRUB SERPL-MCNC: 0.5 MG/DL
BUN SERPL-MCNC: 11.3 MG/DL (ref 6–20)
CALCIUM SERPL-MCNC: 9.4 MG/DL (ref 8.6–10)
CHLORIDE SERPL-SCNC: 104 MMOL/L (ref 98–107)
CHOLEST SERPL-MCNC: 165 MG/DL
CREAT SERPL-MCNC: 0.81 MG/DL (ref 0.51–0.95)
DEPRECATED HCO3 PLAS-SCNC: 27 MMOL/L (ref 22–29)
EGFRCR SERPLBLD CKD-EPI 2021: >90 ML/MIN/1.73M2
EOSINOPHIL # BLD AUTO: 0.1 10E3/UL (ref 0–0.7)
EOSINOPHIL NFR BLD AUTO: 1 %
ERYTHROCYTE [DISTWIDTH] IN BLOOD BY AUTOMATED COUNT: 13.3 % (ref 10–15)
FASTING STATUS PATIENT QL REPORTED: ABNORMAL
GLUCOSE SERPL-MCNC: 102 MG/DL (ref 70–99)
HCT VFR BLD AUTO: 42.2 % (ref 35–47)
HDLC SERPL-MCNC: 36 MG/DL
HGB BLD-MCNC: 14.9 G/DL (ref 11.7–15.7)
IMM GRANULOCYTES # BLD: 0 10E3/UL
IMM GRANULOCYTES NFR BLD: 0 %
LDLC SERPL CALC-MCNC: 107 MG/DL
LYMPHOCYTES # BLD AUTO: 1.2 10E3/UL (ref 0.8–5.3)
LYMPHOCYTES NFR BLD AUTO: 20 %
MCH RBC QN AUTO: 29.6 PG (ref 26.5–33)
MCHC RBC AUTO-ENTMCNC: 35.3 G/DL (ref 31.5–36.5)
MCV RBC AUTO: 84 FL (ref 78–100)
MONOCYTES # BLD AUTO: 0.3 10E3/UL (ref 0–1.3)
MONOCYTES NFR BLD AUTO: 6 %
NEUTROPHILS # BLD AUTO: 4.3 10E3/UL (ref 1.6–8.3)
NEUTROPHILS NFR BLD AUTO: 73 %
NONHDLC SERPL-MCNC: 129 MG/DL
NRBC # BLD AUTO: 0 10E3/UL
NRBC BLD AUTO-RTO: 0 /100
PLATELET # BLD AUTO: 213 10E3/UL (ref 150–450)
POTASSIUM SERPL-SCNC: 4 MMOL/L (ref 3.4–5.3)
PROT SERPL-MCNC: 7.1 G/DL (ref 6.4–8.3)
RBC # BLD AUTO: 5.04 10E6/UL (ref 3.8–5.2)
SODIUM SERPL-SCNC: 140 MMOL/L (ref 135–145)
TRIGL SERPL-MCNC: 110 MG/DL
WBC # BLD AUTO: 5.9 10E3/UL (ref 4–11)

## 2024-01-11 PROCEDURE — 99000 SPECIMEN HANDLING OFFICE-LAB: CPT | Performed by: PATHOLOGY

## 2024-01-11 PROCEDURE — 83516 IMMUNOASSAY NONANTIBODY: CPT | Performed by: DERMATOLOGY

## 2024-01-11 PROCEDURE — 80053 COMPREHEN METABOLIC PANEL: CPT | Performed by: PATHOLOGY

## 2024-01-11 PROCEDURE — 80061 LIPID PANEL: CPT | Performed by: PATHOLOGY

## 2024-01-11 PROCEDURE — 85025 COMPLETE CBC W/AUTO DIFF WBC: CPT | Performed by: PATHOLOGY

## 2024-01-11 PROCEDURE — 86038 ANTINUCLEAR ANTIBODIES: CPT | Performed by: DERMATOLOGY

## 2024-01-11 PROCEDURE — 36415 COLL VENOUS BLD VENIPUNCTURE: CPT | Performed by: PATHOLOGY

## 2024-01-11 PROCEDURE — 88350 IMFLUOR EA ADDL 1ANTB STN PX: CPT | Performed by: DERMATOLOGY

## 2024-01-11 PROCEDURE — 99203 OFFICE O/P NEW LOW 30 MIN: CPT | Mod: GC | Performed by: DERMATOLOGY

## 2024-01-11 PROCEDURE — 88346 IMFLUOR 1ST 1ANTB STAIN PX: CPT | Performed by: DERMATOLOGY

## 2024-01-11 RX ORDER — TRIAMCINOLONE ACETONIDE 1 MG/G
OINTMENT TOPICAL 2 TIMES DAILY
Qty: 80 G | Refills: 2 | Status: SHIPPED | OUTPATIENT
Start: 2024-01-11

## 2024-01-11 NOTE — PROGRESS NOTES
"Dermatology Rooming Note    Azalea Mckeon's goals for this visit include:   Chief Complaint   Patient presents with    Derm Problem     Pt has had rashes on torso, neck, feet, and face last present in October. Now pt states she has been \"gaging fits\" randomly occurring since new years.     Adan Shah, EMT-B    "

## 2024-01-11 NOTE — LETTER
"1/11/2024       RE: Azalea Mckeon  [address] [facility] [address]     Dear Colleague,    Thank you for referring your patient, Azalea Mckeon, to the [facility] DERMATOLOGY CLINIC [place] at [facility]. Please see a copy of my visit note below.    [facility] Dermatology Note  Encounter Date: Jan 11, 2024  Office Visit     Dermatology Problem List:  PMH: hx breast cancer s/p chemo/radiation, on tamoxifen  1. Pauci-immune bullous eruption, ankles/dorsal feet  - Occurred x1. Ddx edema bullae v r/o AIBD  - Pemphigoid panel pending 1/11/24, further blistering studies will be sent PRN pending results  2. Erythematous, pruritic eruption of the anterior trunk/face  - Occurred x2, resolved w/prednisone  - Ddx ACD vs radiation dermatitis vs other  - Current: triamcinolone 0.1% oint BID PRN  3. Recurrent pink \"butterfly rash\" on cheeks per patient report  - RELL pending 1/11/24    ____________________________________________    Assessment & Plan:    Pauci/non-immune bullous eruption, ankles/dorsal feet  Occurred x1 in the setting of radiation for breast cancer. It is very possible that these were edema blisters (she reports LE edema at the time), though we cannot rule out an autoimmune blistering disease based on the photos alone. It is reassuring that these bullae have not recurred and this fact argues against something like epidermolysis bullosa acquisita. The photos we reviewed reveal tense blisters so we will get a BP panel as a starting point. Her main question today was \"is this an autoimmune disease.\" As discussed below, we will also be getting an RELL as a screening test for autoimmune disease, however, skin findings are not consistent with any specific autoimmune diagnosis. And it is difficult to know if these blisters are related to the other rashes described below.  - Labs: pemphigoid panel (further blistering studies will be sent " "PRN pending results)  - Start triamcinolone 0.1% oint BID if recurs. Take photos, and call or MyChart.    2. Erythematous, pruritic eruption of the anterior trunk/face  Occurred x2, once prior to chemo and once about a month after the completion of chemo in the middle of her radiation series but before starting tamoxifen. The differential includes a contact dermatitis vs a radiation dermatitis (though this wouldn't explain the first episode). Unlikely to be a chemo reaction/side effect given it was not temporally related to her chemo. Reassuringly, this rash has also not recurred. While these rashes do not squarely fit with an autoimmune disease, given her concern and out of an abundance of caution we will get an RELL to screen.  - Labs: RELL  - Start triamcinolone 0.1% oint BID if recurs. Take photos, and call or MyChart.    3. Recurrent pink \"butterfly rash\" on cheeks per patient report  Unfortunately there are no photos of this to review and not present on exam. She is currently on tamoxifen and having menopause symptoms, so query if this is actually more flushing (although she thinks this rash started occurring prior to tamoxifen.) Rosacea on ddx as well though no clear evidence of this on today's exam.  - RELL as above  - If recurs, take photos, and call or MyChart.    Procedures Performed:   None    Follow-up: pending lab results or if rash recurs    Staff and Scribe:   Scribe Disclosure:   By signing my name below, I, Beryl Amin, attest that this documentation has been prepared under the direction and in the presence of Dr. Bud Stephens MD.  - Electronically Signed: Beryl Amin 01/11/24     Shirin Vallejo MD  Dermatology Resident PGY4    Staff Physician Comments:   I saw and evaluated the patient with the resident and I edited the assessment and plan as documented in the note. I was present for the examination.    Provider Disclosure:   The documentation recorded by the scribe accurately reflects the " "services I personally performed and the decisions made by me.    Bud Stephens MD   of Dermatology  Department of Dermatology  [facility]          ____________________________________________    CC: Derm Problem (Pt has had rashes on torso, neck, feet, and face last present in October. Now pt states she has been \"gaging fits\" randomly occurring since new years.)    HPI:  Ms. Azalea Mckeon is a(n) 43 year old female who presents today as a new patient for rashes. She has a history of breast cancer. She had mastectomy in April 2023. About 3 weeks prior to starting her chemotherapy, she developed a confluent rash on the anterior trunk all the way up to the chin/jaw line. This was very, very itchy. Did not respond to abx. Did finally respond to a short few day course of prednisone. She underwent chemotherapy which ended in mid Sept. No rashes during chemo. She then started radiation. In the middle of radiation (about 1 month after finishing chemo) she developed a similar rash on the trunk/entire face. She was not on tamoxifen yet at this time.The rash was super itchy and face was puffy. At the same time she developed some bullae on the ankles and tops of the feet. She does endorse the legs being quite edematous at this time. These rashes resolved with a 2 week course of prednisone.     In terms of other skin history, she endorses recurrent \"butterfly rashes\" on the cheeks with unclear triggers. She does not have photos of this. She thinks these started happening in recent years even before cancer treatments/before starting tamoxifen. She is currently on tamoxifen and does endorse hot flashes/menopause symptoms but is not sure that this is causing her cheek rashes. She wonders if she has an autoimmune disease. No family history of autoimmune disease. No family history of rosacea.    Patient is otherwise feeling well, without additional skin concerns.    Labs " Reviewed:  CBC, CMP from 12/2023  No RELL on file    Physical exam:  Vitals: There were no vitals taken for this visit.  GEN: This is a well developed, well-nourished female in no acute distress, in a pleasant mood.    SKIN: Perez phototype I-II  - Total skin excluding the undergarment areas was performed. The exam included the head/face, neck, both arms, chest, back, abdomen, both legs, digits and/or nails.   - No rashes present on today's exam  - Review of photos from 10/2023 reveals   - On one of the ankles/dorsal feet there are a couple of tense bullae without surrounding inflammation. Also one small, few cm, pink scaly eczematous appearing area   - On the visible areas of the upper chest and face there is confluent erythema and some periorbital edema  - Multiple regular brown pigmented macules and papules are identified on the head/neck, trunk, extremities.   - Scattered brown macules on sun exposed areas.  - No other lesions of concern on areas examined.     Medications:  Current Outpatient Medications   Medication    tamoxifen (NOLVADEX) 10 MG tablet     No current facility-administered medications for this visit.      Past Medical History:   Patient Active Problem List   Diagnosis    CARDIOVASCULAR SCREENING; LDL GOAL LESS THAN 160    Anxiety    Malignant neoplasm of overlapping sites of right breast in female, estrogen receptor positive (H)    Chemotherapy-induced neutropenia  (H24)    Lymphedema    Cervical high risk HPV (human papillomavirus) test positive     Past Medical History:   Diagnosis Date    Breast cancer (H) 03/17/2023    Right Breast    Cancer (H)     right breast, s/p bilateral mastectomies    Personal history of chemotherapy     Taxotere + Cytoxan (6/26/2023 - 8/28/2023) - Dr. Dyson    S/P radiation therapy     5,000 cGy to right chestwall completed on 11/8/2023 - [facility] No referring provider defined for this encounter. on close of this  "encounter.    Dermatology Rooming Note    Azalea Mckeon's goals for this visit include:   Chief Complaint   Patient presents with    Derm Problem     Pt has had rashes on torso, neck, feet, and face last present in October. Now pt states she has been \"gaging fits\" randomly occurring since new years.     Adan Shah, EMT-B    "

## 2024-01-11 NOTE — PROGRESS NOTES
"[facility] Dermatology Note  Encounter Date: Jan 11, 2024  Office Visit     Dermatology Problem List:  PMH: hx breast cancer s/p chemo/radiation, on tamoxifen  1. Pauci-immune bullous eruption, ankles/dorsal feet  - Occurred x1. Ddx edema bullae v r/o AIBD  - Pemphigoid panel pending 1/11/24, further blistering studies will be sent PRN pending results  2. Erythematous, pruritic eruption of the anterior trunk/face  - Occurred x2, resolved w/prednisone  - Ddx ACD vs radiation dermatitis vs other  - Current: triamcinolone 0.1% oint BID PRN  3. Recurrent pink \"butterfly rash\" on cheeks per patient report  - RELL pending 1/11/24    ____________________________________________    Assessment & Plan:    Pauci/non-immune bullous eruption, ankles/dorsal feet  Occurred x1 in the setting of radiation for breast cancer. It is very possible that these were edema blisters (she reports LE edema at the time), though we cannot rule out an autoimmune blistering disease based on the photos alone. It is reassuring that these bullae have not recurred and this fact argues against something like epidermolysis bullosa acquisita. The photos we reviewed reveal tense blisters so we will get a BP panel as a starting point. Her main question today was \"is this an autoimmune disease.\" As discussed below, we will also be getting an RELL as a screening test for autoimmune disease, however, skin findings are not consistent with any specific autoimmune diagnosis. And it is difficult to know if these blisters are related to the other rashes described below.  - Labs: pemphigoid panel (further blistering studies will be sent PRN pending results)  - Start triamcinolone 0.1% oint BID if recurs. Take photos, and call or MyChart.    2. Erythematous, pruritic eruption of the anterior trunk/face  Occurred x2, once prior to chemo and once about a month after the completion of chemo in the middle of her radiation series but before starting " "tamoxifen. The differential includes a contact dermatitis vs a radiation dermatitis (though this wouldn't explain the first episode). Unlikely to be a chemo reaction/side effect given it was not temporally related to her chemo. Reassuringly, this rash has also not recurred. While these rashes do not squarely fit with an autoimmune disease, given her concern and out of an abundance of caution we will get an RELL to screen.  - Labs: RELL  - Start triamcinolone 0.1% oint BID if recurs. Take photos, and call or MyChart.    3. Recurrent pink \"butterfly rash\" on cheeks per patient report  Unfortunately there are no photos of this to review and not present on exam. She is currently on tamoxifen and having menopause symptoms, so query if this is actually more flushing (although she thinks this rash started occurring prior to tamoxifen.) Rosacea on ddx as well though no clear evidence of this on today's exam.  - RELL as above  - If recurs, take photos, and call or MyChart.    Procedures Performed:   None    Follow-up: pending lab results or if rash recurs    Staff and Scribe:   Scribe Disclosure:   By signing my name below, I, Beryl Brennan, attest that this documentation has been prepared under the direction and in the presence of Dr. Bud Stephens MD.  - Electronically Signed: Beryl Amin 01/11/24     Shirin Vallejo MD  Dermatology Resident PGY4    Staff Physician Comments:   I saw and evaluated the patient with the resident and I edited the assessment and plan as documented in the note. I was present for the examination.    Provider Disclosure:   The documentation recorded by the scribe accurately reflects the services I personally performed and the decisions made by me.    Bud Stephens MD   of Dermatology  Department of Dermatology  [facility] [employer] [facility]          ____________________________________________    CC: Derm Problem (Pt has had rashes on torso, neck, feet, and " "face last present in October. Now pt states she has been \"gaging fits\" randomly occurring since new years.)    HPI:  Ms. Azalea Mckeon is a(n) 43 year old female who presents today as a new patient for rashes. She has a history of breast cancer. She had mastectomy in April 2023. About 3 weeks prior to starting her chemotherapy, she developed a confluent rash on the anterior trunk all the way up to the chin/jaw line. This was very, very itchy. Did not respond to abx. Did finally respond to a short few day course of prednisone. She underwent chemotherapy which ended in mid Sept. No rashes during chemo. She then started radiation. In the middle of radiation (about 1 month after finishing chemo) she developed a similar rash on the trunk/entire face. She was not on tamoxifen yet at this time.The rash was super itchy and face was puffy. At the same time she developed some bullae on the ankles and tops of the feet. She does endorse the legs being quite edematous at this time. These rashes resolved with a 2 week course of prednisone.     In terms of other skin history, she endorses recurrent \"butterfly rashes\" on the cheeks with unclear triggers. She does not have photos of this. She thinks these started happening in recent years even before cancer treatments/before starting tamoxifen. She is currently on tamoxifen and does endorse hot flashes/menopause symptoms but is not sure that this is causing her cheek rashes. She wonders if she has an autoimmune disease. No family history of autoimmune disease. No family history of rosacea.    Patient is otherwise feeling well, without additional skin concerns.    Labs Reviewed:  CBC, CMP from 12/2023  No RELL on file    Physical exam:  Vitals: There were no vitals taken for this visit.  GEN: This is a well developed, well-nourished female in no acute distress, in a pleasant mood.    SKIN: Perez phototype I-II  - Total skin excluding the undergarment areas was performed. The " exam included the head/face, neck, both arms, chest, back, abdomen, both legs, digits and/or nails.   - No rashes present on today's exam  - Review of photos from 10/2023 reveals   - On one of the ankles/dorsal feet there are a couple of tense bullae without surrounding inflammation. Also one small, few cm, pink scaly eczematous appearing area   - On the visible areas of the upper chest and face there is confluent erythema and some periorbital edema  - Multiple regular brown pigmented macules and papules are identified on the head/neck, trunk, extremities.   - Scattered brown macules on sun exposed areas.  - No other lesions of concern on areas examined.     Medications:  Current Outpatient Medications   Medication    tamoxifen (NOLVADEX) 10 MG tablet     No current facility-administered medications for this visit.      Past Medical History:   Patient Active Problem List   Diagnosis    CARDIOVASCULAR SCREENING; LDL GOAL LESS THAN 160    Anxiety    Malignant neoplasm of overlapping sites of right breast in female, estrogen receptor positive (H)    Chemotherapy-induced neutropenia  (H24)    Lymphedema    Cervical high risk HPV (human papillomavirus) test positive     Past Medical History:   Diagnosis Date    Breast cancer (H) 03/17/2023    Right Breast    Cancer (H)     right breast, s/p bilateral mastectomies    Personal history of chemotherapy     Taxotere + Cytoxan (6/26/2023 - 8/28/2023) - Dr. Dyson    S/P radiation therapy     5,000 cGy to right chestwall completed on 11/8/2023 - [facility] No referring provider defined for this encounter. on close of this encounter.

## 2024-01-11 NOTE — PATIENT INSTRUCTIONS
We will let you know what we find on your labs. (We checked a basic autoimmune marker called RELL and a panel to look for an autoimmune blistering disease).    Start triamcinolone 0.1% ointment twice daily to any rashes that flare. Please also take photos and send them via [employer] or give us a call.

## 2024-01-12 LAB
ANA PAT SER IF-IMP: ABNORMAL
ANA SER QL IF: ABNORMAL
ANA TITR SER IF: ABNORMAL {TITER}

## 2024-01-16 ENCOUNTER — THERAPY VISIT (OUTPATIENT)
Dept: PHYSICAL THERAPY | Facility: CLINIC | Age: 44
End: 2024-01-16
Attending: PLASTIC SURGERY
Payer: MEDICAID

## 2024-01-16 DIAGNOSIS — I89.0 LYMPHEDEMA: Primary | ICD-10-CM

## 2024-01-16 PROCEDURE — 97140 MANUAL THERAPY 1/> REGIONS: CPT | Mod: GP | Performed by: PHYSICAL THERAPIST

## 2024-01-17 LAB
ANNOTATION COMMENT IMP: NORMAL
PATHOLOGY STUDY: NORMAL

## 2024-01-17 NOTE — PROGRESS NOTES
PLAN  Continue therapy per current plan of care of 1x/week (have decreased from 2x/wk) for ongoing focus of MFR to BUQ to assist in decreasing tightness and increased BUE ROM as pt still has expanders in, waiting to hear on potential surgery date after her MD appt at the beginning of February.    Beginning/End Dates of Progress Note Reporting Period:  10/10/23 to 01/09/2024    Referring Provider:  EDWARDO Ferreira MD           01/09/24 0500   Appointment Info   Signing clinician's name / credentials Carlotta Sarmiento, PT, DPT, CLT   Visits Used 29   Medical Diagnosis S/P breast reconstruction, bilateral   PT Tx Diagnosis RUE/RUQ lymphedema; Bilateral chest/UQ tightness and decreased BUE ROM   Other pertinent information radiation completed 11/8/23   Progress Note/Certification   Onset of illness/injury or Date of Surgery 04/24/23  (date of surgery)   Therapy Frequency 1x/week   Predicted Duration 12 weeks   Progress Note Due Date 01/10/24   Progress Note Completed Date 10/10/23   GOALS   PT Goals 2;3;4   PT Goal 1   Goal Identifier stg   Goal Description pt to be independent with daily BUE/chest stretches and exercises to increase ROM and ease of overhead activity and ADL   Rationale to maximize safety and independence with performance of ADLs and functional tasks;to maximize safety and independence with self cares   Target Date 08/10/23   Date Met 08/08/23   PT Goal 2   Goal Identifier stg   Goal Description pt to be indendent with daily self-MFR of RUQ to assist with increasing ROM for ease and independence of overhead activity and ADL   Rationale to maximize safety and independence with performance of ADLs and functional tasks;to maximize safety and independence with self cares   Target Date 08/10/23   Date Met 08/08/23   PT Goal 3   Goal Identifier ltg   Goal Description pt to increase bilateral UE flexion to 160 degrees to increase ease and comfort of overhead activity and ADL   Rationale to maximize  safety and independence with performance of ADLs and functional tasks;to maximize safety and independence with self cares   Target Date 10/09/23   Date Met 10/03/23   PT Goal 4   Goal Identifier ltg   Goal Description pt to be independent with longterm lymphedema management via HEP, elevation, skin cares, self MLD, self MFR and compression garment wear/use (as/if needed) for optimal longterm management   Rationale to maximize safety and independence with performance of ADLs and functional tasks   Target Date 01/10/24   Subjective Report   Subjective Report I have a meeting with my doctor the beginning of February and am hoping to get my surgery date   Objective Measures   Objective Measures Objective Measure 1   Objective Measure 1   Objective Measure RUE girth (when last taken 12/28/23)   Details since 11/21/23: RUE -5.4%   Treatment Interventions (PT)   Interventions Manual Therapy   Manual Therapy   Manual Therapy: Mobilization, MFR, MLD, friction massage minutes (20393) 55   Manual Therapy 1 - Details With pt in supine, manual techniques performed to B sides: STM (pinch an inch, S, and C strokes and alternating circles) and MFR to Bilateral sides: pecs, anterior/lateral tops of shoulders, chest deep sweeps, rib sweeps, supra and sub clavicle area; bilateral axilla, lateral ribs and STM only to inferior/lower ribs; STM with movement (BUE flexion) [id number] each side, good tolerance throughout   Skilled Intervention CDT education; MFR; STM   Patient Response/Progress discomfort at end range, but tolerable   Plan   Home program daily self stretches   Updates to plan of care 1x/week   Plan for next session periodic measurements and ROM, bilateral upper quadrant MFR and STM; R-sided breast scar massage,  add scapular manual mvmt to prevent adhesions   Total Session Time   Timed Code Treatment Minutes 55   Total Treatment Time (sum of timed and untimed services) 55

## 2024-01-23 ENCOUNTER — THERAPY VISIT (OUTPATIENT)
Dept: PHYSICAL THERAPY | Facility: CLINIC | Age: 44
End: 2024-01-23
Attending: PLASTIC SURGERY
Payer: MEDICAID

## 2024-01-23 DIAGNOSIS — I89.0 LYMPHEDEMA: Primary | ICD-10-CM

## 2024-01-23 PROCEDURE — 97140 MANUAL THERAPY 1/> REGIONS: CPT | Mod: GP | Performed by: PHYSICAL THERAPIST

## 2024-01-30 ENCOUNTER — THERAPY VISIT (OUTPATIENT)
Dept: PHYSICAL THERAPY | Facility: CLINIC | Age: 44
End: 2024-01-30
Attending: PLASTIC SURGERY
Payer: MEDICAID

## 2024-01-30 DIAGNOSIS — I89.0 LYMPHEDEMA: Primary | ICD-10-CM

## 2024-01-30 PROCEDURE — 97140 MANUAL THERAPY 1/> REGIONS: CPT | Mod: GP | Performed by: PHYSICAL THERAPIST

## 2024-02-05 ENCOUNTER — OFFICE VISIT (OUTPATIENT)
Dept: FAMILY MEDICINE | Facility: CLINIC | Age: 44
End: 2024-02-05
Payer: COMMERCIAL

## 2024-02-05 VITALS
RESPIRATION RATE: 16 BRPM | TEMPERATURE: 98 F | BODY MASS INDEX: 23.29 KG/M2 | DIASTOLIC BLOOD PRESSURE: 79 MMHG | HEIGHT: 65 IN | SYSTOLIC BLOOD PRESSURE: 111 MMHG | HEART RATE: 97 BPM | OXYGEN SATURATION: 98 % | WEIGHT: 139.8 LBS

## 2024-02-05 DIAGNOSIS — F41.9 ANXIETY: ICD-10-CM

## 2024-02-05 DIAGNOSIS — R11.0 NAUSEA: ICD-10-CM

## 2024-02-05 DIAGNOSIS — C50.811 MALIGNANT NEOPLASM OF OVERLAPPING SITES OF RIGHT BREAST IN FEMALE, ESTROGEN RECEPTOR POSITIVE (H): ICD-10-CM

## 2024-02-05 DIAGNOSIS — Z11.59 NEED FOR HEPATITIS C SCREENING TEST: ICD-10-CM

## 2024-02-05 DIAGNOSIS — Z17.0 MALIGNANT NEOPLASM OF OVERLAPPING SITES OF RIGHT BREAST IN FEMALE, ESTROGEN RECEPTOR POSITIVE (H): ICD-10-CM

## 2024-02-05 DIAGNOSIS — Z00.00 ROUTINE GENERAL MEDICAL EXAMINATION AT A HEALTH CARE FACILITY: Primary | ICD-10-CM

## 2024-02-05 DIAGNOSIS — F32.A DEPRESSION, UNSPECIFIED DEPRESSION TYPE: ICD-10-CM

## 2024-02-05 LAB
ALBUMIN SERPL BCG-MCNC: 4.5 G/DL (ref 3.5–5.2)
ALP SERPL-CCNC: 36 U/L (ref 40–150)
ALT SERPL W P-5'-P-CCNC: 22 U/L (ref 0–50)
ANION GAP SERPL CALCULATED.3IONS-SCNC: 11 MMOL/L (ref 7–15)
AST SERPL W P-5'-P-CCNC: 22 U/L (ref 0–45)
BILIRUB SERPL-MCNC: 0.7 MG/DL
BUN SERPL-MCNC: 8.1 MG/DL (ref 6–20)
CALCIUM SERPL-MCNC: 9.7 MG/DL (ref 8.6–10)
CHLORIDE SERPL-SCNC: 105 MMOL/L (ref 98–107)
CREAT SERPL-MCNC: 0.79 MG/DL (ref 0.51–0.95)
DEPRECATED HCO3 PLAS-SCNC: 25 MMOL/L (ref 22–29)
EGFRCR SERPLBLD CKD-EPI 2021: >90 ML/MIN/1.73M2
GLUCOSE SERPL-MCNC: 96 MG/DL (ref 70–99)
HCV AB SERPL QL IA: NONREACTIVE
LIPASE SERPL-CCNC: 33 U/L (ref 13–60)
POTASSIUM SERPL-SCNC: 3.7 MMOL/L (ref 3.4–5.3)
PROT SERPL-MCNC: 7.1 G/DL (ref 6.4–8.3)
SODIUM SERPL-SCNC: 141 MMOL/L (ref 135–145)
TSH SERPL DL<=0.005 MIU/L-ACNC: 0.6 UIU/ML (ref 0.3–4.2)
VIT D+METAB SERPL-MCNC: 27 NG/ML (ref 20–50)

## 2024-02-05 PROCEDURE — 84443 ASSAY THYROID STIM HORMONE: CPT | Performed by: PHYSICIAN ASSISTANT

## 2024-02-05 PROCEDURE — 86803 HEPATITIS C AB TEST: CPT | Performed by: PHYSICIAN ASSISTANT

## 2024-02-05 PROCEDURE — 80053 COMPREHEN METABOLIC PANEL: CPT | Performed by: PHYSICIAN ASSISTANT

## 2024-02-05 PROCEDURE — 82306 VITAMIN D 25 HYDROXY: CPT | Performed by: PHYSICIAN ASSISTANT

## 2024-02-05 PROCEDURE — 99214 OFFICE O/P EST MOD 30 MIN: CPT | Mod: 25 | Performed by: PHYSICIAN ASSISTANT

## 2024-02-05 PROCEDURE — 99396 PREV VISIT EST AGE 40-64: CPT | Performed by: PHYSICIAN ASSISTANT

## 2024-02-05 PROCEDURE — 36415 COLL VENOUS BLD VENIPUNCTURE: CPT | Performed by: PHYSICIAN ASSISTANT

## 2024-02-05 PROCEDURE — 83690 ASSAY OF LIPASE: CPT | Performed by: PHYSICIAN ASSISTANT

## 2024-02-05 RX ORDER — ESCITALOPRAM OXALATE 10 MG/1
10 TABLET ORAL DAILY
Qty: 90 TABLET | Refills: 0 | Status: SHIPPED | OUTPATIENT
Start: 2024-02-05 | End: 2024-04-28

## 2024-02-05 SDOH — HEALTH STABILITY: PHYSICAL HEALTH: ON AVERAGE, HOW MANY DAYS PER WEEK DO YOU ENGAGE IN MODERATE TO STRENUOUS EXERCISE (LIKE A BRISK WALK)?: 1 DAY

## 2024-02-05 SDOH — HEALTH STABILITY: PHYSICAL HEALTH: ON AVERAGE, HOW MANY MINUTES DO YOU ENGAGE IN EXERCISE AT THIS LEVEL?: 10 MIN

## 2024-02-05 ASSESSMENT — ENCOUNTER SYMPTOMS: FATIGUE: 1

## 2024-02-05 ASSESSMENT — PAIN SCALES - GENERAL: PAINLEVEL: NO PAIN (0)

## 2024-02-05 ASSESSMENT — SOCIAL DETERMINANTS OF HEALTH (SDOH): HOW OFTEN DO YOU GET TOGETHER WITH FRIENDS OR RELATIVES?: ONCE A WEEK

## 2024-02-05 NOTE — PATIENT INSTRUCTIONS
Referral for Behavioral Health Clinician (BHC)    During our visit, I encouraged you talk with our Behavioral Health Clinician (BHC). A BHC would be a great addition to your care team and will support your whole health!    What is a Behavioral Health Clinician (BHC)?    A BHC is a licensed mental health therapist. They can help you when behaviors, emotions, stress or worries get in the way of your life or health. Your BHC will work with you and your primary care team. Together, you'll come up with a unique care plan that works for you!         [facility] KAYLA Faulkner, RAHUL    What will happen when I meet with a BHC?    BHCs ask questions to better understand your concerns. They will provide brief, solution-focused therapy. They can also make referrals to a specialty therapist or other services to help you reach your goals.      How do I schedule an appointment with the [facility]?    Call [phone] and ask for a C appointment. You can schedule a virtual or in-person session.    How much time will I spend with the [facility]?     First visits are 45-60 minutes.  Return visits are typically 20-30 minutes.         How does billing work?      Outpatient mental health services are billed to insurance. Most plans don't charge a second co-pay if you see your primary care provider (PCP) the same day. Please verify your coverage and ask about your copay.         After the Appointment    As with any provider appointment, you will be given a survey in the mail to let us know how we are doing. This allows us to not only improve services where needed, but also to reinforce where services are going well.        Your Health Risk Assessment indicates you feel you are not in good health    A healthy lifestyle helps keep the body fit and the mind alert. It helps protect you from disease, helps you fight disease, and helps prevent chronic disease (disease that doesn't go away) from getting worse. This is important as you  get older and begin to notice twinges in muscles and joints and a decline in the strength and stamina you once took for granted. A healthy lifestyle includes good healthcare, good nutrition, weight control, recreation, and regular exercise. Avoid harmful substances and do what you can to keep safe. Another part of a healthy lifestyle is stay mentally active and socially involved.    Good healthcare   Have a wellness visit every year.   If you have new symptoms, let us know right away. Don't wait until the next checkup.   Take medicines exactly as prescribed and keep your medicines in a safe place. Tell us if your medicine causes problems.   Healthy diet and weight control   Eat 3 or 4 small, nutritious, low-fat, high-fiber meals a day. Include a variety of fruits, vegetables, and whole-grain foods.   Make sure you get enough calcium in your diet. Calcium, vitamin D, and exercise help prevent osteoporosis (bone thinning).   If you live alone, try eating with others when you can. That way you get a good meal and have company while you eat it.   Try to keep a healthy weight. If you eat more calories than your body uses for energy, it will be stored as fat and you will gain weight.     Recreation   Recreation is not limited to sports and team events. It includes any activity that provides relaxation, interest, enjoyment, and exercise. Recreation provides an outlet for physical, mental, and social energy. It can give a sense of worth and achievement. It can help you stay healthy.    Mental Exercise and Social Involvement  Mental and emotional health is as important as physical health. Keep in touch with friends and family. Stay as active as possible. Continue to learn and challenge yourself.   Things you can do to stay mentally active are:  Learn something new, like a foreign language or musical instrument.   Play SCRABBLE or do crossword puzzles. If you cannot find people to play these games with you at home, you can  "play them with others on your computer through the Internet.   Join a games club--anything from card games to chess or checkers or lawn bowling.   Start a new hobby.   Go back to school.   Volunteer.   Read.   Keep up with world events.  Learning About Being Physically Active  What is physical activity?     Being physically active means doing any kind of activity that gets your body moving.  The types of physical activity that can help you get fit and stay healthy include:  Aerobic or \"cardio\" activities. These make your heart beat faster and make you breathe harder, such as brisk walking, riding a bike, or running. They strengthen your heart and lungs and build up your endurance.  Strength training activities. These make your muscles work against, or \"resist,\" something. Examples include lifting weights or doing push-ups. These activities help tone and strengthen your muscles and bones.  Stretches. These let you move your joints and muscles through their full range of motion. Stretching helps you be more flexible.  Reaching a balance between these three types of physical activity is important because each one contributes to your overall fitness.  What are the benefits of being active?  Being active is one of the best things you can do for your health. It helps you to:  Feel stronger and have more energy to do all the things you like to do.  Focus better at school or work.  Feel, think, and sleep better.  Reach and stay at a healthy weight.  Lose fat and build lean muscle.  Lower your risk for serious health problems, including diabetes, heart attack, high blood pressure, and some cancers.  Keep your heart, lungs, bones, muscles, and joints strong and healthy.  How can you make being active part of your life?  Start slowly. Make it your long-term goal to get at least 30 minutes of exercise on most days of the week. Walking is a good choice. You also may want to do other activities, such as running, swimming, " "cycling, or playing tennis or team sports.  Pick activities that you like--ones that make your heart beat faster, your muscles stronger, and your muscles and joints more flexible. If you find more than one thing you like doing, do them all. You don't have to do the same thing every day.  Get your heart pumping every day. Any activity that makes your heart beat faster and keeps it at that rate for a while counts.  Here are some great ways to get your heart beating faster:  Go for a brisk walk, run, or hike.  Go for a swim or bike ride.  Take an online exercise class or dance.  Play a game of touch football, basketball, or soccer.  Play tennis, pickleball, or racquetball.  Climb stairs.  Even some household chores can be aerobic. Just do them at a faster pace. Raking or mowing the lawn, sweeping the garage, and vacuuming and cleaning your home all can help get your heart rate up.  Strengthen your muscles during the week. You don't have to lift heavy weights or grow big, bulky muscles to get stronger. Doing a few simple activities that make your muscles work against, or \"resist,\" something can help you get stronger. Aim for at least twice a week.  For example, you can:  Do push-ups or sit-ups, which use your own body weight as resistance.  Lift weights or dumbbells or use stretch bands at home or in a gym or community center.  Stretch your muscles often. Stretching will help you as you become more active. It can help you stay flexible and loosen tight muscles. It can also help improve your balance and posture and can be a great way to relax.  Be sure to stretch the muscles you'll be using when you work out. It's best to warm your muscles slightly before you stretch them. Walk or do some other light aerobic activity for a few minutes. Then start stretching.  When you stretch your muscles:  Do it slowly. Stretching is not about going fast or making sudden movements.  Don't push or bounce during a stretch.  Hold each " "stretch for at least 15 to 30 seconds, if you can. You should feel a stretch in the muscle, but not pain.  Breathe out as you do the stretch. Then breathe in as you hold the stretch. Don't hold your breath.  If you're worried about how more activity might affect your health, have a checkup before you start. Follow any special advice your doctor gives you for getting a smart start.  Where can you learn more?  Go to https://www.Attunity.net/patiented  Enter W332 in the search box to learn more about \"Learning About Being Physically Active.\"  Current as of: June 6, 2023               Content Version: 13.8    1934-3002 [employer].   Care instructions adapted under license by your healthcare professional. If you have questions about a medical condition or this instruction, always ask your healthcare professional. [employer] disclaims any warranty or liability for your use of this information.      Eating Healthy Foods: Care Instructions  With every meal, you can make healthy food choices. Try to eat a variety of fruits, vegetables, whole grains, lean proteins, and low-fat dairy products. This can help you get the right balance of nutrients, including vitamins and minerals. Small changes add up over time. You can start by adding one healthy food to your meals each day.    Try to make half your plate fruits and vegetables, one-fourth whole grains, and one-fourth lean proteins. Try including dairy with your meals.   Eat more fruits and vegetables. Try to have them with most meals and snacks.   Foods for healthy eating    Fruits    These can be fresh, frozen, canned, or dried.  Try to choose whole fruit rather than fruit juice.  Eat a variety of colors.    Vegetables    These can be fresh, frozen, canned, or dried.  Beans, peas, and lentils count too.    Whole grains    Choose whole-grain breads, cereals, and noodles.  Try brown rice.    Lean proteins    These can include lean meat, poultry, " "fish, and eggs.  You can also have tofu, beans, peas, lentils, nuts, and seeds.    Dairy    Try milk, yogurt, and cheese.  Choose low-fat or fat-free when you can.  If you need to, use lactose-free milk or fortified plant-based milk products, such as soy milk.    Water    Drink water when you're thirsty.  Limit sugar-sweetened drinks, including soda, fruit drinks, and sports drinks.  Where can you learn more?  Go to https://www.Cirrascale.net/patiented  Enter T756 in the search box to learn more about \"Eating Healthy Foods: Care Instructions.\"  Current as of: February 28, 2023               Content Version: 13.8    9197-8348 [employer].   Care instructions adapted under license by your healthcare professional. If you have questions about a medical condition or this instruction, always ask your healthcare professional. [employer] disclaims any warranty or liability for your use of this information.      Learning About Stress  What is stress?     Stress is your body's response to a hard situation. Your body can have a physical, emotional, or mental response. Stress is a fact of life for most people, and it affects everyone differently. What causes stress for you may not be stressful for someone else.  A lot of things can cause stress. You may feel stress when you go on a job interview, take a test, or run a race. This kind of short-term stress is normal and even useful. It can help you if you need to work hard or react quickly. For example, stress can help you finish an important job on time.  Long-term stress is caused by ongoing stressful situations or events. Examples of long-term stress include long-term health problems, ongoing problems at work, or conflicts in your family. Long-term stress can harm your health.  How does stress affect your health?  When you are stressed, your body responds as though you are in danger. It makes hormones that speed up your heart, make you breathe " faster, and give you a burst of energy. This is called the fight-or-flight stress response. If the stress is over quickly, your body goes back to normal and no harm is done.  But if stress happens too often or lasts too long, it can have bad effects. Long-term stress can make you more likely to get sick, and it can make symptoms of some diseases worse. If you tense up when you are stressed, you may develop neck, shoulder, or low back pain. Stress is linked to high blood pressure and heart disease.  Stress also harms your emotional health. It can make you julien, tense, or depressed. Your relationships may suffer, and you may not do well at work or school.  What can you do to manage stress?  You can try these things to help manage stress:   Do something active. Exercise or activity can help reduce stress. Walking is a great way to get started. Even everyday activities such as housecleaning or yard work can help.  Try yoga or tj chi. These techniques combine exercise and meditation. You may need some training at first to learn them.  Do something you enjoy. For example, listen to music or go to a movie. Practice your hobby or do volunteer work.  Meditate. This can help you relax, because you are not worrying about what happened before or what may happen in the future.  Do guided imagery. Imagine yourself in any setting that helps you feel calm. You can use online videos, books, or a teacher to guide you.  Do breathing exercises. For example:  From a standing position, bend forward from the waist with your knees slightly bent. Let your arms dangle close to the floor.  Breathe in slowly and deeply as you return to a standing position. Roll up slowly and lift your head last.  Hold your breath for just a few seconds in the standing position.  Breathe out slowly and bend forward from the waist.  Let your feelings out. Talk, laugh, cry, and express anger when you need to. Talking with supportive friends or family, a  "counselor, or a maria leader about your feelings is a healthy way to relieve stress. Avoid discussing your feelings with people who make you feel worse.  Write. It may help to write about things that are bothering you. This helps you find out how much stress you feel and what is causing it. When you know this, you can find better ways to cope.  What can you do to prevent stress?  You might try some of these things to help prevent stress:  Manage your time. This helps you find time to do the things you want and need to do.  Get enough sleep. Your body recovers from the stresses of the day while you are sleeping.  Get support. Your family, friends, and community can make a difference in how you experience stress.  Limit your news feed. Avoid or limit time on social media or news that may make you feel stressed.  Do something active. Exercise or activity can help reduce stress. Walking is a great way to get started.  Where can you learn more?  Go to https://www.Fridge.net/patiented  Enter N032 in the search box to learn more about \"Learning About Stress.\"  Current as of: February 26, 2023               Content Version: 13.8    4013-4319 [employer].   Care instructions adapted under license by your healthcare professional. If you have questions about a medical condition or this instruction, always ask your healthcare professional. [employer] disclaims any warranty or liability for your use of this information.      Substance Use Disorder: Care Instructions  Overview     You can improve your life and health by stopping your use of alcohol or drugs. When you don't drink or use drugs, you may feel and sleep better. You may get along better with your family, friends, and coworkers. There are medicines and programs that can help with substance use disorder.  How can you care for yourself at home?  Here are some ways to help you stay sober and prevent relapse.  If you have been given medicine " to help keep you sober or reduce your cravings, be sure to take it exactly as prescribed.  Talk to your doctor about programs that can help you stop using drugs or drinking alcohol.  Do not keep alcohol or drugs in your home.  Plan ahead. Think about what you'll say if other people ask you to drink or use drugs. Try not to spend time with people who drink or use drugs.  Use the time and money spent on drinking or drugs to do something that's important to you.  Preventing a relapse  Have a plan to deal with relapse. Learn to recognize changes in your thinking that lead you to drink or use drugs. Get help before you start to drink or use drugs again.  Try to stay away from situations, friends, or places that may lead you to drink or use drugs.  If you feel the need to drink alcohol or use drugs again, seek help right away. Call a trusted friend or family member. Some people get support from organizations such as Narcotics Anonymous or [employer] or from treatment facilities.  If you relapse, get help as soon as you can. Some people make a plan with another person that outlines what they want that person to do for them if they relapse. The plan usually includes how to handle the relapse and who to notify in case of relapse.  Don't give up. Remember that a relapse doesn't mean that you have failed. Use the experience to learn the triggers that lead you to drink or use drugs. Then quit again. Recovery is a lifelong process. Many people have several relapses before they are able to quit for good.  Follow-up care is a key part of your treatment and safety. Be sure to make and go to all appointments, and call your doctor if you are having problems. It's also a good idea to know your test results and keep a list of the medicines you take.  When should you call for help?   Call 911  anytime you think you may need emergency care. For example, call if you or someone else:    Has overdosed or has withdrawal signs. Be sure to  "tell the emergency workers that you are or someone else is using or trying to quit using drugs. Overdose or withdrawal signs may include:  Losing consciousness.  Seizure.  Seeing or hearing things that aren't there (hallucinations).     Is thinking or talking about suicide or harming others.   Where to get help 24 hours a day, 7 days a week   If you or someone you know talks about suicide, self-harm, a mental health crisis, a substance use crisis, or any other kind of emotional distress, get help right away. You can:    Call the Suicide and Crisis Lifeline at 938.     Call 0-012-172-TALK (1-129.828.3111).     Text HOME to 626310 to access the Crisis Text Line.   Consider saving these numbers in your phone.  Go to [employer] for more information or to chat online.  Call your doctor now or seek immediate medical care if:    You are having withdrawal symptoms. These may include nausea or vomiting, sweating, shakiness, and anxiety.   Watch closely for changes in your health, and be sure to contact your doctor if:    You have a relapse.     You need more help or support to stop.   Where can you learn more?  Go to https://www.[employer].net/patiented  Enter H573 in the search box to learn more about \"Substance Use Disorder: Care Instructions.\"  Current as of: March 21, 2023               Content Version: 13.8    5411-5631 [employer].   Care instructions adapted under license by your healthcare professional. If you have questions about a medical condition or this instruction, always ask your healthcare professional. [employer] disclaims any warranty or liability for your use of this information.      Preventive Care Advice   This is general advice given by our system to help you stay healthy. However, your care team may have specific advice just for you. Please talk to your care team about your preventive care needs.  Nutrition  Eat 5 or more servings of fruits and vegetables each day.  Try " wheat bread, brown rice and whole grain pasta (instead of white bread, rice, and pasta).  Get enough calcium and vitamin D. Check the label on foods and aim for 100% of the RDA (recommended daily allowance).  Lifestyle  Exercise at least 150 minutes each week  (30 minutes a day, 5 days a week).  Do muscle strengthening activities 2 days a week. These help control your weight and prevent disease.  No smoking.  Wear sunscreen to prevent skin cancer.  Have a dental exam and cleaning every 6 months.  Yearly exams  See your health care team every year to talk about:  Any changes in your health.  Any medicines your care team has prescribed.  Preventive care, family planning, and ways to prevent chronic diseases.  Shots (vaccines)   HPV shots (up to age 26), if you've never had them before.  Hepatitis B shots (up to age 59), if you've never had them before.  COVID-19 shot: Get this shot when it's due.  Flu shot: Get a flu shot every year.  Tetanus shot: Get a tetanus shot every 10 years.  Pneumococcal, hepatitis A, and RSV shots: Ask your care team if you need these based on your risk.  Shingles shot (for age 50 and up)  General health tests  Diabetes screening:  Starting at age 35, Get screened for diabetes at least every 3 years.  If you are younger than age 35, ask your care team if you should be screened for diabetes.  Cholesterol test: At age 39, start having a cholesterol test every 5 years, or more often if advised.  Bone density scan (DEXA): At age 50, ask your care team if you should have this scan for osteoporosis (brittle bones).  Hepatitis C: Get tested at least once in your life.  STIs (sexually transmitted infections)  Before age 24: Ask your care team if you should be screened for STIs.  After age 24: Get screened for STIs if you're at risk. You are at risk for STIs (including HIV) if:  You are sexually active with more than one person.  You don't use condoms every time.  You or a partner was diagnosed with  a sexually transmitted infection.  If you are at risk for HIV, ask about PrEP medicine to prevent HIV.  Get tested for HIV at least once in your life, whether you are at risk for HIV or not.  Cancer screening tests  Cervical cancer screening: If you have a cervix, begin getting regular cervical cancer screening tests starting at age 21.  Breast cancer scan (mammogram): If you've ever had breasts, begin having regular mammograms starting at age 40. This is a scan to check for breast cancer.  Colon cancer screening: It is important to start screening for colon cancer at age 45.  Have a colonoscopy test every 10 years (or more often if you're at risk) Or, ask your provider about stool tests like a FIT test every year or Cologuard test every 3 years.  To learn more about your testing options, visit:   https://www.[employer]/065589.pdf.  For help making a decision, visit:   https://bit.[employer]/yo77721.  Prostate cancer screening test: If you have a prostate, ask your care team if a prostate cancer screening test (PSA) at age 55 is right for you.  Lung cancer screening: If you are a current or former smoker ages 50 to 80, ask your care team if ongoing lung cancer screenings are right for you.  For informational purposes only. Not to replace the advice of your health care provider. Copyright   2023 [employer]. All rights reserved. Clinically reviewed by the [facility]. Rebls 201742 - REV 01/24.

## 2024-02-05 NOTE — PROGRESS NOTES
Preventive Care Visit  [facility]  Jem Arredondo PA-C, Physician Assistant - Medical  Feb 5, 2024    Assessment & Plan     (Z00.00) Routine general medical examination at a health care facility  (primary encounter diagnosis)  Comment: Here for routine screening  Plan:    (Z11.59) Need for hepatitis C screening test  Comment: Discussed screening lab Plan: Hepatitis C Screen Reflex to HCV RNA Quant and         Genotype           (R11.0) Nausea  Comment: intermittent bouts of nausea vomiting episodes.  These are worse in the morning.  Discussed with patient possibility of GERD, H. Pylori, anxiety, amongst others.  Will check metabolic as well as lipase function.  Discussed potential of Prilosec daily for the next 30 days to see if there is symptom improvement.  Possibility of tamoxifen may be exacerbating symptoms.  If no improvement of symptoms the possibility of EGD.  Follow-up in 30 days  Plan: Comprehensive metabolic panel (BMP + Alb, Alk         Phos, ALT, AST, Total. Bili, TP), Lipase,         Helicobacter pylori Antigen Stool, omeprazole         (PRILOSEC) 20 MG DR capsule          (C50.811,  Z17.0) Malignant neoplasm of overlapping sites of right breast in female, estrogen receptor positive (H)  Comment: Continue to follow with oncology.  Plan:    (F32.A) Depression, unspecified depression type  Comment: History of depression as well as anxiety.  Previously on Lexapro.  Will restart 10 mg tablet.  Follow-up in 30 days for recheck.  Discussed potential side effects of this medication.  Patient denies any thoughts of self-harm.  If any thoughts of self-harm advised to seek emergency department evaluation  Plan: Vitamin D Deficiency, escitalopram (LEXAPRO) 10        MG tablet            (F41.9) Anxiety  Comment:   Plan: TSH with free T4 reflex, escitalopram (LEXAPRO)        10 MG tablet                    Counseling  Appropriate preventive services were discussed with this patient,  including applicable screening as appropriate for fall prevention, nutrition, physical activity, Tobacco-use cessation, weight loss and cognition.  Checklist reviewing preventive services available has been given to the patient.  Reviewed patient's diet, addressing concerns and/or questions.   She is at risk for lack of exercise and has been provided with information to increase physical activity for the benefit of her well-being.   The patient was instructed to see the dentist every 6 months.             Val Tristan is a 43 year old, presenting for the following:  Physical and Fatigue (Feeling unwell over all. )        Via the Health Maintenance questionnaire, the patient has reported the following services have been completed -Eye Exam, this information has been sent to the abstraction team.  Health Care Directive  Patient does not have a Health Care Directive or Living Will: Discussed advance care planning with patient; information given to patient to review.    Fatigue  Associated symptoms include fatigue.     Patient with intermittent episodes of gait gain and nausea.  This is ongoing over the course of day but worse in the morning.  Usually the first 30 minutes of awakening has more severe symptoms.  It does have more prominent symptoms over nighttime as well.  SHe does have more sensitivities to smells and taste.    Of note, patient had episodes of nausea and vomiting surrounding initial 20 mg dose of tamoxifen.  Had reduced dose to 5 mg and titrated up to 10 mg.  She has been on the 10 mg dose for a few weeks prior to onset of symptoms.  Did not consume alcohol or use tobacco products.  Does consume roughly half cup of coffee per day.  Does use cannabis Gummies in the evening 10 mg to help with sleep initiation.  No changes in stool output.  Patient has not had any regular menstrual cycle since chemotherapy.  Last Pap 11/23 with recommendations to repeat in 1 year time.     No family history of colon  cancer.      Patient has been dealing with more anxiety and depression as of late.  No thoughts of self-harm. Previously was on Lexapro.  Had good relief of symptoms with that medication.           2/5/2024   General Health   How would you rate your overall physical health? (!) FAIR   Feel stress (tense, anxious, or unable to sleep) To some extent   (!) STRESS CONCERN      2/5/2024   Nutrition   Three or more servings of calcium each day? (!) NO   Diet: Regular (no restrictions)   How many servings of fruit and vegetables per day? (!) 2-3   How many sweetened beverages each day? 0-1         2/5/2024   Exercise   Days per week of moderate/strenous exercise 1 day   Average minutes spent exercising at this level 10 min   (!) EXERCISE CONCERN      2/5/2024   Social Factors   Frequency of gathering with friends or relatives Once a week   Worry food won't last until get money to buy more No   Food not last or not have enough money for food? No   Do you have housing?  Yes   Are you worried about losing your housing? No   Lack of transportation? No   Unable to get utilities (heat,electricity)? No         2/5/2024   Dental   Dentist two times every year? (!) NO         2/5/2024   TB Screening   Were you born outside of [place]?  No         Today's PHQ-2 Score:       2/5/2024     9:39 AM   PHQ-2 ( 1999 [employer])   Q1: Little interest or pleasure in doing things 1   Q2: Feeling down, depressed or hopeless 1   PHQ-2 Score 2   Q1: Little interest or pleasure in doing things Several days   Q2: Feeling down, depressed or hopeless Several days   PHQ-2 Score 2           2/5/2024   Substance Use   Alcohol more than 3/day or more than 7/wk No   Do you use any other substances recreationally? (!) CANNABIS PRODUCTS     Social History     Tobacco Use    Smoking status: Former     Packs/day: 0.50     Years: 10.00     Additional pack years: 0.00     Total pack years: 5.00     Types: Cigarettes     Quit date: 1/1/2010     Years since quitting:  14.1     Passive exposure: Past    Smokeless tobacco: Never   Vaping Use    Vaping Use: Never used   Substance Use Topics    Alcohol use: No    Drug use: No            No data to display                 Continue to follow with oncology for breast cancer.        2024   STI Screening   New sexual partner(s) since last STI/HIV test? No     History of abnormal Pap smear: Next Pap recommended         Latest Ref Rng & Units 2023     9:28 AM 3/12/2013    11:12 AM   PAP / HPV   PAP  Negative for Intraepithelial Lesion or Malignancy (NILM)     PAP (Historical)   NIL    HPV 16 DNA Negative Negative     HPV 18 DNA Negative Negative     Other HR HPV Negative Positive       The 10-year ASCVD risk score (Annetta MOSQUEDA, et al., 2019) is: 0.8%    Values used to calculate the score:      Age: 43 years      Sex: Female      Is Non- : No      Diabetic: No      Tobacco smoker: No      Systolic Blood Pressure: 111 mmHg      Is BP treated: No      HDL Cholesterol: 36 mg/dL      Total Cholesterol: 165 mg/dL       Reviewed and updated as needed this visit by Provider                    Past Medical History:   Diagnosis Date    Breast cancer (H) 2023    Right Breast    Cancer (H)     right breast, s/p bilateral mastectomies    Personal history of chemotherapy     Taxotere + Cytoxan (2023 - 2023) - Dr. Dyson    S/P radiation therapy     5,000 cGy to right chestwall completed on 2023 - [facility]     Past Surgical History:   Procedure Laterality Date    BREAST BIOPSY, RT/LT Right 2023    GYN SURGERY  2010        GYN SURGERY  2011        GYN SURGERY  2013    C/S    GYN SURGERY  2013    C/S    HC REMOVAL OF OVARIAN CYST(S)  1999    MASTECTOMY PARTIAL WITH SENTINEL NODE Bilateral 2023    Procedure: BILATERAL Skin-Sparing Mastectomy, BILATERAL breast implant removal, RIGHT Axillary [place] Lymph Node Biopsy;   "Surgeon: Mitra Souza MD;  Location: [facility] OR    RECONSTRUCT BREAST, INSERT TISSUE EXPANDER BILATERAL, COMBINED Bilateral 04/24/2023    Procedure: bilateral breast reconstruction, insert tissue expander bilateral, Spy **Latex Allergy**;  Surgeon: EDWARDO Ferreira MD;  Location: [facility] OR    TUBAL LIGATION  01/01/2013     Review of Systems   Constitutional:  Positive for fatigue.          Objective    Exam  /79   Pulse 97   Temp 98  F (36.7  C) (Tympanic)   Resp 16   Ht 1.638 m (5' 4.5\")   Wt 63.4 kg (139 lb 12.8 oz)   SpO2 98%   BMI 23.63 kg/m     Estimated body mass index is 23.63 kg/m  as calculated from the following:    Height as of this encounter: 1.638 m (5' 4.5\").    Weight as of this encounter: 63.4 kg (139 lb 12.8 oz).    Physical Exam  GENERAL: alert and no distress  EYES: Eyes grossly normal to inspection, PERRL and conjunctivae and sclerae normal  HENT: ear canals and TM's normal, nose and mouth without ulcers or lesions  NECK: no adenopathy, no asymmetry, masses, or scars  RESP: lungs clear to auscultation - no rales, rhonchi or wheezes  CV: regular rate and rhythm, normal S1 S2, no S3 or S4, no murmur, click or rub, no peripheral edema  ABDOMEN: soft, nontender, no hepatosplenomegaly, no masses and bowel sounds normal  MS: no gross musculoskeletal defects noted, no edema  SKIN: no suspicious lesions or rashes  NEURO: Normal strength and tone, mentation intact and speech normal  PSYCH: mentation appears normal, affect normal/bright      Signed Electronically by: Jem Arredondo PA-C    "

## 2024-02-06 ENCOUNTER — LAB (OUTPATIENT)
Dept: INFUSION THERAPY | Facility: CLINIC | Age: 44
End: 2024-02-06
Attending: NURSE PRACTITIONER
Payer: COMMERCIAL

## 2024-02-06 ENCOUNTER — ONCOLOGY VISIT (OUTPATIENT)
Dept: ONCOLOGY | Facility: CLINIC | Age: 44
End: 2024-02-06
Attending: INTERNAL MEDICINE
Payer: COMMERCIAL

## 2024-02-06 VITALS
BODY MASS INDEX: 24.5 KG/M2 | DIASTOLIC BLOOD PRESSURE: 71 MMHG | RESPIRATION RATE: 16 BRPM | SYSTOLIC BLOOD PRESSURE: 105 MMHG | HEIGHT: 64 IN | HEART RATE: 69 BPM | WEIGHT: 143.5 LBS | OXYGEN SATURATION: 99 %

## 2024-02-06 DIAGNOSIS — T50.905D ADVERSE EFFECT OF DRUG, SUBSEQUENT ENCOUNTER: ICD-10-CM

## 2024-02-06 DIAGNOSIS — C50.811 MALIGNANT NEOPLASM OF OVERLAPPING SITES OF RIGHT BREAST IN FEMALE, ESTROGEN RECEPTOR POSITIVE (H): Primary | ICD-10-CM

## 2024-02-06 DIAGNOSIS — Z17.0 MALIGNANT NEOPLASM OF OVERLAPPING SITES OF RIGHT BREAST IN FEMALE, ESTROGEN RECEPTOR POSITIVE (H): Primary | ICD-10-CM

## 2024-02-06 DIAGNOSIS — Z17.0 MALIGNANT NEOPLASM OF OVERLAPPING SITES OF RIGHT BREAST IN FEMALE, ESTROGEN RECEPTOR POSITIVE (H): ICD-10-CM

## 2024-02-06 DIAGNOSIS — C50.811 MALIGNANT NEOPLASM OF OVERLAPPING SITES OF RIGHT BREAST IN FEMALE, ESTROGEN RECEPTOR POSITIVE (H): ICD-10-CM

## 2024-02-06 LAB
BASOPHILS # BLD AUTO: 0 10E3/UL (ref 0–0.2)
BASOPHILS NFR BLD AUTO: 0 %
EOSINOPHIL # BLD AUTO: 0.1 10E3/UL (ref 0–0.7)
EOSINOPHIL NFR BLD AUTO: 1 %
ERYTHROCYTE [DISTWIDTH] IN BLOOD BY AUTOMATED COUNT: 13.7 % (ref 10–15)
HCT VFR BLD AUTO: 37.2 % (ref 35–47)
HGB BLD-MCNC: 13 G/DL (ref 11.7–15.7)
HOLD SPECIMEN: NORMAL
IMM GRANULOCYTES # BLD: 0 10E3/UL
IMM GRANULOCYTES NFR BLD: 0 %
LYMPHOCYTES # BLD AUTO: 1.2 10E3/UL (ref 0.8–5.3)
LYMPHOCYTES NFR BLD AUTO: 22 %
MCH RBC QN AUTO: 30.3 PG (ref 26.5–33)
MCHC RBC AUTO-ENTMCNC: 34.9 G/DL (ref 31.5–36.5)
MCV RBC AUTO: 87 FL (ref 78–100)
MONOCYTES # BLD AUTO: 0.4 10E3/UL (ref 0–1.3)
MONOCYTES NFR BLD AUTO: 7 %
NEUTROPHILS # BLD AUTO: 3.8 10E3/UL (ref 1.6–8.3)
NEUTROPHILS NFR BLD AUTO: 70 %
PLATELET # BLD AUTO: 219 10E3/UL (ref 150–450)
RBC # BLD AUTO: 4.29 10E6/UL (ref 3.8–5.2)
WBC # BLD AUTO: 5.3 10E3/UL (ref 4–11)

## 2024-02-06 PROCEDURE — 36415 COLL VENOUS BLD VENIPUNCTURE: CPT

## 2024-02-06 PROCEDURE — 85004 AUTOMATED DIFF WBC COUNT: CPT

## 2024-02-06 PROCEDURE — G0463 HOSPITAL OUTPT CLINIC VISIT: HCPCS | Performed by: INTERNAL MEDICINE

## 2024-02-06 PROCEDURE — 99213 OFFICE O/P EST LOW 20 MIN: CPT | Performed by: INTERNAL MEDICINE

## 2024-02-06 ASSESSMENT — PAIN SCALES - GENERAL: PAINLEVEL: NO PAIN (0)

## 2024-02-06 NOTE — Clinical Note
2024         RE: Azalea Mckeon  [address] [facility] [address]        Dear Colleague,    Thank you for referring your patient, Azalea Mckeon, to the [facility] [place]. Please see a copy of my visit note below.    [facility] Hematology / Oncology  Progress Note Dec 12, 2023  Name: Azalea Mckeon  :  1980    MRN:  [id number]    --------------------    Assessment / Plan:  Stage 1A (pT1c pN0 cM0), hormone-positive, HER2-negative, right-sided breast cancer:  # 2023 Presented w/ abnormal mammogram.  # 2023 Status post bilateral mastectomy w/ sentinel node and reconstruction; path w/ multifocal disease, largest 16 mm, grade 3 disease, neg margins for IDC, neg node ()   # 2023 Oncotype score 22; adjuvant chemotherapy indicated.  # 2023 - Aug 2023 Adjuvant Taxotere / Cytoxan x 4 cycles.  # 2023 Negative hereditary breast cancer panel (40 genes).  # 2022 Adjuvant right chest wall radiation 5000 cGy over 25 fractions.    After discussion of the risks and benefits and options we agreed to rechallenge with tamoxifen 5 mg daily.  If tolerated after several months, we could consider increasing to 10 mg.  Given severity of her reaction, I do not suspect she will tolerate 20 mg daily.  The other option for a number of tamoxifen is poorly tolerated would be to either medically or surgically induced menopause and consider an aromatase inhibitor.  Agree with rheumatology consultation for recurrent steroid responsive rash with blistering.  Otherwise chemistries and blood counts appear to have resolved after chemotherapy.  Follow-up 2 months.    King Dyson MD    --------------------    Interval History:  Azalea returns for follow-up of breast cancer accompanied by her .  But 4 days after starting tamoxifen, she began developing mental fog followed by loss of appetite followed by left rib cage discomfort followed by pretty significant  nausea and vomiting prompting an emergency room visit.  While there, she had a fairly reassuring visit including a reassuring hepatic panel, chemistry panel, blood count panel, lipase, troponin as well as CT of the abdomen pelvis.  Of note, she was having some vasomotor symptoms on tamoxifen and she has not had return of menses since August.  Her port is out.  She is also been managed for some prednisone responsive rash has been coming and going on the thorax face and legs including some blistering on the legs.    Social History:  Social History     Tobacco Use    Smoking status: Former     Packs/day: 0.50     Years: 10.00     Additional pack years: 0.00     Total pack years: 5.00     Types: Cigarettes     Quit date: 2010     Years since quittin.1     Passive exposure: Past    Smokeless tobacco: Never   Substance Use Topics    Alcohol use: No     Family History:  Family History   Problem Relation Age of Onset    Anxiety Disorder Mother     Diabetes Father     Substance Abuse Father     Bleeding Disorder Sister     Pulmonary Embolism Sister         33    Depression Sister     Autism Spectrum Disorder Daughter     Anxiety Disorder Daughter     Breast Cancer Other         Distant 2nd cousin per patient report    Cancer No family hx of     Hypertension No family hx of     Cerebrovascular Disease No family hx of     Thyroid Disease No family hx of     Glaucoma No family hx of     Macular Degeneration No family hx of     Ovarian Cancer No family hx of      Immunizations:  Immunization History   Administered Date(s) Administered    Influenza (IIV3) PF 2012    TDAP Vaccine (Adacel) 2013     Health Maintenance Due   Topic Date Due    HEPATITIS B IMMUNIZATION (1 of 3 - 3-dose series) Never done    COVID-19 Vaccine (1) Never done    Pneumococcal Vaccine: Pediatrics (0 to 5 Years) and At-Risk Patients (6 to 64 Years) (1 - PCV) Never done    DTAP/TDAP/TD IMMUNIZATION (2 - Td or Tdap) 2023    INFLUENZA  "VACCINE (1) 09/01/2023     --------------------    Physical Exam:  VS: /71 (BP Location: Left arm)   Pulse 69   Resp 16   Ht 1.638 m (5' 4.49\")   Wt 65.1 kg (143 lb 8 oz)   SpO2 99%   BMI 24.26 kg/m  .  GEN: Well appearing.    Labs / Imaging:  Reviewed CBC, CMP, lipase, troponin.  Reviewed CT AP.      Again, thank you for allowing me to participate in the care of your patient.        Sincerely,        King Dyson MD  "

## 2024-02-06 NOTE — NURSING NOTE
"Oncology Rooming Note    February 6, 2024 3:34 PM   Azalea Mckeon is a 43 year old female who presents for:    Chief Complaint   Patient presents with    Oncology Clinic Visit     2 month follow up     Initial Vitals: /71 (BP Location: Left arm)   Pulse 69   Resp 16   Ht 1.638 m (5' 4.49\")   Wt 65.1 kg (143 lb 8 oz)   SpO2 99%   BMI 24.26 kg/m   Estimated body mass index is 24.26 kg/m  as calculated from the following:    Height as of this encounter: 1.638 m (5' 4.49\").    Weight as of this encounter: 65.1 kg (143 lb 8 oz). Body surface area is 1.72 meters squared.  No Pain (0) Comment: Data Unavailable   No LMP recorded. (Menstrual status: Chemotherapy).  Allergies reviewed: Yes  Medications reviewed: Yes    Medications: Medication refills not needed today.  Pharmacy name entered into [facility]: [facility] [id number] [place] [address]    Frailty Screening:   Is the patient here for a new oncology consult visit in cancer care? 2. No      Clinical concerns: plans to start Omeprazole and Escitalopram- Rx'd yesterday by PCP. Wants to be sure this is okay with Tamoxifen use. Experiencing gagging in the mornings and will have nauseas all day some days.       Nancy Mccurdy LPN              "

## 2024-02-06 NOTE — PROGRESS NOTES
"[facility] Hematology / Oncology  Progress Note  Name: Azalea Mckeon  :  1980    MRN:  [id number]    --------------------    Assessment / Plan:  Stage 1A (pT1c pN0 cM0), hormone-positive, HER2-negative, right-sided breast cancer:  # 2023 Presented w/ abnormal mammogram.  # 2023 Status post bilateral mastectomy w/ sentinel node and reconstruction; path w/ multifocal disease, largest 16 mm, grade 3 disease, neg margins for IDC, neg node ()   # 2023 Oncotype score 22; adjuvant chemotherapy indicated.  # 2023 - Aug 2023 Adjuvant Taxotere / Cytoxan x 4 cycles.  # 2023 Negative hereditary breast cancer panel (40 genes).  # 2022 Adjuvant right chest wall radiation 5000 cGy over 25 fractions.    Continue tamoxifen 5 mg daily.  Monitor vasomotor complaints on lexapro.  Trying to find selective serotonin reuptake inhibitor that agrees w/ tamoxifen without upsetting mood.  Counts remain recovered after completing chemo.  Follow-up 4 months.    King Dyson MD    --------------------    Interval History:  Azalea returns for follow-up of breast cancer unaccompanied.  Doing okay all in all.  Tolerating tamoxifen 5 mg daily \"okay\".  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  Fatigue slowly improving.  No return of menstrual changes.    Social History:  Social History     Tobacco Use    Smoking status: Former     Packs/day: 0.50     Years: 10.00     Additional pack years: 0.00     Total pack years: 5.00     Types: Cigarettes     Quit date: 2010     Years since quittin.1     Passive exposure: Past    Smokeless tobacco: Never   Substance Use Topics    Alcohol use: No     Family History:  Family History   Problem Relation Age of Onset    Anxiety Disorder Mother     Diabetes Father     Substance Abuse Father     Bleeding Disorder Sister     Pulmonary Embolism Sister         33    Depression Sister     Autism Spectrum Disorder Daughter     Anxiety Disorder Daughter     Breast " "Cancer Other         Distant 2nd cousin per patient report    Cancer No family hx of     Hypertension No family hx of     Cerebrovascular Disease No family hx of     Thyroid Disease No family hx of     Glaucoma No family hx of     Macular Degeneration No family hx of     Ovarian Cancer No family hx of      Immunizations:  Immunization History   Administered Date(s) Administered    Influenza (IIV3) PF 11/01/2012    TDAP Vaccine (Adacel) 07/25/2013     Health Maintenance Due   Topic Date Due    HEPATITIS B IMMUNIZATION (1 of 3 - 3-dose series) Never done    COVID-19 Vaccine (1) Never done    Pneumococcal Vaccine: Pediatrics (0 to 5 Years) and At-Risk Patients (6 to 64 Years) (1 - PCV) Never done    DTAP/TDAP/TD IMMUNIZATION (2 - Td or Tdap) 07/25/2023    INFLUENZA VACCINE (1) 09/01/2023     --------------------    Physical Exam:  VS: /71 (BP Location: Left arm)   Pulse 69   Resp 16   Ht 1.638 m (5' 4.49\")   Wt 65.1 kg (143 lb 8 oz)   SpO2 99%   BMI 24.26 kg/m  .  GEN: Well appearing.    Labs / Imaging:  Reviewed CBC.  "

## 2024-02-09 ENCOUNTER — OFFICE VISIT (OUTPATIENT)
Dept: SURGERY | Facility: CLINIC | Age: 44
End: 2024-02-09
Payer: COMMERCIAL

## 2024-02-09 DIAGNOSIS — Z98.890 S/P BREAST RECONSTRUCTION, BILATERAL: Primary | ICD-10-CM

## 2024-02-09 PROCEDURE — 99214 OFFICE O/P EST MOD 30 MIN: CPT | Performed by: PLASTIC SURGERY

## 2024-02-09 NOTE — NURSING NOTE
Azalea Mckeon's chief complaint for this visit includes:  Chief Complaint   Patient presents with    RECHECK     post tissue expansion visit-discuss surgery     PCP: No Ref-Primary, Physician    Referring Provider:  No referring provider defined for this encounter.    There were no vitals taken for this visit.  Data Unavailable        Allergies   Allergen Reactions    Percocet [Oxycodone-Acetaminophen] Itching    Latex Rash         Do you need any medication refills at today's visit? No    Jocelynn cordova Clinic Assistant- Surgical Specialties

## 2024-02-09 NOTE — LETTER
2/9/2024         RE: Azalea Mckeon  [address] [address] [facility] [address]        Dear Colleague,    Thank you for referring your patient, Azalea Mckeon, to the [facility]. Please see a copy of my visit note below.       PRESENTING COMPLAINT:  Follow up visit for bilateral breast reconstruction for breast cancer.     HISTORY OF PRESENTING COMPLAINT: The patient is here for follow up.  The patient is being seen in the presence of my nurse.     Patient is well-known to my service.  She underwent bilateral expander placement after undergoing bilateral mastectomy for right-sided breast cancer.  She underwent radiation therapy that ended a few months ago.  She is now here to discuss final decisions for definitive reconstruction.  She would like to be alone a large B cup.  Similar to the size she currently is.    On exam: Vital signs stable afebrile.  Both breasts are healed.  Relatively symmetric.  Right side has radiation changes and is firmer.     ASSESSMENT AND PLAN:  Based upon the above findings, the patient is here for follow up.     We once again discussed her different options including implant-based reconstruction versus latissimus flap reconstruction with or without implants versus thigh based flap reconstructions.  After much thought and deliberation the patient wants to proceed with latissimus dorsi musculocutaneous flap reconstructions alone.  She understands that these will be smaller constructs.  Probably in the size range of B cup.  In the future a small implant could be placed secondarily.  Overview of this procedure were explained in detail to the patient and her .  I was very clear about the scars and where they would be in the permanence of the scars.  I also explained that this may require a staged surgery to do touchup surgeries.    Will schedule the procedure and see her back in the preop.    All risks, benefits and alternatives were explained to the patient  in detail, they were understood and agreed upon by the patient, they were acknowledged by the patient,   all the patient's questions were answered in detail to the patient's fullest understanding that they acknowledged, the team approach for treatment in the operating room was agreed upon by the patient, and proceeding with surgery was agreed upon by the patient.    All questions were answered.  The patient was happy with the visit.    Total time spent in the encounter today including chart review visit itself and postvisit paperwork was 30 minutes.      Again, thank you for allowing me to participate in the care of your patient.        Sincerely,        EDWARDO Ferreira MD

## 2024-02-09 NOTE — PROGRESS NOTES
PRESENTING COMPLAINT:  Follow up visit for bilateral breast reconstruction for breast cancer.     HISTORY OF PRESENTING COMPLAINT: The patient is here for follow up.  The patient is being seen in the presence of my nurse.     Patient is well-known to my service.  She underwent bilateral expander placement after undergoing bilateral mastectomy for right-sided breast cancer.  She underwent radiation therapy that ended a few months ago.  She is now here to discuss final decisions for definitive reconstruction.  She would like to be alone a large B cup.  Similar to the size she currently is.    On exam: Vital signs stable afebrile.  Both breasts are healed.  Relatively symmetric.  Right side has radiation changes and is firmer.     ASSESSMENT AND PLAN:  Based upon the above findings, the patient is here for follow up.     We once again discussed her different options including implant-based reconstruction versus latissimus flap reconstruction with or without implants versus thigh based flap reconstructions.  After much thought and deliberation the patient wants to proceed with latissimus dorsi musculocutaneous flap reconstructions alone.  She understands that these will be smaller constructs.  Probably in the size range of B cup.  In the future a small implant could be placed secondarily.  Overview of this procedure were explained in detail to the patient and her .  I was very clear about the scars and where they would be in the permanence of the scars.  I also explained that this may require a staged surgery to do touchup surgeries.    Will schedule the procedure and see her back in the preop.    All risks, benefits and alternatives were explained to the patient in detail, they were understood and agreed upon by the patient, they were acknowledged by the patient,   all the patient's questions were answered in detail to the patient's fullest understanding that they acknowledged, the team approach for treatment  in the operating room was agreed upon by the patient, and proceeding with surgery was agreed upon by the patient.    All questions were answered.  The patient was happy with the visit.    Total time spent in the encounter today including chart review visit itself and postvisit paperwork was 30 minutes.

## 2024-02-13 ENCOUNTER — TELEPHONE (OUTPATIENT)
Dept: SURGERY | Facility: CLINIC | Age: 44
End: 2024-02-13
Payer: COMMERCIAL

## 2024-02-13 NOTE — TELEPHONE ENCOUNTER
RN noted surgery scheduling message below and [facility] message with pre-surgery instructions sent via [employer]...Viji Llamas RN

## 2024-02-13 NOTE — TELEPHONE ENCOUNTER
RN Care Coordinator: Viji Llamas    Surgery is scheduled with Dr. Ferreira  Date: 6/13   Location: [facility]    H&P to be completed by: PAC clinic - scheduled on 5/21, in person        Surgical consult: 5/21 with Dr. Ferreira [facility]    Post-op: 6/21 with Dr. Ferreira [facility]    Patient will receive surgery arrival and start time from PAC. Patient is aware that if times change after they see PAC, they will receive a call from the pre-admission nurses 1-2 days prior to surgery with updated arrival time and NPO instructions.       Spoke with the patient and was able to confirm all scheduled information.       Patient questions/concerns: N/A       Surgery packet: was sent via [employer]    __    Beryl Griggs on 2/13/2024 at 11:51 AM  P: [phone]

## 2024-02-14 NOTE — TELEPHONE ENCOUNTER
FUTURE VISIT INFORMATION      SURGERY INFORMATION:  Date: 24  Location: [facility]  Surgeon:  EDWARDO Ferreira MD   Anesthesia Type:  General with block  Procedure: Bilateral breast reconstruction with bilateral latissimus flap reconstructions, spy   Consult: ov 24    RECORDS REQUESTED FROM:       Primary Care Provider: [facility]    Most recent EKG+ Tracin23- Jose

## 2024-02-20 ENCOUNTER — OFFICE VISIT (OUTPATIENT)
Dept: SURGERY | Facility: CLINIC | Age: 44
End: 2024-02-20
Payer: COMMERCIAL

## 2024-02-20 DIAGNOSIS — C50.811 MALIGNANT NEOPLASM OF OVERLAPPING SITES OF RIGHT BREAST IN FEMALE, ESTROGEN RECEPTOR POSITIVE (H): Primary | ICD-10-CM

## 2024-02-20 DIAGNOSIS — Z17.0 MALIGNANT NEOPLASM OF OVERLAPPING SITES OF RIGHT BREAST IN FEMALE, ESTROGEN RECEPTOR POSITIVE (H): Primary | ICD-10-CM

## 2024-02-20 PROCEDURE — 99212 OFFICE O/P EST SF 10 MIN: CPT | Performed by: SURGERY

## 2024-02-20 NOTE — NURSING NOTE
Azalea Mckeon's goals for this visit include:   Chief Complaint   Patient presents with    RECHECK     6 month exam, hx of breast cancer       She requests these members of her care team be copied on today's visit information: no    PCP: No Ref-Primary, Physician    Referring Provider:  No referring provider defined for this encounter.    There were no vitals taken for this visit.    Do you need any medication refills at today's visit? No    Thao Grewal LPN

## 2024-02-20 NOTE — PROGRESS NOTES
FOLLOW-UP  Feb 20, 2024    Azalea Mckeon is a 43 year old female who returns for follow-up for      Cancer Staging   Malignant neoplasm of overlapping sites of right breast in female, estrogen receptor positive (H)  Staging form: Breast, AJCC 8th Edition  - Clinical: Stage IIB (cT3, cN0, cM0, G3, ER+, SC+, HER2-) - Signed by Mitra Souza MD on 3/29/2023  - Pathologic: Stage IA (pT1c, pN0, cM0, G3, ER+, SC+, HER2-) - Signed by Mitra Souza MD on 8/15/2023      Treatment to date:  1. Genetic testing - negative for pathogenic variants in EDWARD, BARD1, BRCA1, BRCA2, CDH1, CHEK2, NF1, PALB2, PTEN, STK11, and TP53 genes   2. Right skin-sparing mastectomy, right axillary sentinel lymph node mapping and biopsy, left risk-reducing skin-sparing mastectomy (4/24/2023) - 10 cm DCIS with involved anterior superior margin; pT1cN0 invasive ductal carcinoma  3. Oncotype DX 22  4. Adjuvant taxotere/cytoxan (6/26/2023 to August 2023)  5. Adjuvant post-mastectomy radiation therapy (completed 11/8/2023)    HPI:    Since her last visit, she has completed adjuvant systemic therapy and adjuvant radiation therapy. She tolerated these treatments relatively well.  She is planning on second stage reconstruction in June 2024.    She is feeling well. She notes no masses in either skin flap, axilla, or neck. She has had some discomfort laterally in the right chest wall/axilla.    Physical Exam  Constitutional:       Appearance: She is well-developed.   Pulmonary:      Effort: No respiratory distress.   Chest:   Breasts:     Right: No mass.      Left: No mass.      Comments: Bilateral surgical absence of breasts. Expanders in place. No palpable masses in the skin flaps bilaterally. Right chest wall skin with slight hyperpigmentation c/w recent radiation therapy.  Lymphadenopathy:      Upper Body:      Right upper body: No axillary or pectoral adenopathy.      Left upper body: No axillary or pectoral adenopathy.      Comments: No  lymphedema in bilateral upper extremities.    Skin:     General: Skin is warm and dry.        ASSESSMENT:    Azalea Mckeon is a 43 year old female with RIGHT breast cancer, s/p surgery, adjuvant chemotherapy, adjuvant radiation therapy.    She is doing well. We discussed continue to moisturize the right chest wall skin and continue with range of motion exercises of the right shoulder. We discussed the latter will be important to prepare for her upcoming reconstruction.    She is scheduled to undergo bilateral latissimus flap reconstruction in June 2024. She will continue to follow up with Dr Dyson.     Total time spent with the patient was 15 minutes, of which 75% was counseling.     PLAN:  Follow up with Dr Ferreira as scheduled  Follow up with Dr Dyson as scheduled  Follow up with me JANIA Souza MD MS [facility] FACS  Associate Professor of Surgery  Division of Surgical Oncology  [facility]     18 minutes spent on the date of the encounter doing chart review, patient visit, and documentation.

## 2024-02-20 NOTE — LETTER
2/20/2024         RE: Azalea Mckeon  [id number] [address] [facility] [id number]        Dear Colleague,    Thank you for referring your patient, Azalea Mckeon, to the [facility]. Please see a copy of my visit note below.    FOLLOW-UP  Feb 20, 2024    Azalea Mckeon is a 43 year old female who returns for follow-up for      Cancer Staging   Malignant neoplasm of overlapping sites of right breast in female, estrogen receptor positive (H)  Staging form: Breast, AJCC 8th Edition  - Clinical: Stage IIB (cT3, cN0, cM0, G3, ER+, UT+, HER2-) - Signed by Mitra Souza MD on 3/29/2023  - Pathologic: Stage IA (pT1c, pN0, cM0, G3, ER+, UT+, HER2-) - Signed by Mitra Souza MD on 8/15/2023      Treatment to date:  1. Genetic testing - negative for pathogenic variants in EDWARD, BARD1, BRCA1, BRCA2, CDH1, CHEK2, NF1, PALB2, PTEN, STK11, and TP53 genes   2. Right skin-sparing mastectomy, right axillary sentinel lymph node mapping and biopsy, left risk-reducing skin-sparing mastectomy (4/24/2023) - 10 cm DCIS with involved anterior superior margin; pT1cN0 invasive ductal carcinoma  3. Oncotype DX 22  4. Adjuvant taxotere/cytoxan (6/26/2023 to August 2023)  5. Adjuvant post-mastectomy radiation therapy (completed 11/8/2023)    HPI:    Since her last visit, she has completed adjuvant systemic therapy and adjuvant radiation therapy. She tolerated these treatments relatively well.  She is planning on second stage reconstruction in June 2024.    She is feeling well. She notes no masses in either skin flap, axilla, or neck. She has had some discomfort laterally in the right chest wall/axilla.    Physical Exam  Constitutional:       Appearance: She is well-developed.   Pulmonary:      Effort: No respiratory distress.   Chest:   Breasts:     Right: No mass.      Left: No mass.      Comments: Bilateral surgical absence of breasts. Expanders in place. No palpable masses in the skin flaps bilaterally.  Right chest wall skin with slight hyperpigmentation c/w recent radiation therapy.  Lymphadenopathy:      Upper Body:      Right upper body: No axillary or pectoral adenopathy.      Left upper body: No axillary or pectoral adenopathy.      Comments: No lymphedema in bilateral upper extremities.    Skin:     General: Skin is warm and dry.        ASSESSMENT:    Azalea Mckeon is a 43 year old female with RIGHT breast cancer, s/p surgery, adjuvant chemotherapy, adjuvant radiation therapy.    She is doing well. We discussed continue to moisturize the right chest wall skin and continue with range of motion exercises of the right shoulder. We discussed the latter will be important to prepare for her upcoming reconstruction.    She is scheduled to undergo bilateral latissimus flap reconstruction in June 2024. She will continue to follow up with Dr Dyson.     Total time spent with the patient was 15 minutes, of which 75% was counseling.     PLAN:  Follow up with Dr Ferreira as scheduled  Follow up with Dr Dyson as scheduled  Follow up with me JANIA Souza MD MS [facility] FACS  Associate Professor of Surgery  Division of Surgical Oncology  [facility]     18 minutes spent on the date of the encounter doing chart review, patient visit, and documentation.      Again, thank you for allowing me to participate in the care of your patient.        Sincerely,        Mitra Souza MD

## 2024-02-23 DIAGNOSIS — C50.811 MALIGNANT NEOPLASM OF OVERLAPPING SITES OF RIGHT BREAST IN FEMALE, ESTROGEN RECEPTOR POSITIVE (H): ICD-10-CM

## 2024-02-23 DIAGNOSIS — Z17.0 MALIGNANT NEOPLASM OF OVERLAPPING SITES OF RIGHT BREAST IN FEMALE, ESTROGEN RECEPTOR POSITIVE (H): ICD-10-CM

## 2024-02-23 RX ORDER — TAMOXIFEN CITRATE 10 MG/1
5 TABLET ORAL DAILY
Qty: 45 TABLET | Refills: 3 | Status: SHIPPED | OUTPATIENT
Start: 2024-02-23 | End: 2024-05-13

## 2024-02-23 NOTE — TELEPHONE ENCOUNTER
"SUBJECTIVE/OBJECTIVE:                                                    Refill request receive for:  tamoxifen (NOLVADEX) 10 MG tablet     Last refill: 12/12/2023  Date of LOV r/t Medication: 2/6/2024   Future appt scheduled? No     RECENT LABS/VITALS                                                      Recent Labs   Lab Test 01/11/24  1517   CHOL 165   HDL 36*   *   TRIG 110     Lab Results   Component Value Date    AST 22 02/05/2024     Lab Results   Component Value Date    ALT 22 02/05/2024     No results found for: \"A1C\"  Creatinine   Date Value Ref Range Status   02/05/2024 0.79 0.51 - 0.95 mg/dL Final   04/14/2021 0.78 0.52 - 1.04 mg/dL Final   ]  Potassium   Date Value Ref Range Status   02/05/2024 3.7 3.4 - 5.3 mmol/L Final   04/14/2021 3.9 3.4 - 5.3 mmol/L Final   ]  TSH   Date Value Ref Range Status   02/05/2024 0.60 0.30 - 4.20 uIU/mL Final   01/15/2013 2.04 0.4 - 5.0 mU/L Final     BP Readings from Last 4 Encounters:   02/06/24 105/71   02/05/24 111/79   12/21/23 123/83   12/12/23 111/70       PLAN:                                                      Sent to provider to advise.    Sofi Martinez RN           "

## 2024-03-25 NOTE — PROGRESS NOTES
[facility]                                                                                   OUTPATIENT PHYSICAL THERAPY    PLAN OF TREATMENT FOR OUTPATIENT REHABILITATION   Patient's Last Name, First Name, Azalea Herron YOB: 1980   Provider's Name   [facility]   Medical Record No.  [id number]     Onset Date: 4/24/23 (Date of referral) Start of Care Date: 7/11/23     Medical Diagnosis:  S/P breast reconstruction, bilateral       PT Treatment Diagnosis:  RUE/RUQ lymphedema; Bilateral chest/UQ tightness and decreased BUE ROM  Plan of Treatment  Frequency/Duration: 1x/week for 12 weeks    Certification date from 3/15/24 - 6/7/24     See note for plan of treatment details and functional goals     Carlotta Sarmiento, PT, DPT, CLT                         I CERTIFY THE NEED FOR THESE SERVICES FURNISHED UNDER        THIS PLAN OF TREATMENT AND WHILE UNDER MY CARE     (Physician attestation of this document indicates review and certification of the therapy plan).              Referring Provider:  EDWARDO Ferreira MD    Initial Assessment  See Epic Evaluation-                     01/30/24 0500   Appointment Info   Signing clinician's name / credentials Carlotta Sarmiento, PT, DPT, CLT   Visits Used 32   Medical Diagnosis S/P breast reconstruction, bilateral   PT Tx Diagnosis RUE/RUQ lymphedema; Bilateral chest/UQ tightness and decreased BUE ROM   Other pertinent information radiation completed 11/8/23   Progress Note/Certification   Start of Care Date 07/11/23   Onset of illness/injury or Date of Surgery 04/24/23   Therapy Frequency 1x/week   Predicted Duration 12 weeks   Certification date from 03/15/24   Certification date to 06/07/24   Progress Note Completed Date 04/05/24   GOALS   PT Goals 2;3;4   PT Goal 1   Goal Identifier stg   Goal Description pt to be independent with daily BUE/chest stretches and exercises to increase ROM and ease  of overhead activity and ADL   Rationale to maximize safety and independence with performance of ADLs and functional tasks;to maximize safety and independence with self cares   Target Date 08/10/23   Date Met 08/08/23   PT Goal 2   Goal Identifier stg   Goal Description pt to be indendent with daily self-MFR of RUQ to assist with increasing ROM for ease and independence of overhead activity and ADL   Rationale to maximize safety and independence with performance of ADLs and functional tasks;to maximize safety and independence with self cares   Target Date 08/10/23   Date Met 08/08/23   PT Goal 3   Goal Identifier ltg   Goal Description pt to increase bilateral UE flexion to 160 degrees to increase ease and comfort of overhead activity and ADL   Rationale to maximize safety and independence with performance of ADLs and functional tasks;to maximize safety and independence with self cares   Target Date 10/09/23   Date Met 10/03/23   PT Goal 4   Goal Identifier ltg   Goal Description pt to be independent with longterm lymphedema management via HEP, elevation, skin cares, self MLD, self MFR and compression garment wear/use (as/if needed) for optimal longterm management   Rationale to maximize safety and independence with performance of ADLs and functional tasks   Target Date 01/10/24   Subjective Report   Subjective Report I will hear next Friday when I can get my expanders out   Treatment Interventions (PT)   Interventions Manual Therapy   Manual Therapy   Manual Therapy: Mobilization, MFR, MLD, friction massage minutes (31165) 55   Manual Therapy 1 - Details With pt in supine, manual techniques performed to B sides: STM (pinch an inch, S, and C strokes and alternating circles) and MFR to Bilateral sides: pecs, anterior/lateral tops of shoulders, chest deep sweeps, rib sweeps, supra and sub clavicle area; bilateral axilla, lateral ribs and STM only to inferior/lower ribs; STM with movement (BUE flexion) [id number] each  side with good tolerance throughout - mild discomfort on RUE at end range only; theragun x10min, level 1 with large round head to bilateral traps, neck and rhomboids; will trial decrease to 1x every other week to see how pt does and until we learn surgery date to get expanders out; stressed importance of daily to near-daily stretching which pt has been consistent in doing   Skilled Intervention CDT education; MFR; STM; theragun   Patient Response/Progress trial decrease to 1x every other week   Plan   Home program daily self stretches   Updates to plan of care return in 2 weeks   Plan for next session periodic measurements and ROM, bilateral upper quadrant MFR and STM; R-sided breast scar massage,  add scapular manual mvmt to prevent adhesions   Total Session Time   Timed Code Treatment Minutes 55   Total Treatment Time (sum of timed and untimed services) 55

## 2024-03-25 NOTE — PROGRESS NOTES
[facility]                                                                                   OUTPATIENT PHYSICAL THERAPY    PLAN OF TREATMENT FOR OUTPATIENT REHABILITATION   Patient's Last Name, First Name, Azalea Herorn YOB: 1980   Provider's Name   [facility]   Medical Record No.  [id number]     Onset Date: 4/24/23 (Date of referral) Start of Care Date: 7/11/23     Medical Diagnosis:  S/P breast reconstruction, bilateral       PT Treatment Diagnosis:  RUE/RUQ lymphedema; Bilateral chest/UQ tightness and decreased BUE ROM  Plan of Treatment  Frequency/Duration: 2x/week for 12 weeks    Certification date from 10/3/2023 - 12/20/2023       See note for plan of treatment details and functional goals     Carlotta Sarmiento, PT, DPT, CLT                         I CERTIFY THE NEED FOR THESE SERVICES FURNISHED UNDER        THIS PLAN OF TREATMENT AND WHILE UNDER MY CARE     (Physician attestation of this document indicates review and certification of the therapy plan).              Referring Provider:  EDWARDO Ferreira MD    Initial Assessment  See Epic Evaluation-                   10/03/23 0500   Appointment Info   Signing clinician's name / credentials Carlotta Sarmiento, PT, DPT, CLT   Visits Used 17   Medical Diagnosis S/P breast reconstruction, bilateral   PT Tx Diagnosis RUE/RUQ lymphedema; Bilateral chest/UQ tightness and decreased BUE ROM   Other pertinent information Radiation will be 10/5 - 11/8   Progress Note/Certification   Onset of illness/injury or Date of Surgery 04/24/23  (date of surgery)   Therapy Frequency 2x/week   Predicted Duration 12 weeks   Progress Note Due Date 10/09/23       Present No   GOALS   PT Goals 2;3;4   PT Goal 1   Goal Identifier stg   Goal Description pt to be independent with daily BUE/chest stretches and exercises to increase ROM and ease of overhead activity and ADL    Rationale to maximize safety and independence with performance of ADLs and functional tasks;to maximize safety and independence with self cares   Target Date 08/10/23   Date Met 08/08/23   PT Goal 2   Goal Identifier stg   Goal Description pt to be indendent with daily self-MFR of RUQ to assist with increasing ROM for ease and independence of overhead activity and ADL   Rationale to maximize safety and independence with performance of ADLs and functional tasks;to maximize safety and independence with self cares   Target Date 08/10/23   Date Met 08/08/23   PT Goal 3   Goal Identifier ltg   Goal Description pt to increase bilateral UE flexion to 160 degrees to increase ease and comfort of overhead activity and ADL   Rationale to maximize safety and independence with performance of ADLs and functional tasks;to maximize safety and independence with self cares   Target Date 10/09/23   Date Met 10/03/23   PT Goal 4   Goal Identifier ltg   Goal Description pt to be independent with longterm lymphedema management via HEP, elevation, skin cares, self MLD, self MFR and compression garment wear/use (as/if needed) for optimal longterm management   Rationale to maximize safety and independence with performance of ADLs and functional tasks   Target Date 01/01/24   Subjective Report   Subjective Report radiation starts wednesday   Objective Measures   Objective Measures Objective Measure 1;Objective Measure 2   Objective Measure 1   Objective Measure girth   Details since 8/15/23: RUE -0.3% (stable)   Objective Measure 2   Objective Measure AROM in supine (last taken 8/29/23)   Details R UE: Flexion 160*, *, L UE: Flexion 160*, *   Treatment Interventions (PT)   Interventions Manual Therapy   Manual Therapy   Manual Therapy: Mobilization, MFR, MLD, friction massage minutes (25603) 55   Manual Therapy 1 - Details With pt in supine, manual techniques performed to B sides: STM (pinch an inch, S, and C strokes and  alternating circles) and MFR with UE resing on pillow for comfort, to Bilateral sides: pecs, anterior/lateral tops of shoulders, chest deep sweeps, rib sweeps, supra clavicle area; STM only to inferior ribs bilaterally; pt tolerated well with periodic rest breaks for comfort; education on importance of daily stretches with radiation starting on Wednesday this week to maintain ROM throughout radiation tx   Skilled Intervention CDT education; MFR; STM   Patient Response/Progress good tolerance and comfort w/treatment today; feeling less tight overall this week so far   Plan   Home program daily self stretches   Updates to plan of care 2x/wk   Plan for next session measurements, ROM, bilateral upper quadrant MFR and STM; R-sided breast scar massage   Total Session Time   Timed Code Treatment Minutes 55   Total Treatment Time (sum of timed and untimed services) 55

## 2024-03-25 NOTE — PROGRESS NOTES
[facility]                                                                                   OUTPATIENT PHYSICAL THERAPY    PLAN OF TREATMENT FOR OUTPATIENT REHABILITATION   Patient's Last Name, First Name, Azalea Herron YOB: 1980   Provider's Name   [facility]   Medical Record No.  [id number]     Onset Date: 4/24/23 (Date of referral) Start of Care Date: 7/11/23     Medical Diagnosis:  S/P breast reconstruction, bilateral       PT Treatment Diagnosis:  RUE/RUQ lymphedema; Bilateral chest/UQ tightness and decreased BUE ROM  Plan of Treatment  Frequency/Duration: 2x/week for 12 weeks    Certification date from 12/21/23 - 3/14/24         See note for plan of treatment details and functional goals     Carlotta Sarmiento, PT, DPT, CLT                         I CERTIFY THE NEED FOR THESE SERVICES FURNISHED UNDER        THIS PLAN OF TREATMENT AND WHILE UNDER MY CARE     (Physician attestation of this document indicates review and certification of the therapy plan).              Referring Provider:  EDWARDO Ferreira MD    Initial Assessment  See Epic Evaluation-     12/28/23 0500   Appointment Info   Signing clinician's name / credentials Carlotta Sarmiento, PT, DPT, CLT   Visits Used 28   Medical Diagnosis S/P breast reconstruction, bilateral   PT Tx Diagnosis RUE/RUQ lymphedema; Bilateral chest/UQ tightness and decreased BUE ROM   Other pertinent information radiation completed 11/8/23   Progress Note/Certification   Start of Care Date 07/11/23   Onset of illness/injury or Date of Surgery 04/24/23  (date of surgery)   Therapy Frequency 1x/week   Predicted Duration 12 weeks   Certification date from 12/21/23   Certification date to 03/14/24   Progress Note Due Date 01/10/24   Progress Note Completed Date 10/10/23       Present No   GOALS   PT Goals 2;3;4   PT Goal 1   Goal Identifier stg   Goal Description pt to be  independent with daily BUE/chest stretches and exercises to increase ROM and ease of overhead activity and ADL   Rationale to maximize safety and independence with performance of ADLs and functional tasks;to maximize safety and independence with self cares   Target Date 08/10/23   Date Met 08/08/23   PT Goal 2   Goal Identifier stg   Goal Description pt to be indendent with daily self-MFR of RUQ to assist with increasing ROM for ease and independence of overhead activity and ADL   Rationale to maximize safety and independence with performance of ADLs and functional tasks;to maximize safety and independence with self cares   Target Date 08/10/23   Date Met 08/08/23   PT Goal 3   Goal Identifier ltg   Goal Description pt to increase bilateral UE flexion to 160 degrees to increase ease and comfort of overhead activity and ADL   Rationale to maximize safety and independence with performance of ADLs and functional tasks;to maximize safety and independence with self cares   Target Date 10/09/23   Date Met 10/03/23   PT Goal 4   Goal Identifier ltg   Goal Description pt to be independent with longterm lymphedema management via HEP, elevation, skin cares, self MLD, self MFR and compression garment wear/use (as/if needed) for optimal longterm management   Rationale to maximize safety and independence with performance of ADLs and functional tasks   Target Date 01/10/24   Subjective Report   Subjective Report I got my fills and am really tight, but I keep doing my stretching exercises   Objective Measures   Objective Measures Objective Measure 1   Objective Measure 1   Objective Measure RUE girth   Details since 11/21/23: RUE -5.4%   Treatment Interventions (PT)   Interventions Manual Therapy   Manual Therapy   Manual Therapy: Mobilization, MFR, MLD, friction massage minutes ([id number]) 55   Manual Therapy 1 - Details RUE girth measurements taken; With pt in supine, manual techniques performed to B sides: STM (pinch an inch, S,  and C strokes and alternating circles) and MFR to Bilateral sides: pecs, anterior/lateral tops of shoulders, chest deep sweeps, rib sweeps, supra and sub clavicle area; bilateral axilla, lateral ribs and STM only to inferior/lower ribs; STM with movement (BUE flexion) [id number] each side, good tolerance throughout   Skilled Intervention CDT education; MFR; STM   Patient Response/Progress tolerated treatment well   Plan   Home program daily self stretches   Updates to plan of care 1x/week   Plan for next session periodic measurements and ROM, bilateral upper quadrant MFR and STM; R-sided breast scar massage,  add scapular manual mvmt to prevent adhesions   Total Session Time   Timed Code Treatment Minutes 55   Total Treatment Time (sum of timed and untimed services) 55

## 2024-04-03 ENCOUNTER — TELEPHONE (OUTPATIENT)
Dept: SURGERY | Facility: CLINIC | Age: 44
End: 2024-04-03
Payer: COMMERCIAL

## 2024-04-03 NOTE — TELEPHONE ENCOUNTER
Left Voicemail (1st Attempt), sent myc for the patient to call back and reschedule the following:    Appointment type: Post-op  Provider: Dr. Ferreira  Return date: Reschedule 6/21 post-op appt, reschedule with Urvashi Ferreira in [facility] clinic  Specialty phone number: [phone]  Additional appointment(s) needed: n/a  Additonal Notes: per staff message from Nishant Sloan RN

## 2024-04-05 NOTE — TELEPHONE ENCOUNTER
Patient confirmed scheduled appointment:  Date: 6/18/24  Time: 11:40  Visit type: Post-op  Provider: Urvashi Jefferson  Location: [facility]  Testing/imaging: n.a  Additional notes: rescheduled 6/21 appt with Dr. Ferreira due to the provider being out

## 2024-04-08 PROBLEM — I89.0 LYMPHEDEMA: Status: RESOLVED | Noted: 2023-07-11 | Resolved: 2024-04-08

## 2024-04-08 NOTE — PROGRESS NOTES
DISCHARGE  Reason for Discharge: Patient has failed to schedule further appointments;  last seen in clinic on 1/30/24    Equipment Issued: none    Discharge Plan: Patient to continue home program.    Referring Provider:  EDWARDO Ferreira MD           01/30/24 0500   Appointment Info   Signing clinician's name / credentials Carlotta Sarmiento, PT, DPT, CLT   Visits Used 32   Medical Diagnosis S/P breast reconstruction, bilateral   PT Tx Diagnosis RUE/RUQ lymphedema; Bilateral chest/UQ tightness and decreased BUE ROM   Other pertinent information radiation completed 11/8/23   Progress Note/Certification   Start of Care Date 07/11/23   Onset of illness/injury or Date of Surgery 04/24/23   Therapy Frequency 1x/week   Predicted Duration 12 weeks   Certification date from 03/15/24   Certification date to 06/07/24   Progress Note Due Date 04/05/24   Progress Note Completed Date 01/10/24   GOALS   PT Goals 2;3;4   PT Goal 1   Goal Identifier stg   Goal Description pt to be independent with daily BUE/chest stretches and exercises to increase ROM and ease of overhead activity and ADL   Rationale to maximize safety and independence with performance of ADLs and functional tasks;to maximize safety and independence with self cares   Target Date 08/10/23   Date Met 08/08/23   PT Goal 2   Goal Identifier stg   Goal Description pt to be indendent with daily self-MFR of RUQ to assist with increasing ROM for ease and independence of overhead activity and ADL   Rationale to maximize safety and independence with performance of ADLs and functional tasks;to maximize safety and independence with self cares   Target Date 08/10/23   Date Met 08/08/23   PT Goal 3   Goal Identifier ltg   Goal Description pt to increase bilateral UE flexion to 160 degrees to increase ease and comfort of overhead activity and ADL   Rationale to maximize safety and independence with performance of ADLs and functional tasks;to maximize safety and independence  with self cares   Target Date 10/09/23   Date Met 10/03/23   PT Goal 4   Goal Identifier ltg   Goal Description pt to be independent with longterm lymphedema management via HEP, elevation, skin cares, self MLD, self MFR and compression garment wear/use (as/if needed) for optimal longterm management   Rationale to maximize safety and independence with performance of ADLs and functional tasks   Target Date 01/10/24   Subjective Report   Subjective Report I will hear next Friday when I can get my expanders out   Treatment Interventions (PT)   Interventions Manual Therapy   Manual Therapy   Manual Therapy: Mobilization, MFR, MLD, friction massage minutes (29360) 55   Manual Therapy 1 - Details With pt in supine, manual techniques performed to B sides: STM (pinch an inch, S, and C strokes and alternating circles) and MFR to Bilateral sides: pecs, anterior/lateral tops of shoulders, chest deep sweeps, rib sweeps, supra and sub clavicle area; bilateral axilla, lateral ribs and STM only to inferior/lower ribs; STM with movement (BUE flexion) o81acdr each side with good tolerance throughout - mild discomfort on RUE at end range only; theragun x10min, level 1 with large round head to bilateral traps, neck and rhomboids; will trial decrease to 1x every other week to see how pt does and until we learn surgery date to get expanders out; stressed importance of daily to near-daily stretching which pt has been consistent in doing   Skilled Intervention CDT education; MFR; STM; theragun   Patient Response/Progress trial decrease to 1x every other week   Plan   Home program daily self stretches   Updates to plan of care return in 2 weeks   Plan for next session periodic measurements and ROM, bilateral upper quadrant MFR and STM; R-sided breast scar massage,  add scapular manual mvmt to prevent adhesions   Total Session Time   Timed Code Treatment Minutes 55   Total Treatment Time (sum of timed and untimed services) 55

## 2024-04-28 DIAGNOSIS — F41.9 ANXIETY: ICD-10-CM

## 2024-04-28 DIAGNOSIS — F32.A DEPRESSION, UNSPECIFIED DEPRESSION TYPE: ICD-10-CM

## 2024-04-29 RX ORDER — ESCITALOPRAM OXALATE 10 MG/1
10 TABLET ORAL DAILY
Qty: 90 TABLET | Refills: 0 | Status: SHIPPED | OUTPATIENT
Start: 2024-04-29 | End: 2024-07-11

## 2024-05-13 ENCOUNTER — VIRTUAL VISIT (OUTPATIENT)
Dept: ONCOLOGY | Facility: CLINIC | Age: 44
End: 2024-05-13
Attending: INTERNAL MEDICINE
Payer: COMMERCIAL

## 2024-05-13 VITALS — WEIGHT: 134 LBS | HEIGHT: 64 IN | BODY MASS INDEX: 22.88 KG/M2

## 2024-05-13 DIAGNOSIS — Z17.0 MALIGNANT NEOPLASM OF OVERLAPPING SITES OF RIGHT BREAST IN FEMALE, ESTROGEN RECEPTOR POSITIVE (H): Primary | ICD-10-CM

## 2024-05-13 DIAGNOSIS — T50.905D ADVERSE EFFECT OF DRUG, SUBSEQUENT ENCOUNTER: ICD-10-CM

## 2024-05-13 DIAGNOSIS — C50.811 MALIGNANT NEOPLASM OF OVERLAPPING SITES OF RIGHT BREAST IN FEMALE, ESTROGEN RECEPTOR POSITIVE (H): Primary | ICD-10-CM

## 2024-05-13 PROCEDURE — 99214 OFFICE O/P EST MOD 30 MIN: CPT | Mod: 95 | Performed by: INTERNAL MEDICINE

## 2024-05-13 ASSESSMENT — PAIN SCALES - GENERAL: PAINLEVEL: NO PAIN (0)

## 2024-05-13 NOTE — NURSING NOTE
Is the patient currently in the state of [place]? YES    Visit mode:VIDEO    If the visit is dropped, the patient can be reconnected by: VIDEO VISIT: Text to cell phone:   Telephone Information:   Mobile [phone]       Will anyone else be joining the visit? NO  (If patient encounters technical issues they should call [phone])    How would you like to obtain your AVS? MyChart    Are changes needed to the allergy or medication list? Pt stated no changes to allergies and Pt stated no med changes    Are refills needed on medications prescribed by this physician? NO    Reason for visit: [facility]

## 2024-05-13 NOTE — LETTER
2024         RE: Azalea Mckeon  [address] [place] [address]        Dear Colleague,    Thank you for referring your patient, Azalea Mckeon, to the [facility]. Please see a copy of my visit note below.    [facility] Hematology / Oncology  Progress Note  Name: Azalea Mckeon  :  1980    MRN:  [id number]    --------------------    Assessment / Plan:  Stage 1A (pT1c pN0 cM0), hormone-positive, HER2-negative, right-sided breast cancer:  # 2023 Presented w/ abnormal mammogram.  # 2023 Status post bilateral mastectomy w/ sentinel node and reconstruction; path w/ multifocal disease, largest 16 mm, grade 3 disease, neg margins for IDC, neg node ()   # 2023 Oncotype score 22; adjuvant chemotherapy indicated.  # 2023 - Aug 2023 Adjuvant Taxotere / Cytoxan x 4 cycles.  # 2023 Negative hereditary breast cancer panel (40 genes).  # 2022 Adjuvant right chest wall radiation 5000 cGy over 25 fractions.    Stop Tamoxifen given intolerable side effects profile at 5 mg.  Check FSH, estradiol in 4-6 weeks to check on menopausal status.  Reviewed options of AI if in menopause or AI w/ ovarian suppression if not in menopause.  Retrial of Tamoxifen unlikely to be successful.    King Dyson MD    --------------------    Interval History:  Azalea returns for follow-up of breast cancer unaccompanied.  She is really struggling with side effects from tamoxifen.  It has been a tough go.  No breast concerns.  Feels recovered from chemotherapy and radiation.    Social History:  Social History     Tobacco Use     Smoking status: Former     Current packs/day: 0.00     Average packs/day: 0.5 packs/day for 10.0 years (5.0 ttl pk-yrs)     Types: Cigarettes     Start date: 2000     Quit date: 2010     Years since quittin.3     Passive exposure: Past     Smokeless tobacco: Never   Substance Use Topics     Alcohol use: No     Family  History:  Family History   Problem Relation Age of Onset     Anxiety Disorder Mother      Diabetes Father      Substance Abuse Father      Bleeding Disorder Sister      Pulmonary Embolism Sister         33     Depression Sister      Autism Spectrum Disorder Daughter      Anxiety Disorder Daughter      Breast Cancer Other         Distant 2nd cousin per patient report     Cancer No family hx of      Hypertension No family hx of      Cerebrovascular Disease No family hx of      Thyroid Disease No family hx of      Glaucoma No family hx of      Macular Degeneration No family hx of      Ovarian Cancer No family hx of      Immunizations:  Immunization History   Administered Date(s) Administered     Influenza (IIV3) PF 11/01/2012     TDAP Vaccine (Adacel) 07/25/2013     Health Maintenance Due   Topic Date Due     HEPATITIS B IMMUNIZATION (1 of 3 - 3-dose series) Never done     COVID-19 Vaccine (1) Never done     Pneumococcal Vaccine: Pediatrics (0 to 5 Years) and At-Risk Patients (6 to 64 Years) (1 - PCV) Never done     DTAP/TDAP/TD IMMUNIZATION (2 - Td or Tdap) 07/25/2023     INFLUENZA VACCINE (1) 09/01/2023     --------------------    Physical Exam:  Video visit.    Labs / Imaging:  No new labs or imaging.    Video Visit:  Azalea is a 44 year old female who is being evaluated via a billable video visit.  }    Video start time:  1:37 PM  Video end time:  1:52 PM  Provider location: [facility]  Patient location: Home  Mode of transmission:  [employer] / [employer].      Again, thank you for allowing me to participate in the care of your patient.        Sincerely,        King Dyson MD

## 2024-05-13 NOTE — PROGRESS NOTES
"Virtual Visit Details    Type of service:  Video Visit   Video Start Time: {video visit start/end time for provider to select:401105}  Video End Time:{video visit start/end time for provider to select:060751}    Originating Location (pt. Location): {video visit patient location:486628::\"Home\"}  {PROVIDER LOCATION On-site should be selected for visits conducted from your clinic location or adjoining [facility] hospital, academic office, or other nearby [facility] building. Off-site should be selected for all other provider locations, including home:192107}  Distant Location (provider location):  {virtual location provider:566644}  Platform used for Video Visit: {Virtual Visit Platforms:216907::\"[email or web address]\"}  "

## 2024-05-14 NOTE — PROGRESS NOTES
[facility] Hematology / Oncology  Progress Note  Name: Azalea Mckeon  :  1980    MRN:  [id number]    --------------------    Assessment / Plan:  Stage 1A (pT1c pN0 cM0), hormone-positive, HER2-negative, right-sided breast cancer:  # 2023 Presented w/ abnormal mammogram.  # 2023 Status post bilateral mastectomy w/ sentinel node and reconstruction; path w/ multifocal disease, largest 16 mm, grade 3 disease, neg margins for IDC, neg node ()   # 2023 Oncotype score 22; adjuvant chemotherapy indicated.  # 2023 - Aug 2023 Adjuvant Taxotere / Cytoxan x 4 cycles.  # 2023 Negative hereditary breast cancer panel (40 genes).  # 2022 Adjuvant right chest wall radiation 5000 cGy over 25 fractions.    Stop Tamoxifen given intolerable side effects profile at 5 mg.  Check FSH, estradiol in 4-6 weeks to check on menopausal status.  Reviewed options of AI if in menopause or AI w/ ovarian suppression if not in menopause.  Retrial of Tamoxifen unlikely to be successful.    King Dyson MD    --------------------    Interval History:  Azalea returns for follow-up of breast cancer unaccompanied.  She is really struggling with side effects from tamoxifen.  It has been a tough go.  No breast concerns.  Feels recovered from chemotherapy and radiation.    Social History:  Social History     Tobacco Use    Smoking status: Former     Current packs/day: 0.00     Average packs/day: 0.5 packs/day for 10.0 years (5.0 ttl pk-yrs)     Types: Cigarettes     Start date: 2000     Quit date: 2010     Years since quittin.3     Passive exposure: Past    Smokeless tobacco: Never   Substance Use Topics    Alcohol use: No     Family History:  Family History   Problem Relation Age of Onset    Anxiety Disorder Mother     Diabetes Father     Substance Abuse Father     Bleeding Disorder Sister     Pulmonary Embolism Sister         33    Depression Sister     Autism Spectrum Disorder Daughter      Anxiety Disorder Daughter     Breast Cancer Other         Distant 2nd cousin per patient report    Cancer No family hx of     Hypertension No family hx of     Cerebrovascular Disease No family hx of     Thyroid Disease No family hx of     Glaucoma No family hx of     Macular Degeneration No family hx of     Ovarian Cancer No family hx of      Immunizations:  Immunization History   Administered Date(s) Administered    Influenza (IIV3) PF 11/01/2012    TDAP Vaccine (Adacel) 07/25/2013     Health Maintenance Due   Topic Date Due    HEPATITIS B IMMUNIZATION (1 of 3 - 3-dose series) Never done    COVID-19 Vaccine (1) Never done    Pneumococcal Vaccine: Pediatrics (0 to 5 Years) and At-Risk Patients (6 to 64 Years) (1 - PCV) Never done    DTAP/TDAP/TD IMMUNIZATION (2 - Td or Tdap) 07/25/2023    INFLUENZA VACCINE (1) 09/01/2023     --------------------    Physical Exam:  Video visit.    Labs / Imaging:  No new labs or imaging.    Video Visit:  Azalea is a 44 year old female who is being evaluated via a billable video visit.  }    Video start time:  1:37 PM  Video end time:  1:52 PM  Provider location: [facility]  Patient location: Home  Mode of transmission:  [employer] / [employer]

## 2024-05-20 LAB
ABO/RH(D): NORMAL
ANTIBODY SCREEN: NEGATIVE
SPECIMEN EXPIRATION DATE: NORMAL

## 2024-05-21 ENCOUNTER — PRE VISIT (OUTPATIENT)
Dept: SURGERY | Facility: CLINIC | Age: 44
End: 2024-05-21

## 2024-05-21 ENCOUNTER — LAB (OUTPATIENT)
Dept: LAB | Facility: CLINIC | Age: 44
End: 2024-05-21
Payer: COMMERCIAL

## 2024-05-21 ENCOUNTER — ANESTHESIA EVENT (OUTPATIENT)
Dept: SURGERY | Facility: CLINIC | Age: 44
End: 2024-05-21
Payer: COMMERCIAL

## 2024-05-21 ENCOUNTER — OFFICE VISIT (OUTPATIENT)
Dept: PLASTIC SURGERY | Facility: CLINIC | Age: 44
End: 2024-05-21
Attending: PLASTIC SURGERY
Payer: COMMERCIAL

## 2024-05-21 ENCOUNTER — OFFICE VISIT (OUTPATIENT)
Dept: SURGERY | Facility: CLINIC | Age: 44
End: 2024-05-21
Attending: PLASTIC SURGERY
Payer: COMMERCIAL

## 2024-05-21 VITALS
OXYGEN SATURATION: 100 % | DIASTOLIC BLOOD PRESSURE: 73 MMHG | TEMPERATURE: 98 F | HEART RATE: 65 BPM | WEIGHT: 137 LBS | SYSTOLIC BLOOD PRESSURE: 104 MMHG | HEIGHT: 65 IN | BODY MASS INDEX: 22.82 KG/M2 | RESPIRATION RATE: 16 BRPM

## 2024-05-21 VITALS
RESPIRATION RATE: 16 BRPM | DIASTOLIC BLOOD PRESSURE: 73 MMHG | BODY MASS INDEX: 23.52 KG/M2 | TEMPERATURE: 98 F | SYSTOLIC BLOOD PRESSURE: 104 MMHG | WEIGHT: 137 LBS | HEART RATE: 65 BPM | OXYGEN SATURATION: 100 %

## 2024-05-21 DIAGNOSIS — Z01.818 PRE-OP EVALUATION: ICD-10-CM

## 2024-05-21 DIAGNOSIS — Z01.818 PRE-OP EVALUATION: Primary | ICD-10-CM

## 2024-05-21 DIAGNOSIS — Z85.3 HISTORY OF RIGHT BREAST CANCER: Primary | ICD-10-CM

## 2024-05-21 LAB
ERYTHROCYTE [DISTWIDTH] IN BLOOD BY AUTOMATED COUNT: 11.9 % (ref 10–15)
HCT VFR BLD AUTO: 41.4 % (ref 35–47)
HGB BLD-MCNC: 14.4 G/DL (ref 11.7–15.7)
MCH RBC QN AUTO: 30.9 PG (ref 26.5–33)
MCHC RBC AUTO-ENTMCNC: 34.8 G/DL (ref 31.5–36.5)
MCV RBC AUTO: 89 FL (ref 78–100)
PLATELET # BLD AUTO: 216 10E3/UL (ref 150–450)
RBC # BLD AUTO: 4.66 10E6/UL (ref 3.8–5.2)
WBC # BLD AUTO: 5.8 10E3/UL (ref 4–11)

## 2024-05-21 PROCEDURE — 99202 OFFICE O/P NEW SF 15 MIN: CPT | Performed by: PHYSICIAN ASSISTANT

## 2024-05-21 PROCEDURE — 86900 BLOOD TYPING SEROLOGIC ABO: CPT | Performed by: PHYSICIAN ASSISTANT

## 2024-05-21 PROCEDURE — 99214 OFFICE O/P EST MOD 30 MIN: CPT | Performed by: PLASTIC SURGERY

## 2024-05-21 PROCEDURE — 85027 COMPLETE CBC AUTOMATED: CPT | Performed by: PATHOLOGY

## 2024-05-21 PROCEDURE — 36415 COLL VENOUS BLD VENIPUNCTURE: CPT | Performed by: PATHOLOGY

## 2024-05-21 PROCEDURE — G0463 HOSPITAL OUTPT CLINIC VISIT: HCPCS | Performed by: PLASTIC SURGERY

## 2024-05-21 ASSESSMENT — PAIN SCALES - GENERAL
PAINLEVEL: MODERATE PAIN (4)
PAINLEVEL: NO PAIN (0)

## 2024-05-21 ASSESSMENT — LIFESTYLE VARIABLES: TOBACCO_USE: 0

## 2024-05-21 ASSESSMENT — ENCOUNTER SYMPTOMS: SEIZURES: 0

## 2024-05-21 NOTE — H&P
Pre-Operative H & P     CC:  Preoperative exam to assess for increased cardiopulmonary risk while undergoing surgery and anesthesia.    Date of Encounter: 5/21/2024  Primary Care Physician:  No Ref-Primary, Physician     Reason for visit:   Encounter Diagnosis   Name Primary?    Pre-op evaluation Yes       HPI  Azalea Mckeon is a 44 year old female who presents for pre-operative H & P in preparation for  Procedure Information       Case: [id number] Date/Time: 06/13/24 0730    Procedure: Bilateral breast reconstruction with bilateral latissimus flap reconstructions, spy (Bilateral: Breast)    Anesthesia type: General with Block    Diagnosis: S/P breast reconstruction, bilateral [Z98.890]    Pre-op diagnosis: S/P breast reconstruction, bilateral [Z98.890]    Location:  OR  /  OR    Providers: EDWARDO Ferreira MD            Patient is being evaluated without significant comorbid conditions.    Ms. Witt has a known breast cancer.  She has completed adjuvant systemic therapy and adjuvant radiation therapy.  She follows with oncology and with Dr. Ferreira.  She is status post right skin sparing mastectomy and left risk reducing skin sparing mastectomy 4/2023.  She is now scheduled for breast reconstruction as above.    History is obtained from the patient and chart review    Hx of abnormal bleeding or anti-platelet use: denies    Menstrual history: No LMP recorded. (Menstrual status: Chemotherapy).     Past Medical History  Past Medical History:   Diagnosis Date    Breast cancer (H) 03/17/2023    Right Breast    Cancer (H)     right breast, s/p bilateral mastectomies    Personal history of chemotherapy     Taxotere + Cytoxan (6/26/2023 - 8/28/2023) - Dr. Dyson    S/P radiation therapy     5,000 cGy to right chestwall completed on 11/8/2023 - [facility]       Past Surgical History  Past Surgical History:   Procedure Laterality Date    BREAST BIOPSY, RT/LT Right 03/17/2023    GYN SURGERY   2010        GYN SURGERY  2011        GYN SURGERY  2013    C/S    GYN SURGERY  2013    C/S    HC REMOVAL OF OVARIAN CYST(S)  1999    IR CHEST PORT PLACEMENT > 5 YRS OF AGE  2023    MASTECTOMY PARTIAL WITH SENTINEL NODE Bilateral 2023    Procedure: BILATERAL Skin-Sparing Mastectomy, BILATERAL breast implant removal, RIGHT Axillary [place] Lymph Node Biopsy;  Surgeon: Mitra Souza MD;  Location: [facility] OR    RECONSTRUCT BREAST, INSERT TISSUE EXPANDER BILATERAL, COMBINED Bilateral 2023    Procedure: bilateral breast reconstruction, insert tissue expander bilateral, Spy **Latex Allergy**;  Surgeon: EDWARDO Ferreira MD;  Location: [facility] OR    TUBAL LIGATION  2013       Prior to Admission Medications  Current Outpatient Medications   Medication Sig Dispense Refill    escitalopram (LEXAPRO) 10 MG tablet Take 1 tablet (10 mg) by mouth daily Needs appointment for further refills. (Patient taking differently: Take 10 mg by mouth every evening Needs appointment for further refills.) 90 tablet 0    omeprazole (PRILOSEC) 20 MG DR capsule Take 1 capsule (20 mg) by mouth daily (Patient taking differently: Take 20 mg by mouth every evening) 30 capsule 0    triamcinolone (KENALOG) 0.1 % external ointment Apply topically 2 times daily Apply twice daily as needed to rash if it flares 80 g 2       Allergies  Allergies   Allergen Reactions    Percocet [Oxycodone-Acetaminophen] Itching    Latex Rash       Social History  Social History     Socioeconomic History    Marital status:      Spouse name: Not on file    Number of children: Not on file    Years of education: Not on file    Highest education level: Not on file   Occupational History    Not on file   Tobacco Use    Smoking status: Former     Current packs/day: 0.00     Average packs/day: 0.5 packs/day for 10.0 years (5.0 ttl pk-yrs)     Types: Cigarettes     Start date: 2000     Quit date:  2010     Years since quittin.3     Passive exposure: Past    Smokeless tobacco: Never   Vaping Use    Vaping status: Never Used   Substance and Sexual Activity    Alcohol use: No    Drug use: No    Sexual activity: Yes     Partners: Male     Birth control/protection: Surgical     Comment: tubal ligation   Other Topics Concern    Parent/sibling w/ CABG, MI or angioplasty before 65F 55M? Not Asked   Social History Narrative    Not on file     Social Determinants of Health     Financial Resource Strain: Low Risk  (2024)    Financial Resource Strain     Within the past 12 months, have you or your family members you live with been unable to get utilities (heat, electricity) when it was really needed?: No   Food Insecurity: Low Risk  (2024)    Food Insecurity     Within the past 12 months, did you worry that your food would run out before you got money to buy more?: No     Within the past 12 months, did the food you bought just not last and you didn t have money to get more?: No   Transportation Needs: Low Risk  (2024)    Transportation Needs     Within the past 12 months, has lack of transportation kept you from medical appointments, getting your medicines, non-medical meetings or appointments, work, or from getting things that you need?: No   Physical Activity: Insufficiently Active (2024)    Exercise Vital Sign     Days of Exercise per Week: 1 day     Minutes of Exercise per Session: 10 min   Stress: Stress Concern Present (2024)    Citizen of [place] of Occupational Health - Occupational Stress Questionnaire     Feeling of Stress : To some extent   Social Connections: Unknown (2024)    Social Connection and Isolation Panel [NHANES]     Frequency of Communication with Friends and Family: Not on file     Frequency of Social Gatherings with Friends and Family: Once a week     Attends Jehovah's witness Services: Not on file     Active Member of Clubs or Organizations: Not on file     Attends Club  or Organization Meetings: Not on file     Marital Status: Not on file   Interpersonal Safety: Low Risk  (2/5/2024)    Interpersonal Safety     Do you feel physically and emotionally safe where you currently live?: Yes     Within the past 12 months, have you been hit, slapped, kicked or otherwise physically hurt by someone?: No     Within the past 12 months, have you been humiliated or emotionally abused in other ways by your partner or ex-partner?: No   Housing Stability: Low Risk  (2/5/2024)    Housing Stability     Do you have housing? : Yes     Are you worried about losing your housing?: No       Family History  Family History   Problem Relation Age of Onset    Anxiety Disorder Mother     Diabetes Father     Substance Abuse Father     Bleeding Disorder Sister     Pulmonary Embolism Sister         33    Depression Sister     Autism Spectrum Disorder Daughter     Anxiety Disorder Daughter     Breast Cancer Other         Distant 2nd cousin per patient report    Cancer No family hx of     Hypertension No family hx of     Cerebrovascular Disease No family hx of     Thyroid Disease No family hx of     Glaucoma No family hx of     Macular Degeneration No family hx of     Ovarian Cancer No family hx of     Anesthesia Reaction No family hx of        Review of Systems  The complete review of systems is negative other than noted in the HPI or here.   Anesthesia Evaluation   Pt has had prior anesthetic.     No history of anesthetic complications       ROS/MED HX  ENT/Pulmonary:  - neg pulmonary ROS  (-) tobacco use   Neurologic:  - neg neurologic ROS  (-) no seizures and no CVA   Cardiovascular:     (+)  - -   -  - -                                 Previous cardiac testing   Echo: Date: Results:    Stress Test:  Date: Results:    ECG Reviewed:  Date: 12/2023 Results:  Normal sinus rhythm   Left posterior fascicular block   Nonspecific ST and T wave abnormality   Abnormal ECG     Cath:  Date: Results:   (-) taking  "anticoagulants/antiplatelets   METS/Exercise Tolerance: 4 - Raking leaves, gardening Comment: Works in school on feet, farmwork. Denies exertional symptoms    Hematologic:    (-) history of blood clots and history of blood transfusion   Musculoskeletal:  - neg musculoskeletal ROS     GI/Hepatic:     (+) GERD, Asymptomatic on medication,                  Renal/Genitourinary:  - neg Renal ROS     Endo:  - neg endo ROS  (-) chronic steroid usage   Psychiatric/Substance Use:  - neg psychiatric ROS     Infectious Disease:  - neg infectious disease ROS     Malignancy:   (+) Malignancy, History of Breast.Breast CA status post Surgery, Chemo and Radiation.      Other:            /73   Pulse 65   Temp 98  F (36.7  C) (Oral)   Resp 16   Ht 1.638 m (5' 4.5\")   Wt 62.1 kg (137 lb)   SpO2 100%   BMI 23.15 kg/m      Physical Exam   Constitutional: Awake, alert, cooperative, no apparent distress, and appears stated age.  Eyes: Pupils equal, round and reactive to light, extra ocular muscles intact, sclera clear, conjunctiva normal.  HENT: Normocephalic, oral pharynx with moist mucus membranes, good dentition.  Respiratory: Clear to auscultation bilaterally, no crackles or wheezing.  Cardiovascular: Regular rate and rhythm, normal S1 and S2, and no murmur noted.  Carotids no bruits. No edema. Palpable pulses to radial arteries.   GI: Normal bowel sounds, soft, non-distended, mild TTP  Genitourinary:  deferred  Skin: Warm and dry.   Musculoskeletal: Full ROM of neck. There is no redness, warmth, or swelling of the exposed joints. Gross motor strength is normal.    Neurologic: Awake, alert, oriented to name, place and time. Cranial nerves II-XII are grossly intact. Gait is normal.   Neuropsychiatric: Calm, cooperative. Normal affect.     Prior Labs/Diagnostic Studies   All labs and imaging personally reviewed     EKG/ stress test - if available please see in ROS above   No results found.       No data to display          " "        The patient's records and results personally reviewed by this provider.     Outside records reviewed from: Care Everywhere    LAB/DIAGNOSTIC STUDIES TODAY:  CBC, T&S    Assessment    Azalea Mckeon is a 44 year old female seen as a PAC referral for risk assessment and optimization for anesthesia.    Plan/Recommendations  Pt will be optimized for the proposed procedure.  See below for details on the assessment, risk, and preoperative recommendations    NEUROLOGY  - No history of TIA, CVA or seizure  -Post Op delirium risk factors:  No risk identified    ENT  - No current airway concerns.  Will need to be reassessed day of surgery.  Mallampati: I  TM: > 3    CARDIAC  - No history of CAD, Hypertension, and Afib  -denies cardiac symptoms   - METS (Metabolic Equivalents)  Patient performs 4 or more METS exercise without symptoms             Total Score: 0      RCRI-Very low risk: Class 1 0.4% complication rate             Total Score: 0        PULMONARY  BERNABE Low Risk             Total Score: 0      - Denies asthma or inhaler use  - Tobacco History    History   Smoking Status    Former    Types: Cigarettes   Smokeless Tobacco    Never       GI  PONV High Risk  Total Score: 3           1 AN PONV: Pt is Female    1 AN PONV: Patient is not a current smoker    1 AN PONV: Intended Post Op Opioids        /RENAL  - Baseline Creatinine  0.79    ENDOCRINE    - BMI: Estimated body mass index is 23.15 kg/m  as calculated from the following:    Height as of this encounter: 1.638 m (5' 4.5\").    Weight as of this encounter: 62.1 kg (137 lb).  Healthy Weight (BMI 18.5-24.9)  - No history of Diabetes Mellitus    HEME  VTE Medium Risk 1.8%             Total Score: 5    VTE: Current cancer      - No history of abnormal bleeding or antiplatelet use.    ONC  -breast cancer s/p chemo, radiation and surgery.  Above reconstruction planned     Different anesthesia methods/types have been discussed with the patient, but they are aware " that the final plan will be decided by the assigned anesthesia provider on the date of service.    The patient is optimized for their procedure. AVS with information on surgery time/arrival time, meds and NPO status given by nursing staff. No further diagnostic testing indicated.      On the day of service:     Prep time: 5 minutes  Visit time: 7 minutes  Documentation time: 3 minutes  ------------------------------------------  Total time: 15 minutes      Isabela Garg PA-C  Preoperative Assessment Center  [facility]  Clinic and Surgery Center  Phone: [phone]  Fax: [phone]

## 2024-05-21 NOTE — LETTER
5/21/2024     RE: Azalea Mckeon  [id number] [facility] [id number]    Dear Colleague,    Thank you for referring your patient, Azalea Mckeon, to the [facility]. Please see a copy of my visit note below.    PREOPERATIVE VISIT NOTE     PRESENTING COMPLAINT:  Preop visit for upcoming bilateral breast reconstruction with latissimus musculocutaneous flaps for right-sided breast cancer s/p expander placements and radiation therapy scheduled for 6/13/2024.     HISTORY OF PRESENTING COMPLAINT: The patient is here for a pre-op visit for the patient's surgery.  The patient is being seen in the presence of my nurse. There is no change since the patient's consultation. Please see the consult note for details.     No change in history physical exam.  PAC clinic visit today.  Plan is to do bilateral musculocutaneous flaps.  She understands she will be small.  Right side is radiated.     ASSESSMENT AND PLAN:  Based upon the above findings, the patient is here for a preop visit for upcoming surgery.  I had a monica, detailed discussion with the patient in the presence of my nurse (who was present from beginning to end) about the proposed surgery. I was very clear and detailed about overview of surgery, perioperative plans, and expectations. All risks, benefits and alternatives of the surgery including but not limited to pain, infection, bleeding, scarring, asymmetry, seromas, hematomas, wound breakdown, wound dehiscence, prominent scar, hypertrophic scar, keloid scar, requirement of further surgeries, skin/tissue necrosis, nerve/muscle/deeper structure injury, DVT, PE, MI, CVA, pneumonia, renal failure and death, were explained in detail. The patient agreed and understood them all and had all the questions answered to the patient's satisfaction, and the patient wants to proceed with surgery. I look forward to helping the patient out in the near future.  All questions were answered.  The  patient was happy with the visit.    Total time spent in the encounter today including chart review, visit itself, and post-visit paperwork was 30 minutes.       EDWARDO Ferreira MD

## 2024-05-21 NOTE — PATIENT INSTRUCTIONS
Preparing for Your Surgery      Name:  Azalea Mckeon   MRN:  [id number]   :  1980   Today's Date:  2024       Arriving for surgery:  Surgery date:  2024  Arrival time:  5:30 AM    Please come to:     Please come to:       [facility]    [address] [place]  [address]     The [facility] Patient/Visitor Ramp is at [address]. Patients and visitors who self-park will receive the reduced hospital parking rate. If the Patient /Visitor Ramp is full, please follow the signs to the [employer] car park located at the main hospital entrance.       parking is available (24 hours/ 7 days a week)      Discounted parking pass options are available for patients and visitors. They can be purchased at the [employer] desk at the main hospital entrance.     -    Stop at the security desk and they will direct surgery patients to the Surgery Check [facility] [phone]        - If you need directions, a wheelchair or an escort please stop at the Information/security desk in the lobby.     What can I eat or drink?  -  You may eat and drink normally up to 8 hours prior to arrival time. (Until 9:30 PM )  -  You may have clear liquids until 2 hours prior to arrival time. (Until 3:30 AM)    Examples of clear liquids:  Water  Clear broth  Juices (apple, white grape, white cranberry  and cider) without pulp  Noncarbonated, powder based beverages  (lemonade and Demarco-Aid)  Sodas (Sprite, 7-Up, ginger ale and seltzer)  Coffee or tea (without milk or cream)  Gatorade    -  No Alcohol or cannabis products for at least 24 hours before surgery.     Which medicines can I take?    Hold Aspirin for 7 days before surgery.   Hold Multivitamins for 7 days before surgery.  Hold Supplements for 7 days before surgery.  Hold Ibuprofen (Advil, Motrin) for 1 day(s) before surgery--unless  otherwise directed by surgeon.  Hold Naproxen (Aleve) for 4 days before surgery.    **Can't take evening medications (Lexapro, Prilosec)     How do I prepare myself?  - Please take 2 showers (one the night prior to surgery and one the morning of surgery) using Scrubcare or Hibiclens soap.    Use this soap only from the neck to your toes.     Leave the soap on your skin for one minute--then rinse thoroughly.      You may use your own shampoo and conditioner. No other hair products.   - Please remove all jewelry and body piercings.  - No lotions, deodorants or fragrance.  - No makeup or fingernail polish.   - Bring your ID and insurance card.    -If you use a CPAP machine, please bring the CPAP machine, tubing, and mask to hospital.    -If you have a Deep Brain Stimulator, Spinal Cord Stimulator, or any Neuro Stimulator device---you must bring the remote control to the hospital.      ALL PATIENTS GOING HOME THE SAME DAY OF SURGERY ARE REQUIRED TO HAVE A RESPONSIBLE ADULT TO DRIVE AND BE IN ATTENDANCE WITH THEM FOR 24 HOURS FOLLOWING SURGERY.    Covid testing policy as of 12/06/2022  Your surgeon will notify and schedule you for a COVID test if one is needed before surgery--please direct any questions or COVID symptoms to your surgeon      Questions or Concerns:    - For any questions regarding the day of surgery or your hospital stay, please contact the Pre Admission Nursing Office at [phone].       - If you have health changes between today and your surgery, please call your surgeon.       - For questions after surgery, please call your surgeons office.           Current Visitor Guidelines    You may have 2 visitors in the pre op area.    Visiting hours: 8 a.m. to 8:30 p.m.    Patients confirmed or suspected to have symptoms of COVID 19 or flu:     No visitors allowed for adult patients.   Children (under age 18) can have 1 named visitor.     People who are sick or showing symptoms of COVID 19 or flu:    Are not  allowed to visit patients--we can only make exceptions in special situations.       Please follow these guidelines for your visit:          Please maintain social distance          Masking is optional--however at times you may be asked to wear a mask for the safety of yourself and others     Clean your hands with alcohol hand . Do this when you arrive at and leave the building and patient room,    And again after you touch your mask or anything in the room.     Go directly to and from the room you are visiting.     Stay in the patient s room during your visit. Limit going to other places in the hospital as much as possible     Leave bags and jackets at home or in the car.     For everyone s health, please don t come and go during your visit. That includes for smoking   during your visit.

## 2024-05-21 NOTE — NURSING NOTE
Pre Op Teaching Note:       Pre and Post op Patient Education    Relevant Diagnosis:  breast cancer  Surgical procedure:  reconstruction with bilateral latissimus flaps    Patient demonstrates understanding of the following:  Date of surgery:  6/13/24  Location of surgery:  [address] [place] [address]  History and Physical and any other testing necessary prior to surgery: Yes, discussed with pt need for pre-op within 30 days of surgery with PCP.    Patient demonstrates understanding of the following:    Pre-op showering/scrub information with PCMX Soap: Yes, soap per PAC visit today  Blood thinner medications discussed and when to stop (if applicable):  Per PCP at pre-op.     Post-op follow-up:  Discussed how to contact the hospital, nurse, and clinic scheduling staff if necessary. (See packet information)    Instructional materials used/given/mailed:  [facility] Packet per surgery scheduler, post op teaching sheet, Soap.  Pt advised that final arrival information to come closer to the surgery date by [facility] nurses.

## 2024-05-21 NOTE — NURSING NOTE
"Oncology Rooming Note    May 21, 2024 10:34 AM   Azalea Mckeon is a 44 year old female who presents for:    Chief Complaint   Patient presents with    Oncology Clinic Visit     Discuss breast reconstruction     Initial Vitals: /73 (BP Location: Right arm, Patient Position: Sitting, Cuff Size: Adult Regular)   Pulse 65   Temp 98  F (36.7  C)   Resp 16   Wt 62.1 kg (137 lb)   SpO2 100%   BMI 23.52 kg/m   Estimated body mass index is 23.52 kg/m  as calculated from the following:    Height as of 5/13/24: 1.626 m (5' 4\").    Weight as of this encounter: 62.1 kg (137 lb). Body surface area is 1.67 meters squared.  No Pain (0) Comment: Data Unavailable   No LMP recorded. (Menstrual status: Chemotherapy).  Allergies reviewed: Yes  Medications reviewed: Yes    Medications: Medication refills not needed today.  Pharmacy name entered into [employer] Software: [id number] [place] [address]    Frailty Screening:   Is the patient here for a new oncology consult visit in cancer care? 2. No      Clinical concerns:        Mildred Wu              "

## 2024-05-21 NOTE — PROGRESS NOTES
PREOPERATIVE VISIT NOTE     PRESENTING COMPLAINT:  Preop visit for upcoming bilateral breast reconstruction with latissimus musculocutaneous flaps for right-sided breast cancer s/p expander placements and radiation therapy scheduled for 6/13/2024.     HISTORY OF PRESENTING COMPLAINT: The patient is here for a pre-op visit for the patient's surgery.  The patient is being seen in the presence of my nurse. There is no change since the patient's consultation. Please see the consult note for details.     No change in history physical exam.  PAC clinic visit today.  Plan is to do bilateral musculocutaneous flaps.  She understands she will be small.  Right side is radiated.     ASSESSMENT AND PLAN:  Based upon the above findings, the patient is here for a preop visit for upcoming surgery.  I had a monica, detailed discussion with the patient in the presence of my nurse (who was present from beginning to end) about the proposed surgery. I was very clear and detailed about overview of surgery, perioperative plans, and expectations. All risks, benefits and alternatives of the surgery including but not limited to pain, infection, bleeding, scarring, asymmetry, seromas, hematomas, wound breakdown, wound dehiscence, prominent scar, hypertrophic scar, keloid scar, requirement of further surgeries, skin/tissue necrosis, nerve/muscle/deeper structure injury, DVT, PE, MI, CVA, pneumonia, renal failure and death, were explained in detail. The patient agreed and understood them all and had all the questions answered to the patient's satisfaction, and the patient wants to proceed with surgery. I look forward to helping the patient out in the near future.  All questions were answered.  The patient was happy with the visit.    Total time spent in the encounter today including chart review, visit itself, and post-visit paperwork was 30 minutes.

## 2024-06-06 DIAGNOSIS — R11.0 NAUSEA: ICD-10-CM

## 2024-06-10 ENCOUNTER — LAB (OUTPATIENT)
Dept: INFUSION THERAPY | Facility: CLINIC | Age: 44
End: 2024-06-10
Attending: INTERNAL MEDICINE
Payer: COMMERCIAL

## 2024-06-10 DIAGNOSIS — Z17.0 MALIGNANT NEOPLASM OF OVERLAPPING SITES OF RIGHT BREAST IN FEMALE, ESTROGEN RECEPTOR POSITIVE (H): ICD-10-CM

## 2024-06-10 DIAGNOSIS — T50.905D ADVERSE EFFECT OF DRUG, SUBSEQUENT ENCOUNTER: ICD-10-CM

## 2024-06-10 DIAGNOSIS — C50.811 MALIGNANT NEOPLASM OF OVERLAPPING SITES OF RIGHT BREAST IN FEMALE, ESTROGEN RECEPTOR POSITIVE (H): ICD-10-CM

## 2024-06-10 LAB
ESTRADIOL SERPL-MCNC: <5 PG/ML
FSH SERPL IRP2-ACNC: 146 MIU/ML
HOLD SPECIMEN: NORMAL

## 2024-06-10 PROCEDURE — 36415 COLL VENOUS BLD VENIPUNCTURE: CPT

## 2024-06-10 PROCEDURE — 82670 ASSAY OF TOTAL ESTRADIOL: CPT

## 2024-06-10 PROCEDURE — 83001 ASSAY OF GONADOTROPIN (FSH): CPT

## 2024-06-12 ENCOUNTER — ONCOLOGY VISIT (OUTPATIENT)
Dept: ONCOLOGY | Facility: CLINIC | Age: 44
End: 2024-06-12
Attending: INTERNAL MEDICINE
Payer: COMMERCIAL

## 2024-06-12 VITALS
DIASTOLIC BLOOD PRESSURE: 77 MMHG | BODY MASS INDEX: 22.73 KG/M2 | SYSTOLIC BLOOD PRESSURE: 119 MMHG | HEART RATE: 71 BPM | HEIGHT: 65 IN | RESPIRATION RATE: 16 BRPM | OXYGEN SATURATION: 100 % | WEIGHT: 136.4 LBS

## 2024-06-12 DIAGNOSIS — Z17.0 MALIGNANT NEOPLASM OF OVERLAPPING SITES OF RIGHT BREAST IN FEMALE, ESTROGEN RECEPTOR POSITIVE (H): Primary | ICD-10-CM

## 2024-06-12 DIAGNOSIS — C50.811 MALIGNANT NEOPLASM OF OVERLAPPING SITES OF RIGHT BREAST IN FEMALE, ESTROGEN RECEPTOR POSITIVE (H): Primary | ICD-10-CM

## 2024-06-12 PROCEDURE — 99213 OFFICE O/P EST LOW 20 MIN: CPT | Performed by: INTERNAL MEDICINE

## 2024-06-12 PROCEDURE — G0463 HOSPITAL OUTPT CLINIC VISIT: HCPCS | Performed by: INTERNAL MEDICINE

## 2024-06-12 ASSESSMENT — PAIN SCALES - GENERAL: PAINLEVEL: NO PAIN (0)

## 2024-06-12 NOTE — PROGRESS NOTES
[facility] Hematology / Oncology  Progress Note  Name: Azalea Mckeon  :  1980    MRN:  [id number]    --------------------    Assessment / Plan:  Stage 1A (pT1c pN0 cM0), hormone-positive, HER2-negative, right-sided breast cancer:  # 2023 Presented w/ abnormal mammogram.  # 2023 Status post bilateral mastectomy w/ sentinel node and reconstruction; path w/ multifocal disease, largest 16 mm, grade 3 disease, neg margins for IDC, neg node ()   # 2023 Oncotype score 22; adjuvant chemotherapy indicated.  # 2023 - Aug 2023 Adjuvant Taxotere / Cytoxan x 4 cycles.  # 2023 Negative hereditary breast cancer panel (40 genes).  # 2023 Adjuvant right chest wall radiation 5000 cGy over 25 fractions.  # 2023 Adjuvant Tamoxifen; discontinued due to side effects and entered menopause.    Reviewed FSH and estradiol confirm menopause.  Reviewed AI options, side effects as endocrine therapy post-menopause.  Start letrozole several weeks post-operatively; recheck phone call early August.  Reviewed surveillance strategies, imaging as needed for symptoms.    King Dyson MD    --------------------    Interval History:  Azalea returns for follow-up of breast cancer unaccompanied.  Continues to deal w/ hot flashes, but improved off Tamoxifen.  Feels less irritable off Tamoxifen.  Breast reconstruction tomorrow.    Social History:  Social History     Tobacco Use    Smoking status: Former     Current packs/day: 0.00     Average packs/day: 0.5 packs/day for 10.0 years (5.0 ttl pk-yrs)     Types: Cigarettes     Start date: 2000     Quit date: 2010     Years since quittin.4     Passive exposure: Past    Smokeless tobacco: Never   Substance Use Topics    Alcohol use: No     Family History:  Family History   Problem Relation Age of Onset    Anxiety Disorder Mother     Diabetes Father     Substance Abuse Father     Bleeding Disorder Sister     Pulmonary Embolism Sister         33     "Depression Sister     Autism Spectrum Disorder Daughter     Anxiety Disorder Daughter     Breast Cancer Other         Distant 2nd cousin per patient report    Cancer No family hx of     Hypertension No family hx of     Cerebrovascular Disease No family hx of     Thyroid Disease No family hx of     Glaucoma No family hx of     Macular Degeneration No family hx of     Ovarian Cancer No family hx of     Anesthesia Reaction No family hx of      Immunizations:  Immunization History   Administered Date(s) Administered    Influenza (IIV3) PF 11/01/2012    TDAP Vaccine (Adacel) 07/25/2013     Health Maintenance Due   Topic Date Due    HEPATITIS B IMMUNIZATION (1 of 3 - 3-dose series) Never done    COVID-19 Vaccine (1) Never done    Pneumococcal Vaccine: Pediatrics (0 to 5 Years) and At-Risk Patients (6 to 64 Years) (1 - PCV) Never done    DTAP/TDAP/TD IMMUNIZATION (2 - Td or Tdap) 07/25/2023    INFLUENZA VACCINE (1) 09/01/2023     --------------------    Physical Exam:  VS: /77 (BP Location: Left leg)   Pulse 71   Resp 16   Ht 1.638 m (5' 4.5\")   Wt 61.9 kg (136 lb 6.4 oz)   SpO2 100%   BMI 23.05 kg/m    Gen: Well-appearing.    Labs / Imaging:  Reviewed estradiol and FSH.  "

## 2024-06-12 NOTE — NURSING NOTE
"Oncology Rooming Note    June 12, 2024 3:27 PM   Azalea Mckeon is a 44 year old female who presents for:    Chief Complaint   Patient presents with    Oncology Clinic Visit     Initial Vitals: /77 (BP Location: Left leg)   Pulse 71   Resp 16   Ht 1.638 m (5' 4.5\")   Wt 61.9 kg (136 lb 6.4 oz)   SpO2 100%   BMI 23.05 kg/m   Estimated body mass index is 23.05 kg/m  as calculated from the following:    Height as of this encounter: 1.638 m (5' 4.5\").    Weight as of this encounter: 61.9 kg (136 lb 6.4 oz). Body surface area is 1.68 meters squared.  No Pain (0) Comment: Data Unavailable   No LMP recorded. (Menstrual status: Chemotherapy).  Allergies reviewed: Yes  Medications reviewed: No    Medications: Medication refills not needed today.  Pharmacy name entered into [employer]: [facility] [id number] [place] [address]    Frailty Screening:   Is the patient here for a new oncology consult visit in cancer care? 2. No      Clinical concerns: No Concerns        Richy Grewal MA              "

## 2024-06-12 NOTE — LETTER
2024      Azalea Mckeon  [address] [facility] [address]      Dear Colleague,    Thank you for referring your patient, Azalea Mckeon, to the [facility] [place]. Please see a copy of my visit note below.    [facility] Hematology / Oncology  Progress Note  Name: Azalea Mckeon  :  1980    MRN:  [id number]    --------------------    Assessment / Plan:  Stage 1A (pT1c pN0 cM0), hormone-positive, HER2-negative, right-sided breast cancer:  # 2023 Presented w/ abnormal mammogram.  # 2023 Status post bilateral mastectomy w/ sentinel node and reconstruction; path w/ multifocal disease, largest 16 mm, grade 3 disease, neg margins for IDC, neg node ()   # 2023 Oncotype score 22; adjuvant chemotherapy indicated.  # 2023 - Aug 2023 Adjuvant Taxotere / Cytoxan x 4 cycles.  # 2023 Negative hereditary breast cancer panel (40 genes).  # 2023 Adjuvant right chest wall radiation 5000 cGy over 25 fractions.  # 2023 Adjuvant Tamoxifen; discontinued due to side effects and entered menopause.    Reviewed FSH and estradiol confirm menopause.  Reviewed AI options, side effects as endocrine therapy post-menopause.  Start letrozole several weeks post-operatively; recheck phone call early August.  Reviewed surveillance strategies, imaging as needed for symptoms.    King Dyson MD    --------------------    Interval History:  Azalea returns for follow-up of breast cancer unaccompanied.  Continues to deal w/ hot flashes, but improved off Tamoxifen.  Feels less irritable off Tamoxifen.  Breast reconstruction tomorrow.    Social History:  Social History     Tobacco Use     Smoking status: Former     Current packs/day: 0.00     Average packs/day: 0.5 packs/day for 10.0 years (5.0 ttl pk-yrs)     Types: Cigarettes     Start date: 2000     Quit date: 2010     Years since quittin.4     Passive exposure: Past     Smokeless tobacco: Never  "  Substance Use Topics     Alcohol use: No     Family History:  Family History   Problem Relation Age of Onset     Anxiety Disorder Mother      Diabetes Father      Substance Abuse Father      Bleeding Disorder Sister      Pulmonary Embolism Sister         33     Depression Sister      Autism Spectrum Disorder Daughter      Anxiety Disorder Daughter      Breast Cancer Other         Distant 2nd cousin per patient report     Cancer No family hx of      Hypertension No family hx of      Cerebrovascular Disease No family hx of      Thyroid Disease No family hx of      Glaucoma No family hx of      Macular Degeneration No family hx of      Ovarian Cancer No family hx of      Anesthesia Reaction No family hx of      Immunizations:  Immunization History   Administered Date(s) Administered     Influenza (IIV3) PF 11/01/2012     TDAP Vaccine (Adacel) 07/25/2013     Health Maintenance Due   Topic Date Due     HEPATITIS B IMMUNIZATION (1 of 3 - 3-dose series) Never done     COVID-19 Vaccine (1) Never done     Pneumococcal Vaccine: Pediatrics (0 to 5 Years) and At-Risk Patients (6 to 64 Years) (1 - PCV) Never done     DTAP/TDAP/TD IMMUNIZATION (2 - Td or Tdap) 07/25/2023     INFLUENZA VACCINE (1) 09/01/2023     --------------------    Physical Exam:  VS: /77 (BP Location: Left leg)   Pulse 71   Resp 16   Ht 1.638 m (5' 4.5\")   Wt 61.9 kg (136 lb 6.4 oz)   SpO2 100%   BMI 23.05 kg/m    Gen: Well-appearing.    Labs / Imaging:  Reviewed estradiol and FSH.      Again, thank you for allowing me to participate in the care of your patient.        Sincerely,        King Dyson MD  "

## 2024-06-13 ENCOUNTER — ANESTHESIA (OUTPATIENT)
Dept: SURGERY | Facility: CLINIC | Age: 44
End: 2024-06-13
Payer: COMMERCIAL

## 2024-06-13 ENCOUNTER — HOSPITAL ENCOUNTER (INPATIENT)
Facility: CLINIC | Age: 44
LOS: 3 days | Discharge: HOME | End: 2024-06-16
Attending: PLASTIC SURGERY | Admitting: PLASTIC SURGERY
Payer: COMMERCIAL

## 2024-06-13 DIAGNOSIS — Z98.890 S/P BREAST RECONSTRUCTION: Primary | ICD-10-CM

## 2024-06-13 LAB
GLUCOSE BLDC GLUCOMTR-MCNC: 132 MG/DL (ref 70–99)
GLUCOSE BLDC GLUCOMTR-MCNC: 174 MG/DL (ref 70–99)
GLUCOSE BLDC GLUCOMTR-MCNC: 199 MG/DL (ref 70–99)
GLUCOSE BLDC GLUCOMTR-MCNC: 57 MG/DL (ref 70–99)

## 2024-06-13 PROCEDURE — 250N000011 HC RX IP 250 OP 636: Performed by: PLASTIC SURGERY

## 2024-06-13 PROCEDURE — 4A1GXSH MONITORING OF SKIN AND BREAST VASCULAR PERFUSION USING INDOCYANINE GREEN DYE, EXTERNAL APPROACH: ICD-10-PCS | Performed by: PLASTIC SURGERY

## 2024-06-13 PROCEDURE — 258N000003 HC RX IP 258 OP 636: Performed by: STUDENT IN AN ORGANIZED HEALTH CARE EDUCATION/TRAINING PROGRAM

## 2024-06-13 PROCEDURE — 258N000001 HC RX 258: Performed by: ANESTHESIOLOGY

## 2024-06-13 PROCEDURE — 360N000077 HC SURGERY LEVEL 4, PER MIN: Performed by: PLASTIC SURGERY

## 2024-06-13 PROCEDURE — 250N000013 HC RX MED GY IP 250 OP 250 PS 637: Performed by: ANESTHESIOLOGY

## 2024-06-13 PROCEDURE — 250N000011 HC RX IP 250 OP 636: Mod: JZ | Performed by: ANESTHESIOLOGY

## 2024-06-13 PROCEDURE — 120N000002 HC R&B MED SURG/OB UMMC

## 2024-06-13 PROCEDURE — 250N000009 HC RX 250: Performed by: ANESTHESIOLOGY

## 2024-06-13 PROCEDURE — 271N000001 HC OR GENERAL SUPPLY NON-STERILE: Performed by: PLASTIC SURGERY

## 2024-06-13 PROCEDURE — 258N000003 HC RX IP 258 OP 636: Mod: JZ | Performed by: ANESTHESIOLOGY

## 2024-06-13 PROCEDURE — 250N000013 HC RX MED GY IP 250 OP 250 PS 637: Performed by: STUDENT IN AN ORGANIZED HEALTH CARE EDUCATION/TRAINING PROGRAM

## 2024-06-13 PROCEDURE — 258N000003 HC RX IP 258 OP 636: Mod: JZ | Performed by: NURSE ANESTHETIST, CERTIFIED REGISTERED

## 2024-06-13 PROCEDURE — 250N000011 HC RX IP 250 OP 636: Performed by: STUDENT IN AN ORGANIZED HEALTH CARE EDUCATION/TRAINING PROGRAM

## 2024-06-13 PROCEDURE — 250N000025 HC SEVOFLURANE, PER MIN: Performed by: PLASTIC SURGERY

## 2024-06-13 PROCEDURE — 999N000141 HC STATISTIC PRE-PROCEDURE NURSING ASSESSMENT: Performed by: PLASTIC SURGERY

## 2024-06-13 PROCEDURE — 272N000001 HC OR GENERAL SUPPLY STERILE: Performed by: PLASTIC SURGERY

## 2024-06-13 PROCEDURE — 250N000011 HC RX IP 250 OP 636: Mod: JZ | Performed by: STUDENT IN AN ORGANIZED HEALTH CARE EDUCATION/TRAINING PROGRAM

## 2024-06-13 PROCEDURE — 37799 UNLISTED PX VASCULAR SURGERY: CPT | Mod: GC | Performed by: PLASTIC SURGERY

## 2024-06-13 PROCEDURE — 710N000010 HC RECOVERY PHASE 1, LEVEL 2, PER MIN: Performed by: PLASTIC SURGERY

## 2024-06-13 PROCEDURE — 19361 BRST RCNSTJ LATSMS DRSI FLAP: CPT | Performed by: NURSE ANESTHETIST, CERTIFIED REGISTERED

## 2024-06-13 PROCEDURE — 370N000017 HC ANESTHESIA TECHNICAL FEE, PER MIN: Performed by: PLASTIC SURGERY

## 2024-06-13 PROCEDURE — 19361 BRST RCNSTJ LATSMS DRSI FLAP: CPT | Performed by: ANESTHESIOLOGY

## 2024-06-13 PROCEDURE — 19361 BRST RCNSTJ LATSMS DRSI FLAP: CPT | Mod: 50 | Performed by: PLASTIC SURGERY

## 2024-06-13 PROCEDURE — 0HRV075 REPLACEMENT OF BILATERAL BREAST USING LATISSIMUS DORSI MYOCUTANEOUS FLAP, OPEN APPROACH: ICD-10-PCS | Performed by: PLASTIC SURGERY

## 2024-06-13 PROCEDURE — C9290 INJ, BUPIVACAINE LIPOSOME: HCPCS | Performed by: STUDENT IN AN ORGANIZED HEALTH CARE EDUCATION/TRAINING PROGRAM

## 2024-06-13 RX ORDER — NALOXONE HYDROCHLORIDE 0.4 MG/ML
0.2 INJECTION, SOLUTION INTRAMUSCULAR; INTRAVENOUS; SUBCUTANEOUS
Status: DISCONTINUED | OUTPATIENT
Start: 2024-06-13 | End: 2024-06-16 | Stop reason: HOSPADM

## 2024-06-13 RX ORDER — POLYETHYLENE GLYCOL 3350 17 G/17G
17 POWDER, FOR SOLUTION ORAL DAILY
Status: DISCONTINUED | OUTPATIENT
Start: 2024-06-14 | End: 2024-06-16 | Stop reason: HOSPADM

## 2024-06-13 RX ORDER — HYDROMORPHONE HYDROCHLORIDE 4 MG/1
4 TABLET ORAL EVERY 4 HOURS PRN
Status: DISCONTINUED | OUTPATIENT
Start: 2024-06-13 | End: 2024-06-16 | Stop reason: HOSPADM

## 2024-06-13 RX ORDER — ALBUTEROL SULFATE 0.83 MG/ML
2.5 SOLUTION RESPIRATORY (INHALATION) EVERY 4 HOURS PRN
Status: DISCONTINUED | OUTPATIENT
Start: 2024-06-13 | End: 2024-06-13 | Stop reason: HOSPADM

## 2024-06-13 RX ORDER — HYDROMORPHONE HCL IN WATER/PF 6 MG/30 ML
0.2 PATIENT CONTROLLED ANALGESIA SYRINGE INTRAVENOUS
Status: DISCONTINUED | OUTPATIENT
Start: 2024-06-13 | End: 2024-06-14

## 2024-06-13 RX ORDER — METHOCARBAMOL 750 MG/1
750 TABLET, FILM COATED ORAL EVERY 6 HOURS PRN
Status: DISCONTINUED | OUTPATIENT
Start: 2024-06-13 | End: 2024-06-16 | Stop reason: HOSPADM

## 2024-06-13 RX ORDER — LIDOCAINE 40 MG/G
CREAM TOPICAL
Status: DISCONTINUED | OUTPATIENT
Start: 2024-06-13 | End: 2024-06-16 | Stop reason: HOSPADM

## 2024-06-13 RX ORDER — NALOXONE HYDROCHLORIDE 0.4 MG/ML
0.4 INJECTION, SOLUTION INTRAMUSCULAR; INTRAVENOUS; SUBCUTANEOUS
Status: DISCONTINUED | OUTPATIENT
Start: 2024-06-13 | End: 2024-06-16 | Stop reason: HOSPADM

## 2024-06-13 RX ORDER — NALOXONE HYDROCHLORIDE 0.4 MG/ML
0.2 INJECTION, SOLUTION INTRAMUSCULAR; INTRAVENOUS; SUBCUTANEOUS
Status: DISCONTINUED | OUTPATIENT
Start: 2024-06-13 | End: 2024-06-13 | Stop reason: HOSPADM

## 2024-06-13 RX ORDER — DEXAMETHASONE SODIUM PHOSPHATE 4 MG/ML
4 INJECTION, SOLUTION INTRA-ARTICULAR; INTRALESIONAL; INTRAMUSCULAR; INTRAVENOUS; SOFT TISSUE
Status: DISCONTINUED | OUTPATIENT
Start: 2024-06-13 | End: 2024-06-13 | Stop reason: HOSPADM

## 2024-06-13 RX ORDER — ONDANSETRON 2 MG/ML
4 INJECTION INTRAMUSCULAR; INTRAVENOUS EVERY 6 HOURS PRN
Status: DISCONTINUED | OUTPATIENT
Start: 2024-06-13 | End: 2024-06-16 | Stop reason: HOSPADM

## 2024-06-13 RX ORDER — PROPOFOL 10 MG/ML
INJECTION, EMULSION INTRAVENOUS PRN
Status: DISCONTINUED | OUTPATIENT
Start: 2024-06-13 | End: 2024-06-13

## 2024-06-13 RX ORDER — FENTANYL CITRATE 50 UG/ML
50 INJECTION, SOLUTION INTRAMUSCULAR; INTRAVENOUS EVERY 5 MIN PRN
Status: DISCONTINUED | OUTPATIENT
Start: 2024-06-13 | End: 2024-06-13 | Stop reason: HOSPADM

## 2024-06-13 RX ORDER — DEXTROSE MONOHYDRATE, SODIUM CHLORIDE, AND POTASSIUM CHLORIDE 50; 1.49; 4.5 G/1000ML; G/1000ML; G/1000ML
INJECTION, SOLUTION INTRAVENOUS CONTINUOUS
Status: DISCONTINUED | OUTPATIENT
Start: 2024-06-13 | End: 2024-06-14

## 2024-06-13 RX ORDER — NALOXONE HYDROCHLORIDE 0.4 MG/ML
0.4 INJECTION, SOLUTION INTRAMUSCULAR; INTRAVENOUS; SUBCUTANEOUS
Status: DISCONTINUED | OUTPATIENT
Start: 2024-06-13 | End: 2024-06-13 | Stop reason: HOSPADM

## 2024-06-13 RX ORDER — HYDROMORPHONE HCL IN WATER/PF 6 MG/30 ML
0.2 PATIENT CONTROLLED ANALGESIA SYRINGE INTRAVENOUS EVERY 5 MIN PRN
Status: DISCONTINUED | OUTPATIENT
Start: 2024-06-13 | End: 2024-06-13 | Stop reason: HOSPADM

## 2024-06-13 RX ORDER — PROCHLORPERAZINE MALEATE 5 MG
10 TABLET ORAL EVERY 6 HOURS PRN
Status: DISCONTINUED | OUTPATIENT
Start: 2024-06-13 | End: 2024-06-16 | Stop reason: HOSPADM

## 2024-06-13 RX ORDER — LETROZOLE 2.5 MG/1
2.5 TABLET, FILM COATED ORAL DAILY
Qty: 90 TABLET | Refills: 3 | Status: SHIPPED | OUTPATIENT
Start: 2024-07-08

## 2024-06-13 RX ORDER — HYDROMORPHONE HCL IN WATER/PF 6 MG/30 ML
0.4 PATIENT CONTROLLED ANALGESIA SYRINGE INTRAVENOUS
Status: DISCONTINUED | OUTPATIENT
Start: 2024-06-13 | End: 2024-06-14

## 2024-06-13 RX ORDER — CEFAZOLIN SODIUM/WATER 2 G/20 ML
2 SYRINGE (ML) INTRAVENOUS
Status: COMPLETED | OUTPATIENT
Start: 2024-06-13 | End: 2024-06-13

## 2024-06-13 RX ORDER — DIPHENHYDRAMINE HYDROCHLORIDE 50 MG/ML
INJECTION INTRAMUSCULAR; INTRAVENOUS PRN
Status: DISCONTINUED | OUTPATIENT
Start: 2024-06-13 | End: 2024-06-13

## 2024-06-13 RX ORDER — MEPERIDINE HYDROCHLORIDE 25 MG/ML
12.5 INJECTION INTRAMUSCULAR; INTRAVENOUS; SUBCUTANEOUS EVERY 5 MIN PRN
Status: DISCONTINUED | OUTPATIENT
Start: 2024-06-13 | End: 2024-06-13 | Stop reason: HOSPADM

## 2024-06-13 RX ORDER — DEXTROSE MONOHYDRATE 25 G/50ML
25 INJECTION, SOLUTION INTRAVENOUS ONCE
Status: COMPLETED | OUTPATIENT
Start: 2024-06-13 | End: 2024-06-13

## 2024-06-13 RX ORDER — ONDANSETRON 2 MG/ML
INJECTION INTRAMUSCULAR; INTRAVENOUS PRN
Status: DISCONTINUED | OUTPATIENT
Start: 2024-06-13 | End: 2024-06-13

## 2024-06-13 RX ORDER — ACETAMINOPHEN 325 MG/1
975 TABLET ORAL
Status: COMPLETED | OUTPATIENT
Start: 2024-06-13 | End: 2024-06-13

## 2024-06-13 RX ORDER — FENTANYL CITRATE 50 UG/ML
25-50 INJECTION, SOLUTION INTRAMUSCULAR; INTRAVENOUS
Status: DISCONTINUED | OUTPATIENT
Start: 2024-06-13 | End: 2024-06-13 | Stop reason: HOSPADM

## 2024-06-13 RX ORDER — OXYCODONE HYDROCHLORIDE 5 MG/1
5 TABLET ORAL
Status: CANCELLED | OUTPATIENT
Start: 2024-06-13

## 2024-06-13 RX ORDER — LORAZEPAM 2 MG/ML
.5-1 INJECTION INTRAMUSCULAR
Status: DISCONTINUED | OUTPATIENT
Start: 2024-06-13 | End: 2024-06-13 | Stop reason: HOSPADM

## 2024-06-13 RX ORDER — SCOLOPAMINE TRANSDERMAL SYSTEM 1 MG/1
1 PATCH, EXTENDED RELEASE TRANSDERMAL ONCE
Status: COMPLETED | OUTPATIENT
Start: 2024-06-13 | End: 2024-06-14

## 2024-06-13 RX ORDER — INDOCYANINE GREEN AND WATER 25 MG
KIT INJECTION PRN
Status: DISCONTINUED | OUTPATIENT
Start: 2024-06-13 | End: 2024-06-13

## 2024-06-13 RX ORDER — AMOXICILLIN 250 MG
1 CAPSULE ORAL 2 TIMES DAILY
Status: DISCONTINUED | OUTPATIENT
Start: 2024-06-13 | End: 2024-06-16 | Stop reason: HOSPADM

## 2024-06-13 RX ORDER — NALOXONE HYDROCHLORIDE 0.4 MG/ML
0.1 INJECTION, SOLUTION INTRAMUSCULAR; INTRAVENOUS; SUBCUTANEOUS
Status: DISCONTINUED | OUTPATIENT
Start: 2024-06-13 | End: 2024-06-13 | Stop reason: HOSPADM

## 2024-06-13 RX ORDER — NALOXONE HYDROCHLORIDE 0.4 MG/ML
0.1 INJECTION, SOLUTION INTRAMUSCULAR; INTRAVENOUS; SUBCUTANEOUS
Status: CANCELLED | OUTPATIENT
Start: 2024-06-13

## 2024-06-13 RX ORDER — DEXAMETHASONE SODIUM PHOSPHATE 4 MG/ML
4 INJECTION, SOLUTION INTRA-ARTICULAR; INTRALESIONAL; INTRAMUSCULAR; INTRAVENOUS; SOFT TISSUE
Status: CANCELLED | OUTPATIENT
Start: 2024-06-13

## 2024-06-13 RX ORDER — OXYCODONE HYDROCHLORIDE 10 MG/1
10 TABLET ORAL
Status: CANCELLED | OUTPATIENT
Start: 2024-06-13

## 2024-06-13 RX ORDER — SODIUM CHLORIDE, SODIUM LACTATE, POTASSIUM CHLORIDE, CALCIUM CHLORIDE 600; 310; 30; 20 MG/100ML; MG/100ML; MG/100ML; MG/100ML
INJECTION, SOLUTION INTRAVENOUS CONTINUOUS PRN
Status: DISCONTINUED | OUTPATIENT
Start: 2024-06-13 | End: 2024-06-13

## 2024-06-13 RX ORDER — ACETAMINOPHEN 325 MG/1
975 TABLET ORAL EVERY 8 HOURS
Status: DISCONTINUED | OUTPATIENT
Start: 2024-06-14 | End: 2024-06-16 | Stop reason: HOSPADM

## 2024-06-13 RX ORDER — CEFAZOLIN SODIUM/WATER 2 G/20 ML
2 SYRINGE (ML) INTRAVENOUS SEE ADMIN INSTRUCTIONS
Status: DISCONTINUED | OUTPATIENT
Start: 2024-06-13 | End: 2024-06-13 | Stop reason: HOSPADM

## 2024-06-13 RX ORDER — DEXAMETHASONE SODIUM PHOSPHATE 4 MG/ML
INJECTION, SOLUTION INTRA-ARTICULAR; INTRALESIONAL; INTRAMUSCULAR; INTRAVENOUS; SOFT TISSUE PRN
Status: DISCONTINUED | OUTPATIENT
Start: 2024-06-13 | End: 2024-06-13

## 2024-06-13 RX ORDER — FENTANYL CITRATE 50 UG/ML
25 INJECTION, SOLUTION INTRAMUSCULAR; INTRAVENOUS EVERY 5 MIN PRN
Status: DISCONTINUED | OUTPATIENT
Start: 2024-06-13 | End: 2024-06-13 | Stop reason: HOSPADM

## 2024-06-13 RX ORDER — BISACODYL 10 MG
10 SUPPOSITORY, RECTAL RECTAL DAILY PRN
Status: DISCONTINUED | OUTPATIENT
Start: 2024-06-16 | End: 2024-06-16 | Stop reason: HOSPADM

## 2024-06-13 RX ORDER — ONDANSETRON 4 MG/1
4 TABLET, ORALLY DISINTEGRATING ORAL EVERY 30 MIN PRN
Status: CANCELLED | OUTPATIENT
Start: 2024-06-13

## 2024-06-13 RX ORDER — ONDANSETRON 2 MG/ML
4 INJECTION INTRAMUSCULAR; INTRAVENOUS EVERY 30 MIN PRN
Status: DISCONTINUED | OUTPATIENT
Start: 2024-06-13 | End: 2024-06-13 | Stop reason: HOSPADM

## 2024-06-13 RX ORDER — SODIUM CHLORIDE, SODIUM LACTATE, POTASSIUM CHLORIDE, CALCIUM CHLORIDE 600; 310; 30; 20 MG/100ML; MG/100ML; MG/100ML; MG/100ML
INJECTION, SOLUTION INTRAVENOUS CONTINUOUS
Status: DISCONTINUED | OUTPATIENT
Start: 2024-06-13 | End: 2024-06-13 | Stop reason: HOSPADM

## 2024-06-13 RX ORDER — ONDANSETRON 4 MG/1
4 TABLET, ORALLY DISINTEGRATING ORAL EVERY 30 MIN PRN
Status: DISCONTINUED | OUTPATIENT
Start: 2024-06-13 | End: 2024-06-13 | Stop reason: HOSPADM

## 2024-06-13 RX ORDER — ONDANSETRON 2 MG/ML
4 INJECTION INTRAMUSCULAR; INTRAVENOUS EVERY 30 MIN PRN
Status: CANCELLED | OUTPATIENT
Start: 2024-06-13

## 2024-06-13 RX ORDER — BUPIVACAINE HYDROCHLORIDE 2.5 MG/ML
INJECTION, SOLUTION EPIDURAL; INFILTRATION; INTRACAUDAL
Status: COMPLETED | OUTPATIENT
Start: 2024-06-13 | End: 2024-06-13

## 2024-06-13 RX ORDER — ONDANSETRON 4 MG/1
4 TABLET, ORALLY DISINTEGRATING ORAL EVERY 6 HOURS PRN
Status: DISCONTINUED | OUTPATIENT
Start: 2024-06-13 | End: 2024-06-16 | Stop reason: HOSPADM

## 2024-06-13 RX ORDER — HYDROMORPHONE HYDROCHLORIDE 2 MG/1
2 TABLET ORAL EVERY 4 HOURS PRN
Status: DISCONTINUED | OUTPATIENT
Start: 2024-06-13 | End: 2024-06-16 | Stop reason: HOSPADM

## 2024-06-13 RX ORDER — ACETAMINOPHEN 325 MG/1
650 TABLET ORAL EVERY 4 HOURS PRN
Status: DISCONTINUED | OUTPATIENT
Start: 2024-06-17 | End: 2024-06-16 | Stop reason: HOSPADM

## 2024-06-13 RX ORDER — LIDOCAINE HYDROCHLORIDE 20 MG/ML
INJECTION, SOLUTION INFILTRATION; PERINEURAL PRN
Status: DISCONTINUED | OUTPATIENT
Start: 2024-06-13 | End: 2024-06-13

## 2024-06-13 RX ORDER — FLUMAZENIL 0.1 MG/ML
0.2 INJECTION, SOLUTION INTRAVENOUS
Status: DISCONTINUED | OUTPATIENT
Start: 2024-06-13 | End: 2024-06-13 | Stop reason: HOSPADM

## 2024-06-13 RX ORDER — KETOROLAC TROMETHAMINE 30 MG/ML
INJECTION, SOLUTION INTRAMUSCULAR; INTRAVENOUS PRN
Status: DISCONTINUED | OUTPATIENT
Start: 2024-06-13 | End: 2024-06-13

## 2024-06-13 RX ORDER — HYDROMORPHONE HCL IN WATER/PF 6 MG/30 ML
0.4 PATIENT CONTROLLED ANALGESIA SYRINGE INTRAVENOUS EVERY 5 MIN PRN
Status: DISCONTINUED | OUTPATIENT
Start: 2024-06-13 | End: 2024-06-13 | Stop reason: HOSPADM

## 2024-06-13 RX ADMIN — SCOPOLAMINE 1 PATCH: 1 PATCH TRANSDERMAL at 07:14

## 2024-06-13 RX ADMIN — LIDOCAINE HYDROCHLORIDE 100 MG: 20 INJECTION, SOLUTION INFILTRATION; PERINEURAL at 07:42

## 2024-06-13 RX ADMIN — Medication 2 G: at 11:45

## 2024-06-13 RX ADMIN — BUPIVACAINE 20 ML: 13.3 INJECTION, SUSPENSION, LIPOSOMAL INFILTRATION at 06:35

## 2024-06-13 RX ADMIN — HYDROMORPHONE HYDROCHLORIDE 2 MG: 2 TABLET ORAL at 17:06

## 2024-06-13 RX ADMIN — SODIUM CHLORIDE, POTASSIUM CHLORIDE, SODIUM LACTATE AND CALCIUM CHLORIDE: 600; 310; 30; 20 INJECTION, SOLUTION INTRAVENOUS at 10:22

## 2024-06-13 RX ADMIN — HYDROMORPHONE HYDROCHLORIDE 0.5 MG: 1 INJECTION, SOLUTION INTRAMUSCULAR; INTRAVENOUS; SUBCUTANEOUS at 08:40

## 2024-06-13 RX ADMIN — MIDAZOLAM 1 MG: 1 INJECTION INTRAMUSCULAR; INTRAVENOUS at 06:45

## 2024-06-13 RX ADMIN — PHENYLEPHRINE HYDROCHLORIDE 100 MCG: 10 INJECTION INTRAVENOUS at 12:42

## 2024-06-13 RX ADMIN — DEXAMETHASONE SODIUM PHOSPHATE 8 MG: 4 INJECTION, SOLUTION INTRA-ARTICULAR; INTRALESIONAL; INTRAMUSCULAR; INTRAVENOUS; SOFT TISSUE at 07:42

## 2024-06-13 RX ADMIN — Medication 50 MG: at 07:42

## 2024-06-13 RX ADMIN — PROCHLORPERAZINE EDISYLATE 5 MG: 5 INJECTION INTRAMUSCULAR; INTRAVENOUS at 13:48

## 2024-06-13 RX ADMIN — DIPHENHYDRAMINE HYDROCHLORIDE 25 MG: 50 INJECTION, SOLUTION INTRAMUSCULAR; INTRAVENOUS at 09:10

## 2024-06-13 RX ADMIN — HYDROMORPHONE HYDROCHLORIDE 2 MG: 2 TABLET ORAL at 20:56

## 2024-06-13 RX ADMIN — TRANEXAMIC ACID 1 G: 1 INJECTION, SOLUTION INTRAVENOUS at 09:50

## 2024-06-13 RX ADMIN — MIDAZOLAM 1 MG: 1 INJECTION INTRAMUSCULAR; INTRAVENOUS at 07:20

## 2024-06-13 RX ADMIN — PHENYLEPHRINE HYDROCHLORIDE 100 MCG: 10 INJECTION INTRAVENOUS at 11:27

## 2024-06-13 RX ADMIN — Medication 20 MG: at 10:23

## 2024-06-13 RX ADMIN — ONDANSETRON 4 MG: 2 INJECTION INTRAMUSCULAR; INTRAVENOUS at 13:32

## 2024-06-13 RX ADMIN — FENTANYL CITRATE 25 MCG: 50 INJECTION, SOLUTION INTRAMUSCULAR; INTRAVENOUS at 14:02

## 2024-06-13 RX ADMIN — MIDAZOLAM 1 MG: 1 INJECTION INTRAMUSCULAR; INTRAVENOUS at 07:39

## 2024-06-13 RX ADMIN — INDOCYANINE GREEN AND WATER 12.5 MG: KIT at 09:56

## 2024-06-13 RX ADMIN — PHENYLEPHRINE HYDROCHLORIDE 100 MCG: 10 INJECTION INTRAVENOUS at 12:27

## 2024-06-13 RX ADMIN — FENTANYL CITRATE 50 MCG: 50 INJECTION, SOLUTION INTRAMUSCULAR; INTRAVENOUS at 06:45

## 2024-06-13 RX ADMIN — PHENYLEPHRINE HYDROCHLORIDE 100 MCG: 10 INJECTION INTRAVENOUS at 11:01

## 2024-06-13 RX ADMIN — ONDANSETRON 4 MG: 2 INJECTION INTRAMUSCULAR; INTRAVENOUS at 11:48

## 2024-06-13 RX ADMIN — DOCUSATE SODIUM 50 MG AND SENNOSIDES 8.6 MG 1 TABLET: 8.6; 5 TABLET, FILM COATED ORAL at 19:47

## 2024-06-13 RX ADMIN — ACETAMINOPHEN 975 MG: 325 TABLET, FILM COATED ORAL at 16:31

## 2024-06-13 RX ADMIN — BUPIVACAINE HYDROCHLORIDE 20 ML: 2.5 INJECTION, SOLUTION EPIDURAL; INFILTRATION; INTRACAUDAL; PERINEURAL at 06:35

## 2024-06-13 RX ADMIN — ACETAMINOPHEN 975 MG: 325 TABLET, FILM COATED ORAL at 23:50

## 2024-06-13 RX ADMIN — Medication 2 G: at 07:45

## 2024-06-13 RX ADMIN — PROPOFOL 150 MG: 10 INJECTION, EMULSION INTRAVENOUS at 07:42

## 2024-06-13 RX ADMIN — SODIUM CHLORIDE, POTASSIUM CHLORIDE, SODIUM LACTATE AND CALCIUM CHLORIDE: 600; 310; 30; 20 INJECTION, SOLUTION INTRAVENOUS at 07:29

## 2024-06-13 RX ADMIN — DEXTROSE MONOHYDRATE 25 ML: 25 INJECTION, SOLUTION INTRAVENOUS at 06:18

## 2024-06-13 RX ADMIN — HYDROMORPHONE HYDROCHLORIDE 0.5 MG: 1 INJECTION, SOLUTION INTRAMUSCULAR; INTRAVENOUS; SUBCUTANEOUS at 08:49

## 2024-06-13 RX ADMIN — KETOROLAC TROMETHAMINE 30 MG: 30 INJECTION, SOLUTION INTRAMUSCULAR at 11:49

## 2024-06-13 RX ADMIN — PHENYLEPHRINE HYDROCHLORIDE 100 MCG: 10 INJECTION INTRAVENOUS at 11:58

## 2024-06-13 RX ADMIN — POTASSIUM CHLORIDE, DEXTROSE MONOHYDRATE AND SODIUM CHLORIDE: 150; 5; 450 INJECTION, SOLUTION INTRAVENOUS at 15:17

## 2024-06-13 RX ADMIN — Medication 100 MG: at 13:00

## 2024-06-13 ASSESSMENT — ACTIVITIES OF DAILY LIVING (ADL)
ADLS_ACUITY_SCORE: 29
ADLS_ACUITY_SCORE: 20
ADLS_ACUITY_SCORE: 29
ADLS_ACUITY_SCORE: 20

## 2024-06-13 NOTE — ANESTHESIA PROCEDURE NOTES
Airway       Patient location during procedure: OR       Procedure Start/Stop Times: 6/13/2024 7:44 AM  Staff -        CRNA: Mariluz Avina APRN CRNA       Performed By: CRNAIndications and Patient Condition       Indications for airway management: nora-procedural       Induction type:intravenous       Mask difficulty assessment: 1 - vent by mask    Final Airway Details       Final airway type: endotracheal airway       Successful airway: ETT - single  Endotracheal Airway Details        ETT size (mm): 7.0       Cuffed: yes       Successful intubation technique: direct laryngoscopy       DL Blade Type: Vickers 2       Grade View of Cords: 1       Adjucts: stylet       Position: Right       Measured from: gums/teeth       Secured at (cm): 22       Bite block used: None    Post intubation assessment        Placement verified by: capnometry, equal breath sounds and chest rise        Number of attempts at approach: 1       Number of other approaches attempted: 0       Secured with: tape       Ease of procedure: easy       Dentition: Intact and Unchanged    Medication(s) Administered   Medication Administration Time: 6/13/2024 7:44 AM

## 2024-06-13 NOTE — ANESTHESIA PREPROCEDURE EVALUATION
Anesthesia Pre-Procedure Evaluation    Patient: Azalea Mckeon   MRN: [id number] : 1980        Procedure : Procedure(s):  Bilateral breast reconstruction with bilateral latissimus flap reconstructions, spy  **Latex Allergy**          Past Medical History:   Diagnosis Date    Breast cancer (H) 2023    Right Breast    Cancer (H)     right breast, s/p bilateral mastectomies    Personal history of chemotherapy     Taxotere + Cytoxan (2023 - 2023) - Dr. Dyson    S/P radiation therapy     5,000 cGy to right chestwall completed on 2023 - [facility]      Past Surgical History:   Procedure Laterality Date    BREAST BIOPSY, RT/LT Right 2023    GYN SURGERY  2010        GYN SURGERY  2011        GYN SURGERY  2013    C/S    GYN SURGERY  2013    C/S    HC REMOVAL OF OVARIAN CYST(S)  1999    IR CHEST PORT PLACEMENT > 5 YRS OF AGE  2023    MASTECTOMY PARTIAL WITH SENTINEL NODE Bilateral 2023    Procedure: BILATERAL Skin-Sparing Mastectomy, BILATERAL breast implant removal, RIGHT Axillary [place] Lymph Node Biopsy;  Surgeon: Mitra Souza MD;  Location: [facility] OR    RECONSTRUCT BREAST, INSERT TISSUE EXPANDER BILATERAL, COMBINED Bilateral 2023    Procedure: bilateral breast reconstruction, insert tissue expander bilateral, Spy **Latex Allergy**;  Surgeon: EDWARDO Ferreira MD;  Location: [facility] OR    TUBAL LIGATION  2013      Allergies   Allergen Reactions    Percocet [Oxycodone-Acetaminophen] Itching    Latex Rash      Social History     Tobacco Use    Smoking status: Former     Current packs/day: 0.00     Average packs/day: 0.5 packs/day for 10.0 years (5.0 ttl pk-yrs)     Types: Cigarettes     Start date: 2000     Quit date: 2010     Years since quittin.4     Passive exposure: Past    Smokeless tobacco: Never   Substance Use Topics    Alcohol use: No      Wt Readings from Last 1  "Encounters:   06/13/24 61.4 kg (135 lb 5.8 oz)        Anesthesia Evaluation            ROS/MED HX  ENT/Pulmonary:       Neurologic:       Cardiovascular:    (-) murmur and wheezes   METS/Exercise Tolerance:     Hematologic:       Musculoskeletal:       GI/Hepatic:       Renal/Genitourinary:       Endo:       Psychiatric/Substance Use:     (+) psychiatric history anxiety       Infectious Disease:       Malignancy:   (+) Malignancy, History of Breast.Breast CA status post Chemo.      Other:            Physical Exam    Airway        Mallampati: II   TM distance: > 3 FB   Neck ROM: full   Mouth opening: > 3 cm    Respiratory Devices and Support         Dental  no notable dental history         Cardiovascular          Rhythm and rate: regular and normal (-) no systolic click and no murmur    Pulmonary   pulmonary exam normal        breath sounds clear to auscultation   (-) no wheezes        OUTSIDE LABS:  CBC:   Lab Results   Component Value Date    WBC 5.8 05/21/2024    WBC 5.3 02/06/2024    HGB 14.4 05/21/2024    HGB 13.0 02/06/2024    HCT 41.4 05/21/2024    HCT 37.2 02/06/2024     05/21/2024     02/06/2024     BMP:   Lab Results   Component Value Date     02/05/2024     01/11/2024    POTASSIUM 3.7 02/05/2024    POTASSIUM 4.0 01/11/2024    CHLORIDE 105 02/05/2024    CHLORIDE 104 01/11/2024    CO2 25 02/05/2024    CO2 27 01/11/2024    BUN 8.1 02/05/2024    BUN 11.3 01/11/2024    CR 0.79 02/05/2024    CR 0.81 01/11/2024     (H) 06/13/2024    GLC 57 (L) 06/13/2024     COAGS: No results found for: \"PTT\", \"INR\", \"FIBR\"  POC:   Lab Results   Component Value Date    HCG Negative 09/22/2023     HEPATIC:   Lab Results   Component Value Date    ALBUMIN 4.5 02/05/2024    PROTTOTAL 7.1 02/05/2024    ALT 22 02/05/2024    AST 22 02/05/2024    ALKPHOS 36 (L) 02/05/2024    BILITOTAL 0.7 02/05/2024     OTHER:   Lab Results   Component Value Date    MINNA 9.7 02/05/2024    LIPASE 33 02/05/2024    TSH 0.60 " 02/05/2024       Anesthesia Plan    ASA Status:  2    NPO Status:  NPO Appropriate    Anesthesia Type: General.     - Airway: ETT   Induction: Intravenous.   Maintenance: Balanced.   Techniques and Equipment:     - Lines/Monitors: 2nd IV     Consents    Anesthesia Plan(s) and associated risks, benefits, and realistic alternatives discussed. Questions answered and patient/representative(s) expressed understanding.     - Discussed: Risks, Benefits and Alternatives for BOTH SEDATION and the PROCEDURE were discussed     - Discussed with:  Patient      - Extended Intubation/Ventilatory Support Discussed: Yes.      - Patient is DNR/DNI Status: No     Use of blood products discussed: Yes.     - Discussed with: Patient.     Postoperative Care    Pain management: IV analgesics.   PONV prophylaxis: Ondansetron (or other 5HT-3), Dexamethasone or Solumedrol, Scopolamine patch     Comments:               Christopher J. Behrens, MD    I have reviewed the pertinent notes and labs in the chart from the past 30 days and (re)examined the patient.  Any updates or changes from those notes are reflected in this note.

## 2024-06-13 NOTE — PROGRESS NOTES
Pectoralis block performed without complications.  VSS.  Pt tolerated well.  Will continue to monitor.

## 2024-06-13 NOTE — LETTER
Transition Communication Hand-off for Care Transitions to Next Level of Care Provider    Name: Azalea Mckeon  : 1980  MRN #: [id number]  Primary Care Provider: Jem Arredondo     Primary Clinic: [address]  [facility] [address]     Reason for Hospitalization:  S/P breast reconstruction, bilateral [Z98.890]  Admit Date/Time: 2024  5:26 AM  Discharge Date: 24  Payor Source: Payor: [facility] / Plan: ARE PMAP / Product Type: HMO /     Readmission Assessment Measure (MIRNA) Risk Score/category: 10%    Reason for Communication Hand-off Referral: Multiple providers/specialties    Discharge Plan:v Home with family    Concern for non-adherence with plan of care: No  Discharge Needs Assessment:  Needs      Flowsheet Row Most Recent Value   Equipment Currently Used at Home none   # of Referrals Placed by CM Internal Clinic Care Coordination, External Care Coordination            Already enrolled in Tele-monitoring program and name of program:  Unknown  Follow-up specialty is recommended: Yes    Follow-up plan:    Future Appointments   Date Time Provider Department Center   2024 11:40 AM Urvashi Jefferson PA-C [facility]   2024  3:30 PM King Dyson MD [facility]       Any outstanding tests or procedures:        Referrals       Future Labs/Procedures    Home Care Referral     Comments:    [facility] - Pending Insurance Auth  Start of Care: 2024    Your provider has ordered home health services. If you have not been contacted within 2 days of your discharge please call the selected Home Care agency listed on your Discharge document.  If a Home Care agency is NOT listed, please call [phone].            Supplies       Future Labs/Procedures    Wrist/Arm/Hand Bracking Supplies Order Sling (ultra sling); Right     Comments:    Bracing Documentation:   Patient requires the use of the ordered bracing device due to following medical need/condition: latissimus flap  surgery.    I, the undersigned, certify that the above prescribed supplies are medically necessary for this patient and is both reasonable and necessary in reference to accepted standards of medical and necessary in reference to accepted standards of medical practice in the treatment of this patient's condition and is not prescribed as a convenience.            Key Recommendations:      Khadra Carrasquillo RN    AVS/Discharge Summary is the source of truth; this is a helpful guide for improved communication of patient story

## 2024-06-13 NOTE — BRIEF OP NOTE
[facility]    Brief Operative Note    Pre-operative diagnosis: S/P breast reconstruction, bilateral [Z98.890]  Post-operative diagnosis Same as pre-operative diagnosis    Procedure: Bilateral breast reconstruction with bilateral latissimus flap reconstructions, spy  **Latex Allergy**, Bilateral - Breast    Surgeon: Surgeons and Role:     * EDWARDO Ferreira MD - Primary     * Oscar Arias MD - Resident - Assisting  Anesthesia: General with Block   Estimated Blood Loss: 150cc    Drains: Anish-Solomon x 4  Specimens: * No specimens in log *  Findings:   None. Procedure as stated  Complications: None.  Implants:   Implant Name Type Inv. Item Serial No.  Lot No. LRB No. Used Action   TISSUE EXPANDER NATRELLE 133S SMOOTH 550ML [id number] - [id number] Breast Implant/Tissue Expander TISSUE EXPANDER NATRELLE 133S SMOOTH 550ML [id number] [employer]  Left 1 Explanted   TISSUE EXPANDER NATRELLE 133S SMOOTH 550ML [id number] - [id number] Breast Implant/Tissue Expander TISSUE EXPANDER NATRELLE 133S SMOOTH 550ML [id number] [employer]  Right 1 Explanted       Plan:    Admit to Plastics  Regular diet  NWB with B/l UE  30-40 degrees of abduction at shoulder at all times  Splint to remain in place at all times  PRN pain meds  Ambulate  Likely discharge JHONY Doyle, MS  Plastic Surgery, PGY-4

## 2024-06-13 NOTE — PATIENT INSTRUCTIONS
1) Start letrozole after Fourth of July.  2) BJT MG early August end of day PHONE call.    King Dyson MD.

## 2024-06-13 NOTE — ANESTHESIA POSTPROCEDURE EVALUATION
Patient: Azalea Mckeon    Procedure: Procedure(s):  Bilateral breast reconstruction with bilateral latissimus flap reconstructions, spy  **Latex Allergy**       Anesthesia Type:  General    Note:  Disposition: Inpatient   Postop Pain Control: Uneventful            Sign Out: Well controlled pain   PONV: No   Neuro/Psych: Uneventful            Sign Out: Acceptable/Baseline neuro status   Airway/Respiratory: Uneventful            Sign Out: Acceptable/Baseline resp. status   CV/Hemodynamics: Uneventful            Sign Out: Acceptable CV status; No obvious hypovolemia; No obvious fluid overload   Other NRE: NONE   DID A NON-ROUTINE EVENT OCCUR? No           Last vitals:  Vitals Value Taken Time   /65 06/13/24 1500   Temp 36.7  C (98  F) 06/13/24 1445   Pulse 76 06/13/24 1504   Resp 17 06/13/24 1504   SpO2 100 % 06/13/24 1504   Vitals shown include unfiled device data.    Electronically Signed By: Sylvie Valero DO  June 13, 2024  3:05 PM

## 2024-06-13 NOTE — ANESTHESIA CARE TRANSFER NOTE
Patient: Azalea Mckeon    Procedure: Procedure(s):  Bilateral breast reconstruction with bilateral latissimus flap reconstructions, spy  **Latex Allergy**       Diagnosis: S/P breast reconstruction, bilateral [Z98.890]  Diagnosis Additional Information: No value filed.    Anesthesia Type:   General     Note:    Oropharynx: oropharynx clear of all foreign objects  Level of Consciousness: awake  Oxygen Supplementation: room air    Independent Airway: airway patency satisfactory and stable  Dentition: dentition unchanged      Patient transferred to: PACU  Comments: Temp 98   Bp 123/80  Sats 100  Rr 14  Hr 98  Handoff Report: Identifed the Patient, Identified the Reponsible Provider, Reviewed the pertinent medical history, Discussed the surgical course, Reviewed Intra-OP anesthesia mangement and issues during anesthesia, Set expectations for post-procedure period and Allowed opportunity for questions and acknowledgement of understanding  Vitals:  Vitals Value Taken Time   BP     Temp     Pulse 88 06/13/24 1323   Resp 11 06/13/24 1323   SpO2 100 % 06/13/24 1323   Vitals shown include unfiled device data.    Electronically Signed By: MOLINA Franco CRNA  June 13, 2024  1:24 PM

## 2024-06-13 NOTE — OP NOTE
PREOPERATIVE DIAGNOSIS: Right-sided breast cancer, underwent bilateral mastectomy and expander based reconstruction with right-sided radiation and severe capsular contracture.  Now ready for next stage of her reconstruction.    POSTOPERATIVE DIAGNOSIS: As above    PROCEDURES:   1.  Bilateral breast reconstruction with latissimus dorsi musculocutaneous flaps  2.  Intraoperative chemical angiography using the spy phi system.  This  included supervision of IV ICG dye injection and interpretation of the angiogram.  Indication of use was to evaluate the blood flow to the flaps after harvesting to ensure appropriate use of the flap and debridement.     SURGEON: Melida Ferreira MD      RESIDENT:: Oscar Arias MD.     ANESTHESIA: General anesthesia with endotracheal intubation.      COMPLICATIONS: Nil.      DRAINS: One 15-Citizen of [place] channel HONEY drain on each side of the breast and one 15 Citizen of [place] HONEY drain on each side of the donor site on the back.     SPECIMENS: None     BLOOD LOSS: 150 mL        DESCRIPTION OF PROCEDURE: After informed consent was taken from the patient, the proper site and procedure was ascertained with them and they were appropriately marked, they were taken to the operating room. They were placed in a supine position with their knees comfortably flexed with pillows underneath them, and pneumoboots placed and running prior to induction of anesthesia. Preoperative antibiotics were given in the OR and appropriately redosed during the case. General anesthesia was administered without any complications.  A Rios catheter was placed.  10 minutes without Jhon to make sure that the catheter comes out thanks she was first placed in a prone position with her arms above her head.  All appropriate places were padded.  She was prepped and draped in the standard surgical fashion.     We began by first marking the proposed flaps symmetrically on each side and taking as much skin as possible.  The same procedures  were carried out on each side.  The flap skin island was cut and dissected down through subcutaneous tissues to the underlying latissimus muscle.  The superior medial and inferior and anterior aspects of the muscle were identified by dissecting the subcutaneous tissues of the back off of it.  The muscle was then taken down from the chest wall and elevated.  Large perforators were clipped ligated.  The flap was elevated from inferior medial to superior lateral towards the axilla.  The thoracodorsal vessels were identified in the axilla and protected.  The insertion of the latissimus on each side was taken down.  The entire flap was now raised.  At this point hemostasis was ensured.  2 drains were placed 1 in each side.  They were secured.  The skin was then closed using 0 PDS suture in a deep layer followed by 0 PDS suture in a deep dermal layer followed by 3 oh STRATAFIX suture in a running manner.  75% of the incision was closed on each side in this manner.  The flap was placed in the axilla and covered with sterile towel and Tegaderm.  Appropriate dressings were placed over the drain sites and the incisions with Prineo.  At this point the patient was now flipped into a supine position on another bed with her arms abducted and appropriately padded.  She was able to be sat up.  She was reprepped and draped.  At this point we began the anterior portion of the case.  The inframammary fold on each side was marked out as needed so was the boundaries of the breast symmetrically on each side.  The original mastectomy scar incision was opened on each side down into the capsule and the expander was removed.  Extensive capsulotomies were carried out to the boundaries of the breast on each side the right side was more contracted than the left side.  Hemostasis was ensured.  The inferior capsules on each side were thoroughly opened up.  At this point the flaps were retrieved into the anterior pockets.  Hemostasis was ensured.   Chemical angiography using the spy phi system was carried out with 5 cc of IV ICG dye injection and at 30 and 60 seconds the blood flow to the flaps were checked.  There was excellent flow throughout both labs.  At this point we inset the flaps and closed the skin-burying the flaps completely and set the patient up and came to the conclusion that we were able to utilize the skin on both sides and get good shape and size matches.  At this point we de-epithelialized the entire flap on both sides.  Hemostasis was ensured.  The pockets were irrigated out.  A 15 [place] HONEY drains were placed and secured.  We then used 0 PDS sutures circumferentially to hold the flap muscle appropriately in the superior and medial aspects.  The skin was then closed using 2-0 Monocryl suture in a deep dermal layer and 3-0 Stratafix suture in a running manner.  The 25% of the open wound on the back was closed using 0 PDS suture in a deep layer followed by 3-0 Stratafix suture in the skin.  Appropriate pernio dressings as well as HONEY drain dressings were placed.  The patient was placed in slings keeping the arms abducted.  At the end of the case the patient was stable, and there was no evidence for heat bleeding. The patient tolerated the procedure well. All counts were correct at the end of the case. The patient was extubated and sent to recovery room in stable condition.

## 2024-06-13 NOTE — ANESTHESIA PROCEDURE NOTES
Pectoralis Procedure Note    Pre-Procedure   Staff -        Anesthesiologist:  Aren Mackey MD       Resident/Fellow: Sylvie Valero DO       Performed By: resident       Location: pre-op       Procedure Start/Stop Times: 6/13/2024 6:35 AM and 6/13/2024 6:40 AM       Pre-Anesthestic Checklist: patient identified, IV checked, site marked, risks and benefits discussed, informed consent, monitors and equipment checked, pre-op evaluation, at physician/surgeon's request and post-op pain management  Timeout:       Correct Patient: Yes        Correct Procedure: Yes        Correct Site: Yes        Correct Position: Yes        Correct Laterality: Yes        Site Marked: Yes  Procedure Documentation  Procedure: Pectoralis             Pectoralis II and Pectoralis I       Diagnosis: POST OPERATIVE PAIN CONTROL       Laterality: bilateral       Patient Position: supine       Patient Prep/Sterile Barriers: sterile gloves, mask       Skin prep: Chloraprep       Needle Type: insulated       Needle Gauge: 21.        Needle Length (Inches): 4        Ultrasound guided       1. Ultrasound was used to identify targeted nerve, plexus, vascular marker, or fascial plane and place a needle adjacent to it in real-time.       2. Ultrasound was used to visualize the spread of anesthetic in close proximity to the above referenced structure.       3. A permanent image is entered into the patient's record.    Assessment/Narrative         The placement was negative for: blood aspirated, painful injection and site bleeding       Paresthesias: No.       Bolus given via needle..        Secured via.        Insertion/Infusion Method: Single Shot       Complications: none    Medication(s) Administered   Bupivacaine 0.25% PF (Infiltration) - Infiltration   20 mL - 6/13/2024 6:35:00 AM  Bupivacaine liposome (Exparel) 1.3% LA inj susp (Infiltration) - Infiltration   20 mL - 6/13/2024 6:35:00 AM  Medication Administration Time: 6/13/2024 6:35 AM      FOR  "[facility]) ONLY:   Pain Team Contact information: please page the Pain Team Via [employer]. Search \"Pain\". During daytime hours, please page the attending first. At night please page the resident first.      "

## 2024-06-14 ENCOUNTER — APPOINTMENT (OUTPATIENT)
Dept: OCCUPATIONAL THERAPY | Facility: CLINIC | Age: 44
End: 2024-06-14
Payer: COMMERCIAL

## 2024-06-14 LAB — GLUCOSE BLDC GLUCOMTR-MCNC: 106 MG/DL (ref 70–99)

## 2024-06-14 PROCEDURE — 97165 OT EVAL LOW COMPLEX 30 MIN: CPT | Mod: GO | Performed by: OCCUPATIONAL THERAPIST

## 2024-06-14 PROCEDURE — 97535 SELF CARE MNGMENT TRAINING: CPT | Mod: GO | Performed by: OCCUPATIONAL THERAPIST

## 2024-06-14 PROCEDURE — 97530 THERAPEUTIC ACTIVITIES: CPT | Mod: GO | Performed by: OCCUPATIONAL THERAPIST

## 2024-06-14 PROCEDURE — 258N000003 HC RX IP 258 OP 636: Performed by: STUDENT IN AN ORGANIZED HEALTH CARE EDUCATION/TRAINING PROGRAM

## 2024-06-14 PROCEDURE — 120N000002 HC R&B MED SURG/OB UMMC

## 2024-06-14 PROCEDURE — 999N000147 HC STATISTIC PT IP EVAL DEFER

## 2024-06-14 PROCEDURE — 250N000013 HC RX MED GY IP 250 OP 250 PS 637: Performed by: STUDENT IN AN ORGANIZED HEALTH CARE EDUCATION/TRAINING PROGRAM

## 2024-06-14 PROCEDURE — 250N000011 HC RX IP 250 OP 636: Mod: JZ | Performed by: STUDENT IN AN ORGANIZED HEALTH CARE EDUCATION/TRAINING PROGRAM

## 2024-06-14 RX ORDER — HYDROMORPHONE HYDROCHLORIDE 2 MG/1
2 TABLET ORAL EVERY 6 HOURS PRN
Qty: 15 TABLET | Refills: 0 | Status: SHIPPED | OUTPATIENT
Start: 2024-06-14 | End: 2024-06-18

## 2024-06-14 RX ORDER — ACETAMINOPHEN 325 MG/1
650 TABLET ORAL EVERY 4 HOURS PRN
Qty: 30 TABLET | Refills: 0 | Status: SHIPPED | OUTPATIENT
Start: 2024-06-17

## 2024-06-14 RX ORDER — POLYETHYLENE GLYCOL 3350 17 G/17G
17 POWDER, FOR SOLUTION ORAL DAILY
Qty: 510 G | Refills: 0 | Status: SHIPPED | OUTPATIENT
Start: 2024-06-14

## 2024-06-14 RX ORDER — AMOXICILLIN 250 MG
1 CAPSULE ORAL 2 TIMES DAILY
Qty: 10 TABLET | Refills: 0 | Status: SHIPPED | OUTPATIENT
Start: 2024-06-14

## 2024-06-14 RX ORDER — ONDANSETRON 4 MG/1
4 TABLET, ORALLY DISINTEGRATING ORAL EVERY 6 HOURS PRN
Qty: 15 TABLET | Refills: 0 | Status: SHIPPED | OUTPATIENT
Start: 2024-06-14

## 2024-06-14 RX ORDER — METHOCARBAMOL 750 MG/1
750 TABLET, FILM COATED ORAL EVERY 6 HOURS PRN
Qty: 30 TABLET | Refills: 0 | Status: SHIPPED | OUTPATIENT
Start: 2024-06-14 | End: 2024-06-18

## 2024-06-14 RX ADMIN — ACETAMINOPHEN 975 MG: 325 TABLET, FILM COATED ORAL at 16:08

## 2024-06-14 RX ADMIN — POLYETHYLENE GLYCOL 3350 17 G: 17 POWDER, FOR SOLUTION ORAL at 08:09

## 2024-06-14 RX ADMIN — POTASSIUM CHLORIDE, DEXTROSE MONOHYDRATE AND SODIUM CHLORIDE: 150; 5; 450 INJECTION, SOLUTION INTRAVENOUS at 03:54

## 2024-06-14 RX ADMIN — HYDROMORPHONE HYDROCHLORIDE 4 MG: 4 TABLET ORAL at 03:55

## 2024-06-14 RX ADMIN — HYDROMORPHONE HYDROCHLORIDE 4 MG: 4 TABLET ORAL at 08:10

## 2024-06-14 RX ADMIN — HYDROMORPHONE HYDROCHLORIDE 4 MG: 4 TABLET ORAL at 18:22

## 2024-06-14 RX ADMIN — DOCUSATE SODIUM 50 MG AND SENNOSIDES 8.6 MG 1 TABLET: 8.6; 5 TABLET, FILM COATED ORAL at 20:48

## 2024-06-14 RX ADMIN — HYDROMORPHONE HYDROCHLORIDE 4 MG: 4 TABLET ORAL at 13:54

## 2024-06-14 RX ADMIN — HYDROMORPHONE HYDROCHLORIDE 0.2 MG: 0.2 INJECTION, SOLUTION INTRAMUSCULAR; INTRAVENOUS; SUBCUTANEOUS at 10:24

## 2024-06-14 RX ADMIN — DOCUSATE SODIUM 50 MG AND SENNOSIDES 8.6 MG 1 TABLET: 8.6; 5 TABLET, FILM COATED ORAL at 08:11

## 2024-06-14 RX ADMIN — ACETAMINOPHEN 975 MG: 325 TABLET, FILM COATED ORAL at 08:09

## 2024-06-14 ASSESSMENT — ACTIVITIES OF DAILY LIVING (ADL)
ADLS_ACUITY_SCORE: 29

## 2024-06-14 NOTE — PLAN OF CARE
[id number]    VSS, afebrile and on RA. A&Ox4. Denies SOB/nausea. Rated max 4/10, gave PO dilaudid x1 with effectiveness. Ambulated in hallway w/ SBA. Bowel sounds hypoactive, not passing flatus. HONEY x4 stripped Q4. Fair appetite. Continue w/ POC.       Goal Outcome Evaluation:      Plan of Care Reviewed With: patient    Overall Patient Progress: no changeOverall Patient Progress: no change    Outcome Evaluation: Ambulated in hallway

## 2024-06-14 NOTE — PHARMACY-ADMISSION MEDICATION HISTORY
Pharmacist Admission Medication History    Admission medication history is complete. The information provided in this note is only as accurate as the sources available at the time of the update.    Information Source(s):  Pre-op RN assessment, Dispense history (Surescripts)    Letrozole hasn't started yet.    Medication History Completed By: Pepper Burris [facility] 6/13/2024 7:26 PM    PTA Med List   Medication Sig Last Dose    escitalopram (LEXAPRO) 10 MG tablet Take 1 tablet (10 mg) by mouth daily Needs appointment for further refills. More than a month    omeprazole (PRILOSEC) 20 MG DR capsule Take 1 capsule (20 mg) by mouth daily More than a month    triamcinolone (KENALOG) 0.1 % external ointment Apply topically 2 times daily Apply twice daily as needed to rash if it flares More than a month

## 2024-06-14 NOTE — PROGRESS NOTES
06/14/24 1000   Appointment Info   Signing Clinician's Name / Credentials (OT) Dev Decker OTR/L   Rehab Comments (OT) 30-40 degrees shoulder flexion, must be in slings at all times   Living Environment   People in Home child(racheal), dependent;parent(s)  (family able to stay)   Current Living Arrangements house   Home Accessibility stairs to enter home   Number of Stairs, Main Entrance 7   Stair Railings, Main Entrance railings safe and in good condition   Transportation Anticipated family or friend will provide   Living Environment Comments Pt has teenage children at home, mom is planning on staying with her at discharge, 7 stairs into home and then all needs met on main level   Self-Care   Usual Activity Tolerance good   Current Activity Tolerance fair   Equipment Currently Used at Home none   Fall history within last six months no   Activity/Exercise/Self-Care Comment Pt reports active at baseline   Instrumental Activities of Daily Living (IADL)   Previous Responsibilities meal prep;housekeeping;laundry;work;driving;medication management   IADL Comments can receive assistance   General Information   Onset of Illness/Injury or Date of Surgery 06/13/24   Referring Physician Nati Doe   Additional Occupational Profile Info/Pertinent History of Current Problem 44F hx breast cancer s/p mastectomy now s/p Bilateral breast reconstruction with bilateral latissimus flap  on 6/13/24 recovering as expected.   Existing Precautions/Restrictions weight bearing;other (see comments)  (shoulder abduction at 30-40, must be in slings at all times)   Left Upper Extremity (Weight-bearing Status) non weight-bearing (NWB)   Right Upper Extremity (Weight-bearing Status) non weight-bearing (NWB)   Cognitive Status Examination   Orientation Status orientation to person, place and time   Visual Perception   Visual Impairment/Limitations WFL   Pain Assessment   Patient Currently in Pain Yes, see Vital Sign flowsheet   Range of Motion  Comprehensive   Comment, General Range of Motion no ROM at this time due to precautions   Strength Comprehensive (MMT)   Comment, General Manual Muscle Testing (MMT) Assessment no strength tested due to precautions   Coordination   Coordination Comments limited due to slings on both arms   Bed Mobility   Comment (Bed Mobility) min A for bed mobility to EOB   Transfers   Transfer Comments SBA to stand   Activities of Daily Living   BADL Assessment/Intervention   (assistance with all ADL)   Clinical Impression   Criteria for Skilled Therapeutic Interventions Met (OT) Yes, treatment indicated   OT Diagnosis decreased ADL independence   OT Problem List-Impairments impacting ADL problems related to;activity tolerance impaired;post-surgical precautions;pain   Assessment of Occupational Performance 5 or more Performance Deficits   Identified Performance Deficits dressing, bathing, toileting, grooming, bed mobility, IADL   Planned Therapy Interventions (OT) ADL retraining;IADL retraining;orthotic fitting/training;transfer training;home program guidelines;progressive activity/exercise   Clinical Decision Making Complexity (OT) detailed assessment/moderate complexity   Risk & Benefits of therapy have been explained evaluation/treatment results reviewed;patient   Clinical Impression Comments Pt with post surgical precautions of arms in slings at all times and no shoulder abduction other than 30-40 degrees.  Pt would benefit from skilled OT services to increase safety and independence with ADL   OT Total Evaluation Time   OT Eval, Low Complexity Minutes (61168) 8   OT Goals   Therapy Frequency (OT) Daily   OT Predicted Duration/Target Date for Goal Attainment 06/18/24   OT Goals Upper Body Dressing;Toilet Transfer/Toileting;Transfers;Bed Mobility   OT: Upper Body Dressing Moderate assist;Maximum assist;within precautions;including set-up/clothing retrieval   OT: Bed Mobility Supervision/stand-by assist;Minimal  assist;rolling;supine to/from sitting   OT: Transfer Supervision/stand-by assist;within precautions   OT: Toilet Transfer/Toileting Supervision/stand-by assist;cleaning and garment management;toilet transfer;within precautions   Self-Care/Home Management   Self-Care/Home Mgmt/ADL, Compensatory, Meal Prep Minutes (30405) 10   Treatment Detail/Skilled Intervention Therapist proividing education on compensatory strategies for ADL completio during session.  Encouraged looser items and asking for help, encouraged minimal engagement in activity during session.  Pt verbalizing understanding of this education.  Pt then able to complete toilet transfer with SBA, dependent for periarea clean up.  Therapist encouraged use of bidet attachement at home, Pt reports having this available as well.   Therapeutic Activities   Therapeutic Activity Minutes (41222) 24   Symptoms noted during/after treatment fatigue   Treatment Detail/Skilled Intervention Therapist providing education on post surgical precautions and education on need to wear slings at all times.  Therapist providing education on abduction precautions and to keep arms at 30-40 degrees at all times.  Pt completed bed mobility with SBA to min A for mgmt of legs and to swing legs over the edge of bed.  Pt taking extra time throughout.  Pt then able to complete sit to stand with SBA from therapist.  Pt ambulating ~150 ft with SBA from therapist.  Pt taking brief standing rest breka during session.  Pt then returning to bed wiht mod A for bringing legs into bed during session.   OT Discharge Planning   OT Plan ambulation, stairs, dressing tips, bed mobility   OT Discharge Recommendation (DC Rec) home with home care occupational therapy   OT Rationale for DC Rec Pt has good family support available at home with ADL and IADL, may benefit from  OT to provide recommendations on accomodations and set-up to ensure safety at home   Total Session Time   Timed Code Treatment Minutes  34   Total Session Time (sum of timed and untimed services) 42

## 2024-06-14 NOTE — PROGRESS NOTES
[id number] VSS. A&Ox4. C/O mild pain, managed w/ PRN oral dilaudid and scheduled tylenol. Denies nausea. Jpx4 w/ stripping Q4H, output bright red/bloody. Pt dangled, stood at side of bed and used bedside commode. Bilateral arm slings to be worn at all times. Pt currently resting comfortably, no other complaints at this time. Will continue w/ POC.

## 2024-06-14 NOTE — PLAN OF CARE
Goal Outcome Evaluation:    Given PRN PO Dilaudid 4 mg once for back and breast pain. Patient tolerating regular diet, denies nausea.  Pt ambulating in unit hallways once with assist of one person. Pt getting up to bathroom and voiding spontaneous. HONEY drains. RLQ draining serosanguineous output about 14 mL. PIV SL. Arm sling at times. Mom at the bedside.

## 2024-06-14 NOTE — PLAN OF CARE
Physical Therapy: Orders received. Chart reviewed and discussed with care team.? Physical Therapy not indicated due to patient mobilizing well despite surgery. OT working with patient and able to address any discharge needs at this point.? Defer discharge recommendations to OT and medical team.? Will complete orders as there are no acute care PT needs at this time.

## 2024-06-14 NOTE — PROGRESS NOTES
Nursing Focus: Admission    D: Patient admitted/transferred from PACU via transport for inpatient care.      I: Upon arrival to the unit patient was oriented to room, unit, and call light. Patient s height, weight, and vital signs were obtained. Allergies reviewed and allergy band applied. MD notified of patient s arrival on the unit. Adult AVS completed. Head to toe assessment completed. Education assessment completed. Care plan initiated.     A: Vital signs stable upon admission. Patient rates pain at 4/10. Two RN skin assessment completed. Second RN was Moreno Ansari. Significant Skin Findings include bilateral breast incisions, bilateral back incisions, HONEY drain x4.     P: Continue to monitor patient s status and intervene as needed. Continue with plan of care. Notify MD with any concerns or changes in patient status.

## 2024-06-14 NOTE — DISCHARGE INSTRUCTIONS
LATISSIMUS FLAP BREAST RECONSTRUCTION POST-OPERATIVE INSTRUCTIONS    Instructions  What are my post-operative instructions?  ?  Have someone drive you home after surgery and help you at home for 1-2      days.  ?  Get plenty of rest.  ?  Follow balanced diet.  ?  Decreased activity may promote constipation, so you may want to add      more raw fruit to your diet, and be sure to increase fluid intake.  ? Take pain medication as prescribed.   ?  Do not drink alcohol when taking pain medications.  ?  Even when not taking pain medications, no alcohol for 3 weeks as it      causes fluid retention.  ?  If you are taking vitamins with iron, resume these as tolerated.  ?  Do not smoke. Smoking delays healing and increases the risk of      complications.    Activities  ?  Start walking as soon as possible, this helps to reduce swelling and     lowers the chance of blood clots.  ?  You may use your arms for activities of daily living for the first three     weeks, but do not push over your head until 3 weeks post-op. Keep your arm away from your body about 15-30 degrees to avoid flap compression. Wearing the sling with spacer can help with this while walking.   ?  Do not drive until you are no longer taking any pain medications     (narcotics).  ?  Unless stated on this form, discuss your time off work with your surgeon.  ?  No driving for 4 weeks. When abdominal area will allow for sudden      braking, you may resume driving.  ?  No heavy lifting for 6 to 8 weeks.    Incision Care  ?  You may shower 72 hours after surgery with drains in place and the open wound.  ?  No tub soaking, bathing,or swimming for 6 to 8 weeks.  ?  Avoid exposing scars to sun for at least 12 months.  ?  Always use a strong sunblock, if sun exposure is unavoidable (SPF 50 or     greater).  ?  Keep surgical tape on breast and back incisions on until it peels off.  ?  Keep incisions clean and inspect daily for signs of infection. Staples/stitches will  be taken out in clinic in 2-3 weeks.    What to Expect  ?  Maximum discomfort will occur the first few days following surgery; you      may experience incision discomfort and generalized discomfort in your      breasts and abdomen.  ?  Oozing can be expected.     Appearance  ?  The majority of swelling will subside in 3-4 weeks, but some swelling may      persist for up to 3 months.    Follow-Up Care  ?  Your 1st post-operative visit will be scheduled for about a week after       surgery. Some drains may be taken out during this visit.    Please note my office will call you 1-2 business days after your procedure to check up on how you're doing and to schedule your post-operative appointment.     When to Call:  ?  If you have increased swelling or bruising.  ?  If swelling and redness persist after a few days.  ?  If you have increased redness along the incision.  ?  If you have severe or increased pain not relieved by medication.  ?  If you have any side effects to medications; such as, rash, nausea,      headache, vomiting.  ?  If you have an oral temperature over 100.4 degrees.  ?  If you have any yellowish or greenish drainage from the incisions or      notice a foul odor.  ?  If you have bleeding from the incisions that is difficult to control with      light pressure.  ?  If you have loss of feeling or motion.    For Medical Questions, Please Call:  ?   [phone], Monday - Friday, 8 a.m. - 4:30 p.m.  ?   After hours and on weekends, call Hospital Paging at [phone] and       ask for the Plastic Surgery Resident on call.     Warm

## 2024-06-14 NOTE — PROGRESS NOTES
"Plastic Surgery Progress Note    A/P: 44F hx breast cancer s/p mastectomy now s/p Bilateral breast reconstruction with bilateral latissimus flap  on 6/13/24 recovering as expected.    Regular diet  NWB with B/l UE  30-40 degrees of abduction at shoulder at all times  Splint to remain in place at all times  PRN pain meds  Ambulate  Discontinue IVF  PT/ Ot  Likely discharge later today if ambulating ok and tolerating reg diet    Nati Doe MD- PGY1  Plastic Surgery Resident  Pager [phone]  ------------------------------------------------------------    S/24h: No acute events overnight. Got light headed standing up to commode. Otherwise pain tolerable doing well    O: BP 93/58 (BP Location: Left arm)   Pulse 70   Temp 98.8  F (37.1  C) (Oral)   Resp 16   Ht 1.626 m (5' 4\")   Wt 61.4 kg (135 lb 5.8 oz)   SpO2 100%   BMI 23.23 kg/m     Gen: no acute distress  CV: regular rate  Pulm: non-labored, symmetric chest expansion  Back: incisions c/d/I no signs of hematoma  Right breast incision c/d/I, soft, moderately tender, no signs of hematoma drain s/s  Left breast incision c/d/I, soft, moderately tender, no signs of hematoma drain s/s    Drain(s): all drains s/s    1 left back 48  2 right back 60  3 inferior left lateral chest 35  4 lateral chest 45    BMP  Recent Labs   Lab 06/14/24  0552 06/13/24  1344 06/13/24  0942 06/13/24  0638   * 174* 132* 199*     CBCNo lab results found in last 7 days.      "

## 2024-06-14 NOTE — PROGRESS NOTES
Patient was seen by PT this morning and walked as tolerated, gave IV 0.2 dilaudid after the walk, resting in bed, HONEY X 4 patent with bloody output and HONEY site dressing intact and no leaking.  Breast incision intact. Bilateral arm slings to be worn at all times, so will need assist with meals. Positive bowel sound but not passing gas yet. Rated her pain at 4-5/10, gave oxycodone and dilaudid tablets. Continue to monitor.

## 2024-06-15 ENCOUNTER — APPOINTMENT (OUTPATIENT)
Dept: OCCUPATIONAL THERAPY | Facility: CLINIC | Age: 44
End: 2024-06-15
Attending: PLASTIC SURGERY
Payer: COMMERCIAL

## 2024-06-15 LAB — GLUCOSE BLDC GLUCOMTR-MCNC: 91 MG/DL (ref 70–99)

## 2024-06-15 PROCEDURE — 97535 SELF CARE MNGMENT TRAINING: CPT | Mod: GO

## 2024-06-15 PROCEDURE — 97530 THERAPEUTIC ACTIVITIES: CPT | Mod: GO

## 2024-06-15 PROCEDURE — 250N000011 HC RX IP 250 OP 636: Performed by: STUDENT IN AN ORGANIZED HEALTH CARE EDUCATION/TRAINING PROGRAM

## 2024-06-15 PROCEDURE — 120N000002 HC R&B MED SURG/OB UMMC

## 2024-06-15 PROCEDURE — 250N000013 HC RX MED GY IP 250 OP 250 PS 637: Performed by: STUDENT IN AN ORGANIZED HEALTH CARE EDUCATION/TRAINING PROGRAM

## 2024-06-15 RX ADMIN — ONDANSETRON 4 MG: 4 TABLET, ORALLY DISINTEGRATING ORAL at 19:52

## 2024-06-15 RX ADMIN — HYDROMORPHONE HYDROCHLORIDE 4 MG: 4 TABLET ORAL at 17:18

## 2024-06-15 RX ADMIN — HYDROMORPHONE HYDROCHLORIDE 4 MG: 4 TABLET ORAL at 12:42

## 2024-06-15 RX ADMIN — DOCUSATE SODIUM 50 MG AND SENNOSIDES 8.6 MG 1 TABLET: 8.6; 5 TABLET, FILM COATED ORAL at 08:15

## 2024-06-15 RX ADMIN — ACETAMINOPHEN 975 MG: 325 TABLET, FILM COATED ORAL at 00:00

## 2024-06-15 RX ADMIN — POLYETHYLENE GLYCOL 3350 17 G: 17 POWDER, FOR SOLUTION ORAL at 08:15

## 2024-06-15 RX ADMIN — HYDROMORPHONE HYDROCHLORIDE 4 MG: 4 TABLET ORAL at 08:16

## 2024-06-15 RX ADMIN — DOCUSATE SODIUM 50 MG AND SENNOSIDES 8.6 MG 1 TABLET: 8.6; 5 TABLET, FILM COATED ORAL at 19:52

## 2024-06-15 RX ADMIN — HYDROMORPHONE HYDROCHLORIDE 4 MG: 4 TABLET ORAL at 04:12

## 2024-06-15 RX ADMIN — ACETAMINOPHEN 975 MG: 325 TABLET, FILM COATED ORAL at 08:15

## 2024-06-15 RX ADMIN — ONDANSETRON 4 MG: 4 TABLET, ORALLY DISINTEGRATING ORAL at 12:42

## 2024-06-15 RX ADMIN — METHOCARBAMOL 750 MG: 750 TABLET ORAL at 15:43

## 2024-06-15 RX ADMIN — HYDROMORPHONE HYDROCHLORIDE 4 MG: 4 TABLET ORAL at 21:08

## 2024-06-15 RX ADMIN — METHOCARBAMOL 750 MG: 750 TABLET ORAL at 08:15

## 2024-06-15 RX ADMIN — ACETAMINOPHEN 975 MG: 325 TABLET, FILM COATED ORAL at 15:43

## 2024-06-15 RX ADMIN — METHOCARBAMOL 750 MG: 750 TABLET ORAL at 21:47

## 2024-06-15 ASSESSMENT — ACTIVITIES OF DAILY LIVING (ADL)
ADLS_ACUITY_SCORE: 29
ADLS_ACUITY_SCORE: 31
DEPENDENT_IADLS:: CLEANING;COOKING;LAUNDRY;SHOPPING;MEAL PREPARATION;TRANSPORTATION
ADLS_ACUITY_SCORE: 31

## 2024-06-15 NOTE — PLAN OF CARE
Goal Outcome Evaluation: POD#2. Pt afebrile, vitals stable. IS enc. Pt sleeping between cares. Pt up to bsc this am with asst of 1, back to bed with asst of 2. Pt slow but steady. Rated pain upon waking up as a 7.  Pain managed with scheduled tylenol and prn oral dilaudid. Voided w/o difficulty. Abd round, +bs, +gas. HONEY x4 with small-scant output of s/s or serous output. Lungs dim in bases. Back latissimus incision V-shaped with steristrips and dermabond D/I. Arms in slings at all times. HOB 30 degrees with knees elevated. Mom at bedside supportive with cares. Cont to maintain pain control. Monitor return of bowel fxn.

## 2024-06-15 NOTE — CONSULTS
Care Management Initial Consult    General Information  Assessment completed with: Patient,    Type of CM/SW Visit: Initial Assessment    Primary Care Provider verified and updated as needed:  (states she sees whoever is available, added last seen PA to PCP for HC)   Readmission within the last 30 days: no previous admission in last 30 days      Reason for Consult: discharge planning  Advance Care Planning: Advance Care Planning Reviewed: no concerns identified (Copy Requested)          Communication Assessment  Patient's communication style: spoken language (English or Bilingual)    Hearing Difficulty or Deaf: no   Wear Glasses or Blind: yes    Cognitive  Cognitive/Neuro/Behavioral: WDL                      Living Environment:   People in home: child(racheal), dependent, spouse  [place], mother will also be staying with Pt  Current living Arrangements: house      Able to return to prior arrangements: yes       Family/Social Support:  Care provided by: spouse/significant other  Provides care for: child(racheal)  Marital Status:   , Parent(s)          Description of Support System: Supportive, Involved    Support Assessment: Adequate family and caregiver support    Current Resources:   Patient receiving home care services: No     Community Resources:    Equipment currently used at home: none  Supplies currently used at home: Incontinence Supplies    Employment/Financial:  Employment Status: employed full-time        Financial Concerns: none   Referral to Financial Worker: No       Does the patient's insurance plan have a 3 day qualifying hospital stay waiver?  Yes     Which insurance plan 3 day waiver is available? Alternative insurance waiver    Will the waiver be used for post-acute placement? No    Lifestyle & Psychosocial Needs:  Social Determinants of Health     Food Insecurity: Low Risk  (2/5/2024)    Food Insecurity     Within the past 12 months, did you worry that your food would run out before you got  money to buy more?: No     Within the past 12 months, did the food you bought just not last and you didn t have money to get more?: No   Depression: Not at risk (2/5/2024)    PHQ-2     PHQ-2 Score: 2   Housing Stability: Low Risk  (2/5/2024)    Housing Stability     Do you have housing? : Yes     Are you worried about losing your housing?: No   Tobacco Use: Medium Risk (6/13/2024)    Patient History     Smoking Tobacco Use: Former     Smokeless Tobacco Use: Never     Passive Exposure: Past   Financial Resource Strain: Low Risk  (2/5/2024)    Financial Resource Strain     Within the past 12 months, have you or your family members you live with been unable to get utilities (heat, electricity) when it was really needed?: No   Alcohol Use: Not on file   Transportation Needs: Low Risk  (2/5/2024)    Transportation Needs     Within the past 12 months, has lack of transportation kept you from medical appointments, getting your medicines, non-medical meetings or appointments, work, or from getting things that you need?: No   Physical Activity: Insufficiently Active (2/5/2024)    Exercise Vital Sign     Days of Exercise per Week: 1 day     Minutes of Exercise per Session: 10 min   Interpersonal Safety: Low Risk  (2/5/2024)    Interpersonal Safety     Do you feel physically and emotionally safe where you currently live?: Yes     Within the past 12 months, have you been hit, slapped, kicked or otherwise physically hurt by someone?: No     Within the past 12 months, have you been humiliated or emotionally abused in other ways by your partner or ex-partner?: No   Stress: Stress Concern Present (2/5/2024)    [employer] [place] [employer] - Occupational Stress Questionnaire     Feeling of Stress : To some extent   Social Connections: Unknown (2/5/2024)    Social Connection and Isolation Panel [NHANES]     Frequency of Communication with Friends and Family: Not on file     Frequency of Social Gatherings with Friends and  Family: Once a week     Attends Zoroastrian Services: Not on file     Active Member of Clubs or Organizations: Not on file     Attends Club or Organization Meetings: Not on file     Marital Status: Not on file   Health Literacy: Not on file       Functional Status:  Prior to admission patient needed assistance:   Dependent ADLs:: Positioning, Transfers, Grooming, Eating, Dressing, Bathing  Dependent IADLs:: Cleaning, Cooking, Laundry, Shopping, Meal Preparation, Transportation       Mental Health Status:  Mental Health Status: No Current Concerns       Chemical Dependency Status:  Chemical Dependency Status: No Current Concerns             Values/Beliefs:  Spiritual, Cultural Beliefs, Zoroastrian Practices, Values that affect care: no               Additional Information:  44F hx breast cancer s/p mastectomy now s/p Bilateral breast reconstruction with bilateral latissimus flap  on 6/13/24 recovering as expected.     RNCC met with Pt and her mother at the bedside. RNCC introduced role and reviewed current discharge planning. Pt has discharge recs for home OT. She will also discharge with 4 Jps and bilateral arm braces.     Pt was anticipated to discharge today however Pt is experiencing a lot of pain with movement. Anticipate discharge in the morning.    Pt lives at home with her spouse and 3 dependent children. Pt voices plan for caring for her kids and pets. Her mother is also in  town and will be staying with her post hospitalization. Pt reports her mom will transport her as she as a vehicle this is easier to get in and out of.     RNCC reviewed the home care referral process and let them know [facility] declined d/t payor source and additional referrals are pending .    RNCC will follow up in the morning and let Pt know if home care was found.     Care management will continue to follow and support safe discharge planning as needed.     Khadra Carrasquillo RN  [facility] Float   [phone]

## 2024-06-15 NOTE — PLAN OF CARE
[id number]:     A&O x4. Ax1 d/t bilateral arm slings. VSS on RA. Pt reports ongoing pain, reports relief with schd. Meds. Denies N/V or SOB. Last BM: PTA, pt voiding spontaneously with AUOP. Ambulated in halls earlier in the evening. HONEY drains stripped and emptied. Continue POC.

## 2024-06-15 NOTE — PLAN OF CARE
Goal Outcome Evaluation:      Plan of Care Reviewed With: patient, parent    Overall Patient Progress: no change    A&Ox4, AVSS. Intermittent nausea, emesis x1. Pain 5/10 controlled w/ po dilaudid, robaxin, and scheduled tylenol. HONEY x4 draining small amount of serosang, to bulb. Regular diet, vomited breakfast, will attempt to try dinner. AX1, ambulated halls today. New R. Arm sling delivered to patient. Discharge order is in but patient would like to wait until AM to go home. Continue w/ POC.

## 2024-06-15 NOTE — DISCHARGE SUMMARY
[facility] Discharge Summary    Azalea Mckeon MRN: [id number]   YOB: 1980 Age: 44 year old     Date of Admission:  6/13/2024  Date of Discharge::  June 16, 2024  Admitting Physician:  EDWARDO Ferreira MD  Discharge Physician:  Nati Doe MD  Primary Care Physician:         No Ref-Primary, Physician          Admission Diagnoses:   S/P breast reconstruction, bilateral [Z98.890]             Discharge Diagnosis:   Same           Procedures:   1.  Bilateral breast reconstruction with latissimus dorsi musculocutaneous flaps  2.  Intraoperative chemical angiography using the spy phi system.  This  included supervision of IV ICG dye injection and interpretation of the angiogram.  Indication of use was to evaluate the blood flow to the flaps after harvesting to ensure appropriate use of the flap and debridement.        Non-operative procedures:   None performed          Consultations:   PHYSICAL THERAPY ADULT IP CONSULT  OCCUPATIONAL THERAPY ADULT IP CONSULT  CARE MANAGEMENT / SOCIAL WORK IP CONSULT            Brief History of Illness:   Right-sided breast cancer, underwent bilateral mastectomy and expander based reconstruction with right-sided radiation and severe capsular contracture. Now ready for next stage of her reconstruction.            Hospital Course:   The patient underwent the above operation on 6/13/24, which she tolerated well without complications. She was transferred to the floor for routine postoperative care and the remainder of her hospitalization was unremarkable.        At the time of discharge, she was tolerating a regular diet, ambulating, voiding spontaneously without difficulty, and pain was controlled with oral pain medications. The patient was discharged home in stable and improved condition. She will follow up in clinic on 6/18/24.         Final Pathology Result:   NA         Medications Prior to Admission:     Medications Prior to Admission   Medication Sig Dispense  Refill Last Dose    escitalopram (LEXAPRO) 10 MG tablet Take 1 tablet (10 mg) by mouth daily Needs appointment for further refills. 90 tablet 0 More than a month    omeprazole (PRILOSEC) 20 MG DR capsule Take 1 capsule (20 mg) by mouth daily 30 capsule 0 More than a month    triamcinolone (KENALOG) 0.1 % external ointment Apply topically 2 times daily Apply twice daily as needed to rash if it flares 80 g 2 More than a month            Discharge Medications:     Current Discharge Medication List        START taking these medications    Details   acetaminophen (TYLENOL) 325 MG tablet Take 2 tablets (650 mg) by mouth every 4 hours as needed for other (For optimal non-opioid multimodal pain management to improve pain control.)  Qty: 30 tablet, Refills: 0    Associated Diagnoses: S/P breast reconstruction      HYDROmorphone (DILAUDID) 2 MG tablet Take 1 tablet (2 mg) by mouth every 6 hours as needed for pain  Qty: 15 tablet, Refills: 0    Associated Diagnoses: S/P breast reconstruction      methocarbamol (ROBAXIN) 750 MG tablet Take 1 tablet (750 mg) by mouth every 6 hours as needed for muscle spasms  Qty: 30 tablet, Refills: 0    Associated Diagnoses: S/P breast reconstruction      ondansetron (ZOFRAN ODT) 4 MG ODT tab Take 1 tablet (4 mg) by mouth every 6 hours as needed for nausea or vomiting  Qty: 15 tablet, Refills: 0    Associated Diagnoses: S/P breast reconstruction      polyethylene glycol (MIRALAX) 17 GM/Dose powder Take 17 g by mouth daily  Qty: 510 g, Refills: 0    Associated Diagnoses: S/P breast reconstruction      senna-docusate (SENOKOT-S/PERICOLACE) 8.6-50 MG tablet Take 1 tablet by mouth 2 times daily  Qty: 10 tablet, Refills: 0    Associated Diagnoses: S/P breast reconstruction           CONTINUE these medications which have NOT CHANGED    Details   escitalopram (LEXAPRO) 10 MG tablet Take 1 tablet (10 mg) by mouth daily Needs appointment for further refills.  Qty: 90 tablet, Refills: 0    Associated  Diagnoses: Depression, unspecified depression type; Anxiety      omeprazole (PRILOSEC) 20 MG DR capsule Take 1 capsule (20 mg) by mouth daily  Qty: 30 capsule, Refills: 0    Associated Diagnoses: Nausea      triamcinolone (KENALOG) 0.1 % external ointment Apply topically 2 times daily Apply twice daily as needed to rash if it flares  Qty: 80 g, Refills: 2    Associated Diagnoses: Bullous rash      letrozole (FEMARA) 2.5 MG tablet Take 1 tablet (2.5 mg) by mouth daily  Qty: 90 tablet, Refills: 3    Associated Diagnoses: Malignant neoplasm of overlapping sites of right breast in female, estrogen receptor positive (H)                  Day of Discharge Physical Exam:   Temp:  [98.8  F (37.1  C)-99.1  F (37.3  C)] 99  F (37.2  C)  Pulse:  [74-87] 74  Resp:  [16] 16  BP: ()/(59-61) 101/59  SpO2:  [97 %-99 %] 98 %  Gen: no acute distress  CV: regular rate  Pulm: non-labored, symmetric chest expansion  Back: incisions c/d/I no signs of hematoma, skin soft, non fluctuant  Right breast incision c/d/I, soft, minimally tender, no signs of hematoma drain s/s  Left breast incision c/d/I, soft, minimally tender, no signs of hematoma drain s/s     Drain(s): all drains s/s     1 left back 40 (37) (48)  2 right back 95 (83) (60)  3 inferior left lateral chest 25 (37) (35)  4 lateral chest 40 (58) (45)          Discharge Instructions and Follow-Up:        Reason for your hospital stay    Breast reconstruction     Activity    Activities  -  Start walking as soon as possible, this helps to reduce swelling and     lowers the chance of blood clots.  -  You may use your arms for activities of daily living for the first three     weeks, but do not push over your head until 3 weeks post-op. Keep your arm away from your body about 15-30 degrees to avoid flap compression. Wearing the sling with spacer can help with this while walking.   -  Do not drive until you are no longer taking any pain medications     (narcotics).  -  Unless stated  on this form, discuss your time off work with your surgeon.  -  No driving for 4 weeks. When abdominal area will allow for sudden      braking, you may resume driving.  -  No heavy lifting for 6 to 8 weeks.     Follow Up ([facility])    A follow up appointment has been made with Dr. Ferreira's clinic on 6/18/24 at the [facility] [address] [facility].     Appointments on [place] and/or [facility] (with [facility] or [facility] provider or service). Call [phone] if you haven't heard regarding these appointments within 7 days of discharge.     Wrist/Arm/Hand Bracking Supplies Order Sling (ultra sling); Right    Bracing Documentation:   Patient requires the use of the ordered bracing device due to following medical need/condition: latissimus flap surgery.    I, the undersigned, certify that the above prescribed supplies are medically necessary for this patient and is both reasonable and necessary in reference to accepted standards of medical and necessary in reference to accepted standards of medical practice in the treatment of this patient's condition and is not prescribed as a convenience.     Diet    Follow this diet upon discharge: Orders Placed This Encounter      Regular Diet Adult            Discharge Disposition:     Discharged to home      Condition at discharge: Stable      Patient discussed with staff on day of discharge.    Nati Doe MD  Plastic Surgery PGY1

## 2024-06-16 ENCOUNTER — APPOINTMENT (OUTPATIENT)
Dept: OCCUPATIONAL THERAPY | Facility: CLINIC | Age: 44
End: 2024-06-16
Attending: PLASTIC SURGERY
Payer: COMMERCIAL

## 2024-06-16 VITALS
BODY MASS INDEX: 23.73 KG/M2 | WEIGHT: 139 LBS | DIASTOLIC BLOOD PRESSURE: 59 MMHG | SYSTOLIC BLOOD PRESSURE: 101 MMHG | RESPIRATION RATE: 16 BRPM | HEART RATE: 74 BPM | HEIGHT: 64 IN | OXYGEN SATURATION: 98 % | TEMPERATURE: 99 F

## 2024-06-16 PROCEDURE — 97535 SELF CARE MNGMENT TRAINING: CPT | Mod: GO

## 2024-06-16 PROCEDURE — 97530 THERAPEUTIC ACTIVITIES: CPT | Mod: GO

## 2024-06-16 PROCEDURE — 250N000013 HC RX MED GY IP 250 OP 250 PS 637: Performed by: STUDENT IN AN ORGANIZED HEALTH CARE EDUCATION/TRAINING PROGRAM

## 2024-06-16 RX ADMIN — DOCUSATE SODIUM 50 MG AND SENNOSIDES 8.6 MG 1 TABLET: 8.6; 5 TABLET, FILM COATED ORAL at 07:31

## 2024-06-16 RX ADMIN — METHOCARBAMOL 750 MG: 750 TABLET ORAL at 04:59

## 2024-06-16 RX ADMIN — POLYETHYLENE GLYCOL 3350 17 G: 17 POWDER, FOR SOLUTION ORAL at 07:31

## 2024-06-16 RX ADMIN — ACETAMINOPHEN 975 MG: 325 TABLET, FILM COATED ORAL at 07:31

## 2024-06-16 RX ADMIN — METHOCARBAMOL 750 MG: 750 TABLET ORAL at 11:23

## 2024-06-16 RX ADMIN — ACETAMINOPHEN 975 MG: 325 TABLET, FILM COATED ORAL at 00:07

## 2024-06-16 RX ADMIN — HYDROMORPHONE HYDROCHLORIDE 4 MG: 4 TABLET ORAL at 07:31

## 2024-06-16 RX ADMIN — HYDROMORPHONE HYDROCHLORIDE 4 MG: 4 TABLET ORAL at 01:27

## 2024-06-16 ASSESSMENT — ACTIVITIES OF DAILY LIVING (ADL)
ADLS_ACUITY_SCORE: 31

## 2024-06-16 NOTE — PROGRESS NOTES
Nursing Focus: Discharge    D: Patient discharged to home at 1145. Patient accompanied by mom.    I: Discharge prescriptions sent to discharge pharmacy to be filled. All discharge medications and instructions reviewed with patient. Patient instructed to call clinic triage nurse if she experiences a fever >100.4, uncontrolled nausea, vomiting, diarrhea, or pain; or experiences any signs or symptoms of bleeding. Other phone numbers to call with questions or concerns after discharge reviewed. PIV removed. Education completed.    A: Patient verbalized understanding of discharge medications and instructions. Patient will  medications at discharge pharmacy.     P: Patient to follow-up in clinic on June 18th with Urvashi Jefferson.

## 2024-06-16 NOTE — PLAN OF CARE
Occupational Therapy Discharge Summary    Reason for therapy discharge:    All goals and outcomes met, no further needs identified.    Progress towards therapy goal(s). See goals on Care Plan in [facility] electronic health record for goal details.  Goals met    Therapy recommendation(s):    Continued therapy is recommended.  Rationale/Recommendations:  may benefit from  OT to provide recommendations on accomodations and set-up to ensure safety in home env while NWB BUE and ongoing pain mgmt. Once cleared medically, pt will benefit from OP therapies to progress BUE ROM, pain mgmt, and strengthening post op..

## 2024-06-16 NOTE — PROGRESS NOTES
"Plastic Surgery Progress Note    A/P: 44F hx breast cancer s/p mastectomy now s/p Bilateral breast reconstruction with bilateral latissimus flap  on 6/13/24 recovering as expected.    Regular diet  NWB with B/l UE  30-40 degrees of abduction at shoulder at all times  Splint to remain in place at all times  PRN pain meds  Ambulate  Discontinue IVF  PT/ Ot  Likely discharge later today if ambulating ok and tolerating reg diet    Nati Doe MD- PGY1  Plastic Surgery Resident  Pager [phone]  ------------------------------------------------------------    S/24h: No acute events overnight. Having back spasms and threw up breakfast. Wants to stay another night    O: /59 (BP Location: Left arm)   Pulse 74   Temp 99  F (37.2  C) (Oral)   Resp 16   Ht 1.626 m (5' 4\")   Wt 63 kg (139 lb)   SpO2 98%   BMI 23.86 kg/m     Gen: no acute distress  CV: regular rate  Pulm: non-labored, symmetric chest expansion  Back: incisions c/d/I no signs of hematoma, skin soft, non fluctuant  Right breast incision c/d/I, soft, moderately tender, no signs of hematoma drain s/s  Left breast incision c/d/I, soft, minimally tender, no signs of hematoma drain s/s    Drain(s): all drains s/s    1 left back 37 (48)  2 right back 83 (60)  3 inferior left lateral chest 37 (35)  4 lateral chest 58 (45)     BMP  Recent Labs   Lab 06/15/24  0552 06/14/24  0552 06/13/24  1344 06/13/24  0942   GLC 91 106* 174* 132*     CBCNo lab results found in last 7 days.      "

## 2024-06-16 NOTE — PROGRESS NOTES
Care Management Discharge Note    Discharge Date: 06/16/2024       Discharge Disposition: Home Care- Pending Insurance     Discharge Services: OT/Nursing     Discharge DME: [employer]    Discharge Transportation: Mom    Private pay costs discussed: Not applicable    Does the patient's insurance plan have a 3 day qualifying hospital stay waiver?  Yes     Which insurance plan 3 day waiver is available? Alternative insurance waiver    Will the waiver be used for post-acute placement? No    PAS Confirmation Code:  n/a   Patient/family educated on Medicare website which has current facility and service quality ratings: yes    Education Provided on the Discharge Plan: Yes  Persons Notified of Discharge Plans: Pt, bedside RN   Patient/Family in Agreement with the Plan: yes    Handoff Referral Completed: Yes    Additional Information:  RNCC notified Pt is medically ready for discharge.     Pt tentatively has home care pending insurance confirmation on Monday. RNCC placed home care orders with a delayed SOC. RNCC met with Pt at the bedside and explained HC is tentative and if insurance benefits can be confirmed they home care company will contact her directly.    RNCC updated MD with home care plan.    Pt's mother will transport home and at bedside,.    No other discharge needs identified at this time,.     Khadra Carrasquillo RN  RN[facility] Float   [phone]

## 2024-06-16 NOTE — PLAN OF CARE
Goal Outcome Evaluation:  Pod# 2  Assumed care from [id number]  Alert. RA. Assist x 1. Reg diet - total feeds. Reports nausea. Pain tolerated w/ robaxin and dilaudid. HONEY X 4. Arm braces in place. Plan to discharge tomorrow. Mom at bedside.     Plan of Care Reviewed With: patient    Overall Patient Progress: no changeOverall Patient Progress: no change

## 2024-06-18 ENCOUNTER — TELEPHONE (OUTPATIENT)
Dept: SURGERY | Facility: CLINIC | Age: 44
End: 2024-06-18

## 2024-06-18 ENCOUNTER — OFFICE VISIT (OUTPATIENT)
Dept: PLASTIC SURGERY | Facility: CLINIC | Age: 44
End: 2024-06-18
Attending: PLASTIC SURGERY
Payer: COMMERCIAL

## 2024-06-18 VITALS
TEMPERATURE: 98.3 F | OXYGEN SATURATION: 100 % | WEIGHT: 136.8 LBS | HEART RATE: 79 BPM | BODY MASS INDEX: 23.48 KG/M2 | SYSTOLIC BLOOD PRESSURE: 111 MMHG | DIASTOLIC BLOOD PRESSURE: 75 MMHG

## 2024-06-18 DIAGNOSIS — Z98.890 S/P BREAST RECONSTRUCTION: Primary | ICD-10-CM

## 2024-06-18 PROCEDURE — 99024 POSTOP FOLLOW-UP VISIT: CPT | Performed by: PHYSICIAN ASSISTANT

## 2024-06-18 RX ORDER — METHOCARBAMOL 750 MG/1
750 TABLET, FILM COATED ORAL EVERY 6 HOURS PRN
Qty: 30 TABLET | Refills: 0 | Status: SHIPPED | OUTPATIENT
Start: 2024-06-18

## 2024-06-18 ASSESSMENT — PAIN SCALES - GENERAL: PAINLEVEL: SEVERE PAIN (6)

## 2024-06-18 NOTE — PROGRESS NOTES
Plastic Surgery Outpatient Visit    ID: Azalea Mckeon is a 44 year old female with PMH R breast cancer, bilateral mastectomy with TE, R XRT with severe capsular contracture, now s/p bilateral TE removal, pedicled LD musculocutaneous flaps 6/13 with Dr. Ferreira.     S: She is doing ok but is currently uncomfortable, having a lot of back spasms. Emptying drains once daily but did not do a drain log or record output. This was not made clear to them at discharge. Taking tylenol, robaxin and oxy when needed. Trying to space out oxys.     O:  /75 (BP Location: Right arm, Patient Position: Sitting, Cuff Size: Adult Regular)   Pulse 79   Temp 98.3  F (36.8  C) (Oral)   Wt 62.1 kg (136 lb 12.8 oz)   SpO2 100%   BMI 23.48 kg/m     General: appears uncomfortable, crying at times  Chest: bilateral chest incisions c/d/I. Skin intact. No significant swelling or ecchymosis.   Back: incisions c/d/I with tape intact. Midback near tape/glue there is a 1.5cm intact blister.   All Jps with serosanginout output 25-45cc    A/P:  -overall surgical sites are healing well  -she appears uncomfortable. Rx for robaxin sent as this is most helpful. Will add 600mg ibuprofen every 6 hours to help with pain as well. Will continue to wean oxy as able  -drain log provided, they will start to record output  -ok to stop arm abduction braces, continue to avoid putting prolonged pressure on upper lateral chest area, can use pillows while in sitting on couch/bed. Continue low activity level. No heavy lifting  -RTC next week in [place] for drain removal of any that meet criteria    Urvashi Jefferson PA-C  Plastic and Reconstructive Surgery    30 minutes spent on the date of the encounter doing chart review, history and physical, dressing changes, documentation and further activity as noted above.

## 2024-06-18 NOTE — LETTER
6/18/2024       RE: Azalea Mckeon  [address] [address] [address]     Dear Colleague,    Thank you for referring your patient, Azalea Mckeon, to the [facility]ERY [facility] [place] at [facility]. Please see a copy of my visit note below.    Plastic Surgery Outpatient Visit    ID: Azalea Mckeon is a 44 year old female with PMH R breast cancer, bilateral mastectomy with TE, R XRT with severe capsular contracture, now s/p bilateral TE removal, pedicled LD musculocutaneous flaps 6/13 with Dr. Ferreira.     S: She is doing ok but is currently uncomfortable, having a lot of back spasms. Emptying drains once daily but did not do a drain log or record output. This was not made clear to them at discharge. Taking tylenol, robaxin and oxy when needed. Trying to space out oxys.     O:  /75 (BP Location: Right arm, Patient Position: Sitting, Cuff Size: Adult Regular)   Pulse 79   Temp 98.3  F (36.8  C) (Oral)   Wt 62.1 kg (136 lb 12.8 oz)   SpO2 100%   BMI 23.48 kg/m     General: appears uncomfortable, crying at times  Chest: bilateral chest incisions c/d/I. Skin intact. No significant swelling or ecchymosis.   Back: incisions c/d/I with tape intact. Midback near tape/glue there is a 1.5cm intact blister.   All Jps with serosanginout output 25-45cc    A/P:  -overall surgical sites are healing well  -she appears uncomfortable. Rx for robaxin sent as this is most helpful. Will add 600mg ibuprofen every 6 hours to help with pain as well. Will continue to wean oxy as able  -drain log provided, they will start to record output  -ok to stop arm abduction braces, continue to avoid putting prolonged pressure on upper lateral chest area, can use pillows while in sitting on couch/bed. Continue low activity level. No heavy lifting  -RTC next week in [place] for drain removal of any that meet criteria        30 minutes spent on the date of  the encounter doing chart review, history and physical, dressing changes, documentation and further activity as noted above.      Again, thank you for allowing me to participate in the care of your patient.      Sincerely,    Urvashi Jefferson PA-C

## 2024-06-18 NOTE — TELEPHONE ENCOUNTER
6/18 Called patient and left voicemail. Provided patient with [phone] as a call back number. Add patient to Delisa Ray PA-C schedule on 6/25/24 in [place] for drain removal.    [facility]   Dermatology, Surgery, Urology  [facility]        ----- Message from Thao Grewal LPN sent at 6/18/2024  1:12 PM CDT -----  Regarding: FW: Follow-up  Scheduling,    Please contact patient to see Delisa next week (6/25) for possible drain removal.

## 2024-06-20 ENCOUNTER — PATIENT OUTREACH (OUTPATIENT)
Dept: FAMILY MEDICINE | Facility: CLINIC | Age: 44
End: 2024-06-20
Payer: COMMERCIAL

## 2024-06-20 NOTE — TELEPHONE ENCOUNTER
Transitions of Care Outreach  Chief Complaint   Patient presents with    Hospital F/U       Most Recent Admission Date: 6/13/2024   Most Recent Admission Diagnosis: S/P breast reconstruction, bilateral - Z98.890     Most Recent Discharge Date: 6/16/2024   Most Recent Discharge Diagnosis: S/P breast reconstruction - Z98.890     Transitions of Care Assessment         Follow up Plan          Future Appointments   Date Time Provider Department Center   6/25/2024  9:40 AM [facility]   8/7/2024  3:30 PM [facility]       Outpatient Plan as outlined on AVS reviewed with patient.    For any urgent concerns, please contact our 24 hour nurse triage line: [phone] ([phone])       Mildred Julian RN

## 2024-06-24 ENCOUNTER — MEDICAL CORRESPONDENCE (OUTPATIENT)
Dept: HEALTH INFORMATION MANAGEMENT | Facility: CLINIC | Age: 44
End: 2024-06-24

## 2024-06-24 ENCOUNTER — TELEPHONE (OUTPATIENT)
Dept: FAMILY MEDICINE | Facility: CLINIC | Age: 44
End: 2024-06-24
Payer: COMMERCIAL

## 2024-06-24 NOTE — TELEPHONE ENCOUNTER
RUTH ANN Haskins from [facility] calling regarding start of care for pt. She has the following questions.     Lexapro - on med list - RN would like an order to have discontinued as pt does not have any medication in the home. RN also stated that pt would like a refill of medication if possible, advised that pt needs to be seen for further refills.    Pt is taking over the counter ibuprofen but it is not on the med list. [facility] is requesting a verbal order that is okay to take with Tylenol and appropriate dosing.    Routing to provider - Please see above and advise.     Thank you - Eli Ogden, MIGUELN, RN

## 2024-06-25 ENCOUNTER — OFFICE VISIT (OUTPATIENT)
Dept: SURGERY | Facility: CLINIC | Age: 44
End: 2024-06-25
Payer: COMMERCIAL

## 2024-06-25 DIAGNOSIS — Z98.890 S/P BREAST RECONSTRUCTION, BILATERAL: Primary | ICD-10-CM

## 2024-06-25 PROCEDURE — 99024 POSTOP FOLLOW-UP VISIT: CPT | Performed by: PHYSICIAN ASSISTANT

## 2024-06-25 NOTE — NURSING NOTE
Azalea Mckeon's goals for this visit include:   Chief Complaint   Patient presents with    Surgical Followup     Breast reconstruction, drain removal       She requests these members of her care team be copied on today's visit information: no    PCP: Jem Arredondo    Referring Provider:  Referred Self, MD  No address on file    There were no vitals taken for this visit.    Do you need any medication refills at today's visit? No    Thao Grewal LPN

## 2024-06-25 NOTE — TELEPHONE ENCOUNTER
This writer attempted to contact [facility] home [facility] RN on 06/25/24      Reason for call provider's message and left message.      If patient calls back:   Registered Nurse called. Follow Triage Call workflow        Dottie Garcia RN

## 2024-06-25 NOTE — PROGRESS NOTES
ID: Azalea Mckeon is a 44 year old female with PMH R breast cancer, bilateral mastectomy with TE, R XRT with severe capsular contracture, now s/p bilateral TE removal, pedicled LD musculocutaneous flaps 6/13 with Dr. Ferreira.      S: She is feeling better overall, but is having a lot of anxiety about drain removal as she has had bad experience with it in the past. She continues to wear her abduction splints and does not recall being told she could discontinue them. She has a small blister on her back from the Prineo tape located at the convergence of the 2 incisions.  It recently opened and she has been applying Bacitracin. Pain is a 2/10 and manageable. She has a home PT evaluation next week to help her regain arm ROM as she struggled with it after her mastectomy. Individual 24 hour drain outputs have been 5-10mL for the past 3-4 days.    O:   General: alert, pleasant, NAD.  Slightly nervous about impending drain removal, but otherwise calm and comfortable.  Chest: bilateral chest incisions c/d/I. Skin and Prineo intact. No significant swelling or ecchymosis.   Back: incisions c/d/I with tape intact. Midback near tape/glue there is a 1.5cm open blister.   All Jps with serosanginout output ~5mL     A/P:  -overall surgical sites are healing well. Back blister has opened but no surrounding cellulitis or drainage.  This was likely due to dermabond tape being stretched during application.   -All 4 Jps removed today without incident and she did quite well with distracting conversation and mom at her side.  -ok to stop arm abduction braces as of last week, so reminded her of that today, continue to avoid putting prolonged pressure on upper lateral chest area, can use pillows while in sitting on couch/bed for next week. May slightly increase activity level. No heavy lifting or prolonged soaking until 6 weeks post-op  -Discussed removal of Prineo tape around post-op week #3 and scar care/massage.  -Continue Bacitracin or  Aquaphor on open blister BID  -RTC in 4 weeks to discuss next steps.     Delisa Ray PA-C  Plastic and Reconstructive Surgery     30 minutes spent on the date of the encounter doing chart review, history and physical, dressing changes, documentation and further activity as noted above.

## 2024-06-25 NOTE — TELEPHONE ENCOUNTER
Divine from [facility] calling back.    Divine was updated on the below, no questions at this time, verbalized understanding.    Dottie Garcia RN

## 2024-06-25 NOTE — TELEPHONE ENCOUNTER
Patient was provided with refill of lexapro 4/29/24. Was provided 90 day supply. Should have medications or refills.     Okay with Tylenol at appropriate dosing.  1000 mg every 8 hours.  No more than 4000 mg/day.    Jem Arredondo PA-C

## 2024-06-25 NOTE — TELEPHONE ENCOUNTER
Will add 600mg ibuprofen every 6 hours to help with pain as well. (Note from plastic surgery follow up)   No more than 3200 mg per day.   Jem Arredondo PA-C

## 2024-06-25 NOTE — LETTER
6/25/2024      Azalea Mckeon  [address] [address] [address]      Dear Colleague,    Thank you for referring your patient, Azalea Mckeon, to the [facility]. Please see a copy of my visit note below.    ID: Azalea Mckeon is a 44 year old female with PMH R breast cancer, bilateral mastectomy with TE, R XRT with severe capsular contracture, now s/p bilateral TE removal, pedicled LD musculocutaneous flaps 6/13 with Dr. Ferreira.      S: She is feeling better overall, but is having a lot of anxiety about drain removal as she has had bad experience with it in the past. She continues to wear her abduction splints and does not recall being told she could discontinue them. She has a small blister on her back from the Prineo tape located at the convergence of the 2 incisions.  It recently opened and she has been applying Bacitracin. Pain is a 2/10 and manageable. She has a home PT evaluation next week to help her regain arm ROM as she struggled with it after her mastectomy. Individual 24 hour drain outputs have been 5-10mL for the past 3-4 days.    O:   General: alert, pleasant, NAD.  Slightly nervous about impending drain removal, but otherwise calm and comfortable.  Chest: bilateral chest incisions c/d/I. Skin and Prineo intact. No significant swelling or ecchymosis.   Back: incisions c/d/I with tape intact. Midback near tape/glue there is a 1.5cm open blister.   All Jps with serosanginout output ~5mL     A/P:  -overall surgical sites are healing well. Back blister has opened but no surrounding cellulitis or drainage.  This was likely due to dermabond tape being stretched during application.   -All 4 Jps removed today without incident and she did quite well with distracting conversation and mom at her side.  -ok to stop arm abduction braces as of last week, so reminded her of that today, continue to avoid putting prolonged pressure on upper lateral chest area, can use pillows while in  sitting on couch/bed for next week. May slightly increase activity level. No heavy lifting or prolonged soaking until 6 weeks post-op  -Discussed removal of Prineo tape around post-op week #3 and scar care/massage.  -Continue Bacitracin or Aquaphor on open blister BID  -RTC in 4 weeks to discuss next steps.     Delisa Ray PA-C  Plastic and Reconstructive Surgery     30 minutes spent on the date of the encounter doing chart review, history and physical, dressing changes, documentation and further activity as noted above.      Again, thank you for allowing me to participate in the care of your patient.        Sincerely,        Delisa Ray PA-C

## 2024-06-25 NOTE — TELEPHONE ENCOUNTER
Called RUTH ANN Haskins back with orders.   She was asking about ibuprofen dosing, not tylenol.    Routing to provider to advise.  Hayley RIVERAN, RN

## 2024-07-02 ENCOUNTER — TELEPHONE (OUTPATIENT)
Dept: FAMILY MEDICINE | Facility: CLINIC | Age: 44
End: 2024-07-02
Payer: COMMERCIAL

## 2024-07-02 NOTE — TELEPHONE ENCOUNTER
Reason for Call:  Form, our goal is to have forms completed with 72 hours, however, some forms may require a visit or additional information.    Type of letter, form or note:  Home Health Certification    Who is the form from?: Home care    Where did the form come from: form was faxed in    What clinic location was the form placed at?: [place]    Where the form was placed: Given to physician    What number is listed as a contact on the form?: [phone]       Additional comments: Placed in your basket    Call taken on 7/2/2024 at 7:13 AM by Khadra Dean MA

## 2024-07-03 NOTE — TELEPHONE ENCOUNTER
Called [facility] and informed ashley that Brooks is out of the office and will sign thi next week.Khadra Dean [facility]

## 2024-07-03 NOTE — TELEPHONE ENCOUNTER
Please notify that I am out of office. Will review forms on return 7/8/24.     Jem Arredondo PA-C

## 2024-07-08 ENCOUNTER — MEDICAL CORRESPONDENCE (OUTPATIENT)
Dept: HEALTH INFORMATION MANAGEMENT | Facility: CLINIC | Age: 44
End: 2024-07-08
Payer: COMMERCIAL

## 2024-07-08 NOTE — TELEPHONE ENCOUNTER
Patient requires face to face for one of these documents. Can not sign all of these documents. Needs face to face. Can be virtual.     Jem Arredondo PA-C

## 2024-07-11 ENCOUNTER — VIRTUAL VISIT (OUTPATIENT)
Dept: FAMILY MEDICINE | Facility: CLINIC | Age: 44
End: 2024-07-11
Payer: COMMERCIAL

## 2024-07-11 DIAGNOSIS — F41.9 ANXIETY: ICD-10-CM

## 2024-07-11 DIAGNOSIS — F32.A DEPRESSION, UNSPECIFIED DEPRESSION TYPE: ICD-10-CM

## 2024-07-11 DIAGNOSIS — Z09 HOSPITAL DISCHARGE FOLLOW-UP: Primary | ICD-10-CM

## 2024-07-11 DIAGNOSIS — Z53.9 DIAGNOSIS NOT YET DEFINED: Primary | ICD-10-CM

## 2024-07-11 PROCEDURE — 99213 OFFICE O/P EST LOW 20 MIN: CPT | Mod: 95 | Performed by: PHYSICIAN ASSISTANT

## 2024-07-11 PROCEDURE — G0180 MD CERTIFICATION HHA PATIENT: HCPCS | Performed by: PHYSICIAN ASSISTANT

## 2024-07-11 RX ORDER — ESCITALOPRAM OXALATE 10 MG/1
10 TABLET ORAL DAILY
Qty: 90 TABLET | Refills: 1 | Status: SHIPPED | OUTPATIENT
Start: 2024-07-11

## 2024-07-11 NOTE — TELEPHONE ENCOUNTER
Faxed HHC to [facility] and to stat scanning.Khadra Dean [facility]

## 2024-07-11 NOTE — PROGRESS NOTES
Azalea is a 44 year old who is being evaluated via a billable video visit.          Assessment & Plan     (Z09) Hospital discharge follow-up  (primary encounter diagnosis)  Comment: Patient hospital discharge follow-up as well as completion of home health care orders.  Patient has been receiving home health care nurse.  Overall states she has been doing well.  Home health care orders stated that he needs a face-to-face    (F32.A) Depression, unspecified depression type  Comment: History of depression.  Has been on Lexapro.  Patient was requesting refills of medications.  No thoughts of self-harm.  States mood has been stable with this.  Refills provided.  Plan: escitalopram (LEXAPRO) 10 MG tablet           (F41.9) Anxiety  Comment:   Plan: escitalopram (LEXAPRO) 10 MG tablet                  Subjective   Azalea is a 44 year old, presenting for the following health issues:  Forms (Forms fill out. )      7/11/2024     9:21 AM   Additional Questions   Roomed by Sandra Collins         7/11/2024   Forms   Any forms needing to be completed Yes        Video Start Time: 1000    HPI         Hospital Follow-up Visit:    Hospital/Nursing Home/IP Rehab Facility: [facility]  Date of Admission: 6/13/2024  Date of Discharge: 6/16/2024  Reason(s) for Admission: Status post breast reconstruction bilaterally with latissimus dorsi musculocutaneous flaps  Problems taking medications regularly:  None  Medication changes since discharge: None  Problems adhering to non-medication therapy:  None    Summary of hospitalization:  [facility] discharge summary reviewed  Diagnostic Tests/Treatments reviewed.  Follow up needed: none  Other Healthcare Providers Involved in Patient s Care:         Surgical follow-up appointment - upcoming appointment 7/26  Update since discharge: improved.    Patient with a history of breast cancer had breast reconstruction 6/13/2024.  Discharged on  6/16/2024.  Has been doing well at home.  Has followed up with her surgical group.  Has been having home care monitoring blood pressure.  Patient states her blood pressure has been slightly lower, however, denies any chest pain, shortness of breath, lightheadedness.  Has otherwise been feeling well.  Currently on lifting restrictions 15 pounds per surgical team.  Only taking ibuprofen as needed for discomfort.  Has been experiencing some mild swelling bilateral axillary distribution.  Patient reports this is unchanged.          Plan of care communicated with patient                 Review of Systems  Constitutional, HEENT, cardiovascular, pulmonary, gi and gu systems are negative, except as otherwise noted.      Objective           Vitals:  No vitals were obtained today due to virtual visit.    Physical Exam   GENERAL: alert and no distress  EYES: Eyes grossly normal to inspection.  No discharge or erythema, or obvious scleral/conjunctival abnormalities.  RESP: No audible wheeze, cough, or visible cyanosis.    SKIN: Visible skin clear. No significant rash, abnormal pigmentation or lesions.  NEURO: Cranial nerves grossly intact.  Mentation and speech appropriate for age.  PSYCH: Appropriate affect, tone, and pace of words          Video-Visit Details    Type of service:  Video Visit   Video End Time:1010  Originating Location (pt. Location): Home    Distant Location (provider location):  On-site  Platform used for Video Visit: Kiya  Signed Electronically by: Jem Arredondo PA-C

## 2024-07-12 ENCOUNTER — TELEPHONE (OUTPATIENT)
Dept: PLASTIC SURGERY | Facility: CLINIC | Age: 44
End: 2024-07-12
Payer: COMMERCIAL

## 2024-07-15 NOTE — TELEPHONE ENCOUNTER
Reviewed pt's chart.    Surgery 6/13:  Bilateral breast reconstruction with bilateral latissimus flap reconstructions, spy    6/25 visit with Delisa office visit notes:    -ok to stop arm abduction braces as of last week, so reminded her of that today, continue to avoid putting prolonged pressure on upper lateral chest area, can use pillows while in sitting on couch/bed for next week. May slightly increase activity level. No heavy lifting or prolonged soaking until 6 weeks post-op       Tanisha with [facility] states that the pt is unable to put her arms all the way down to her sides due to excess tissue and she is wondering whether the pt should be using any compression garments.    I called Tanisha, she states pt just doesn't feel every normal . Under armpits, swelling or tissue excess?  Lacking 20 degrees adduction on left, 30 degrees on the right. I asked if there were any other post op concerns and she said no.    Call Tanisha back with updates.    Routing to Dr. Ferreira's PA for review/recommendations.  Comfort Beckford RN

## 2024-07-15 NOTE — TELEPHONE ENCOUNTER
Called Tanisha back at [facility] to relay Urvashi's recommendation.    No answer, left detailed voicemail for Tanisha to return call to clinic if further clarification needed.    I let her know we do NOT want them using compression on Azalea yet, we will discuss more at her next post op visit scheduled 7/26.        Comfort Beckford RN

## 2024-07-16 NOTE — TELEPHONE ENCOUNTER
RN noted message below. Will reopen chart if Tanisha calls back with additional questions or concerns..Viji Llamas RN

## 2024-07-19 ENCOUNTER — MEDICAL CORRESPONDENCE (OUTPATIENT)
Dept: HEALTH INFORMATION MANAGEMENT | Facility: CLINIC | Age: 44
End: 2024-07-19
Payer: COMMERCIAL

## 2024-07-24 ENCOUNTER — TELEPHONE (OUTPATIENT)
Dept: PLASTIC SURGERY | Facility: CLINIC | Age: 44
End: 2024-07-24
Payer: COMMERCIAL

## 2024-07-24 DIAGNOSIS — Z98.890 S/P BREAST RECONSTRUCTION: Primary | ICD-10-CM

## 2024-07-24 NOTE — TELEPHONE ENCOUNTER
Tanisha called back and is asking for an outpatient PT referral for Shoulder ROM rehab at the [place] location once her home care is discontinued. Tanisha asks that the referral be faxed to  [phone] . Routing to  to advise if okay for outpatient PT referral to be placed. Thank you..Delisa Gonzalez RN, PA-C  Physician Assistant - Surgical S/P breast reconstruction, bilateral  Dx Surgical Followup ; Referred by Self, Referred, MD  Reason for Visit     Progress Notes  Delisa Ray PA-C (Physician Assistant - C)  Physician Assistant - Surgical  ID: Azalea Mckeon is a 44 year old female with PMH R breast cancer, bilateral mastectomy with TE, R XRT with severe capsular contracture, now s/p bilateral TE removal, pedicled LD musculocutaneous flaps 6/13 with Dr. Ferreira.      S: She is feeling better overall, but is having a lot of anxiety about drain removal as she has had bad experience with it in the past. She continues to wear her abduction splints and does not recall being told she could discontinue them. She has a small blister on her back from the Prineo tape located at the convergence of the 2 incisions.  It recently opened and she has been applying Bacitracin. Pain is a 2/10 and manageable. She has a home PT evaluation next week to help her regain arm ROM as she struggled with it after her mastectomy. Individual 24 hour drain outputs have been 5-10mL for the past 3-4 days.

## 2024-07-24 NOTE — TELEPHONE ENCOUNTER
Called Tanisha, no answer. Left detailed message with reason for call and with call back number..Viji Llamas RN

## 2024-07-24 NOTE — TELEPHONE ENCOUNTER
ok'd referral. Referral placed and faxed to number below. Successful transmission confirmed by Right fax..Viji Llamas RN

## 2024-07-24 NOTE — TELEPHONE ENCOUNTER
[facility] Call Center    Phone Message    May a detailed message be left on voicemail: yes     Reason for Call: Other: Tanisha calling to advise that patient is ready for outpatient and they need order for rehab. Please call to discuss. Fax number is [phone]     Action Taken: Message routed to:  [facility] Plastic Surg    Travel Screening: Not Applicable     Date of Service:

## 2024-07-26 ENCOUNTER — TELEPHONE (OUTPATIENT)
Dept: FAMILY MEDICINE | Facility: CLINIC | Age: 44
End: 2024-07-26

## 2024-07-26 ENCOUNTER — OFFICE VISIT (OUTPATIENT)
Dept: SURGERY | Facility: CLINIC | Age: 44
End: 2024-07-26
Payer: COMMERCIAL

## 2024-07-26 DIAGNOSIS — Z98.890 S/P BREAST RECONSTRUCTION, BILATERAL: Primary | ICD-10-CM

## 2024-07-26 PROCEDURE — 99024 POSTOP FOLLOW-UP VISIT: CPT | Performed by: PLASTIC SURGERY

## 2024-07-26 NOTE — LETTER
7/26/2024      Azalea Mckeon  [address] [address] [address]      Dear Colleague,    Thank you for referring your patient, Azalea Mckeon, to the [facility]. Please see a copy of my visit note below.    PRESENTING COMPLAINT:  Post-operative visit s/p bilateral breast reconstruction with bilateral latissimus musculocutaneous flaps on 6/13/2024.     HISTORY OF PRESENTING COMPLAINT: The patient is here for post-operative visit.  The patient is being seen in the presence of my nurse.     The patient is very happy the results.  She is doing much better in terms of her pain.  She last saw my PA a couple weeks ago.  All her drains are out.    On exam: Vital signs stable afebrile.  Both breasts are healing and well.  Overall symmetry is good.  Left breast has some upper pole indentations and so does the right side.  Left side is slightly larger than the right side.  She has some standing cutaneous cones in the axilla.     ASSESSMENT AND PLAN:  Based upon the above findings, the patient is here for post-operative visit.     Advised the patient to start doing range of motion exercises to her shoulders and continue following up with physical therapy.  I will see her back in the next 4 to 6 weeks and plan the next stages of reconstruction including fat grafting nipple reconstruction and any revisional surgeries on her back that she would like.    All questions were answered.  The patient was happy with the visit.      Again, thank you for allowing me to participate in the care of your patient.        Sincerely,        [facility] Ferreira MD

## 2024-07-26 NOTE — NURSING NOTE
Azalea Mckeon's goals for this visit include:   Chief Complaint   Patient presents with    Post-Op - General Surgery     6 week post op breast reconstruction       She requests these members of her care team be copied on today's visit information:     PCP: Jem Arredondo    Referring Provider:  Referred Self, MD  No address on file    There were no vitals taken for this visit.    Do you need any medication refills at today's visit?         Eli Chapman EMT

## 2024-07-29 NOTE — PROGRESS NOTES
PRESENTING COMPLAINT:  Post-operative visit s/p bilateral breast reconstruction with bilateral latissimus musculocutaneous flaps on 6/13/2024.     HISTORY OF PRESENTING COMPLAINT: The patient is here for post-operative visit.  The patient is being seen in the presence of my nurse.     The patient is very happy the results.  She is doing much better in terms of her pain.  She last saw my PA a couple weeks ago.  All her drains are out.    On exam: Vital signs stable afebrile.  Both breasts are healing and well.  Overall symmetry is good.  Left breast has some upper pole indentations and so does the right side.  Left side is slightly larger than the right side.  She has some standing cutaneous cones in the axilla.     ASSESSMENT AND PLAN:  Based upon the above findings, the patient is here for post-operative visit.     Advised the patient to start doing range of motion exercises to her shoulders and continue following up with physical therapy.  I will see her back in the next 4 to 6 weeks and plan the next stages of reconstruction including fat grafting nipple reconstruction and any revisional surgeries on her back that she would like.    All questions were answered.  The patient was happy with the visit.

## 2024-07-29 NOTE — TELEPHONE ENCOUNTER
Patient has physical therapy orders by the surgical team.    Home care orders were with PCP.     Jem Arredondo PA-C

## 2024-07-30 NOTE — TELEPHONE ENCOUNTER
RN's, can you remove pended medication and close encounter please.Khadra Dean [facility]

## 2024-07-30 NOTE — TELEPHONE ENCOUNTER
Called PHILIP Haskins to relay providers message regarding orders. Left message on [facility]'s confidential VM.     Thank you - Eli Ogden, MIGUELN, RN

## 2024-08-05 ENCOUNTER — TELEPHONE (OUTPATIENT)
Dept: FAMILY MEDICINE | Facility: CLINIC | Age: 44
End: 2024-08-05
Payer: COMMERCIAL

## 2024-08-05 NOTE — TELEPHONE ENCOUNTER
After huddling with provider in person, Brooks Arredondo approves of requested PT home care orders.    Mildred Julian RN

## 2024-08-05 NOTE — TELEPHONE ENCOUNTER
Provider: PT requested from [facility]  1x a week for 3 weeks. See TE from 07/26/24.    Are you willing to follow patient and provide home care orders? Re faxing orders.    Mildred Julian RN

## 2024-08-06 DIAGNOSIS — Z98.890 STATUS POST BILATERAL BREAST RECONSTRUCTION: Primary | ICD-10-CM

## 2024-08-07 ENCOUNTER — VIRTUAL VISIT (OUTPATIENT)
Dept: ONCOLOGY | Facility: CLINIC | Age: 44
End: 2024-08-07
Attending: INTERNAL MEDICINE
Payer: COMMERCIAL

## 2024-08-07 ENCOUNTER — TELEPHONE (OUTPATIENT)
Dept: PLASTIC SURGERY | Facility: CLINIC | Age: 44
End: 2024-08-07
Payer: COMMERCIAL

## 2024-08-07 VITALS
SYSTOLIC BLOOD PRESSURE: 101 MMHG | WEIGHT: 138 LBS | BODY MASS INDEX: 22.99 KG/M2 | DIASTOLIC BLOOD PRESSURE: 77 MMHG | HEIGHT: 65 IN

## 2024-08-07 DIAGNOSIS — M81.8 OSTEOPOROSIS DUE TO AROMATASE INHIBITOR: ICD-10-CM

## 2024-08-07 DIAGNOSIS — T38.6X5A OSTEOPOROSIS DUE TO AROMATASE INHIBITOR: ICD-10-CM

## 2024-08-07 DIAGNOSIS — Z17.0 MALIGNANT NEOPLASM OF OVERLAPPING SITES OF RIGHT BREAST IN FEMALE, ESTROGEN RECEPTOR POSITIVE (H): Primary | ICD-10-CM

## 2024-08-07 DIAGNOSIS — C50.811 MALIGNANT NEOPLASM OF OVERLAPPING SITES OF RIGHT BREAST IN FEMALE, ESTROGEN RECEPTOR POSITIVE (H): Primary | ICD-10-CM

## 2024-08-07 PROBLEM — Z98.890 STATUS POST BILATERAL BREAST RECONSTRUCTION: Status: ACTIVE | Noted: 2024-08-06

## 2024-08-07 PROCEDURE — 99442 PR PHYSICIAN TELEPHONE EVALUATION 11-20 MIN: CPT | Mod: 93 | Performed by: INTERNAL MEDICINE

## 2024-08-07 ASSESSMENT — PAIN SCALES - GENERAL: PAINLEVEL: NO PAIN (0)

## 2024-08-07 NOTE — LETTER
8/7/2024      Azalea Mckeon  [address] [address] [address]      Dear Colleague,    Thank you for referring your patient, Azalea Mckeon, to the [facility]. Please see a copy of my visit note below.    Hem Onc Phone Note  August 8, 2024    Subjective: Phone call for letrozole toxicity check.  Azalea is recovering from surgery about as expected.  No concerns.  Tolerating letrozole well and without complaints.  No major hot flashes.  No major joint complaints.  No other changes.    Objective: No new labs or imaging.    Assessment / Plan: Azalea will continue letrozole; tolerating better than Tamoxifen although early on.  With early menopause from chemo and ongoing AI use, bone health will be a big focus of visits.  To that end, continue calcium and vitamin D.  We will get a DEXA scan and vitamin D at return visit.  We will plan to discuss bone strengthening agents to offset any bone loss.  Negative genetic testing.  Status post bilateral mastectomy w/ reconstruction.  Follow-up 3 months.    King Dyson MD  Total time 11 mins (reviewing, coordinating care).      Again, thank you for allowing me to participate in the care of your patient.        Sincerely,        King Dyson MD

## 2024-08-07 NOTE — PROGRESS NOTES
"Virtual Visit Details    Type of service:  Telephone Visit   Phone call duration: *** minutes   Originating Location (pt. Location): {patient location:062524::\"Home\"}  {PROVIDER LOCATION On-site should be selected for visits conducted from your clinic location or adjoining [facility] hospital, academic office, or other nearby [facility] building. Off-site should be selected for all other provider locations, including home:[id number]}  Distant Location (provider location):  {virtual location provider:281990}  "

## 2024-08-07 NOTE — TELEPHONE ENCOUNTER
Spoke with patient    Surgery is scheduled with Dr. Ferreira  Date: 11/1/2024   date requested per patient  Location: [facility]    H&P to be completed by: Primary Care team, patient instructed to schedule PCP - per patient, this will be scheduled with [facility]     Surgical consult: 10/11/2024 at 1:15pm   [facility]    Post-op: 11/8/2024 at 4:00pm with Dr. Jhon Duong [facility]    Patient was informed that a surgery center pre-op RN will call 2-3 days prior to surgery with arrival time and instructions: Yes    Approximate arrival time discussed with patient:  No    Patient questions/concerns: N/A     Surgery packet: to be sent via [employer]  __    Liz Rosario on 8/7/2024 at 11:08 AM

## 2024-08-07 NOTE — NURSING NOTE
Current patient location: [address] [place] [id number]    Is the patient currently in the state of [place]? YES    Visit mode:TELEPHONE    If the visit is dropped, the patient can be reconnected by: TELEPHONE VISIT: Phone number:   Telephone Information:   Mobile [phone]       Will anyone else be joining the visit? NO  (If patient encounters technical issues they should call [phone]    How would you like to obtain your AVS? MyChart    Are changes needed to the allergy or medication list? Pt stated no changes to allergies and Pt stated no med changes    Are refills needed on medications prescribed by this physician? NO    Rooming Documentation:  Assigned questionnaire(s) completed      Reason for visit: ENID RICKETTSF

## 2024-08-08 ENCOUNTER — MEDICAL CORRESPONDENCE (OUTPATIENT)
Dept: HEALTH INFORMATION MANAGEMENT | Facility: CLINIC | Age: 44
End: 2024-08-08

## 2024-08-08 NOTE — PROGRESS NOTES
Hem Onc Phone Note  August 8, 2024    Subjective: Phone call for letrozole toxicity check.  Azalea is recovering from surgery about as expected.  No concerns.  Tolerating letrozole well and without complaints.  No major hot flashes.  No major joint complaints.  No other changes.    Objective: No new labs or imaging.    Assessment / Plan: Azalea will continue letrozole; tolerating better than Tamoxifen although early on.  With early menopause from chemo and ongoing AI use, bone health will be a big focus of visits.  To that end, continue calcium and vitamin D.  We will get a DEXA scan and vitamin D at return visit.  We will plan to discuss bone strengthening agents to offset any bone loss.  Negative genetic testing.  Status post bilateral mastectomy w/ reconstruction.  Follow-up 3 months.    King Dyson MD  Total time 11 mins (reviewing, coordinating care).

## 2024-08-19 ENCOUNTER — MEDICAL CORRESPONDENCE (OUTPATIENT)
Dept: HEALTH INFORMATION MANAGEMENT | Facility: CLINIC | Age: 44
End: 2024-08-19
Payer: COMMERCIAL

## 2024-08-22 ENCOUNTER — THERAPY VISIT (OUTPATIENT)
Dept: PHYSICAL THERAPY | Facility: CLINIC | Age: 44
End: 2024-08-22
Attending: PLASTIC SURGERY
Payer: COMMERCIAL

## 2024-08-22 DIAGNOSIS — Z98.890 S/P BREAST RECONSTRUCTION: ICD-10-CM

## 2024-08-22 PROCEDURE — 97140 MANUAL THERAPY 1/> REGIONS: CPT | Mod: GP | Performed by: PHYSICAL THERAPIST

## 2024-08-22 PROCEDURE — 97110 THERAPEUTIC EXERCISES: CPT | Mod: GP | Performed by: PHYSICAL THERAPIST

## 2024-08-22 PROCEDURE — 97161 PT EVAL LOW COMPLEX 20 MIN: CPT | Mod: GP | Performed by: PHYSICAL THERAPIST

## 2024-08-22 NOTE — PROGRESS NOTES
PHYSICAL THERAPY EVALUATION  Type of Visit: Evaluation       Fall Risk Screen:  Fall screen completed by: PT  Have you fallen 2 or more times in the past year?: No  Have you fallen and had an injury in the past year?: No  Is patient a fall risk?: No    Subjective       Presenting condition or subjective complaint: Range of motion in my arms.  Pt is known to lymphedema clinic and was seen about 1 year ago.  Reports she underwent another surgery in 6/2024 and since that surgery has developed increased tightness in her R shoulder.  Reports also feeling a breast heaviness in her R breast.  She had home PT following the surgery where they worked on stretches.  She is starting to have more pain in her right shoulder due to the tightness.  Date of onset: 06/13/24    Relevant medical history: Cancer   Per Oncology Note 2/6/2024  Stage 1A (pT1c pN0 cM0), hormone-positive, HER2-negative, right-sided breast cancer:  # Feb 2023 Presented w/ abnormal mammogram.  # Apr 2023 Status post bilateral mastectomy w/ sentinel node and reconstruction; path w/ multifocal disease, largest 16 mm, grade 3 disease, neg margins for IDC, neg node (1/1)   # Apr 2023 Oncotype score 22; adjuvant chemotherapy indicated.  # Jun 2023 - Aug 2023 Adjuvant Taxotere / Cytoxan x 4 cycles.  # Jul 2023 Negative hereditary breast cancer panel (40 genes).  # Nov 2022 Adjuvant right chest wall radiation 5000 cGy over 25 fractions.  Dates & types of surgery: Mastectomy and breast reconstruction    Prior diagnostic imaging/testing results:       Prior therapy history for the same diagnosis, illness or injury: Yes [facility]    Living Environment  Social support: With a significant other or spouse   Type of home: House   Stairs to enter the home: Yes 10 Is there a railing: Yes     Ramp: No   Stairs inside the home: Yes 10 Is there a railing: Yes     Help at home:    Equipment owned:       Employment: Yes   Hobbies/Interests:       Patient goals for therapy: Lift arms above head    Pain assessment: Location: R shoulder/Rating: Worst: 7/10; Current: 1/10     Objective       EDEMA EVALUATION  Additional history:  Body part affected by edema:  tightness in R shoulder  If cancer related, treatment:  Radiation 11/2023 and chemo 8/2023  If not cancer related, problems with veins or cause of swelling:    Distance able to walk:  miles  Time able to stand:  hours  Sensation problems in hands/feet:    With some stretches  Edema etiology:  no swelling currently    FUNCTIONAL SCALES  Lower Extremity Functional Scale (score out of 80). A lower score indicates greater impairment:    Shoulder Pain and Disability Index (score out of 100).  A higher score indicates greater impairment:      EDEMA  Skin Condition: Axillary web cording , Fibrotic edema with mild orange peel texture to inferior R breast and pocket of soft edema in R axilla and lateral breast  Scar: Yes  Location: Lateral R and L chest wall, R and L lateral to central breast   Mobility: adherent R side  Capillary Refill: Symmetrical  Radial Pulse: Symmetrical  Dorsal Pedal Pulse:   Stemmer Sign: -  Ulceration: No    GIRTH MEASUREMENTS: Refer to separate girth measurement flowsheet.     VOLUME UE  Right UE (mL) 1302   Left UE (mL) 1334.5   UE Volume Comparison LUE volume greater than RUE volume   % Difference 2.4%     RANGE OF MOTION:   Cervical Rotation: R: 70*; L: 75*  (Degrees) Left AROM Left PROM Right AROM  Right PROM   Shoulder Flexion 140*  120*    Shoulder Extension       Shoulder Abduction 110*  95*    Shoulder Adduction       Shoulder Internal Rotation T7  L1 - lateral right side not center    Shoulder External Rotation 75*  50*    Shoulder Horizontal Abduction       Shoulder Horizontal Adduction       Shoulder Flexion ER       Shoulder Flexion IR       Elbow Extension WNL  WNL    Elbow Flexion WNL  WNL    Pain:   End feel:   STRENGTH:   Pain: - none + mild ++ moderate +++  severe  Strength Scale: 0-5/5 Left Right   Shoulder Flexion 4 4-   Shoulder Extension     Shoulder Abduction 4- 3+   Shoulder Adduction     Shoulder Internal Rotation 4- 4-   Shoulder External Rotation 3+ 4-   Shoulder Horizontal Abduction     Shoulder Horizontal Adduction     Elbow Flexion 4- 4-   Elbow Extension 4- 4-   Mid Trap     Lower Trap     Rhomboid     Serratus Anterior       POSTURE: Sitting Posture: Rounded shoulders, Forward head  PALPATION: Fibrotic edema with mild orange peel texture to inferior R breast and pocket of soft edema in R axilla and lateral breast   ACTIVITIES OF DAILY LIVING: independent  BED MOBILITY: Independent  TRANSFERS: Independent  GAIT/LOCOMOTION: WNL  BALANCE: WNL  SENSATION: LE Sensation WNL  VASCULAR:  no notable concerns at this time  COORDINATION: WNL  MUSCLE TONE: WNL    Assessment & Plan   CLINICAL IMPRESSIONS  Medical Diagnosis: S/P breast reconstruction    Treatment Diagnosis: UQ tightness and R breast secondary lymphedema, R axillary cording   Impression/Assessment: Patient is a 44 year old female with R shoulder pain and tightness and R breast heaviness complaints.  The following significant findings have been identified: Pain, Decreased ROM/flexibility, Decreased joint mobility, Decreased strength, and Edema. These impairments interfere with their ability to perform self care tasks, work tasks, recreational activities, and household chores as compared to previous level of function.     Clinical Decision Making (Complexity):  Clinical Presentation: Evolving/Changing  Clinical Presentation Rationale: based on medical and personal factors listed in PT evaluation  Clinical Decision Making (Complexity): Moderate complexity    PLAN OF CARE  Treatment Interventions:  Modalities: Cryotherapy, E-stim, Hot Pack, Ultrasound  Interventions: Manual Therapy, Neuromuscular Re-education, Therapeutic Activity, Therapeutic Exercise, Self-Care/Home Management, Gradient Compression  Bandaging    Long Term Goals     PT Goal 1  Goal Identifier: R shoulder ROM  Goal Description: Pt will demonstrate shoulder flexion ROM of 140* in order to reach a high shelf.  Target Date: 10/17/24  PT Goal 2  Goal Identifier: Self MLD  Goal Description: Pt will demonstrate independence on self MLD to R Breast to maintain edema reductions upon discharge.  Target Date: 11/14/24  PT Goal 3  Goal Identifier: Komprex 2  Goal Description: Pt will report 2 hours tolerance to komprex 2 in order to improve fibrotic breast edema.  Target Date: 09/19/24      Frequency of Treatment: 2 x a week  Duration of Treatment: 12 weeks    Recommended Referrals to Other Professionals:  none  Education Assessment:   Learner/Method: Patient  Education Comments: Self MLD    Risks and benefits of evaluation/treatment have been explained.   Patient/Family/caregiver agrees with Plan of Care.     Evaluation Time:     PT Eval, Low Complexity Minutes (30851): 30       Signing Clinician: Katelin Carroll, PT        [facility]                                                                                   OUTPATIENT PHYSICAL THERAPY      PLAN OF TREATMENT FOR OUTPATIENT REHABILITATION   Patient's Last Name, First Name, Azalea Herron YOB: 1980   Provider's Name   [facility]   Medical Record No.  [id number]     Onset Date: 06/13/24  Start of Care Date: 08/22/24     Medical Diagnosis:  S/P breast reconstruction      PT Treatment Diagnosis:  UQ tightness and R breast secondary lymphedema, R axillary cording Plan of Treatment  Frequency/Duration: 2 x a week/ 12 weeks    Certification date from 08/22/24 to 11/14/24         See note for plan of treatment details and functional goals     Katelin Carroll, PT                         I CERTIFY THE NEED FOR THESE SERVICES FURNISHED UNDER        THIS PLAN OF TREATMENT AND WHILE UNDER MY CARE     (Physician attestation  of this document indicates review and certification of the therapy plan).              Referring Provider:  EDWARDO Ferreira    Initial Assessment  See Epic Evaluation- Start of Care Date: 08/22/24

## 2024-09-03 ENCOUNTER — THERAPY VISIT (OUTPATIENT)
Dept: PHYSICAL THERAPY | Facility: CLINIC | Age: 44
End: 2024-09-03
Attending: PLASTIC SURGERY
Payer: COMMERCIAL

## 2024-09-03 DIAGNOSIS — Z98.890 S/P BREAST RECONSTRUCTION: Primary | ICD-10-CM

## 2024-09-03 PROCEDURE — 97140 MANUAL THERAPY 1/> REGIONS: CPT | Mod: GP | Performed by: PHYSICAL THERAPIST

## 2024-09-17 ENCOUNTER — THERAPY VISIT (OUTPATIENT)
Dept: PHYSICAL THERAPY | Facility: CLINIC | Age: 44
End: 2024-09-17
Attending: PLASTIC SURGERY
Payer: COMMERCIAL

## 2024-09-17 DIAGNOSIS — Z98.890 S/P BREAST RECONSTRUCTION: Primary | ICD-10-CM

## 2024-09-17 PROCEDURE — 97140 MANUAL THERAPY 1/> REGIONS: CPT | Mod: GP,CQ | Performed by: REHABILITATION PRACTITIONER

## 2024-09-24 ENCOUNTER — PATIENT OUTREACH (OUTPATIENT)
Dept: CARE COORDINATION | Facility: CLINIC | Age: 44
End: 2024-09-24
Payer: COMMERCIAL

## 2024-09-24 NOTE — PROGRESS NOTES
Office note 9/29/2021 and current medication list faxed to [facility]    Attn:  Avani Salas Social Work - Intervention  [facility]  Data/Intervention:  Breast cancer patient follows with Dr. Dyson.  Patient Name: Azalea Mckeon Goes By: Azalea    AMY/Age: 1980 (44 year old)     Visit Type: Asif    Collaborated With:    -[employer] [facility] - completed medical verification.     Assessment/Plan:  Sara sent [employer] message to Azalea hilary on completion of verification and if needed additional resources to connect with writer.     Provided patient/family with contact information and availability.    KAYLA De Luna, [facility] HospitalSW   Adult Oncology - [place]  [phone]  [employer]nsite [place] on    *Please note does not work on .   Support Groups at [facility]: Social Work Services for Cancer Patients ([email or web address])

## 2024-09-25 ENCOUNTER — THERAPY VISIT (OUTPATIENT)
Dept: PHYSICAL THERAPY | Facility: CLINIC | Age: 44
End: 2024-09-25
Attending: PLASTIC SURGERY
Payer: COMMERCIAL

## 2024-09-25 DIAGNOSIS — Z98.890 S/P BREAST RECONSTRUCTION: Primary | ICD-10-CM

## 2024-09-25 PROCEDURE — 97140 MANUAL THERAPY 1/> REGIONS: CPT | Mod: GP

## 2024-09-25 PROCEDURE — 97110 THERAPEUTIC EXERCISES: CPT | Mod: GP

## 2024-09-27 ENCOUNTER — OFFICE VISIT (OUTPATIENT)
Dept: SURGERY | Facility: CLINIC | Age: 44
End: 2024-09-27
Attending: PLASTIC SURGERY
Payer: COMMERCIAL

## 2024-09-27 DIAGNOSIS — Z98.890 STATUS POST BILATERAL BREAST RECONSTRUCTION: ICD-10-CM

## 2024-09-27 DIAGNOSIS — M25.511 ACUTE PAIN OF RIGHT SHOULDER: Primary | ICD-10-CM

## 2024-09-27 DIAGNOSIS — Z98.890 S/P BREAST RECONSTRUCTION, BILATERAL: ICD-10-CM

## 2024-09-27 NOTE — PROGRESS NOTES
PRESENTING COMPLAINT:  Post-operative visit s/p bilateral breast reconstruction with bilateral latissimus musculocutaneous flaps on 6/13/2024.     HISTORY OF PRESENTING COMPLAINT: The patient is here for post-operative visit.  The patient is being seen in the presence of my nurse.      The patient is very happy the results.  Fully healed.  She has been complaining of some right shoulder pain.  She has clicking and difficulty in range of motion.  Denies acute trauma.     On exam: Vital signs stable afebrile.  Both breasts are healing and well.  Overall symmetry is good.  Left breast has some upper pole indentations and so does the right side.  Left side is slightly larger than the right side.  She has some standing cutaneous cones in the axilla.     ASSESSMENT AND PLAN:  Based upon the above findings, the patient is here for post-operative visit.      Plan is to proceed with stage III reconstruction.  We will refer to a shoulder specialist.  I will see her back in preop.     All questions were answered.  The patient was happy with the visit.

## 2024-09-27 NOTE — LETTER
9/27/2024      Azalea Mckeon  [address] [address] [address]      Dear Colleague,    Thank you for referring your patient, Aazlea Mckeon, to the [facility]. Please see a copy of my visit note below.    PRESENTING COMPLAINT:  Post-operative visit s/p bilateral breast reconstruction with bilateral latissimus musculocutaneous flaps on 6/13/2024.     HISTORY OF PRESENTING COMPLAINT: The patient is here for post-operative visit.  The patient is being seen in the presence of my nurse.      The patient is very happy the results.  Fully healed.  She has been complaining of some right shoulder pain.  She has clicking and difficulty in range of motion.  Denies acute trauma.     On exam: Vital signs stable afebrile.  Both breasts are healing and well.  Overall symmetry is good.  Left breast has some upper pole indentations and so does the right side.  Left side is slightly larger than the right side.  She has some standing cutaneous cones in the axilla.     ASSESSMENT AND PLAN:  Based upon the above findings, the patient is here for post-operative visit.      Plan is to proceed with stage III reconstruction.  We will refer to a shoulder specialist.  I will see her back in preop.     All questions were answered.  The patient was happy with the visit.      Again, thank you for allowing me to participate in the care of your patient.        Sincerely,        EDWARDO Ferreira MD

## 2024-10-04 ENCOUNTER — THERAPY VISIT (OUTPATIENT)
Dept: PHYSICAL THERAPY | Facility: CLINIC | Age: 44
End: 2024-10-04
Attending: PLASTIC SURGERY
Payer: COMMERCIAL

## 2024-10-04 DIAGNOSIS — Z98.890 S/P BREAST RECONSTRUCTION: Primary | ICD-10-CM

## 2024-10-04 PROCEDURE — 97140 MANUAL THERAPY 1/> REGIONS: CPT | Mod: GP,CQ | Performed by: REHABILITATION PRACTITIONER

## 2024-10-09 ENCOUNTER — THERAPY VISIT (OUTPATIENT)
Dept: PHYSICAL THERAPY | Facility: CLINIC | Age: 44
End: 2024-10-09
Attending: PLASTIC SURGERY
Payer: COMMERCIAL

## 2024-10-09 DIAGNOSIS — Z98.890 S/P BREAST RECONSTRUCTION: Primary | ICD-10-CM

## 2024-10-09 PROCEDURE — 97140 MANUAL THERAPY 1/> REGIONS: CPT | Mod: GP,CQ | Performed by: REHABILITATION PRACTITIONER

## 2024-10-11 ENCOUNTER — OFFICE VISIT (OUTPATIENT)
Dept: FAMILY MEDICINE | Facility: CLINIC | Age: 44
End: 2024-10-11
Payer: COMMERCIAL

## 2024-10-11 ENCOUNTER — OFFICE VISIT (OUTPATIENT)
Dept: ORTHOPEDICS | Facility: CLINIC | Age: 44
End: 2024-10-11
Attending: PLASTIC SURGERY
Payer: COMMERCIAL

## 2024-10-11 ENCOUNTER — PATIENT OUTREACH (OUTPATIENT)
Dept: OBGYN | Facility: CLINIC | Age: 44
End: 2024-10-11

## 2024-10-11 ENCOUNTER — OFFICE VISIT (OUTPATIENT)
Dept: SURGERY | Facility: CLINIC | Age: 44
End: 2024-10-11
Payer: COMMERCIAL

## 2024-10-11 ENCOUNTER — ANCILLARY PROCEDURE (OUTPATIENT)
Dept: GENERAL RADIOLOGY | Facility: CLINIC | Age: 44
End: 2024-10-11
Attending: PEDIATRICS
Payer: COMMERCIAL

## 2024-10-11 VITALS
HEART RATE: 74 BPM | HEIGHT: 64 IN | WEIGHT: 135 LBS | TEMPERATURE: 98.5 F | BODY MASS INDEX: 23.05 KG/M2 | RESPIRATION RATE: 16 BRPM | DIASTOLIC BLOOD PRESSURE: 81 MMHG | OXYGEN SATURATION: 99 % | SYSTOLIC BLOOD PRESSURE: 115 MMHG

## 2024-10-11 DIAGNOSIS — M75.01 ADHESIVE CAPSULITIS OF RIGHT SHOULDER: Primary | ICD-10-CM

## 2024-10-11 DIAGNOSIS — M25.511 ACUTE PAIN OF RIGHT SHOULDER: ICD-10-CM

## 2024-10-11 DIAGNOSIS — Z98.890 S/P BREAST RECONSTRUCTION, BILATERAL: Primary | ICD-10-CM

## 2024-10-11 DIAGNOSIS — Z98.890 S/P BREAST RECONSTRUCTION: ICD-10-CM

## 2024-10-11 DIAGNOSIS — C50.811 MALIGNANT NEOPLASM OF OVERLAPPING SITES OF RIGHT BREAST IN FEMALE, ESTROGEN RECEPTOR POSITIVE (H): ICD-10-CM

## 2024-10-11 DIAGNOSIS — Z01.818 PREOP GENERAL PHYSICAL EXAM: Primary | ICD-10-CM

## 2024-10-11 DIAGNOSIS — Z98.890 STATUS POST BILATERAL BREAST RECONSTRUCTION: ICD-10-CM

## 2024-10-11 DIAGNOSIS — Z17.0 MALIGNANT NEOPLASM OF OVERLAPPING SITES OF RIGHT BREAST IN FEMALE, ESTROGEN RECEPTOR POSITIVE (H): ICD-10-CM

## 2024-10-11 DIAGNOSIS — F41.9 ANXIETY: ICD-10-CM

## 2024-10-11 PROBLEM — R87.810 CERVICAL HIGH RISK HPV (HUMAN PAPILLOMAVIRUS) TEST POSITIVE: Status: ACTIVE | Noted: 2023-11-02

## 2024-10-11 LAB
BASOPHILS # BLD AUTO: 0 10E3/UL (ref 0–0.2)
BASOPHILS NFR BLD AUTO: 0 %
EOSINOPHIL # BLD AUTO: 0.1 10E3/UL (ref 0–0.7)
EOSINOPHIL NFR BLD AUTO: 1 %
ERYTHROCYTE [DISTWIDTH] IN BLOOD BY AUTOMATED COUNT: 12.5 % (ref 10–15)
HCT VFR BLD AUTO: 39.9 % (ref 35–47)
HGB BLD-MCNC: 13.9 G/DL (ref 11.7–15.7)
IMM GRANULOCYTES # BLD: 0 10E3/UL
IMM GRANULOCYTES NFR BLD: 0 %
LYMPHOCYTES # BLD AUTO: 1.3 10E3/UL (ref 0.8–5.3)
LYMPHOCYTES NFR BLD AUTO: 21 %
MCH RBC QN AUTO: 30.5 PG (ref 26.5–33)
MCHC RBC AUTO-ENTMCNC: 34.8 G/DL (ref 31.5–36.5)
MCV RBC AUTO: 88 FL (ref 78–100)
MONOCYTES # BLD AUTO: 0.5 10E3/UL (ref 0–1.3)
MONOCYTES NFR BLD AUTO: 8 %
NEUTROPHILS # BLD AUTO: 4.4 10E3/UL (ref 1.6–8.3)
NEUTROPHILS NFR BLD AUTO: 70 %
PLATELET # BLD AUTO: 235 10E3/UL (ref 150–450)
RBC # BLD AUTO: 4.56 10E6/UL (ref 3.8–5.2)
WBC # BLD AUTO: 6.3 10E3/UL (ref 4–11)

## 2024-10-11 PROCEDURE — 36415 COLL VENOUS BLD VENIPUNCTURE: CPT | Performed by: PHYSICIAN ASSISTANT

## 2024-10-11 PROCEDURE — 85025 COMPLETE CBC W/AUTO DIFF WBC: CPT | Performed by: PHYSICIAN ASSISTANT

## 2024-10-11 PROCEDURE — 99213 OFFICE O/P EST LOW 20 MIN: CPT | Performed by: PHYSICIAN ASSISTANT

## 2024-10-11 PROCEDURE — 99244 OFF/OP CNSLTJ NEW/EST MOD 40: CPT | Performed by: PEDIATRICS

## 2024-10-11 PROCEDURE — 99213 OFFICE O/P EST LOW 20 MIN: CPT | Performed by: PLASTIC SURGERY

## 2024-10-11 PROCEDURE — 73030 X-RAY EXAM OF SHOULDER: CPT | Mod: TC | Performed by: RADIOLOGY

## 2024-10-11 PROCEDURE — 80048 BASIC METABOLIC PNL TOTAL CA: CPT | Performed by: PHYSICIAN ASSISTANT

## 2024-10-11 ASSESSMENT — PAIN SCALES - GENERAL: PAINLEVEL: NO PAIN (0)

## 2024-10-11 NOTE — LETTER
10/11/2024      Azalea Mckeon  [address] [address] [address]      Dear Colleague,    Thank you for referring your patient, Azalea Mckeon, to the [facility] [place]. Please see a copy of my visit note below.    ASSESSMENT & PLAN    Azalea was seen today for pain, decreased rom.    Diagnoses and all orders for this visit:    Adhesive capsulitis of right shoulder    Acute pain of right shoulder  -     Orthopedic  Referral  -     XR Shoulder Right G/E 3 Views; Future    S/P breast reconstruction      This issue is chronic and Unchanged.    ICD-10-CM    1. Adhesive capsulitis of right shoulder  M75.01       2. Acute pain of right shoulder  M25.511 Orthopedic  Referral     XR Shoulder Right G/E 3 Views      3. S/P breast reconstruction  Z98.890           We discussed these other possible diagnosis: More likely adhesive capsulitis given history and current exam.    We discussed the following treatment options: symptom treatment, activity modification/rest, imaging, rehab and referral. Following discussion, plan: discuss presumptive treatment with GH joint injection and physical therapy. Will coordinate timing with plastic surgery    Plan:  - Today's Plan of Care:  Monitor symptoms  Follow up with plastic surgery    Discussed activity considerations and other supportive care including Ice/Heat, OTC and other topical medications as needed.    Likely PT referral and GH joint injection referral pending discussion with plastic surgery    Discussed possible MRI if not improving    Follow Up: to be determined - will send a MyChart    Concerning signs and symptoms were reviewed and all questions were answered at this time.    Thanks for the opportunity to participate in the care of this patient, I will keep you updated on their progress.    CC: EDWARDO Wilcox MD [facility]  Sports Medicine Physician  [facility]    -----  Chief  Complaint   Patient presents with     Right Shoulder - pain, decreased ROM       SUBJECTIVE  Azalea Mckeon is a/an 44 year old female who is seen in consultation at the request of  EDWARDO Ferreira M.D. for evaluation of right shoulder pain.    The patient is seen by themselves.    Onset: 6/13/24. Patient describes no injury, her pain started after reconstruction of her breast after mastectomy and the latissimus dorsi was taken to create a breast pocket. She has had pain and problems ever since, only on the right.    Location of Pain: right shoulder, anterior, feels like it's in the joint   Worsened by: reaching behind her back, abduction, anything overhead, sleeping on that side  Better with: physical therapy -more for scar tissue, lymphedema  Treatments tried: ice, heat, home exercises, physical therapy (many visits), and massage, soft tissue IA weekly.   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   General: healthy, alert and in no distress  Skin: no suspicious lesions or rash.  CV: distal perfusion intact   Resp: normal respiratory effort without conversational dyspnea   Psych: normal mood and affect  Gait: NORMAL  Neuro: Normal light sensory exam of upper extremity    Right Shoulder exam    Inspection and Posture:       normal    Skin:        no visible deformities    Tender:        none    Non Tender:       remainder of shoulder bilateral    ROM:        forward flexion 160  right       abduction 90 right       internal rotation gluteal right       external rotation 20 right    Painful motions:       flexion right       elevation right    Strength:        abduction 5/5 bilateral       flexion 5/5 bilateral       internal rotation 5/5 bilateral       external rotation 5/5 bilateral    Impingement  testing:       neg (-) Neer right       positive (+) Gaspar right  - painful arc    Sensation:        normal sensation over shoulder and upper extremity       RADIOLOGY:  Final results and radiologist's interpretation, available in the [facility] health record.  Images were reviewed with the patient in the office today.  My personal interpretation of the performed imaging:     3 XR views of right shoulder reviewed: no acute bony abnormality, no significant degenerative change  - will follow official read    Reviewed prior PT and Plastic Surgery notes  Review of the result(s) of each unique test - XR             Again, thank you for allowing me to participate in the care of your patient.        Sincerely,        Angeline Wilcox MD

## 2024-10-11 NOTE — PROGRESS NOTES
Preoperative Evaluation  [facility]  [address] [facility] [address]  Phone: [phone]  Primary Provider: Jem Arredondo PA-C  Pre-op Performing Provider: Jem Arredondo PA-C  Oct 11, 2024           10/11/2024   Surgical Information   What procedure is being done? breast reconstruction   Facility or Hospital where procedure/surgery will be performed: [facility]   Who is doing the procedure / surgery? [facility]   Date of surgery / procedure: nov 1   Time of surgery / procedure: 8   Where do you plan to recover after surgery? at home with family        Fax number for surgical facility: Note does not need to be faxed, will be available electronically in Epic.    Assessment & Plan     The proposed surgical procedure is considered INTERMEDIATE risk.    (Z01.818) Preop general physical exam  (primary encounter diagnosis)  Comment: Here for preop examination.  History of breast reconstruction.  Plans for procedure for grafting as well as revision.  Previously no complications with anesthesia or surgical interventions.  Plan: CBC with platelets and differential, Basic         metabolic panel  (Ca, Cl, CO2, Creat, Gluc, K,         Na, BUN)            (F41.9) Anxiety  Comment: Mood has been stable.  Continue with Lexapro.  Plan:     (C50.811,  Z17.0) Malignant neoplasm of overlapping sites of right breast in female, estrogen receptor positive (H)  Comment continue with letrozole     (Z98.890) Status post bilateral breast reconstruction      - No identified additional risk factors other than previously addressed    Antiplatelet or Anticoagulation Medication Instructions   - Patient is on no antiplatelet or anticoagulation medications.    Additional Medication Instructions  Letrozole continue with day of surgery    - SSRIs, SNRIs, TCAs, Antipsychotics: Continue without modification.     Recommendation  Approval given to proceed with proposed procedure, without further  diagnostic evaluation.    Val Tristan is a 44 year old, presenting for the following:  Pre-Op Exam (bilateral breast fat grafting from abdomen and thighs, back scar revisions Bilateral breast revision, /bilateral nipple reconstruction  //)          10/11/2024    11:10 AM   Additional Questions   Roomed by KEVIN CRAMER   Accompanied by SELF     HPI related to upcoming procedure:   Patient presents for preop examination.  Plans for fat grafting from abdomen and thighs, back scar revisions, bilateral breast revision.  Patient had completed systemic chemotherapy and adjuvant radiation.  Continues to follow with Dr. Ferreira.  Original mastectomy 4/23 with reconstruction 6/13/2024.  Patient denies any prior issues with anesthesia.    Has been more than 1 year since menstrual period        10/11/2024   Pre-Op Questionnaire   Have you ever had a heart attack or stroke? No   Have you ever had surgery on your heart or blood vessels, such as a stent placement, a coronary artery bypass, or surgery on an artery in your head, neck, heart, or legs? No   Do you have chest pain with activity? No   Do you have a history of heart failure? No   Do you currently have a cold, bronchitis or symptoms of other infection? No   Do you have a cough, shortness of breath, or wheezing? No   Do you or anyone in your family have previous history of blood clots? (!) YES. Sister with PE.    Do you or does anyone in your family have a serious bleeding problem such as prolonged bleeding following surgeries or cuts? No   Have you ever had problems with anemia or been told to take iron pills? No   Have you had any abnormal blood loss such as black, tarry or bloody stools, or abnormal vaginal bleeding? No   Have you ever had a blood transfusion? No   Are you willing to have a blood transfusion if it is medically needed before, during, or after your surgery? Yes   Have you or any of your relatives ever had problems with anesthesia? No   Do you have  sleep apnea, excessive snoring or daytime drowsiness? No   Do you have any artifical heart valves or other implanted medical devices like a pacemaker, defibrillator, or continuous glucose monitor? No   Do you have artificial joints? No   Are you allergic to latex? (!) YES        Health Care Directive  Patient does not have a Health Care Directive or Living Will: Advance Directive received and scanned. Click on Code in the patient header to view.    Preoperative Review of    reviewed - Short course hydromorphone     Status of Chronic Conditions:  See problem list for active medical problems.  Problems all longstanding and stable, except as noted/documented.  See ROS for pertinent symptoms related to these conditions.    Patient Active Problem List    Diagnosis Date Noted    Status post bilateral breast reconstruction 08/06/2024     Priority: Medium    S/P breast reconstruction 06/13/2024     Priority: Medium    Cervical high risk HPV (human papillomavirus) test positive 11/02/2023     Priority: Medium     2006, 2007, 2009, 2010 NIL paps per CE  3/12/13 NIL pap  Gap in care  11/2/23 NIL pap, +HR HPV (not 16/18). Immunosuppressed. Pt states she just finished chemo and radiation. Planning tamoxifen. Plan: cotest in 1 year   11/8/23 Pt notified   10/11/24 Reminder mychart      Chemotherapy-induced neutropenia (H) 06/01/2023     Priority: Medium    Malignant neoplasm of overlapping sites of right breast in female, estrogen receptor positive (H) 03/29/2023     Priority: Medium     Check 1.24 follow-up [place].      Anxiety 03/12/2015     Priority: Medium    CARDIOVASCULAR SCREENING; LDL GOAL LESS THAN 160 01/10/2013     Priority: Medium      Past Medical History:   Diagnosis Date    Breast cancer (H) 03/17/2023    Right Breast    Cancer (H)     right breast, s/p bilateral mastectomies    Personal history of chemotherapy     Taxotere + Cytoxan (6/26/2023 - 8/28/2023) - Dr. Dyson    S/P radiation therapy     5,000  cGy to right chestwall completed on 2023 - [facility]     Past Surgical History:   Procedure Laterality Date    BREAST BIOPSY, RT/LT Right 2023    GYN SURGERY  2010        GYN SURGERY  2011        GYN SURGERY  2013    C/S    GYN SURGERY  2013    C/S    HC REMOVAL OF OVARIAN CYST(S)  1999    IR CHEST PORT PLACEMENT > 5 YRS OF AGE  2023    MASTECTOMY PARTIAL WITH SENTINEL NODE Bilateral 2023    Procedure: BILATERAL Skin-Sparing Mastectomy, BILATERAL breast implant removal, RIGHT Axillary [place] Lymph Node Biopsy;  Surgeon: Mitra Souza MD;  Location: [facility] OR    RECONSTRUCT BREAST, INSERT TISSUE EXPANDER BILATERAL, COMBINED Bilateral 2023    Procedure: bilateral breast reconstruction, insert tissue expander bilateral, Spy **Latex Allergy**;  Surgeon: EDWARDO Ferreira MD;  Location: [facility] OR    RECONSTRUCT CHEST WALL LATISSIMUS DORSI PEDICLE Bilateral 2024    Procedure: Bilateral breast reconstruction with bilateral latissimus flap reconstructions, spy  **Latex Allergy**;  Surgeon: EDWARDO Ferreira MD;  Location: [facility] OR    TUBAL LIGATION  2013     Current Outpatient Medications   Medication Sig Dispense Refill    acetaminophen (TYLENOL) 325 MG tablet Take 2 tablets (650 mg) by mouth every 4 hours as needed for other (For optimal non-opioid multimodal pain management to improve pain control.) 30 tablet 0    escitalopram (LEXAPRO) 10 MG tablet Take 1 tablet (10 mg) by mouth daily Needs appointment for further refills. 90 tablet 1    letrozole (FEMARA) 2.5 MG tablet Take 1 tablet (2.5 mg) by mouth daily 90 tablet 3    methocarbamol (ROBAXIN) 750 MG tablet Take 1 tablet (750 mg) by mouth every 6 hours as needed for muscle spasms 30 tablet 0    omeprazole (PRILOSEC) 20 MG DR capsule Take 1 capsule (20 mg) by mouth daily 30 capsule 0    polyethylene glycol (MIRALAX) 17 GM/Dose powder Take 17 g by mouth  "daily 510 g 0    senna-docusate (SENOKOT-S/PERICOLACE) 8.6-50 MG tablet Take 1 tablet by mouth 2 times daily 10 tablet 0    triamcinolone (KENALOG) 0.1 % external ointment Apply topically 2 times daily Apply twice daily as needed to rash if it flares 80 g 2    ondansetron (ZOFRAN ODT) 4 MG ODT tab Take 1 tablet (4 mg) by mouth every 6 hours as needed for nausea or vomiting (Patient not taking: Reported on 2024) 15 tablet 0     Allergies   Allergen Reactions    Oxycodone-Acetaminophen Hives    Percocet [Oxycodone-Acetaminophen] Itching    Latex Rash      Social History     Tobacco Use    Smoking status: Former     Current packs/day: 0.00     Average packs/day: 0.5 packs/day for 10.0 years (5.0 ttl pk-yrs)     Types: Cigarettes     Start date: 2000     Quit date: 2010     Years since quittin.7     Passive exposure: Past    Smokeless tobacco: Never   Substance Use Topics    Alcohol use: No     Family History   Problem Relation Age of Onset    Anxiety Disorder Mother     Diabetes Father     Substance Abuse Father     Bleeding Disorder Sister     Pulmonary Embolism Sister         33    Depression Sister     Autism Spectrum Disorder Daughter     Anxiety Disorder Daughter     Breast Cancer Other         Distant 2nd cousin per patient report    Cancer No family hx of     Hypertension No family hx of     Cerebrovascular Disease No family hx of     Thyroid Disease No family hx of     Glaucoma No family hx of     Macular Degeneration No family hx of     Ovarian Cancer No family hx of     Anesthesia Reaction No family hx of      History   Drug Use No       Review of Systems  Constitutional, neuro, ENT, endocrine, pulmonary, cardiac, gastrointestinal, genitourinary, musculoskeletal, integument and psychiatric systems are negative, except as otherwise noted.    Objective    /81   Pulse 74   Temp 98.5  F (36.9  C) (Tympanic)   Resp 16   Ht 1.63 m (5' 4.17\")   Wt 61.2 kg (135 lb)   SpO2 99%   BMI " "23.05 kg/m     Estimated body mass index is 23.05 kg/m  as calculated from the following:    Height as of this encounter: 1.63 m (5' 4.17\").    Weight as of this encounter: 61.2 kg (135 lb).  Physical Exam  GENERAL: alert and no distress  EYES: Eyes grossly normal to inspection, PERRL and conjunctivae and sclerae normal  HENT: normal cephalic/atraumatic, nose and mouth without ulcers or lesions, oropharynx clear, and oral mucous membranes moist  NECK: no adenopathy, no asymmetry, masses, or scars  RESP: lungs clear to auscultation - no rales, rhonchi or wheezes  CV: regular rate and rhythm, normal S1 S2, no S3 or S4, no murmur, click or rub, no peripheral edema  ABDOMEN: soft, nontender, no hepatosplenomegaly, no masses and bowel sounds normal  MS: no gross musculoskeletal defects noted, no edema  NEURO: Normal strength and tone, mentation intact and speech normal  PSYCH: mentation appears normal, affect normal/bright    Recent Labs   Lab Test 05/21/24  1144 02/06/24  1442 02/05/24  1027 01/11/24  1517   HGB 14.4 13.0  --  14.9    219  --  213   NA  --   --  141 140   POTASSIUM  --   --  3.7 4.0   CR  --   --  0.79 0.81      Diagnostics  Labs pending at this time.  Results will be reviewed when available.   No EKG required, no history of coronary heart disease, significant arrhythmia, peripheral arterial disease or other structural heart disease.    Revised Cardiac Risk Index (RCRI)  The patient has the following serious cardiovascular risks for perioperative complications:   - No serious cardiac risks = 0 points     RCRI Interpretation: 0 points: Class I (very low risk - 0.4% complication rate)         Signed Electronically by: Jem Arredondo PA-C  A copy of this evaluation report is provided to the requesting physician.    "

## 2024-10-11 NOTE — LETTER
10/11/2024      Azalea Mckeon  [address] [facility] [address]      Dear Colleague,    Thank you for referring your patient, Azalea Mckeon, to the [facility]. Please see a copy of my visit note below.    PREOPERATIVE VISIT NOTE     PRESENTING COMPLAINT:  Preop visit for upcoming bilateral breast fat grafting and scar revision scheduled for 11/1/2024 for breast cancer reconstruction     HISTORY OF PRESENTING COMPLAINT: The patient is here for a pre-op visit for the patient's surgery.  The patient is being seen in the presence of my nurse. There is no change since the patient's consultation. Please see the consult note for details.     No change in history physical exam.  Patient is getting cleared.  Plan is to do fat grafting from abdomen and thighs as well as scar revisions of the back scars.     ASSESSMENT AND PLAN:  Based upon the above findings, the patient is here for a preop visit for upcoming surgery.  I had a monica, detailed discussion with the patient in the presence of my nurse (who was present from beginning to end) about the proposed surgery. I was very clear and detailed about overview of surgery, perioperative plans, and expectations. All risks, benefits and alternatives of the surgery including but not limited to pain, infection, bleeding, scarring, asymmetry, seromas, hematomas, wound breakdown, wound dehiscence, prominent scar, hypertrophic scar, keloid scar, requirement of further surgeries, skin/tissue necrosis, nerve/muscle/deeper structure injury, DVT, PE, MI, CVA, pneumonia, renal failure and death, were explained in detail. The patient agreed and understood them all and had all the questions answered to the patient's satisfaction, and the patient wants to proceed with surgery. I look forward to helping the patient out in the near future.  All questions were answered.  The patient was happy with the visit.    Total time spent in the encounter today including  Negative embx. No cancer or precancer. Already on provera thus no further treatment. Follow instructions given at visit. chart review, visit itself, and post-visit paperwork was 20 minutes.       Again, thank you for allowing me to participate in the care of your patient.        Sincerely,        EDWARDO Ferreira MD

## 2024-10-11 NOTE — PROGRESS NOTES
ASSESSMENT & PLAN    Azalea was seen today for pain, decreased rom.    Diagnoses and all orders for this visit:    Adhesive capsulitis of right shoulder    Acute pain of right shoulder  -     Orthopedic  Referral  -     XR Shoulder Right G/E 3 Views; Future    S/P breast reconstruction      This issue is chronic and Unchanged.    ICD-10-CM    1. Adhesive capsulitis of right shoulder  M75.01       2. Acute pain of right shoulder  M25.511 Orthopedic  Referral     XR Shoulder Right G/E 3 Views      3. S/P breast reconstruction  Z98.890           We discussed these other possible diagnosis: More likely adhesive capsulitis given history and current exam.    We discussed the following treatment options: symptom treatment, activity modification/rest, imaging, rehab and referral. Following discussion, plan: discuss presumptive treatment with GH joint injection and physical therapy. Will coordinate timing with plastic surgery    Plan:  - Today's Plan of Care:  Monitor symptoms  Follow up with plastic surgery    Discussed activity considerations and other supportive care including Ice/Heat, OTC and other topical medications as needed.    Likely PT referral and GH joint injection referral pending discussion with plastic surgery    Discussed possible MRI if not improving    Follow Up: to be determined - will send a MyChart    Concerning signs and symptoms were reviewed and all questions were answered at this time.    Thanks for the opportunity to participate in the care of this patient, I will keep you updated on their progress.    CC: EDWARDO Wilcox MD [facility]  Sports Medicine Physician  [facility]    -----  Chief Complaint   Patient presents with    Right Shoulder - pain, decreased ROM       SUBJECTIVE  Azalea Mckeon is a/an 44 year old female who is seen in consultation at the request of  EDWARDO Ferreira M.D. for evaluation of right shoulder pain.    The  patient is seen by themselves.    Onset: 6/13/24. Patient describes no injury, her pain started after reconstruction of her breast after mastectomy and the latissimus dorsi was taken to create a breast pocket. She has had pain and problems ever since, only on the right.    Location of Pain: right shoulder, anterior, feels like it's in the joint   Worsened by: reaching behind her back, abduction, anything overhead, sleeping on that side  Better with: physical therapy -more for scar tissue, lymphedema  Treatments tried: ice, heat, home exercises, physical therapy (many visits), and massage, soft tissue IA weekly.   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   General: healthy, alert and in no distress  Skin: no suspicious lesions or rash.  CV: distal perfusion intact   Resp: normal respiratory effort without conversational dyspnea   Psych: normal mood and affect  Gait: NORMAL  Neuro: Normal light sensory exam of upper extremity    Right Shoulder exam    Inspection and Posture:       normal    Skin:        no visible deformities    Tender:        none    Non Tender:       remainder of shoulder bilateral    ROM:        forward flexion 160  right       abduction 90 right       internal rotation gluteal right       external rotation 20 right    Painful motions:       flexion right       elevation right    Strength:        abduction 5/5 bilateral       flexion 5/5 bilateral       internal rotation 5/5 bilateral       external rotation 5/5 bilateral    Impingement testing:       neg (-) Neer right       positive (+) Gaspar right  - painful arc    Sensation:        normal sensation over shoulder and upper extremity       RADIOLOGY:  Final results and radiologist's interpretation, available in the [facility]  record.  Images were reviewed with the patient in the office today.  My personal interpretation of the performed imaging:     3 XR views of right shoulder reviewed: no acute bony abnormality, no significant degenerative change  - will follow official read    Reviewed prior PT and Plastic Surgery notes  Review of the result(s) of each unique test - XR

## 2024-10-11 NOTE — Clinical Note
Thanks for the referral.  I think Azalea likely has frozen shoulder.  This would typically be treated with a glenohumeral joint injection and more aggressive shoulder physical therapy.  I wanted to touch base with you prior to starting this treatment given her upcoming surgery.  Please let me know the best timing for a shoulder injection and physical therapy after her upcoming surgery.  What restrictions will she have on any shoulder motion after surgery?  Please let me know us know the best timing for any further shoulder treatment after her recovery. Thanks, Angeline

## 2024-10-11 NOTE — PATIENT INSTRUCTIONS
We discussed these other possible diagnosis: More likely adhesive capsulitis given history and current exam.    We discussed the following treatment options: symptom treatment, activity modification/rest, imaging, rehab and referral. Following discussion, plan: discuss presumptive treatment with GH joint injection and physical therapy. Will coordinate timing with plastic surgery    Plan:  - Today's Plan of Care:  Monitor symptoms  Follow up with plastic surgery    Discussed activity considerations and other supportive care including Ice/Heat, OTC and other topical medications as needed.    Likely PT referral and GH joint injection referral pending discussion with plastic surgery    Discussed possible MRI if not improving    Follow Up: to be determined - will send a MyChart    If you have any further questions for your physician or physician s care team you can call [phone].

## 2024-10-11 NOTE — PROGRESS NOTES
PREOPERATIVE VISIT NOTE     PRESENTING COMPLAINT:  Preop visit for upcoming bilateral breast fat grafting and scar revision scheduled for 11/1/2024 for breast cancer reconstruction     HISTORY OF PRESENTING COMPLAINT: The patient is here for a pre-op visit for the patient's surgery.  The patient is being seen in the presence of my nurse. There is no change since the patient's consultation. Please see the consult note for details.     No change in history physical exam.  Patient is getting cleared.  Plan is to do fat grafting from abdomen and thighs as well as scar revisions of the back scars.     ASSESSMENT AND PLAN:  Based upon the above findings, the patient is here for a preop visit for upcoming surgery.  I had a monica, detailed discussion with the patient in the presence of my nurse (who was present from beginning to end) about the proposed surgery. I was very clear and detailed about overview of surgery, perioperative plans, and expectations. All risks, benefits and alternatives of the surgery including but not limited to pain, infection, bleeding, scarring, asymmetry, seromas, hematomas, wound breakdown, wound dehiscence, prominent scar, hypertrophic scar, keloid scar, requirement of further surgeries, skin/tissue necrosis, nerve/muscle/deeper structure injury, DVT, PE, MI, CVA, pneumonia, renal failure and death, were explained in detail. The patient agreed and understood them all and had all the questions answered to the patient's satisfaction, and the patient wants to proceed with surgery. I look forward to helping the patient out in the near future.  All questions were answered.  The patient was happy with the visit.    Total time spent in the encounter today including chart review, visit itself, and post-visit paperwork was 20 minutes.

## 2024-10-11 NOTE — PATIENT INSTRUCTIONS
How to Take Your Medication Before Surgery  Antiplatelet or Anticoagulation Medication Instructions   - Patient is on no antiplatelet or anticoagulation medications.    Additional Medication Instructions  Letrozole continue with day of surgery    - SSRIs, SNRIs, TCAs, Antipsychotics: Continue without modification.     Patient Education   Preparing for Your Surgery  For Adults  Getting started  In most cases, a nurse will call to review your health history and instructions. They will give you an arrival time based on your scheduled surgery time. Please be ready to share:  Your doctor's clinic name and phone number  Your medical, surgical, and anesthesia history  A list of allergies and sensitivities  A list of medicines, including herbal treatments and over-the-counter drugs  Whether the patient has a legal guardian (ask how to send us the papers in advance)  Note: You may not receive a call if you were seen at our PAC (Preoperative Assessment Center).  Please tell us if you're pregnant--or if there's any chance you might be pregnant. Some surgeries may injure a fetus (unborn baby), so they require a pregnancy test. Surgeries that are safe for a fetus don't always need a test, and you can choose whether to have one.   Preparing for surgery  Within 10 to 30 days of surgery: Have a pre-op exam (sometimes called an H&P, or History and Physical). This can be done at a clinic or pre-operative center.  If you're having a , you may not need this exam. Talk to your care team.  At your pre-op exam, talk to your care team about all medicines you take. (This includes CBD oil and any drugs, such as THC, marijuana, and other forms of cannabis.) If you need to stop any medicine before surgery, ask when to start taking it again.  This is for your safety. Many medicines and drugs can make you bleed too much during surgery. Some change how well surgery (anesthesia) drugs work.  Call your insurance company to let them know  you're having surgery. (If you don't have insurance, call [phone].)  Call your clinic if there's any change in your health. This includes a scrape or scratch near the surgery site, or any signs of a cold (sore throat, runny nose, cough, rash, fever).  Eating and drinking guidelines  For your safety: Unless your surgeon tells you otherwise, follow the guidelines below.  Eat and drink as normal until 8 hours before you arrive for surgery. After that, no food or milk. You can spit out gum when you arrive.  Drink clear liquids until 2 hours before you arrive. These are liquids you can see through, like water, Gatorade, and Propel Water. They also include plain black coffee and tea (no cream or milk).  No alcohol for 24 hours before you arrive. The night before surgery, stop any drinks that contain THC.  If your care team tells you to take medicine on the morning of surgery, it's okay to take it with a sip of water. No other medicines or drugs are allowed (including CBD oil)--follow your care team's instructions.  If you have questions the day of surgery, call your hospital or surgery center.   Preventing infection  Shower or bathe the night before and the morning of surgery. Follow the instructions your clinic gave you. (If no instructions, use regular soap.)  Don't shave or clip hair near your surgery site. We'll remove the hair if needed.  Don't smoke or vape the morning of surgery. No chewing tobacco for 6 hours before you arrive. A nicotine patch is okay. You may spit out nicotine gum when you arrive.  For some surgeries, the surgeon will tell you to fully quit smoking and nicotine.  We will make every effort to keep you safe from infection. We will:  Clean our hands often with soap and water (or an alcohol-based hand rub).  Clean the skin at your surgery site with a special soap that kills germs.  Give you a special gown to keep you warm. (Cold raises the risk of infection.)  Wear hair covers, masks, gowns,  and gloves during surgery.  Give antibiotic medicine, if prescribed. Not all surgeries need this medicine.  What to bring on the day of surgery  Photo ID and insurance card  Copy of your health care directive, if you have one  Glasses and hearing aids (bring cases)  You can't wear contacts during surgery  Inhaler and eye drops, if you use them (tell us about these when you arrive)  CPAP machine or breathing device, if you use them  A few personal items, if spending the night  If you have . . .  A pacemaker, ICD (cardiac defibrillator), or other implant: Bring the ID card.  An implanted stimulator: Bring the remote control.  A legal guardian: Bring a copy of the certified (court-stamped) guardianship papers.  Please remove any jewelry, including body piercings. Leave jewelry and other valuables at home.  If you're going home the day of surgery  You must have a responsible adult drive you home. They should stay with you overnight as well.  If you don't have someone to stay with you, and you aren't safe to go home alone, we may keep you overnight. Insurance often won't pay for this.  After surgery  If it's hard to control your pain or you need more pain medicine, please call your surgeon's office.  Questions?   If you have any questions for your care team, list them here:   ____________________________________________________________________________________________________________________________________________________________________________________________________________________________________________________________  For informational purposes only. Not to replace the advice of your health care provider. Copyright   2003, 2019 [facility]. All rights reserved. Clinically reviewed by Donta Ann MD. [employer] [id number] - REV 08/24.

## 2024-10-12 LAB
ANION GAP SERPL CALCULATED.3IONS-SCNC: 11 MMOL/L (ref 7–15)
BUN SERPL-MCNC: 12 MG/DL (ref 6–20)
CALCIUM SERPL-MCNC: 9.8 MG/DL (ref 8.8–10.4)
CHLORIDE SERPL-SCNC: 105 MMOL/L (ref 98–107)
CREAT SERPL-MCNC: 0.78 MG/DL (ref 0.51–0.95)
EGFRCR SERPLBLD CKD-EPI 2021: >90 ML/MIN/1.73M2
GLUCOSE SERPL-MCNC: 88 MG/DL (ref 70–99)
HCO3 SERPL-SCNC: 24 MMOL/L (ref 22–29)
POTASSIUM SERPL-SCNC: 4.4 MMOL/L (ref 3.4–5.3)
SODIUM SERPL-SCNC: 140 MMOL/L (ref 135–145)

## 2024-10-23 ENCOUNTER — THERAPY VISIT (OUTPATIENT)
Dept: PHYSICAL THERAPY | Facility: CLINIC | Age: 44
End: 2024-10-23
Attending: PLASTIC SURGERY
Payer: COMMERCIAL

## 2024-10-23 ENCOUNTER — TELEPHONE (OUTPATIENT)
Dept: SURGERY | Facility: CLINIC | Age: 44
End: 2024-10-23

## 2024-10-23 DIAGNOSIS — Z98.890 S/P BREAST RECONSTRUCTION: Primary | ICD-10-CM

## 2024-10-23 PROCEDURE — 97140 MANUAL THERAPY 1/> REGIONS: CPT | Mod: GP,CQ | Performed by: REHABILITATION PRACTITIONER

## 2024-10-23 NOTE — TELEPHONE ENCOUNTER
Left a message on 10/22/24 with direct call back number for Azalea to call RN back with some questions regarding the Pink Rib[employer] Riders form. RN reached out to  on 10/22/24 via staff message asking if he would be okay signing form and letter so patient my qualify for a monetary gift to help with some expenses following surgery on 11/1/24.  replied back that he would be okay with this. Will have  review forms and sign when he is in clinic this Friday 10/25/24..Viji Llamas RN

## 2024-10-23 NOTE — TELEPHONE ENCOUNTER
Patient returned call and left message on department VM today at 12:23pm. She states she is returning a call to Viji regarding a pink rider form. She states in the past Sofi in oncology would give the form to the oncologist to sign. She states the form may have been sent because she is now in Dr. Ferreira's care.    She states the form is due by 11/4/24.     Patient would like a call back if RN has any additional questions.    Thao Grewal LPN

## 2024-10-23 NOTE — LETTER
October 25, 2024      Azalea Mckeon  [address] [address] [facility] [address]        Dear Vuzeers      We are writing this letter to request consideration of our patient Azalea Mckeon to be granted a monetary gift while recovering from surgery. Azalea was initially diagnosed on 3- with stage 1A hormone positive, HER2 negative right sided invasive ductal carcinoma. Azalea underwent a bilateral mastectomy with bilateral breast reconstruction on 6- and is currently scheduled for phase 3 breast reconstruction surgery on 11/1/2024. This gift can be used for any unforeseen expenses that may develop while Azalea is recovering from surgery    We appreciate your attention to this matter and please reach out with any further questions to [phone]          Sincerely,        EDWARDO Ferreira MD

## 2024-10-24 NOTE — TELEPHONE ENCOUNTER
Called Azalea and relayed information about pink ribbon rider form and letter that  ok'd signing. RN also notified pt that there are sections of forms that she would need to complete as well. Explained to patient that once forms are signed RN will email forms back to patient to complete and then she can submit forms herself or RN can too. Patient states she will submit the forms. No further questions at this time per patient..Viji Llamas RN

## 2024-10-25 NOTE — TELEPHONE ENCOUNTER
Forms signed by  and emailed to patient at [email or web address] message sent to patient notifying her of this..Viji Llamas RN

## 2024-10-29 ENCOUNTER — OFFICE VISIT (OUTPATIENT)
Dept: OBGYN | Facility: CLINIC | Age: 44
End: 2024-10-29
Payer: COMMERCIAL

## 2024-10-29 ENCOUNTER — ANESTHESIA EVENT (OUTPATIENT)
Dept: SURGERY | Facility: AMBULATORY SURGERY CENTER | Age: 44
End: 2024-10-29
Payer: COMMERCIAL

## 2024-10-29 VITALS
BODY MASS INDEX: 23.16 KG/M2 | TEMPERATURE: 98.8 F | HEART RATE: 75 BPM | SYSTOLIC BLOOD PRESSURE: 109 MMHG | DIASTOLIC BLOOD PRESSURE: 76 MMHG | HEIGHT: 65 IN | WEIGHT: 139 LBS | RESPIRATION RATE: 18 BRPM

## 2024-10-29 DIAGNOSIS — R87.810 CERVICAL HIGH RISK HPV (HUMAN PAPILLOMAVIRUS) TEST POSITIVE: Primary | ICD-10-CM

## 2024-10-29 DIAGNOSIS — Z00.00 ANNUAL PHYSICAL EXAM: ICD-10-CM

## 2024-10-29 PROCEDURE — G0145 SCR C/V CYTO,THINLAYER,RESCR: HCPCS | Performed by: PHYSICIAN ASSISTANT

## 2024-10-29 PROCEDURE — 87624 HPV HI-RISK TYP POOLED RSLT: CPT | Performed by: PHYSICIAN ASSISTANT

## 2024-10-29 PROCEDURE — 99396 PREV VISIT EST AGE 40-64: CPT | Performed by: PHYSICIAN ASSISTANT

## 2024-10-29 PROCEDURE — 99459 PELVIC EXAMINATION: CPT | Performed by: PHYSICIAN ASSISTANT

## 2024-10-29 NOTE — NURSING NOTE
"Initial /76 (BP Location: Right arm, Patient Position: Chair, Cuff Size: Adult Regular)   Pulse 75   Temp 98.8  F (37.1  C) (Tympanic)   Resp 18   Ht 1.638 m (5' 4.5\")   Wt 63 kg (139 lb)   BMI 23.49 kg/m   Estimated body mass index is 23.49 kg/m  as calculated from the following:    Height as of this encounter: 1.638 m (5' 4.5\").    Weight as of this encounter: 63 kg (139 lb). .    "

## 2024-10-29 NOTE — ANESTHESIA PREPROCEDURE EVALUATION
Anesthesia Pre-Procedure Evaluation    Patient: Azalea Mckeon   MRN: [id number] : 1980        Procedure : Procedure(s):  bilateral breast fat grafting from abdomen and thighs, back scar revisions  Bilateral breast revision,  bilateral nipple reconstruction          Past Medical History:   Diagnosis Date    Breast cancer (H) 2023    Right Breast    Cancer (H)     right breast, s/p bilateral mastectomies    Personal history of chemotherapy     Taxotere + Cytoxan (2023 - 2023) - Dr. Dyson    S/P radiation therapy     5,000 cGy to right chestwall completed on 2023 - [facility]      Past Surgical History:   Procedure Laterality Date    BREAST BIOPSY, RT/LT Right 2023    GYN SURGERY  2010        GYN SURGERY  2011        GYN SURGERY  2013    C/S    GYN SURGERY  2013    C/S    HC REMOVAL OF OVARIAN CYST(S)  1999    IR CHEST PORT PLACEMENT > 5 YRS OF AGE  2023    MASTECTOMY PARTIAL WITH SENTINEL NODE Bilateral 2023    Procedure: BILATERAL Skin-Sparing Mastectomy, BILATERAL breast implant removal, RIGHT Axillary [place] Lymph Node Biopsy;  Surgeon: Mitra Souza MD;  Location: [facility] OR    RECONSTRUCT BREAST, INSERT TISSUE EXPANDER BILATERAL, COMBINED Bilateral 2023    Procedure: bilateral breast reconstruction, insert tissue expander bilateral, Spy **Latex Allergy**;  Surgeon: EDWARDO Ferreira MD;  Location: [facility] OR    RECONSTRUCT CHEST WALL LATISSIMUS DORSI PEDICLE Bilateral 2024    Procedure: Bilateral breast reconstruction with bilateral latissimus flap reconstructions, spy  **Latex Allergy**;  Surgeon: EDWARDO Ferreira MD;  Location: [facility] OR    TUBAL LIGATION  2013      Allergies   Allergen Reactions    Oxycodone-Acetaminophen Hives    Percocet [Oxycodone-Acetaminophen] Itching    Latex Rash      Social History     Tobacco Use    Smoking status: Former     Current packs/day:  "0.00     Average packs/day: 0.5 packs/day for 10.0 years (5.0 ttl pk-yrs)     Types: Cigarettes     Start date: 2000     Quit date: 2010     Years since quittin.8     Passive exposure: Past    Smokeless tobacco: Never   Substance Use Topics    Alcohol use: No      Wt Readings from Last 1 Encounters:   10/29/24 63 kg (139 lb)        Anesthesia Evaluation            ROS/MED HX  ENT/Pulmonary:  - neg pulmonary ROS     Neurologic:  - neg neurologic ROS     Cardiovascular:  - neg cardiovascular ROS     METS/Exercise Tolerance:     Hematologic:  - neg hematologic  ROS     Musculoskeletal:  - neg musculoskeletal ROS     GI/Hepatic:  - neg GI/hepatic ROS     Renal/Genitourinary:  - neg Renal ROS     Endo:  - neg endo ROS     Psychiatric/Substance Use:     (+) psychiatric history anxiety       Infectious Disease:  - neg infectious disease ROS     Malignancy:   (+) Malignancy, History of Breast.Breast CA status post Chemo.      Other:            Physical Exam    Airway  airway exam normal      Mallampati: II       Respiratory Devices and Support         Dental       (+) Minor Abnormalities - some fillings, tiny chips      Cardiovascular   cardiovascular exam normal          Pulmonary   pulmonary exam normal                OUTSIDE LABS:  CBC:   Lab Results   Component Value Date    WBC 6.3 10/11/2024    WBC 5.8 2024    HGB 13.9 10/11/2024    HGB 14.4 2024    HCT 39.9 10/11/2024    HCT 41.4 2024     10/11/2024     2024     BMP:   Lab Results   Component Value Date     10/11/2024     2024    POTASSIUM 4.4 10/11/2024    POTASSIUM 3.7 2024    CHLORIDE 105 10/11/2024    CHLORIDE 105 2024    CO2 24 10/11/2024    CO2 25 2024    BUN 12.0 10/11/2024    BUN 8.1 2024    CR 0.78 10/11/2024    CR 0.79 2024    GLC 88 10/11/2024    GLC 91 06/15/2024     COAGS: No results found for: \"PTT\", \"INR\", \"FIBR\"  POC:   Lab Results   Component Value Date "    HCG Negative 09/22/2023     HEPATIC:   Lab Results   Component Value Date    ALBUMIN 4.5 02/05/2024    PROTTOTAL 7.1 02/05/2024    ALT 22 02/05/2024    AST 22 02/05/2024    ALKPHOS 36 (L) 02/05/2024    BILITOTAL 0.7 02/05/2024     OTHER:   Lab Results   Component Value Date    MINNA 9.8 10/11/2024    LIPASE 33 02/05/2024    TSH 0.60 02/05/2024       Anesthesia Plan    ASA Status:  2    NPO Status:  NPO Appropriate    Anesthesia Type: General.     - Airway: ETT   Induction: Intravenous, Propofol.   Maintenance: TIVA.        Consents    Anesthesia Plan(s) and associated risks, benefits, and realistic alternatives discussed. Questions answered and patient/representative(s) expressed understanding.     - Discussed: Risks, Benefits and Alternatives for the PROCEDURE were discussed     - Discussed with:  Patient            Postoperative Care    Pain management: Oral pain medications, IV analgesics, Multi-modal analgesia.   PONV prophylaxis: Ondansetron (or other 5HT-3), Dexamethasone or Solumedrol, Background Propofol Infusion     Comments:               Paul Palmer MD    I have reviewed the pertinent notes and labs in the chart from the past 30 days and (re)examined the patient.  Any updates or changes from those notes are reflected in this note.                           # Financial/Environmental Concerns:

## 2024-10-29 NOTE — PROGRESS NOTES
SUBJECTIVE:   CC: Azalea Mckeon is an 44 year old woman who presents for preventive health visit.       Patient has been advised of split billing requirements and indicates understanding: Yes  Healthy Habits:  Do you get at least three servings of calcium containing foods daily (dairy, green leafy vegetables, etc.)? yes  Amount of exercise or daily activities, outside of work: 4 day(s) per week  Problems taking medications regularly No  Medication side effects: No  Have you had an eye exam in the past two years? yes  Do you see a dentist twice per year? no  Do you have sleep apnea, excessive snoring or daytime drowsiness?no    Patient due for pap smear.   Patient with history of breast CA: she is on letrozole and does not get her period.     Today's PHQ-2 Score:       10/29/2024     9:15 AM 2024     9:39 AM   PHQ-2 (  [employer])   Q1: Little interest or pleasure in doing things 0 1    Q2: Feeling down, depressed or hopeless 0 1    PHQ-2 Score 0 2   Q1: Little interest or pleasure in doing things  Several days   Q2: Feeling down, depressed or hopeless  Several days   PHQ-2 Score  2       Patient-reported       Abuse: Current or Past(Physical, Sexual or Emotional)- No  Do you feel safe in your environment? Yes        Social History     Tobacco Use    Smoking status: Former     Current packs/day: 0.00     Average packs/day: 0.5 packs/day for 10.0 years (5.0 ttl pk-yrs)     Types: Cigarettes     Start date: 2000     Quit date: 2010     Years since quittin.8     Passive exposure: Past    Smokeless tobacco: Never   Substance Use Topics    Alcohol use: No     If you drink alcohol do you typically have >3 drinks per day or >7 drinks per week? No                     Reviewed orders with patient.  Reviewed health maintenance and updated orders accordingly - Yes  Labs reviewed in EPIC  BP Readings from Last 3 Encounters:   10/29/24 109/76   10/11/24 115/81   24 101/77    Wt Readings from Last 3  Encounters:   10/29/24 63 kg (139 lb)   10/11/24 61.2 kg (135 lb)   24 62.6 kg (138 lb)                  Patient Active Problem List   Diagnosis    CARDIOVASCULAR SCREENING; LDL GOAL LESS THAN 160    Anxiety    Malignant neoplasm of overlapping sites of right breast in female, estrogen receptor positive (H)    Chemotherapy-induced neutropenia (H)    Cervical high risk HPV (human papillomavirus) test positive    S/P breast reconstruction    Status post bilateral breast reconstruction     Past Surgical History:   Procedure Laterality Date    BREAST BIOPSY, RT/LT Right 2023    GYN SURGERY  2010        GYN SURGERY  2011        GYN SURGERY  2013    C/S    GYN SURGERY  2013    C/S    HC REMOVAL OF OVARIAN CYST(S)  1999    IR CHEST PORT PLACEMENT > 5 YRS OF AGE  2023    MASTECTOMY PARTIAL WITH SENTINEL NODE Bilateral 2023    Procedure: BILATERAL Skin-Sparing Mastectomy, BILATERAL breast implant removal, RIGHT Axillary [place] Lymph Node Biopsy;  Surgeon: Mitra Souza MD;  Location: [facility] OR    RECONSTRUCT BREAST, INSERT TISSUE EXPANDER BILATERAL, COMBINED Bilateral 2023    Procedure: bilateral breast reconstruction, insert tissue expander bilateral, Spy **Latex Allergy**;  Surgeon: EDWARDO Ferreira MD;  Location: [facility] OR    RECONSTRUCT CHEST WALL LATISSIMUS DORSI PEDICLE Bilateral 2024    Procedure: Bilateral breast reconstruction with bilateral latissimus flap reconstructions, spy  **Latex Allergy**;  Surgeon: EDWARDO Ferreira MD;  Location: [facility] OR    TUBAL LIGATION  2013       Social History     Tobacco Use    Smoking status: Former     Current packs/day: 0.00     Average packs/day: 0.5 packs/day for 10.0 years (5.0 ttl pk-yrs)     Types: Cigarettes     Start date: 2000     Quit date: 2010     Years since quittin.8     Passive exposure: Past    Smokeless tobacco: Never   Substance Use Topics    Alcohol  use: No     Family History   Problem Relation Age of Onset    Anxiety Disorder Mother     Diabetes Father     Substance Abuse Father     Bleeding Disorder Sister     Pulmonary Embolism Sister         33    Depression Sister     Autism Spectrum Disorder Daughter     Anxiety Disorder Daughter     Breast Cancer Other         Distant 2nd cousin per patient report    Cancer No family hx of     Hypertension No family hx of     Cerebrovascular Disease No family hx of     Thyroid Disease No family hx of     Glaucoma No family hx of     Macular Degeneration No family hx of     Ovarian Cancer No family hx of     Anesthesia Reaction No family hx of          Current Outpatient Medications   Medication Sig Dispense Refill    acetaminophen (TYLENOL) 325 MG tablet Take 2 tablets (650 mg) by mouth every 4 hours as needed for other (For optimal non-opioid multimodal pain management to improve pain control.) 30 tablet 0    escitalopram (LEXAPRO) 10 MG tablet Take 1 tablet (10 mg) by mouth daily Needs appointment for further refills. 90 tablet 1    letrozole (FEMARA) 2.5 MG tablet Take 1 tablet (2.5 mg) by mouth daily 90 tablet 3    omeprazole (PRILOSEC) 20 MG DR capsule Take 1 capsule (20 mg) by mouth daily 30 capsule 0    triamcinolone (KENALOG) 0.1 % external ointment Apply topically 2 times daily Apply twice daily as needed to rash if it flares 80 g 2     Allergies   Allergen Reactions    Oxycodone-Acetaminophen Hives    Percocet [Oxycodone-Acetaminophen] Itching    Latex Rash     Recent Labs   Lab Test 10/11/24  1159 02/05/24  1027 01/11/24  1517 08/28/23  0757 06/09/23  1418 04/14/21  0833   LDL  --   --  107*  --   --   --    HDL  --   --  36*  --   --   --    TRIG  --   --  110  --   --   --    ALT  --  22 32 93*   < >  --    CR 0.78 0.79 0.81 0.72   < > 0.78   GFRESTIMATED >90 >90 >90 >90   < > >90   GFRESTBLACK  --   --   --   --   --  >90   POTASSIUM 4.4 3.7 4.0 4.0   < > 3.9   TSH  --  0.60  --   --   --   --     < > =  values in this interval not displayed.        FHS-7:       2/3/2023    11:19 AM 3/2/2023     9:52 AM   Breast CA Risk Assessment (FHS-7)   Did any of your first-degree relatives have breast or ovarian cancer? No No   Did any of your relatives have bilateral breast cancer? No No   Did any man in your family have breast cancer? No No   Did any woman in your family have breast and ovarian cancer? No No   Did any woman in your family have breast cancer before age 50 y? No No   Do you have 2 or more relatives with breast and/or ovarian cancer? No No   Do you have 2 or more relatives with breast and/or bowel cancer? No No     click delete button to remove this line now  Mammogram Screening - Annual screen due to history of breast cancer, carcinoma in situ, or hyperplasia  Pertinent mammograms are reviewed under the imaging tab.    Pertinent mammograms are reviewed under the imaging tab.  History of abnormal Pap smear: YES - reflected in Problem List and Health Maintenance accordingly      Latest Ref Rng & Units 11/2/2023     9:28 AM 3/12/2013    11:12 AM   PAP / HPV   PAP  Negative for Intraepithelial Lesion or Malignancy (NILM)     PAP (Historical)   NIL    HPV 16 DNA Negative Negative     HPV 18 DNA Negative Negative     Other HR HPV Negative Positive       Reviewed and updated as needed this visit by clinical staff   Tobacco  Allergies  Meds   Med Hx  Surg Hx  Fam Hx  Soc Hx        Reviewed and updated as needed this visit by Provider                  Past Medical History:   Diagnosis Date    Breast cancer (H) 03/17/2023    Right Breast    Cancer (H)     right breast, s/p bilateral mastectomies    Personal history of chemotherapy     Taxotere + Cytoxan (6/26/2023 - 8/28/2023) - Dr. Dyson    S/P radiation therapy     5,000 cGy to right chestwall completed on 11/8/2023 - [facility]      Past Surgical History:   Procedure Laterality Date    BREAST BIOPSY, RT/LT Right 03/17/2023    GYN SURGERY   2010        GYN SURGERY  2011        GYN SURGERY  2013    C/S    GYN SURGERY  2013    C/S    HC REMOVAL OF OVARIAN CYST(S)  1999    IR CHEST PORT PLACEMENT > 5 YRS OF AGE  2023    MASTECTOMY PARTIAL WITH SENTINEL NODE Bilateral 2023    Procedure: BILATERAL Skin-Sparing Mastectomy, BILATERAL breast implant removal, RIGHT Axillary [place] Lymph Node Biopsy;  Surgeon: Mitra Souza MD;  Location: [facility] OR    RECONSTRUCT BREAST, INSERT TISSUE EXPANDER BILATERAL, COMBINED Bilateral 2023    Procedure: bilateral breast reconstruction, insert tissue expander bilateral, Spy **Latex Allergy**;  Surgeon: EDWARDO Ferreira MD;  Location: [facility] OR    RECONSTRUCT CHEST WALL LATISSIMUS DORSI PEDICLE Bilateral 2024    Procedure: Bilateral breast reconstruction with bilateral latissimus flap reconstructions, spy  **Latex Allergy**;  Surgeon: EDWARDO Ferreira MD;  Location: [facility] OR    TUBAL LIGATION  2013     OB History    Para Term  AB Living   4 3 3 0 1 3   SAB IAB Ectopic Multiple Live Births   1 0 0 0 3      # Outcome Date GA Lbr Abdiaziz/2nd Weight Sex Type Anes PTL Lv   4 Term 13 40w0d  4.054 kg (8 lb 15 oz) F CS-LTranv   JU      Name: Sushila   3 Term  40w0d   F CS-Unspec   JU      Name: Eva   2 Term  40w0d   M CS-Unspec   JU      Name: Boo   1 2005               ROS:  CONSTITUTIONAL: NEGATIVE for fever, chills, change in weight  INTEGUMENTARU/SKIN: NEGATIVE for worrisome rashes, moles or lesions  EYES: NEGATIVE for vision changes or irritation  ENT: NEGATIVE for ear, mouth and throat problems  RESP: NEGATIVE for significant cough or SOB  BREAST: NEGATIVE for masses, tenderness or discharge  CV: NEGATIVE for chest pain, palpitations or peripheral edema  GI: NEGATIVE for nausea, abdominal pain, heartburn, or change in bowel habits  : NEGATIVE for unusual urinary or vaginal symptoms. Periods are  "regular.  MUSCULOSKELETAL: NEGATIVE for significant arthralgias or myalgia  NEURO: NEGATIVE for weakness, dizziness or paresthesias  ENDOCRINE: NEGATIVE for temperature intolerance, skin/hair changes  HEME/ALLERGY/IMMUNE: NEGATIVE for bleeding problems  PSYCHIATRIC: NEGATIVE for changes in mood or affect    OBJECTIVE:   /76 (BP Location: Right arm, Patient Position: Chair, Cuff Size: Adult Regular)   Pulse 75   Temp 98.8  F (37.1  C) (Tympanic)   Resp 18   Ht 1.638 m (5' 4.5\")   Wt 63 kg (139 lb)   BMI 23.49 kg/m    EXAM:  GENERAL: alert and no distress  EYES: Eyes grossly normal to inspection, PERRL and conjunctivae and sclerae normal  HENT: ear canals and TM's normal, nose and mouth without ulcers or lesions  NECK: no adenopathy, no asymmetry, masses, or scars  RESP: lungs clear to auscultation - no rales, rhonchi or wheezes  BREAST: declined breast exam today  CV: regular rate and rhythm, normal S1 S2, no S3 or S4, no murmur, click or rub, no peripheral edema  ABDOMEN: soft, nontender, no hepatosplenomegaly, no masses and bowel sounds normal   (female) w/bimanual: normal female external genitalia, normal urethral meatus, normal vaginal mucosa, and normal cervix/adnexa/uterus without masses or discharge  MS: no gross musculoskeletal defects noted, no edema  SKIN: no suspicious lesions or rashes  NEURO: Normal strength and tone, mentation intact and speech normal  PSYCH: mentation appears normal, affect normal/bright    Diagnostic Test Results:  Labs reviewed in Epic    ASSESSMENT/PLAN:   (R87.810) Cervical high risk HPV (human papillomavirus) test positive  (primary encounter diagnosis)  Comment: pap smear obtained today.   Plan: HPV and Gynecologic Cytology Panel -         Recommended Age 30-65 Years    (Z00.00) Annual physical exam  Comment: history of Breast CA: could consider bilateral salpingectomy in future. Patient should get Dexa scan to check for osteopenia prior to age 50.     Patient has " "been advised of split billing requirements and indicates understanding: Yes  COUNSELING:   Reviewed preventive health counseling, as reflected in patient instructions       Regular exercise       Healthy diet/nutrition    Estimated body mass index is 23.49 kg/m  as calculated from the following:    Height as of this encounter: 1.638 m (5' 4.5\").    Weight as of this encounter: 63 kg (139 lb).        She reports that she quit smoking about 14 years ago. Her smoking use included cigarettes. She started smoking about 24 years ago. She has a 5 pack-year smoking history. She has been exposed to tobacco smoke. She has never used smokeless tobacco.      Counseling Resources:  ATP IV Guidelines  Pooled Cohorts Equation Calculator  Breast Cancer Risk Calculator  BRCA-Related Cancer Risk Assessment: FHS-7 Tool  FRAX Risk Assessment  ICSI Preventive Guidelines  Dietary Guidelines for Americans, 2010  USDA's MyPlate  ASA Prophylaxis  Lung CA Screening    [facility] [place]   "

## 2024-10-31 LAB
HPV HR 12 DNA CVX QL NAA+PROBE: NEGATIVE
HPV16 DNA CVX QL NAA+PROBE: NEGATIVE
HPV18 DNA CVX QL NAA+PROBE: NEGATIVE
HUMAN PAPILLOMA VIRUS FINAL DIAGNOSIS: NORMAL

## 2024-11-01 ENCOUNTER — ANESTHESIA (OUTPATIENT)
Dept: SURGERY | Facility: AMBULATORY SURGERY CENTER | Age: 44
End: 2024-11-01
Payer: COMMERCIAL

## 2024-11-01 ENCOUNTER — HOSPITAL ENCOUNTER (OUTPATIENT)
Facility: AMBULATORY SURGERY CENTER | Age: 44
Discharge: HOME | End: 2024-11-01
Attending: PLASTIC SURGERY | Admitting: PLASTIC SURGERY
Payer: COMMERCIAL

## 2024-11-01 VITALS
TEMPERATURE: 98.9 F | OXYGEN SATURATION: 98 % | SYSTOLIC BLOOD PRESSURE: 132 MMHG | HEART RATE: 59 BPM | RESPIRATION RATE: 16 BRPM | DIASTOLIC BLOOD PRESSURE: 80 MMHG

## 2024-11-01 DIAGNOSIS — Z98.890 S/P BREAST RECONSTRUCTION: Primary | ICD-10-CM

## 2024-11-01 PROCEDURE — 15771 GRFG AUTOL FAT LIPO 50 CC/<: CPT | Performed by: NURSE ANESTHETIST, CERTIFIED REGISTERED

## 2024-11-01 PROCEDURE — 15771 GRFG AUTOL FAT LIPO 50 CC/<: CPT | Performed by: ANESTHESIOLOGY

## 2024-11-01 PROCEDURE — G8907 PT DOC NO EVENTS ON DISCHARG: HCPCS

## 2024-11-01 PROCEDURE — 15771 GRFG AUTOL FAT LIPO 50 CC/<: CPT | Mod: GC | Performed by: PLASTIC SURGERY

## 2024-11-01 PROCEDURE — 12035 INTMD RPR S/A/T/EXT 12.6-20: CPT | Mod: XS

## 2024-11-01 PROCEDURE — 15772 GRFG AUTOL FAT LIPO EA ADDL: CPT | Mod: GC | Performed by: PLASTIC SURGERY

## 2024-11-01 PROCEDURE — 12035 INTMD RPR S/A/T/EXT 12.6-20: CPT | Mod: 59 | Performed by: PLASTIC SURGERY

## 2024-11-01 PROCEDURE — 15771 GRFG AUTOL FAT LIPO 50 CC/<: CPT

## 2024-11-01 PROCEDURE — 11406 EXC TR-EXT B9+MARG >4.0 CM: CPT

## 2024-11-01 PROCEDURE — G8916 PT W IV AB GIVEN ON TIME: HCPCS

## 2024-11-01 PROCEDURE — 11406 EXC TR-EXT B9+MARG >4.0 CM: CPT | Mod: 59 | Performed by: PLASTIC SURGERY

## 2024-11-01 PROCEDURE — 15772 GRFG AUTOL FAT LIPO EA ADDL: CPT

## 2024-11-01 PROCEDURE — 19380 REVJ RECONSTRUCTED BREAST: CPT | Mod: LT

## 2024-11-01 RX ORDER — CEFAZOLIN SODIUM 2 G/50ML
2 SOLUTION INTRAVENOUS SEE ADMIN INSTRUCTIONS
Status: DISCONTINUED | OUTPATIENT
Start: 2024-11-01 | End: 2024-11-02 | Stop reason: HOSPADM

## 2024-11-01 RX ORDER — ACETAMINOPHEN 325 MG/1
975 TABLET ORAL ONCE
Status: COMPLETED | OUTPATIENT
Start: 2024-11-01 | End: 2024-11-01

## 2024-11-01 RX ORDER — CEFAZOLIN SODIUM 2 G/50ML
2 SOLUTION INTRAVENOUS
Status: COMPLETED | OUTPATIENT
Start: 2024-11-01 | End: 2024-11-01

## 2024-11-01 RX ORDER — ONDANSETRON 4 MG/1
4 TABLET, ORALLY DISINTEGRATING ORAL EVERY 30 MIN PRN
Status: DISCONTINUED | OUTPATIENT
Start: 2024-11-01 | End: 2024-11-02 | Stop reason: HOSPADM

## 2024-11-01 RX ORDER — ONDANSETRON 2 MG/ML
INJECTION INTRAMUSCULAR; INTRAVENOUS PRN
Status: DISCONTINUED | OUTPATIENT
Start: 2024-11-01 | End: 2024-11-01

## 2024-11-01 RX ORDER — DEXAMETHASONE SODIUM PHOSPHATE 4 MG/ML
4 INJECTION, SOLUTION INTRA-ARTICULAR; INTRALESIONAL; INTRAMUSCULAR; INTRAVENOUS; SOFT TISSUE
Status: DISCONTINUED | OUTPATIENT
Start: 2024-11-01 | End: 2024-11-02 | Stop reason: HOSPADM

## 2024-11-01 RX ORDER — ONDANSETRON 2 MG/ML
4 INJECTION INTRAMUSCULAR; INTRAVENOUS EVERY 30 MIN PRN
Status: DISCONTINUED | OUTPATIENT
Start: 2024-11-01 | End: 2024-11-02 | Stop reason: HOSPADM

## 2024-11-01 RX ORDER — FENTANYL CITRATE 50 UG/ML
INJECTION, SOLUTION INTRAMUSCULAR; INTRAVENOUS PRN
Status: DISCONTINUED | OUTPATIENT
Start: 2024-11-01 | End: 2024-11-01

## 2024-11-01 RX ORDER — PROPOFOL 10 MG/ML
INJECTION, EMULSION INTRAVENOUS CONTINUOUS PRN
Status: DISCONTINUED | OUTPATIENT
Start: 2024-11-01 | End: 2024-11-01

## 2024-11-01 RX ORDER — LIDOCAINE HYDROCHLORIDE 20 MG/ML
INJECTION, SOLUTION INFILTRATION; PERINEURAL PRN
Status: DISCONTINUED | OUTPATIENT
Start: 2024-11-01 | End: 2024-11-01

## 2024-11-01 RX ORDER — DEXAMETHASONE SODIUM PHOSPHATE 4 MG/ML
INJECTION, SOLUTION INTRA-ARTICULAR; INTRALESIONAL; INTRAMUSCULAR; INTRAVENOUS; SOFT TISSUE PRN
Status: DISCONTINUED | OUTPATIENT
Start: 2024-11-01 | End: 2024-11-01

## 2024-11-01 RX ORDER — HYDROCODONE BITARTRATE AND ACETAMINOPHEN 5; 325 MG/1; MG/1
1-2 TABLET ORAL EVERY 6 HOURS PRN
Qty: 15 TABLET | Refills: 0 | Status: SHIPPED | OUTPATIENT
Start: 2024-11-01

## 2024-11-01 RX ORDER — SODIUM CHLORIDE, SODIUM LACTATE, POTASSIUM CHLORIDE, CALCIUM CHLORIDE 600; 310; 30; 20 MG/100ML; MG/100ML; MG/100ML; MG/100ML
INJECTION, SOLUTION INTRAVENOUS CONTINUOUS
Status: DISCONTINUED | OUTPATIENT
Start: 2024-11-01 | End: 2024-11-02 | Stop reason: HOSPADM

## 2024-11-01 RX ORDER — LIDOCAINE 40 MG/G
CREAM TOPICAL
Status: DISCONTINUED | OUTPATIENT
Start: 2024-11-01 | End: 2024-11-02 | Stop reason: HOSPADM

## 2024-11-01 RX ORDER — OXYCODONE HYDROCHLORIDE 5 MG/1
5 TABLET ORAL
Status: COMPLETED | OUTPATIENT
Start: 2024-11-01 | End: 2024-11-01

## 2024-11-01 RX ORDER — FENTANYL CITRATE 50 UG/ML
25 INJECTION, SOLUTION INTRAMUSCULAR; INTRAVENOUS
Status: DISCONTINUED | OUTPATIENT
Start: 2024-11-01 | End: 2024-11-02 | Stop reason: HOSPADM

## 2024-11-01 RX ORDER — FENTANYL CITRATE 50 UG/ML
50 INJECTION, SOLUTION INTRAMUSCULAR; INTRAVENOUS EVERY 5 MIN PRN
Status: DISCONTINUED | OUTPATIENT
Start: 2024-11-01 | End: 2024-11-02 | Stop reason: HOSPADM

## 2024-11-01 RX ORDER — ONDANSETRON 4 MG/1
4 TABLET, ORALLY DISINTEGRATING ORAL EVERY 8 HOURS PRN
Qty: 4 TABLET | Refills: 0 | Status: SHIPPED | OUTPATIENT
Start: 2024-11-01

## 2024-11-01 RX ORDER — PROPOFOL 10 MG/ML
INJECTION, EMULSION INTRAVENOUS PRN
Status: DISCONTINUED | OUTPATIENT
Start: 2024-11-01 | End: 2024-11-01

## 2024-11-01 RX ORDER — NALOXONE HYDROCHLORIDE 0.4 MG/ML
0.1 INJECTION, SOLUTION INTRAMUSCULAR; INTRAVENOUS; SUBCUTANEOUS
Status: DISCONTINUED | OUTPATIENT
Start: 2024-11-01 | End: 2024-11-02 | Stop reason: HOSPADM

## 2024-11-01 RX ORDER — OXYCODONE HYDROCHLORIDE 5 MG/1
10 TABLET ORAL
Status: COMPLETED | OUTPATIENT
Start: 2024-11-01 | End: 2024-11-01

## 2024-11-01 RX ORDER — AMOXICILLIN 250 MG
1-2 CAPSULE ORAL 2 TIMES DAILY
Qty: 30 TABLET | Refills: 0 | Status: SHIPPED | OUTPATIENT
Start: 2024-11-01

## 2024-11-01 RX ORDER — FENTANYL CITRATE 50 UG/ML
25 INJECTION, SOLUTION INTRAMUSCULAR; INTRAVENOUS EVERY 5 MIN PRN
Status: DISCONTINUED | OUTPATIENT
Start: 2024-11-01 | End: 2024-11-02 | Stop reason: HOSPADM

## 2024-11-01 RX ADMIN — DEXAMETHASONE SODIUM PHOSPHATE 4 MG: 4 INJECTION, SOLUTION INTRA-ARTICULAR; INTRALESIONAL; INTRAMUSCULAR; INTRAVENOUS; SOFT TISSUE at 10:23

## 2024-11-01 RX ADMIN — Medication 0.5 MG: at 10:56

## 2024-11-01 RX ADMIN — ACETAMINOPHEN 975 MG: 325 TABLET ORAL at 08:16

## 2024-11-01 RX ADMIN — FENTANYL CITRATE 25 MCG: 50 INJECTION, SOLUTION INTRAMUSCULAR; INTRAVENOUS at 12:01

## 2024-11-01 RX ADMIN — ONDANSETRON 4 MG: 2 INJECTION INTRAMUSCULAR; INTRAVENOUS at 10:56

## 2024-11-01 RX ADMIN — Medication 50 MG: at 10:10

## 2024-11-01 RX ADMIN — PROPOFOL 150 MG: 10 INJECTION, EMULSION INTRAVENOUS at 10:10

## 2024-11-01 RX ADMIN — LIDOCAINE HYDROCHLORIDE 60 MG: 20 INJECTION, SOLUTION INFILTRATION; PERINEURAL at 10:05

## 2024-11-01 RX ADMIN — FENTANYL CITRATE 100 MCG: 50 INJECTION, SOLUTION INTRAMUSCULAR; INTRAVENOUS at 10:10

## 2024-11-01 RX ADMIN — OXYCODONE HYDROCHLORIDE 5 MG: 5 TABLET ORAL at 11:51

## 2024-11-01 RX ADMIN — OXYCODONE HYDROCHLORIDE 5 MG: 5 TABLET ORAL at 12:15

## 2024-11-01 RX ADMIN — FENTANYL CITRATE 25 MCG: 50 INJECTION, SOLUTION INTRAMUSCULAR; INTRAVENOUS at 12:26

## 2024-11-01 RX ADMIN — SODIUM CHLORIDE, SODIUM LACTATE, POTASSIUM CHLORIDE, CALCIUM CHLORIDE: 600; 310; 30; 20 INJECTION, SOLUTION INTRAVENOUS at 11:30

## 2024-11-01 RX ADMIN — PROPOFOL 100 MCG/KG/MIN: 10 INJECTION, EMULSION INTRAVENOUS at 10:05

## 2024-11-01 RX ADMIN — CEFAZOLIN SODIUM 2 G: 2 SOLUTION INTRAVENOUS at 10:03

## 2024-11-01 RX ADMIN — SODIUM CHLORIDE, SODIUM LACTATE, POTASSIUM CHLORIDE, CALCIUM CHLORIDE: 600; 310; 30; 20 INJECTION, SOLUTION INTRAVENOUS at 08:16

## 2024-11-01 NOTE — DISCHARGE INSTRUCTIONS
FAT GRAFTING POST-OPERATIVE INSTRUCTIONS    Instructions      Have someone drive you home after surgery and help you at home for 1-2      days.     Get plenty of rest.     Follow balanced diet.     Decreased activity may promote constipation, so you may want to add      more raw fruit to your diet, and be sure to increase fluid intake.     Take pain medication as prescribed. Do not take aspirin or any products      containing aspirin unless approved by your surgeon.     Do not drink alcohol when taking pain medications.     Even when not taking pain medications, no alcohol for 3 weeks as it      causes fluid retention.     If you are taking vitamins with iron, resume these as tolerated.     Do not smoke, as smoking delays healing and increases the risk of      complications.    Activities     Do not drive until you are no longer taking any pain medications      (narcotics).     Start walking as soon as possible, this helps to reduce swelling and       lowers the chance of blood clots.     Unless stated on this form, discuss your time off work with your surgeon.    Treated Area Care      You may shower after 24 hours. The ACE wrap (if used) may be rewrapped as needed (if too tight or loose). Use it for support and may subtitute with a sports bra if preferred.  Wear the compression garment (spandex type clothing or the provided sponge and binder) in the area where the liposuction was performed to harvest the fat for the fat injection for 2 weeks post opor per the surgeon's recommendation.     Avoid exposing scars to sun for at least 12 months.     Always use a strong sunblock, if sun exposure is unavoidable (SPF 50 or      greater).     Inspect daily for signs of infection.     No tub soaking, bathing, or swimming while sutures or drains are in place.     You may wear makeup with sunblock protection shortly.     Stay out of the sun until redness and bruising subsides (usually 48      Hours).    What to Expect      Temporary stinging, throbbing, burning sensation, redness, swelling,       bruising, and excess fullness.     Some swelling, bruising or redness in the donor and recipient sites.     Swelling and puffiness may last several weeks.     Redness and bruising usually lasts about 48 hours.     Appearance     Improved skin texture.     Firmer and smoother skin.    Follow-Up Care     With regular follow-up treatments, you can easily maintain your new       look.     Repeated treatments may be necessary.    When to Call     If you have increased swelling or bruising.     If swelling and redness persist after a few days.     If you have increased redness along the incision.     If you have severe or increased pain not relieved by medication.     If you have any side effects to medications; such as, rash, nausea,      headache, vomiting.     If you have an oral temperature over 100.4 degrees.     If you have any yellowish or greenish drainage from the incisions or      notice a foul odor.     If you have bleeding from the incisions that is difficult to control with      light pressure.     If you have loss of feeling or motion.     If you have any sign of abscesses, open sores, skin peeling or lumpiness.    For Medical Questions, Please Call:     [phone], Monday - Friday, 8 a.m. - 4:30 p.m.     After hours and on weekends, call Hospital Paging at [phone] and      ask for the Plastic Surgeon on call.  To contact Dr Ferreira call:  [phone] - during office hours  [phone] - After office hours, ask for the plastic surgery resident on call    ----------------    You had 975 mg of Tylenol at 0815. You may repeat this after 2:15. Maximum amount of Tylenol/Acetaminophen in a 24 hour period is 4,000 mg.

## 2024-11-01 NOTE — ANESTHESIA CARE TRANSFER NOTE
Patient: Azalea Mckeon    Procedure: Procedure(s):  bilateral breast fat grafting from abdomen and thighs, back scar revisions       Diagnosis: Status post bilateral breast reconstruction [Z98.890]  Diagnosis Additional Information: No value filed.    Anesthesia Type:   General     Note:    Oropharynx: oropharynx clear of all foreign objects  Level of Consciousness: awake  Oxygen Supplementation: face mask  Level of Supplemental Oxygen (L/min / FiO2): 6  Independent Airway: airway patency satisfactory and stable  Dentition: dentition unchanged  Vital Signs Stable: post-procedure vital signs reviewed and stable  Report to RN Given: handoff report given  Patient transferred to: PACU    Handoff Report: Identifed the Patient, Identified the Reponsible Provider, Reviewed the pertinent medical history, Discussed the surgical course, Reviewed Intra-OP anesthesia mangement and issues during anesthesia, Set expectations for post-procedure period and Allowed opportunity for questions and acknowledgement of understanding    Vitals:  Vitals Value Taken Time   /100 11/01/24 1147   Temp     Pulse 90 11/01/24 1147   Resp 15 11/01/24 1149   SpO2 99 % 11/01/24 1149   Vitals shown include unfiled device data.    Electronically Signed By: MOLINA Proctor CRNA  November 1, 2024  11:49 AM

## 2024-11-01 NOTE — OP NOTE
PREOPERATIVE DIAGNOSIS: Bilateral breast reconstruction for right-sided breast cancer now ready for symmetry enhancement stage III reconstruction    POSTOPERATIVE DIAGNOSIS: As above    PROCEDURES:   1.  Bilateral breast fat grafting from thighs and flanks using the revolve system and Alfaro technique a total of 220 cc injected  2.  Left back scar revision with excision of skin and subcutaneous tissue and layered closure total length 10 cm  3.  Right back scar revision with excision of skin and subcutaneous tissue and layered closure total length 6 cm     SURGEON: Melida Ferreira MD      RESIDENT: Shakir Dykes MD     ANESTHESIA: General anesthesia with LMA     COMPLICATIONS: Nil.      DRAINS: None     SPECIMENS: None     BLOOD LOSS: 20 mL        DESCRIPTION OF PROCEDURE: After informed consent was taken from the patient, the proper site and procedure was ascertained with them and they were appropriately marked, they were taken to the operating room. They were placed in a supine position with their knees comfortably flexed with pillows underneath them, and pneumoboots placed and running prior to induction of anesthesia. Preoperative antibiotics were given in the OR and appropriately redosed during the case. General anesthesia was administered without any complications.  She was prepped and draped in a standard surgical fashion.     I began by first instilling tumescent solution in the medial thighs anterior thighs lateral thighs and inferior flank regions.   I also injected some of the left lateral chest area where there was prominent subcutaneous tissue from the latissimus flap more than the right side.  While that medication took effect I turned my attention to the lateral chest area of both sides.  the back scar for the latissimus flaps proximally on each side had standing cutaneous cones with the left side being much prominent compared to the right side.  These were marked out and then excised including  skin and subcutaneous tissue.  Hemostasis ensured.  Each site was closed with 2-0 Monocryl suture in a deep dermal fashion followed by 3-0 Stratafix suture in the skin.  I then liposuction the lateral chest area/flap area which was prominent on the left side compared to the right side.      I then harvested fat from the thighs and inferior flanks on each side using the revolve system and a 4 mm cannula.  A total of about 3 to 50 cc was aspirated and collected in the revolve system.  It was then washed 3 different times.  The fat was then collected in 10 cc syringes.  Using a blunt Alfaro catheters through multiple stab incisions fat was injected in both breasts to fill the volume make it more symmetric and feel indentations especially superiorly and medially.  Once I was happy with the overall symmetry, about 2020 cc had been injected.  All the stab incisions were then closed with 5-0 Vicryl Rapide sutures.  Surgical tape and glue was placed over all the incisions.  The patient was wrapped.. The patient tolerated the procedure well. All counts were correct at the end of the case. The patient was extubated and sent to recovery room in stable condition.

## 2024-11-01 NOTE — ANESTHESIA POSTPROCEDURE EVALUATION
Patient: Azalea Mckeon    Procedure: Procedure(s):  bilateral breast fat grafting from abdomen and thighs, back scar revisions       Anesthesia Type:  General    Note:  Disposition: Outpatient   Postop Pain Control: Uneventful            Sign Out: Well controlled pain   PONV: No   Neuro/Psych: Uneventful            Sign Out: Acceptable/Baseline neuro status   Airway/Respiratory: Uneventful            Sign Out: Acceptable/Baseline resp. status   CV/Hemodynamics: Uneventful            Sign Out: Acceptable CV status; No obvious hypovolemia; No obvious fluid overload   Other NRE: NONE   DID A NON-ROUTINE EVENT OCCUR?            Last vitals:  Vitals Value Taken Time   /88 11/01/24 1230   Temp 98.9  F (37.2  C) 11/01/24 1230   Pulse 69 11/01/24 1230   Resp 20 11/01/24 1230   SpO2 99 % 11/01/24 1230       Electronically Signed By: Paul Palmer MD  November 1, 2024  2:15 PM

## 2024-11-04 ENCOUNTER — PATIENT OUTREACH (OUTPATIENT)
Dept: OBGYN | Facility: CLINIC | Age: 44
End: 2024-11-04
Payer: COMMERCIAL

## 2024-11-04 ENCOUNTER — HOSPITAL ENCOUNTER (OUTPATIENT)
Dept: BONE DENSITY | Facility: CLINIC | Age: 44
Discharge: HOME | End: 2024-11-04
Attending: INTERNAL MEDICINE | Admitting: INTERNAL MEDICINE
Payer: COMMERCIAL

## 2024-11-04 DIAGNOSIS — T38.6X5A OSTEOPOROSIS DUE TO AROMATASE INHIBITOR: ICD-10-CM

## 2024-11-04 DIAGNOSIS — M81.8 OSTEOPOROSIS DUE TO AROMATASE INHIBITOR: ICD-10-CM

## 2024-11-04 LAB
BKR AP ASSOCIATED HPV REPORT: NORMAL
BKR LAB AP GYN ADEQUACY: NORMAL
BKR LAB AP GYN INTERPRETATION: NORMAL
BKR LAB AP PREVIOUS ABNL DX: NORMAL
BKR LAB AP PREVIOUS ABNORMAL: NORMAL
PATH REPORT.COMMENTS IMP SPEC: NORMAL
PATH REPORT.COMMENTS IMP SPEC: NORMAL
PATH REPORT.RELEVANT HX SPEC: NORMAL

## 2024-11-04 PROCEDURE — 77080 DXA BONE DENSITY AXIAL: CPT

## 2024-11-07 ENCOUNTER — THERAPY VISIT (OUTPATIENT)
Dept: PHYSICAL THERAPY | Facility: CLINIC | Age: 44
End: 2024-11-07
Attending: PLASTIC SURGERY
Payer: COMMERCIAL

## 2024-11-07 DIAGNOSIS — Z98.890 S/P BREAST RECONSTRUCTION: Primary | ICD-10-CM

## 2024-11-07 PROCEDURE — 97140 MANUAL THERAPY 1/> REGIONS: CPT | Mod: GP,CQ | Performed by: REHABILITATION PRACTITIONER

## 2024-11-08 ENCOUNTER — OFFICE VISIT (OUTPATIENT)
Dept: SURGERY | Facility: CLINIC | Age: 44
End: 2024-11-08
Payer: COMMERCIAL

## 2024-11-08 DIAGNOSIS — Z98.890 S/P BREAST RECONSTRUCTION, BILATERAL: Primary | ICD-10-CM

## 2024-11-08 PROCEDURE — 99024 POSTOP FOLLOW-UP VISIT: CPT | Performed by: PLASTIC SURGERY

## 2024-11-08 NOTE — LETTER
11/8/2024      Azalea Mckeon  [id number] [facility] [id number]      Dear Colleague,    Thank you for referring your patient, Azalea Mckeon, to the [facility]. Please see a copy of my visit note below.    PRESENTING COMPLAINT:  Post-operative visit status post bilateral breast fat grafting, scar revision on 11/1/2024 for breast cancer reconstruction     HISTORY OF PRESENTING COMPLAINT: The patient is here for post-operative visit.  The patient is being seen in the presence of my nurse.     Patient is doing overall very well.  Happy with results.  Minimal pain.    On exam: Vital signs stable afebrile.  Breasts are healing well.  Incisions are healing and well.  Minimal bruising.     ASSESSMENT AND PLAN:  Based upon the above findings, the patient is here for post-operative visit.     Advised aggressive moisturization and allow everything to settle and heal over the next 4 to 6 weeks.  See us back in 6 weeks to 3 months to plan further fat grafting if needed.    All questions were answered.  The patient was happy with the visit.      Again, thank you for allowing me to participate in the care of your patient.        Sincerely,        EDWARDO Ferreira MD

## 2024-11-08 NOTE — NURSING NOTE
Azalea Mckeon's goals for this visit include:   Chief Complaint   Patient presents with    RECHECK     1 week post op DOS 11/1 bilateral breast fat grafting from abdomen and thighs, back scar revisions       She requests these members of her care team be copied on today's visit information:     PCP: Jem Arredondo    Referring Provider:  Referred Self, MD  No address on file    There were no vitals taken for this visit.    Do you need any medication refills at today's visit?     Gemini Garay MA on 11/8/2024 at 3:39 PM

## 2024-11-08 NOTE — PROGRESS NOTES
"    5/24/2022         RE: Elidia Ventura  [address]  [address]  [facility] [address]        Dear Colleague,    Thank you for referring your patient, Elidia Ventura, to the [facility]. Please see a copy of my visit note below.    Oncology Rooming Note    May 24, 2022 11:26 AM   Elidia Ventura is a 62 year old female who presents for:    Chief Complaint   Patient presents with     Oncology Clinic Visit     New Patient - Atypical lymphoproliferative disorder     Initial Vitals: /77 (BP Location: Right arm, Patient Position: Sitting, Cuff Size: Adult Large)   Pulse 104   Temp 97.9  F (36.6  C) (Oral)   Resp 24   Ht 1.67 m (5' 5.75\")   Wt 106.7 kg (235 lb 4.8 oz)   SpO2 97%   BMI 38.27 kg/m   Estimated body mass index is 38.27 kg/m  as calculated from the following:    Height as of this encounter: 1.67 m (5' 5.75\").    Weight as of this encounter: 106.7 kg (235 lb 4.8 oz). Body surface area is 2.22 meters squared.  No Pain (0) Comment: Data Unavailable   No LMP recorded.  Allergies reviewed: Yes  Medications reviewed: Yes    Medications: Medication refills not needed today.  Pharmacy name entered into [employer]: [id number] - [place], [place] - [address] AT [facility] [address]    Clinical concerns: New Patient - Atypical lymphoproliferative disorder.      Diane Hong [facility] Hematology and Oncology Consult Note      Patient: Elidia Ventura  MRN: [id number]  Date of Service: May 24, 2022      We have been asked by Dr. Washington to evaluate Elidia Ventura for an elevated white blood cell count and platelet count.    Assessment/Plan:    1.  Elevated white blood cell count and platelet count: Chronic, stable.  Numbers now are stable compared to those from 4 years ago.  Recent white blood cell differential shows a slightly elevated neutrophil count and monocyte count.  She had two peripheral blood flow cytometry studies " PRESENTING COMPLAINT:  Post-operative visit status post bilateral breast fat grafting, scar revision on 11/1/2024 for breast cancer reconstruction     HISTORY OF PRESENTING COMPLAINT: The patient is here for post-operative visit.  The patient is being seen in the presence of my nurse.     Patient is doing overall very well.  Happy with results.  Minimal pain.    On exam: Vital signs stable afebrile.  Breasts are healing well.  Incisions are healing and well.  Minimal bruising.     ASSESSMENT AND PLAN:  Based upon the above findings, the patient is here for post-operative visit.     Advised aggressive moisturization and allow everything to settle and heal over the next 4 to 6 weeks.  See us back in 6 weeks to 3 months to plan further fat grafting if needed.    All questions were answered.  The patient was happy with the visit.   done in 2018  by 7 months which were both unremarkable. Today we can check JAK2/CALR/MPL gene mutations.  We can also send peripheral blood for next generation sequencing.  Regardless of the results the patient does not need any therapy at this time.  Her cytosis is very mild and is not causing her any symptoms.  This was all explained to Elidia.  Questions were answered.  We will follow in clinic yearly.    ECOG Performance  0    Staging History:    Cancer Staging  No matching staging information was found for the patient.    History:    Elidia is referred for an elevated white count and platelet count.  I had seen her in clinic a few times in 2018 for the same problem.  At that time she had peripheral blood flow cytometry done twice which was unremarkable.  She had body imaging done in March 2019 which did not show splenomegaly or adenopathy in the abdomen or pelvis.  She had another CT abdomen pelvis with contrast in August 2021 which showed no splenomegaly, lymphadenopathy, or other evidence of a lymphoproliferative disorder.  She is referred back now for follow-up.  Overall she has been doing okay.  No major changes in her health recently.  No acute complaints today.  No fevers, chills, night sweats.  No hot flashes, flushing, or rash.    Past History:    No past medical history on file.   Elevated white blood cell count  Thrombocytosis     No family history on file.   No known family history of blood dyscrasia   [unfilled] Social History     Socioeconomic History     Marital status:      Spouse name: Not on file     Number of children: Not on file     Years of education: Not on file     Highest education level: Not on file   Occupational History     Not on file   Tobacco Use     Smoking status: Current Every Day Smoker     Packs/day: 0.50     Years: 25.00     Pack years: 12.50     Types: Cigarettes, Cigarettes     Smokeless tobacco: Never Used     Tobacco comment: 8 cigs per day   Substance and  "Sexual Activity     Alcohol use: No     Drug use: No     Sexual activity: Never   Other Topics Concern     Not on file   Social History Narrative     Not on file     Social Determinants of Health     Financial Resource Strain: Not on file   Food Insecurity: Not on file   Transportation Needs: Not on file   Physical Activity: Not on file   Stress: Not on file   Social Connections: Not on file   Intimate Partner Violence: Not on file   Housing Stability: Not on file        Allergies:    Allergies   Allergen Reactions     Penicillins Anaphylaxis, Hives and Swelling     Other reaction(s): anaphylaxis, Angioedema     Codeine Unknown     Low blood pressure     Hydrocodone Unknown     Hydrocodone-Acetaminophen      Other reaction(s): *Unknown     Sulfamethoxazole      Other reaction(s): *Unknown     Review of Systems:    As above in the history.     Review of Systems otherwise Negative for:  General: chills, fever or night sweats  Psychological: anxiety or depression  Ophthalmic: blurry vision, double vision or loss of vision, vision change  ENT: epistaxis, oral lesions, hearing changes  Hematological and Lymphatic: bleeding, bruising, jaundice, swollen lymph nodes  Endocrine: hot flashes, unexpected weight changes  Respiratory: cough, hemoptysis, orthopnea or shortness of breath/RIVERS  Cardiovascular: chest pain, edema, palpitations or PND  Gastrointestinal: abdominal pain, constipation, diarrhea or nausea/vomiting  Genito-Urinary: change in urinary stream, incontinence, frequency/urgency  Musculoskeletal: joint pain, stiffness, swelling, muscle pain  Neurological: dizziness, headaches, numbness/tingling  Dermatological: lumps and rash    Physical Exam:    /77 (BP Location: Right arm, Patient Position: Sitting, Cuff Size: Adult Large)   Pulse 104   Temp 97.9  F (36.6  C) (Oral)   Resp 24   Ht 1.67 m (5' 5.75\")   Wt 106.7 kg (235 lb 4.8 oz)   SpO2 97%   BMI 38.27 kg/m      General: patient appears stated age of " 62 year old. Nontoxic and in no distress.   HEENT: Head: atraumatic, normocephalic. Sclerae anicteric.  Chest:  Normal respiratory effort  Cardiac:  No edema.   Abdomen: abdomen is non-distended  Extremities: normal tone and muscle bulk.   Skin: no lesions or rash on visible skin. Warm and dry.   CNS: alert and oriented. Grossly non-focal.   Psychiatric: normal mood and affect.     Lab Results:    Recent Results (from the past 168 hour(s))   CBC with platelets and differential   Result Value Ref Range    WBC Count 13.5 (H) 4.0 - 11.0 10e3/uL    RBC Count 5.20 3.80 - 5.20 10e6/uL    Hemoglobin 14.1 11.7 - 15.7 g/dL    Hematocrit 45.2 35.0 - 47.0 %    MCV 87 78 - 100 fL    MCH 27.1 26.5 - 33.0 pg    MCHC 31.2 (L) 31.5 - 36.5 g/dL    RDW 14.9 10.0 - 15.0 %    Platelet Count 451 (H) 150 - 450 10e3/uL    % Neutrophils 56 %    % Lymphocytes 34 %    % Monocytes 9 %    % Eosinophils 1 %    % Basophils 0 %    % Immature Granulocytes 0 %    NRBCs per 100 WBC 0 <1 /100    Absolute Neutrophils 7.5 1.6 - 8.3 10e3/uL    Absolute Lymphocytes 4.6 0.8 - 5.3 10e3/uL    Absolute Monocytes 1.2 0.0 - 1.3 10e3/uL    Absolute Eosinophils 0.1 0.0 - 0.7 10e3/uL    Absolute Basophils 0.1 0.0 - 0.2 10e3/uL    Absolute Immature Granulocytes 0.1 <=0.4 10e3/uL    Absolute NRBCs 0.0 10e3/uL     Imaging Results:    No results found.      Signed by: Luigi Colindres MD        Again, thank you for allowing me to participate in the care of your patient.        Sincerely,        Luigi Colindres MD

## 2024-11-09 ENCOUNTER — OFFICE VISIT (OUTPATIENT)
Dept: ORTHOPEDICS | Facility: CLINIC | Age: 44
End: 2024-11-09
Attending: PEDIATRICS
Payer: COMMERCIAL

## 2024-11-09 DIAGNOSIS — Z98.890 S/P BREAST RECONSTRUCTION: ICD-10-CM

## 2024-11-09 DIAGNOSIS — M75.01 ADHESIVE CAPSULITIS OF RIGHT SHOULDER: Primary | ICD-10-CM

## 2024-11-09 DIAGNOSIS — M25.511 ACUTE PAIN OF RIGHT SHOULDER: ICD-10-CM

## 2024-11-09 PROCEDURE — 20611 DRAIN/INJ JOINT/BURSA W/US: CPT | Mod: RT | Performed by: FAMILY MEDICINE

## 2024-11-09 RX ORDER — BETAMETHASONE SODIUM PHOSPHATE AND BETAMETHASONE ACETATE 3; 3 MG/ML; MG/ML
6 INJECTION, SUSPENSION INTRA-ARTICULAR; INTRALESIONAL; INTRAMUSCULAR; SOFT TISSUE
Status: COMPLETED | OUTPATIENT
Start: 2024-11-09 | End: 2024-11-09

## 2024-11-09 RX ORDER — ROPIVACAINE HYDROCHLORIDE 5 MG/ML
4 INJECTION, SOLUTION EPIDURAL; INFILTRATION; PERINEURAL
Status: COMPLETED | OUTPATIENT
Start: 2024-11-09 | End: 2024-11-09

## 2024-11-09 RX ADMIN — ROPIVACAINE HYDROCHLORIDE 4 ML: 5 INJECTION, SOLUTION EPIDURAL; INFILTRATION; PERINEURAL at 10:15

## 2024-11-09 RX ADMIN — BETAMETHASONE SODIUM PHOSPHATE AND BETAMETHASONE ACETATE 6 MG: 3; 3 INJECTION, SUSPENSION INTRA-ARTICULAR; INTRALESIONAL; INTRAMUSCULAR; SOFT TISSUE at 10:15

## 2024-11-09 NOTE — PATIENT INSTRUCTIONS
[facility] Injection Discharge Instructions    Procedure: right shoulder large volume steroid injection     You may shower, however avoid swimming, tub baths or hot tubs for 24 hours following your procedure  You may have a mild to moderate increase in pain for several days following the injection.  It may take up to 14 days for the steroid medication to start working although you may feel the effect as early as a few days after the procedure.  You may use ice packs for 10-15 minutes, 3 to 4 times a day at the injection site for comfort  You may use anti-inflammatory medications (such as Ibuprofen or Aleve or Advil) or Tylenol for pain control if necessary  If you were fasting, you may resume your normal diet and medications after the procedure  If you have diabetes, check your blood sugar more frequently than usual as your blood sugar may be higher than normal for 10-14 days following a steroid injection. Contact your doctor who manages your diabetes if your blood sugar is higher than usual    If you experience any of the following, call [facility] @ [phone] or [phone]  -Fever over 100 degree F  -Swelling, bleeding, redness, drainage, warmth at the injection site  - New or worsening pain     It was great seeing you today!    Rene Wharton

## 2024-11-09 NOTE — PROGRESS NOTES
Large Joint Injection/Arthocentesis: R glenohumeral joint    Date/Time: 11/9/2024 10:15 AM    Performed by: Rene Wharton MD  Authorized by: Rene Wharton MD    Indications:  Pain and osteoarthritis  Needle Size:  25 G  Guidance: ultrasound    Approach:  Posterolateral  Location:  Shoulder      Site:  R glenohumeral joint  Medications:  6 mg betamethasone acet & sod phos 6 (3-3) MG/ML; 4 mL ROPivacaine 5 MG/ML  Outcome:  Tolerated well, no immediate complications  Procedure discussed: discussed risks, benefits, and alternatives    Consent Given by:  Patient  Timeout: timeout called immediately prior to procedure    Prep: patient was prepped and draped in usual sterile fashion     Ultrasound images of procedure were permanently stored.   10cc of sterile saline was injected into the shoulder.    Patient reported some improvement of pain after the numbing portion right glenohumeral joint steroid injection.  Ultrasound guided images were permanently stored.   Aftercare instructions given to patient.  Plan to follow-up as discussed above.     Rene Wharton MD [facility] [place]

## 2024-11-09 NOTE — LETTER
11/9/2024      Azalea Mckeon  [address] [facility] [address]      Dear Colleague,    Thank you for referring your patient, Azalea Mckeon, to the [facility]. Please see a copy of my visit note below.    Large Joint Injection/Arthocentesis: R glenohumeral joint    Date/Time: 11/9/2024 10:15 AM    Performed by: Rene Wharton MD  Authorized by: Rene Wharton MD    Indications:  Pain and osteoarthritis  Needle Size:  25 G  Guidance: ultrasound    Approach:  Posterolateral  Location:  Shoulder      Site:  R glenohumeral joint  Medications:  6 mg betamethasone acet & sod phos 6 (3-3) MG/ML; 4 mL ROPivacaine 5 MG/ML  Outcome:  Tolerated well, no immediate complications  Procedure discussed: discussed risks, benefits, and alternatives    Consent Given by:  Patient  Timeout: timeout called immediately prior to procedure    Prep: patient was prepped and draped in usual sterile fashion     Ultrasound images of procedure were permanently stored.   10cc of sterile saline was injected into the shoulder.    Patient reported some improvement of pain after the numbing portion right glenohumeral joint steroid injection.  Ultrasound guided images were permanently stored.   Aftercare instructions given to patient.  Plan to follow-up as discussed above.     Rene Wharton MD [facility]          Again, thank you for allowing me to participate in the care of your patient.        Sincerely,        Rene Wharton MD

## 2024-11-11 ENCOUNTER — ONCOLOGY VISIT (OUTPATIENT)
Dept: ONCOLOGY | Facility: CLINIC | Age: 44
End: 2024-11-11
Attending: INTERNAL MEDICINE
Payer: COMMERCIAL

## 2024-11-11 ENCOUNTER — LAB (OUTPATIENT)
Dept: INFUSION THERAPY | Facility: CLINIC | Age: 44
End: 2024-11-11
Attending: INTERNAL MEDICINE
Payer: COMMERCIAL

## 2024-11-11 VITALS
BODY MASS INDEX: 24.32 KG/M2 | HEIGHT: 65 IN | SYSTOLIC BLOOD PRESSURE: 125 MMHG | WEIGHT: 146 LBS | OXYGEN SATURATION: 99 % | DIASTOLIC BLOOD PRESSURE: 85 MMHG | HEART RATE: 66 BPM

## 2024-11-11 DIAGNOSIS — R74.01 ELEVATED ALT MEASUREMENT: ICD-10-CM

## 2024-11-11 DIAGNOSIS — R23.2 HOT FLASHES RELATED TO AROMATASE INHIBITOR THERAPY: ICD-10-CM

## 2024-11-11 DIAGNOSIS — M81.8 OSTEOPOROSIS DUE TO AROMATASE INHIBITOR: ICD-10-CM

## 2024-11-11 DIAGNOSIS — C50.811 MALIGNANT NEOPLASM OF OVERLAPPING SITES OF RIGHT BREAST IN FEMALE, ESTROGEN RECEPTOR POSITIVE (H): ICD-10-CM

## 2024-11-11 DIAGNOSIS — C50.811 MALIGNANT NEOPLASM OF OVERLAPPING SITES OF RIGHT BREAST IN FEMALE, ESTROGEN RECEPTOR POSITIVE (H): Primary | ICD-10-CM

## 2024-11-11 DIAGNOSIS — E87.6 HYPOKALEMIA: ICD-10-CM

## 2024-11-11 DIAGNOSIS — T38.6X5A OSTEOPOROSIS DUE TO AROMATASE INHIBITOR: ICD-10-CM

## 2024-11-11 DIAGNOSIS — Z17.0 MALIGNANT NEOPLASM OF OVERLAPPING SITES OF RIGHT BREAST IN FEMALE, ESTROGEN RECEPTOR POSITIVE (H): ICD-10-CM

## 2024-11-11 DIAGNOSIS — Z17.0 MALIGNANT NEOPLASM OF OVERLAPPING SITES OF RIGHT BREAST IN FEMALE, ESTROGEN RECEPTOR POSITIVE (H): Primary | ICD-10-CM

## 2024-11-11 DIAGNOSIS — T45.1X5A HOT FLASHES RELATED TO AROMATASE INHIBITOR THERAPY: ICD-10-CM

## 2024-11-11 LAB
ALBUMIN SERPL BCG-MCNC: 4.5 G/DL (ref 3.5–5.2)
ALP SERPL-CCNC: 48 U/L (ref 40–150)
ALT SERPL W P-5'-P-CCNC: 53 U/L (ref 0–50)
ANION GAP SERPL CALCULATED.3IONS-SCNC: 15 MMOL/L (ref 7–15)
AST SERPL W P-5'-P-CCNC: 23 U/L (ref 0–45)
BILIRUB SERPL-MCNC: 0.5 MG/DL
BUN SERPL-MCNC: 19.8 MG/DL (ref 6–20)
CALCIUM SERPL-MCNC: 9.6 MG/DL (ref 8.8–10.4)
CHLORIDE SERPL-SCNC: 103 MMOL/L (ref 98–107)
CREAT SERPL-MCNC: 0.75 MG/DL (ref 0.51–0.95)
EGFRCR SERPLBLD CKD-EPI 2021: >90 ML/MIN/1.73M2
GLUCOSE SERPL-MCNC: 88 MG/DL (ref 70–99)
HCO3 SERPL-SCNC: 22 MMOL/L (ref 22–29)
HOLD SPECIMEN: NORMAL
HOLD SPECIMEN: NORMAL
POTASSIUM SERPL-SCNC: 3.2 MMOL/L (ref 3.4–5.3)
PROT SERPL-MCNC: 7.1 G/DL (ref 6.4–8.3)
SODIUM SERPL-SCNC: 140 MMOL/L (ref 135–145)
VIT D+METAB SERPL-MCNC: 25 NG/ML (ref 20–50)

## 2024-11-11 PROCEDURE — 82947 ASSAY GLUCOSE BLOOD QUANT: CPT

## 2024-11-11 PROCEDURE — 99214 OFFICE O/P EST MOD 30 MIN: CPT | Mod: 24

## 2024-11-11 PROCEDURE — G0463 HOSPITAL OUTPT CLINIC VISIT: HCPCS

## 2024-11-11 PROCEDURE — 84155 ASSAY OF PROTEIN SERUM: CPT

## 2024-11-11 PROCEDURE — G2211 COMPLEX E/M VISIT ADD ON: HCPCS

## 2024-11-11 PROCEDURE — 36415 COLL VENOUS BLD VENIPUNCTURE: CPT

## 2024-11-11 PROCEDURE — 82306 VITAMIN D 25 HYDROXY: CPT

## 2024-11-11 RX ORDER — GABAPENTIN 100 MG/1
100 CAPSULE ORAL AT BEDTIME
Qty: 30 CAPSULE | Refills: 3 | Status: SHIPPED | OUTPATIENT
Start: 2024-11-11

## 2024-11-11 ASSESSMENT — PAIN SCALES - GENERAL: PAINLEVEL_OUTOF10: NO PAIN (0)

## 2024-11-11 NOTE — NURSING NOTE
"Oncology Rooming Note    November 11, 2024 9:58 AM   Azalea Mckeon is a 44 year old female who presents for:    Chief Complaint   Patient presents with    Oncology Clinic Visit     Follow up     Initial Vitals: /85 (BP Location: Right arm, Patient Position: Chair, Cuff Size: Adult Regular)   Pulse 66   Ht 1.638 m (5' 4.5\")   Wt 66.2 kg (146 lb)   SpO2 99%   BMI 24.67 kg/m   Estimated body mass index is 24.67 kg/m  as calculated from the following:    Height as of this encounter: 1.638 m (5' 4.5\").    Weight as of this encounter: 66.2 kg (146 lb). Body surface area is 1.74 meters squared.  No Pain (0) Comment: Data Unavailable   No LMP recorded. (Menstrual status: Chemotherapy).  Allergies reviewed: Yes  Medications reviewed: Yes    Medications: Medication refills not needed today.  Pharmacy name entered into [employer]: [facility] [id number] [place] [address]    Frailty Screening:   Is the patient here for a new oncology consult visit in cancer care? 2. No      Clinical concerns: NO       Gemini Kim CMA              "

## 2024-11-11 NOTE — PROGRESS NOTES
"Oncology Follow Up Visit: November 11, 2024    Oncologist: Dr. Dyson   PCP: Jem Arredondo    Reason for Visit: Follow-up breast cancer    Diagnosis: Stage 1A (pT1c pN0 cM0), hormone-positive, HER2-negative, right-sided breast cancer:  # Feb 2023 Presented w/ abnormal mammogram.  # Apr 2023 Status post bilateral mastectomy w/ sentinel node and reconstruction; path w/ multifocal disease, largest 16 mm, grade 3 disease, neg margins for IDC, neg node (1/1)   # Apr 2023 Oncotype score 22; adjuvant chemotherapy indicated.  # Jun 2023 - Aug 2023 Adjuvant Taxotere / Cytoxan x 4 cycles.  # Jul 2023 Negative hereditary breast cancer panel (40 genes).  # Nov 2023 Adjuvant right chest wall radiation 5000 cGy over 25 fractions.  # Nov 2023 Adjuvant Tamoxifen; discontinued due to side effects and entered menopause.  # July 2024 Adjuvant Letrozole     Interval History:  Pt is seen in-person for review of letrozole, accompanied by her mother. Taking daily and not forgetting doses. Mentions nocturnal hot flashes have worsened over the past month, disrupting her sleep and making it challenging to get through the day without a nap. Mild arthralgias, primarily ankle \"heaviness\" that feels better with movement. Had bilateral breast fat grafting and scar revision completed w/ 11/1 with Dr. Ferreira, recovering well. Mentions ongoing a R) sided breast deep twinge/pain that radiates, occurring intermittently but more frequently over the past month, 1-2 times per week, lasting 1 minute or so each time. Seeing lymphedema therapy, which seems to help. Therapist at most recent visit mentioned more lymphedema present than previously, likely will be fitted for a compression sleeve at next visit. Otherwise, recovering well from surgery and has no additional questions.    Patient denies any of the following except if noted above: fevers, chills, difficulty with energy, vision or hearing changes, chest pain, dyspnea, abdominal pain, " "nausea, vomiting, diarrhea, constipation, urinary concerns, headaches, numbness, tingling, issues with sleep or mood. She also denies lumps, bumps, rashes or skin lesions, bleeding or bruising issues.    Physical Exam:  /85 (BP Location: Right arm, Patient Position: Chair, Cuff Size: Adult Regular)   Pulse 66   Ht 1.638 m (5' 4.5\")   Wt 66.2 kg (146 lb)   SpO2 99%   BMI 24.67 kg/m     BP Readings from Last 6 Encounters:   11/11/24 125/85   11/01/24 132/80   10/29/24 109/76   10/11/24 115/81   08/07/24 101/77   06/18/24 111/75     Wt Readings from Last 6 Encounters:   11/11/24 66.2 kg (146 lb)   10/29/24 63 kg (139 lb)   10/11/24 61.2 kg (135 lb)   08/07/24 62.6 kg (138 lb)   06/18/24 62.1 kg (136 lb 12.8 oz)   06/16/24 63 kg (139 lb)      Constitutional: No acute distress, pleasant, appropriately groomed.   ENT: PERRLA, sclera without erythema. Patent nasal passages. Appropriate dentition.  Neck: Trachea midline, no adenopathy.   Resp: No respiratory distress, adequate depth and rate of respirations.   Cardiac: No cyanosis, JVD, or peripheral edema.  Breasts: Bilateral breasts w/recent revision surgical incisions, no visible erythema or discharge to area of concern. There is mild edema and skin tighting. Unable to palpate more deeply d/t tenderness from recent surgery.  MS:  5/5 muscle strength, adequate ROM.   Skin: No rashes, lesions, or wounds.  Neuro: A/O x 4, sensation intact.   Psych: Appropriate mentation and affect.     Laboratory Results:   Results for orders placed or performed in visit on 11/11/24   Comprehensive metabolic panel     Status: Abnormal   Result Value Ref Range    Sodium 140 135 - 145 mmol/L    Potassium 3.2 (L) 3.4 - 5.3 mmol/L    Carbon Dioxide (CO2) 22 22 - 29 mmol/L    Anion Gap 15 7 - 15 mmol/L    Urea Nitrogen 19.8 6.0 - 20.0 mg/dL    Creatinine 0.75 0.51 - 0.95 mg/dL    GFR Estimate >90 >60 mL/min/1.73m2    Calcium 9.6 8.8 - 10.4 mg/dL    Chloride 103 98 - 107 mmol/L    " Glucose 88 70 - 99 mg/dL    Alkaline Phosphatase 48 40 - 150 U/L    AST 23 0 - 45 U/L    ALT 53 (H) 0 - 50 U/L    Protein Total 7.1 6.4 - 8.3 g/dL    Albumin 4.5 3.5 - 5.2 g/dL    Bilirubin Total 0.5 <=1.2 mg/dL   Extra Tube     Status: None    Narrative    The following orders were created for panel order Extra Tube.  Procedure                               Abnormality         Status                     ---------                               -----------         ------                     Extra Serum Separator Tu...[[id number]]                      Final result               Extra Purple Top Tube[[id number]]                            Final result                 Please view results for these tests on the individual orders.   Extra Serum Separator Tube (SST)     Status: None   Result Value Ref Range    Hold Specimen JIC    Extra Purple Top Tube     Status: None   Result Value Ref Range    Hold Specimen JIC      I reviewed the above labs today.    Imaging:  DX Bone Density  Narrative: EXAM: DX AXIAL HIPS/SPINE  LOCATION: [facility]  DATE: 11/04/2024    INDICATION: BMD screening, baseline. On Femara.  DEMOGRAPHICS: Age- 44 years. Gender- Female. Menopausal status- Perimenopausal.  COMPARISON: No prior studies available on the current scanner.  TECHNIQUE: Dual-energy x-ray absorptiometry (DXA) performed with routine technique.     FINDINGS:    DXA RESULTS  -Lumbar Spine: L1-L4: BMD: 1.139 g/cm2. T-score: -0.4. Z-score: -0.4.  -RIGHT Hip Total: BMD: 1.188 g/cm2. T-score: 1.4. Z-score: 1.7.  -RIGHT Hip Femoral neck: BMD: 1.043 g/cm2. T-score: 0.0. Z-score: 0.6.  -LEFT Hip Total: BMD: 1.043 g/cm2. T-score: 0.3. Z-score: 0.6.  -LEFT Hip Femoral neck: BMD: 0.982 g/cm2. T-score: -0.4. Z-score: 0.2.    WHO T-SCORE CRITERIA  -Normal: T score at or above -1 SD  -Osteopenia: T score between -1 and -2.5 SD  -Osteoporosis: T score at or below -2.5 SD    The World Health Organization (WHO) criteria is  applicable to perimenopausal females, postmenopausal females, and men aged 50 years or older.  FRACTURE RISK  -The FRAX risk calculator is not applicable due to normal BMD in the spine/hip regions.  Impression: IMPRESSION: NORMAL. Bone mineral density measurements are within normal limits using T score.    I reviewed the above imaging report today.        Assessment and Plan:   Stage 1A (pT1c pN0 cM0), hormone-positive, HER2-negative, right-sided breast cancer:   - s/p bilateral breast reconstruction w/latissimus dorsi musculocutaneous flaps in 6/2024, recovering well.  - Had recent bilateral breast fat grafting and scar revision completed w/ 11/1 with Dr. Ferreira, recovering well.   - Current use of letrozole with plan for a minimum of 5-years of use, due to complete in 11/202028.  - Will check BCI prior to discontinuing to see if patient would benefit from extended endocrine therapy.  - Continuing to tolerate well with manageable hot flashes and arthralgias.   - Continues with lymphedema therapy with improvement, will maintain diary log for ongoing deep neuropathic pain to R) breast, no focal masses..  - Reviewed need for annual fasting cholesterol check with PCP, not currently on statin therapy.  - Most recent labs reviewed and discussed.  - No further mammograms d/t previous mastectomies and reconstructive surgery.   - RTC in 6 months for provider review and clinical exam, labs (CBC and CMP) completed prior    AI-related nocturnal hot flashes  - Hot flashes occurring at night that are disrupting sleep and causing daytime fatigue  - Will trial low-dose gabapentin 100 mg at bedtime  - Reviewed gabapentin will not eliminate hot flashes but reduce intensity and severity, addressed sedating side effect  - Will reassess at next visit    Bone health  - DEXA 11/2024 with normal bone density.   - Continue maximum calcium and vitamin D, reviewed adequate dosing.  - Encouraged weight-bearing exercise as tolerated.   -  Repeat DEXA due 11/2026.    Elevated ALT  - Isolated elevation of ALT to 53  - No recent alcohol or acetaminophen use  - Will recheck at next visit    Hypokalemia  - K+ mildly decreased to 3.2  - Reviewed foods rich in potassium   - Continue to monitor         Teressa AUGUSTE CNP      The longitudinal plan of care for the diagnosis(es)/condition(s) as documented were addressed during this visit. Due to the added complexity in care, I will continue to support Azalea in the subsequent management and with ongoing continuity of care.

## 2024-11-11 NOTE — Clinical Note
11/11/2024      Azalea Mckeon  [address] [facility] [address]      Dear Colleague,    Thank you for referring your patient, Azalea Mckeon, to the [facility]. Please see a copy of my visit note below.    Oncology Follow Up Visit: November 11, 2024    Oncologist: Dr. Dyson   PCP: Jem Arredondo    Reason for Visit: Follow-up breast cancer    Diagnosis: Stage 1A (pT1c pN0 cM0), hormone-positive, HER2-negative, right-sided breast cancer:  # Feb 2023 Presented w/ abnormal mammogram.  # Apr 2023 Status post bilateral mastectomy w/ sentinel node and reconstruction; path w/ multifocal disease, largest 16 mm, grade 3 disease, neg margins for IDC, neg node (1/1)   # Apr 2023 Oncotype score 22; adjuvant chemotherapy indicated.  # Jun 2023 - Aug 2023 Adjuvant Taxotere / Cytoxan x 4 cycles.  # Jul 2023 Negative hereditary breast cancer panel (40 genes).  # Nov 2023 Adjuvant right chest wall radiation 5000 cGy over 25 fractions.  # Nov 2023 Adjuvant Tamoxifen; discontinued due to side effects and entered menopause.  # July 2024 Adjuvant Letrozole     Interval History:  Pt is seen in-person for review of ***.     Patient denies any of the following except if noted above: fevers, chills, difficulty with energy, vision or hearing changes, chest pain, dyspnea, abdominal pain, nausea, vomiting, diarrhea, constipation, urinary concerns, headaches, numbness, tingling, issues with sleep or mood. She also denies lumps, bumps, rashes or skin lesions, bleeding or bruising issues.    Physical Exam:  There were no vitals taken for this visit.   BP Readings from Last 6 Encounters:   11/01/24 132/80   10/29/24 109/76   10/11/24 115/81   08/07/24 101/77   06/18/24 111/75   06/16/24 101/59     Wt Readings from Last 6 Encounters:   10/29/24 63 kg (139 lb)   10/11/24 61.2 kg (135 lb)   08/07/24 62.6 kg (138 lb)   06/18/24 62.1 kg (136 lb 12.8 oz)   06/16/24 63 kg (139 lb)   06/12/24 61.9 kg (136  lb 6.4 oz)        Constitutional: No acute distress, pleasant, appropriately groomed.   ENT: PERRLA, sclera without erythema. Appropriate dentition.  Neck: Trachea midline, no adenopathy.   Resp: CTA, adequate depth and rate of respirations.   Cardiac: S1/S2, RRR, no murmurs.   Breasts: ***  Abdomen: BS active, abdomen soft and non-tender. No masses or organomegaly.   MS:  5/5 muscle strength, adequate ROM.   Skin: No rashes, lesions, or wounds on exposed skin.  Neuro: A/O x 4, sensation intact.   Lymph: No palpable anterior/posterior cervical, axillary, or supraclavicular nodes.   Psych: Appropriate mentation and affect.    Laboratory Results:   No results found for any visits on 11/11/24.  I reviewed the above labs today.    Imaging:  DX Bone Density  Narrative: EXAM: DX AXIAL HIPS/SPINE  LOCATION: [facility]  DATE: 11/04/2024    INDICATION: BMD screening, baseline. On Femara.  DEMOGRAPHICS: Age- 44 years. Gender- Female. Menopausal status- Perimenopausal.  COMPARISON: No prior studies available on the current scanner.  TECHNIQUE: Dual-energy x-ray absorptiometry (DXA) performed with routine technique.     FINDINGS:    DXA RESULTS  -Lumbar Spine: L1-L4: BMD: 1.139 g/cm2. T-score: -0.4. Z-score: -0.4.  -RIGHT Hip Total: BMD: 1.188 g/cm2. T-score: 1.4. Z-score: 1.7.  -RIGHT Hip Femoral neck: BMD: 1.043 g/cm2. T-score: 0.0. Z-score: 0.6.  -LEFT Hip Total: BMD: 1.043 g/cm2. T-score: 0.3. Z-score: 0.6.  -LEFT Hip Femoral neck: BMD: 0.982 g/cm2. T-score: -0.4. Z-score: 0.2.    WHO T-SCORE CRITERIA  -Normal: T score at or above -1 SD  -Osteopenia: T score between -1 and -2.5 SD  -Osteoporosis: T score at or below -2.5 SD    The World Health Organization (WHO) criteria is applicable to perimenopausal females, postmenopausal females, and men aged 50 years or older.  FRACTURE RISK  -The FRAX risk calculator is not applicable due to normal BMD in the spine/hip regions.  Impression: IMPRESSION:  NORMAL. Bone mineral density measurements are within normal limits using T score.    I reviewed the above imaging report today.        Assessment and Plan:   Stage 1A (pT1c pN0 cM0), hormone-positive, HER2-negative, right-sided breast cancer:   - s/p bilateral breast reconstruction w/latissimus dorsi musculocutaneous flaps in 6/2024, recovering well.  - Current use of letrozole with plan for a minimum of 5-years of use, due to complete in 11/202028.  - Will check BCI prior to discontinuing to see if patient would benefit from extended endocrine therapy.  - Continuing to tolerate well with manageable hot flashes and arthralgias.   - No concerns regarding lymphedema or skin changes r/t to previous radiation.  - Reviewed need for annual fasting cholesterol check with PCP, not currently on statin therapy.  - Most recent labs reviewed and discussed.  - No further mammograms d/t previous mastectomies and reconstructive surgery.   - RTC in 6 months for provider review and clinical exam, no labs necessary.     Bone health  - DEXA 11/2024 with normal bone density.   - Continue maximum calcium and vitamin D, reviewed adequate dosing.  - Encouraged weight-bearing exercise as tolerated.   - Repeat DEXA due 11/2026.        Teressa Rosario - MOLINA, CNP      Again, thank you for allowing me to participate in the care of your patient.        Sincerely,        MOLINA Caban CNP

## 2024-11-13 ENCOUNTER — THERAPY VISIT (OUTPATIENT)
Dept: PHYSICAL THERAPY | Facility: CLINIC | Age: 44
End: 2024-11-13
Attending: PLASTIC SURGERY
Payer: COMMERCIAL

## 2024-11-13 DIAGNOSIS — Z98.890 S/P BREAST RECONSTRUCTION: Primary | ICD-10-CM

## 2024-11-13 PROCEDURE — 97140 MANUAL THERAPY 1/> REGIONS: CPT | Mod: GP,CQ | Performed by: REHABILITATION PRACTITIONER

## 2024-11-19 ENCOUNTER — PATIENT OUTREACH (OUTPATIENT)
Dept: CARE COORDINATION | Facility: CLINIC | Age: 44
End: 2024-11-19
Payer: COMMERCIAL

## 2024-11-19 ENCOUNTER — THERAPY VISIT (OUTPATIENT)
Dept: PHYSICAL THERAPY | Facility: CLINIC | Age: 44
End: 2024-11-19
Attending: PEDIATRICS
Payer: COMMERCIAL

## 2024-11-19 DIAGNOSIS — M75.01 ADHESIVE CAPSULITIS OF RIGHT SHOULDER: Primary | ICD-10-CM

## 2024-11-19 PROCEDURE — 97161 PT EVAL LOW COMPLEX 20 MIN: CPT | Mod: GP | Performed by: PHYSICAL THERAPIST

## 2024-11-19 PROCEDURE — 97140 MANUAL THERAPY 1/> REGIONS: CPT | Mod: GP | Performed by: PHYSICAL THERAPIST

## 2024-11-19 ASSESSMENT — ACTIVITIES OF DAILY LIVING (ADL)
AT_ITS_WORST?: 7
CARRYING_A_HEAVY_OBJECT_OF_10_POUNDS: 2
WASHING_YOUR_BACK: 5
REACHING_FOR_SOMETHING_ON_A_HIGH_SHELF: 7
PUTTING_ON_AN_UNDERSHIRT_OR_A_PULLOVER_SWEATER: 7
PLACING_AN_OBJECT_ON_A_HIGH_SHELF: 7
PLEASE_INDICATE_YOR_PRIMARY_REASON_FOR_REFERRAL_TO_THERAPY:: SHOULDER
WHEN_LYING_ON_THE_INVOLVED_SIDE: 8
PUTTING_ON_YOUR_PANTS: 0
REMOVING_SOMETHING_FROM_YOUR_BACK_POCKET: 2
PUTTING_ON_A_SHIRT_THAT_BUTTONS_DOWN_THE_FRONT: 2
PUSHING_WITH_THE_INVOLVED_ARM: 4
WASHING_YOUR_HAIR?: 0
TOUCHING_THE_BACK_OF_YOUR_NECK: 2

## 2024-11-19 NOTE — PROGRESS NOTES
PHYSICAL THERAPY EVALUATION  Type of Visit: Evaluation       Fall Risk Screen:  Fall screen completed by: PT  Have you fallen 2 or more times in the past year?: (Patient-Rptd) No  Have you fallen and had an injury in the past year?: (Patient-Rptd) No  Is patient a fall risk?: No    Subjective   Pt notes a decrease in pain in the shoulder since the injection. The pt notes lateral discomfort when sleeping on the side.  Pt had surgery on 11/1/24- messaged Dr and ROM within reason and no restriction at 6-8 weeks. Pt notes surgery back in May 2024 in the back and mastectomy with some scar tissue. Scar along the armpit into the pec      Presenting condition or subjective complaint: (Patient-Rptd) shoulder mobility  Date of onset: 10/23/24 (order date)    Relevant medical history: (Patient-Rptd) Cancer   Dates & types of surgery:      Prior diagnostic imaging/testing results: (Patient-Rptd) X-ray     Prior therapy history for the same diagnosis, illness or injury:        Prior Level of Function  Independent with all mobility    Living Environment  Social support: (Patient-Rptd) With a significant other or spouse   Type of home: (Patient-Rptd) House; Multi-level   Stairs to enter the home:         Ramp: (Patient-Rptd) No   Stairs inside the home: (Patient-Rptd) Yes (Patient-Rptd) 10 Is there a railing: (Patient-Rptd) Yes     Help at home: (Patient-Rptd) None  Equipment owned:       Employment: (Patient-Rptd) No    Hobbies/Interests:      Patient goals for therapy: (Patient-Rptd) reach above head    Pain assessment: Pain denied     Objective   SHOULDER EVALUATION  PAIN: Pain is Exacerbated By: 3-4/10, lying in bend or when reaching high  POSTURE:  slightly rounded shoulder forward  ROM:  AROM- 100*, restricting to not pull on the scar  STRENGTH:  NA at this time, restricted ROM  FLEXIBILITY:  pec tightness  PALPATION:  tender to infra and delt  CERVICAL SCREEN: WFL    Assessment & Plan   CLINICAL IMPRESSIONS  Medical  Diagnosis: Adhesive capsulitis of right shoulder  S/P breast reconstruction  Acute pain of right shoulder    Treatment Diagnosis: R shoulder pain   Impression/Assessment: Patient is a 44 year old female with R shoulder pain complaints.  The following significant findings have been identified: Pain, Decreased ROM/flexibility, Decreased strength, Impaired muscle performance, and Decreased activity tolerance. These impairments interfere with their ability to perform self care tasks, recreational activities, household chores, and driving  as compared to previous level of function.     Clinical Decision Making (Complexity):  Clinical Presentation: Stable/Uncomplicated  Clinical Presentation Rationale: based on medical and personal factors listed in PT evaluation  Clinical Decision Making (Complexity): Low complexity    PLAN OF CARE  Treatment Interventions:  Modalities: Dry Needling, Ultrasound  Interventions: Manual Therapy, Neuromuscular Re-education, Therapeutic Activity, Therapeutic Exercise, Self-Care/Home Management    Long Term Goals     PT Goal 1  Goal Identifier: STG  Goal Description: pt will have 160* shoulder flexion and abduction in 4 weeks to improve ROM.  Target Date: 12/17/24  PT Goal 2  Goal Identifier: LTG  Goal Description: Pt will have no pain when sleeping on her side in 10 weeks.  Target Date: 01/28/25      Frequency of Treatment: 1x/wk  Duration of Treatment: 10 weeks    Education Assessment:   Learner/Method: Patient;Listening;Demonstration;No Barriers to Learning    Risks and benefits of evaluation/treatment have been explained.   Patient/Family/caregiver agrees with Plan of Care.     Evaluation Time:     PT Eval, Low Complexity Minutes (63776): 10     Signing Clinician: Gemini Hargrove PT

## 2024-11-19 NOTE — PROGRESS NOTES
Social Work - Telephone/MyChart message  [facility]    Patient Name: Azalea Mckeon  Goes By: Azalea WOODSB/Age: 1980 (44 year old)      SW received medical verification form for the program [employer] platform. Updated RNCC of completion.     Intervention: Sent patient MyChart message on 2024 .   Plan:  will await patient's return phone call/message and provide assistance at that time.   KAYLA De Luna, [facility]   Adult Oncology - [place]  [phone]  Onsite [place] on    *Please note does not work on .   Support Groups at [facility]: Social Work Services for Cancer Patients ([email or web address])

## 2024-11-20 ENCOUNTER — THERAPY VISIT (OUTPATIENT)
Dept: PHYSICAL THERAPY | Facility: CLINIC | Age: 44
End: 2024-11-20
Attending: PLASTIC SURGERY
Payer: COMMERCIAL

## 2024-11-20 DIAGNOSIS — Z98.890 S/P BREAST RECONSTRUCTION: Primary | ICD-10-CM

## 2024-11-20 PROCEDURE — 97140 MANUAL THERAPY 1/> REGIONS: CPT | Mod: GP,CQ | Performed by: REHABILITATION PRACTITIONER

## 2024-11-20 NOTE — PROGRESS NOTES
[facility]                                                                                   OUTPATIENT PHYSICAL THERAPY    PLAN OF TREATMENT FOR OUTPATIENT REHABILITATION   Patient's Last Name, First Name, Azalea Herron YOB: 1980   Provider's Name   [facility]   Medical Record No.  [id number]     Onset Date: 06/13/24  Start of Care Date: 08/22/24     Medical Diagnosis:  S/P breast reconstruction      PT Treatment Diagnosis:  UQ tightness and R breast secondary lymphedema, R axillary cording Plan of Treatment  Frequency/Duration: 2 x a week/ 12 weeks    Certification date from 11/20/24 to 02/18/25         See note for plan of treatment details and functional goals     Em Santillan PT CLT                        I CERTIFY THE NEED FOR THESE SERVICES FURNISHED UNDER        THIS PLAN OF TREATMENT AND WHILE UNDER MY CARE     (Physician attestation of this document indicates review and certification of the therapy plan).              Referring Provider:  EDWARDO Ferreira,     Initial Assessment  See Epic Evaluation- Start of Care Date: 08/22/24            PLAN  Continue therapy per current plan of care.    Beginning/End Dates of Progress Note Reporting Period:  11/20/24 to 11/20/2024    Referring Provider:  EDWARDO Ferreira     11/20/24 0500   Appointment Info   Signing clinician's name / credentials Sparkle Troncoso PTA/CLT   Visits Used 10   Medical Diagnosis S/P breast reconstruction   PT Tx Diagnosis UQ tightness and R breast secondary lymphedema, R axillary cording   Precautions/Limitations recent fat transplant into chest wall no STM/MFR on chest wall area or scars   as need STM/MFR on scars at 6 wks post surgery   Progress Note/Certification   Start of Care Date 08/22/24   Onset of illness/injury or Date of Surgery 06/13/24   Therapy Frequency 2 x a week   Predicted Duration 12 weeks   Certification date from  08/22/24   Certification date to 11/14/24   Progress Note Due Date 02/18/25   Progress Note Completed Date 11/20/24   Supervision   PT Assistant Visit Number 1   Assistant Supervision PTA visit observed, intervention appropriate, plan of care reviewed and remains appropriate   GOALS   PT Goals 2;3   PT Goal 1   Goal Identifier R shoulder ROM   Goal Description Pt will demonstrate shoulder flexion ROM of 140* in order to reach a high shelf.   Rationale to maximize safety and independence with performance of ADLs and functional tasks   Goal Progress regression d/t recent fat grafts in chest.  OP ortho PT seeinng her again 1/12/24   Target Date 10/17/24   Date Met   (pt is progressing towards this goal)   PT Goal 2   Goal Identifier Self MLD   Goal Description Pt will demonstrate independence on self MLD to R Breast to maintain edema reductions upon discharge.   Rationale to maximize safety and independence with performance of ADLs and functional tasks   Goal Progress pt was independent but due to recent surgery pt unable to perform self MLD due to tightness and pain   Target Date 11/14/24   PT Goal 3   Goal Identifier Komprex 2   Goal Description Pt will report 2 hours tolerance to komprex 2 in order to improve fibrotic breast edema.   Target Date 09/19/24   Date Met 11/08/24   PT Goal 4   Goal Identifier pump   Goal Description in 6 wks pt independent with use of tactile medical lymphedema pump to reduce trunkal and UE edema   Rationale to maximize safety and independence with performance of ADLs and functional tasks   Target Date 01/03/25   PT Goal 5   Goal Identifier compression garments   Goal Description in 6 wks pt to be indepenent with elias doff wear and care of compression garments for trunkal edema and prn UE edema to be independent long term with self management at home independently   Rationale to maximize safety and independence with performance of ADLs and functional tasks   Target Date 01/03/25    Subjective Report   Subjective Report post treatment axilla fullness reduces for about 2 days , not using compression garments plan to order bra and or tank for compression of trunk will order once recovers from fat grafts to bilat breasts about 2 wks ago.  per pt Dr Harris says no restrictions post op but reasonable activity post op..  unable to reach top shelf in cabinet due to tightness and pain 5/10.  sleep disturbed 4x/night due to pain   Objective Measure 1   Objective Measure B UE girth measurements   Details stable RUE since 11/7/24 but increase of 9% since initial evaluaiton, LUE 1 % increase since 11/7/2, LUE increase of 6% since initial evaluation.  increase likely due to recent surgery for fat implant into bilat breasts with implant from mid upper back latissimuss dorsi area  also entered axilla area to implant fatty tissue with likely increased edema   Objective Measure 2   Objective Measure R UE flexion   Details flex AROM supine 120, scaption 65   Objective Measure 3   Objective Measure L UE flexion   Details AROM flexion supine 138, scaption 95  limited more recently due to recent surgery see objective 2 for reason   Treatment Interventions (PT)   Interventions Manual Therapy   Manual Therapy   Manual Therapy: Mobilization, MFR, MLD, friction massage minutes ([id number]) 55   Manual Therapy 2 MFR/STM to R Lat. scar   Manual Therapy 2 - Details B UE AROM and B UE girth measurements, all surgical areas 100% closed, will continue to avoid working over surgical sites, Pt in supine, MLD to R side of trunk and chest: R ingunal nodes, AIA from axilla to Inguinal nodes, upper abdomin to watershed line routing to AIA, inferior and medial breast to AIA,  pt in L SL: supraclavicular nodes, posterior UE/axilla routed to shoulder collectors, superior scapula to supraclavicular nodes, scap and inferior ribs down on medial side of Lat scar routed up to supraclavicular nodes, lateral side of Lat scar routed towards  AIA, pt back into supine: reclear AIA and Inguinal nodes, gentle MFR/STM to inferior breast superior to breast scar and firmness, using alternating circles and stretches to skin, pt has done a minimum 4weeks of conservative therapy treatments from7/11/23 to 1/30/24 and this second time with treatment starting from 8/22/24 to present, conservative treatment consisting of elevation, compression, and exercise despite the conservative treatments pt has persistant swelling, fibrosis, and limited ROM to B chest and trunk sides. therapist strongly recommends pt using the Flexitouch Advanced Pump to both sides of body due to  extensive Latissimus scarring starting at B posterior shoulders down to low back therefore fluid needs to be routed to B Inguinal nodes.   Skilled Intervention CDT, girth and ROM measurements   Patient Response/Progress minimal softening to inferior breast thickness   Plan   Updates to plan of care pump trial 12/11/24, will order compression garments for trunk within 2 wks,  continue MLD and hold scar and chest wall graft area STM/MRF for 6 wks   Plan for next session continue 2x/wk for 6 wks, MLD,,hold STM/MFR   Comments   Comments Patient is in need of the Flexitouch compression pump which will include treatment of the chest/upper R and L quadrant drainage which is necessary and essential in performing proper drainage for optimal long-term lymphedema management. A basic pump that only includes patient's arm is NOT appropriate as the pump/compression ends at the patient's R and L-upper arm's and is therefore overflowing/overwhelming pt's R and L-upper quadrant/chest with fluid which is the exact same region that the pt is reporting increased fullness and discomforts,  The Flexitouch compression pump chest  and trunk piece's will also directly treat/address patient's R and L-breast scarring and fibrosis for optimal longterm lymphedema management for maintenance.   Total Session Time   Timed Code  Treatment Minutes 55   Total Treatment Time (sum of timed and untimed services) 55

## 2024-11-25 ENCOUNTER — THERAPY VISIT (OUTPATIENT)
Dept: PHYSICAL THERAPY | Facility: CLINIC | Age: 44
End: 2024-11-25
Attending: PLASTIC SURGERY
Payer: COMMERCIAL

## 2024-11-25 DIAGNOSIS — Z98.890 S/P BREAST RECONSTRUCTION: Primary | ICD-10-CM

## 2024-11-25 PROCEDURE — 97140 MANUAL THERAPY 1/> REGIONS: CPT | Mod: GP,CQ | Performed by: REHABILITATION PRACTITIONER

## 2024-12-04 ENCOUNTER — THERAPY VISIT (OUTPATIENT)
Dept: PHYSICAL THERAPY | Facility: CLINIC | Age: 44
End: 2024-12-04
Attending: PLASTIC SURGERY
Payer: COMMERCIAL

## 2024-12-04 DIAGNOSIS — Z98.890 S/P BREAST RECONSTRUCTION: Primary | ICD-10-CM

## 2024-12-04 PROCEDURE — 97140 MANUAL THERAPY 1/> REGIONS: CPT | Mod: GP,CQ | Performed by: REHABILITATION PRACTITIONER

## 2024-12-06 ENCOUNTER — MEDICAL CORRESPONDENCE (OUTPATIENT)
Dept: HEALTH INFORMATION MANAGEMENT | Facility: CLINIC | Age: 44
End: 2024-12-06

## 2024-12-09 ENCOUNTER — THERAPY VISIT (OUTPATIENT)
Dept: PHYSICAL THERAPY | Facility: CLINIC | Age: 44
End: 2024-12-09
Attending: PLASTIC SURGERY
Payer: COMMERCIAL

## 2024-12-09 DIAGNOSIS — Z98.890 S/P BREAST RECONSTRUCTION: Primary | ICD-10-CM

## 2024-12-09 PROCEDURE — 97140 MANUAL THERAPY 1/> REGIONS: CPT | Mod: GP,CQ | Performed by: REHABILITATION PRACTITIONER

## 2024-12-09 PROCEDURE — 97139 UNLISTED THERAPEUTIC PX: CPT | Mod: GP,CQ | Performed by: REHABILITATION PRACTITIONER

## 2024-12-10 DIAGNOSIS — T45.1X5A HOT FLASHES RELATED TO AROMATASE INHIBITOR THERAPY: ICD-10-CM

## 2024-12-10 DIAGNOSIS — R23.2 HOT FLASHES RELATED TO AROMATASE INHIBITOR THERAPY: ICD-10-CM

## 2024-12-10 RX ORDER — GABAPENTIN 100 MG/1
200 CAPSULE ORAL AT BEDTIME
Qty: 60 CAPSULE | Refills: 3 | Status: SHIPPED | OUTPATIENT
Start: 2024-12-10

## 2024-12-11 ENCOUNTER — THERAPY VISIT (OUTPATIENT)
Dept: PHYSICAL THERAPY | Facility: CLINIC | Age: 44
End: 2024-12-11
Attending: PLASTIC SURGERY
Payer: COMMERCIAL

## 2024-12-11 DIAGNOSIS — Z98.890 S/P BREAST RECONSTRUCTION: Primary | ICD-10-CM

## 2024-12-11 PROCEDURE — 999N000104 HC STATISTIC NO CHARGE: Performed by: REHABILITATION PRACTITIONER

## 2024-12-12 ENCOUNTER — THERAPY VISIT (OUTPATIENT)
Dept: PHYSICAL THERAPY | Facility: CLINIC | Age: 44
End: 2024-12-12
Attending: PEDIATRICS
Payer: COMMERCIAL

## 2024-12-12 DIAGNOSIS — M75.01 ADHESIVE CAPSULITIS OF RIGHT SHOULDER: Primary | ICD-10-CM

## 2024-12-12 PROCEDURE — 97140 MANUAL THERAPY 1/> REGIONS: CPT | Mod: GP | Performed by: PHYSICAL THERAPIST

## 2024-12-12 PROCEDURE — 97110 THERAPEUTIC EXERCISES: CPT | Mod: GP | Performed by: PHYSICAL THERAPIST

## 2024-12-16 ENCOUNTER — THERAPY VISIT (OUTPATIENT)
Dept: PHYSICAL THERAPY | Facility: CLINIC | Age: 44
End: 2024-12-16
Attending: PLASTIC SURGERY
Payer: COMMERCIAL

## 2024-12-16 DIAGNOSIS — Z98.890 S/P BREAST RECONSTRUCTION: Primary | ICD-10-CM

## 2024-12-16 PROCEDURE — 97139 UNLISTED THERAPEUTIC PX: CPT | Mod: GP,CQ | Performed by: REHABILITATION PRACTITIONER

## 2024-12-16 PROCEDURE — 97140 MANUAL THERAPY 1/> REGIONS: CPT | Mod: GP,CQ | Performed by: REHABILITATION PRACTITIONER

## 2024-12-17 ENCOUNTER — OFFICE VISIT (OUTPATIENT)
Dept: SURGERY | Facility: CLINIC | Age: 44
End: 2024-12-17
Payer: COMMERCIAL

## 2024-12-17 DIAGNOSIS — Z98.890 S/P BREAST RECONSTRUCTION, BILATERAL: Primary | ICD-10-CM

## 2024-12-17 NOTE — LETTER
12/17/2024      Azalea Mckeon  [address] [facility] [address]      Dear Colleague,    Thank you for referring your patient, Azalea Mckeon, to the [facility]. Please see a copy of my visit note below.    Ms Mckeon is 6 week s/p bilateral fat grafting from thighs to breasts, along with bilateral back scar revision.  She feels she has had a relatively uneventful recovery but is certain that she would like another round of fat grafting to even out the breasts as the right seems somewhat larger and the left has no fullness in the superior medial aspect.  She is fine with thighs and flanks as donor sites and is aware of the restrictions and compression needed afterward.  She still does not plan to have nipple/areola reconstruction and is interested in tattooing wondering at what point in her recovery she can have this done.  Regarding the back scars, she feels that they look better than prior to revision, but are again becoming somewhat wide and itchy in some areas.  She is using silicone tape.    Exam: Alert, pleasant, no acute distress  Breast size, contour, and ptosis is approaching natural, more so on the right.  Left side is slightly smaller and with a somewhat pronounced divot in the superior medial aspect when compared to the right.  Breast scar is well-healed, soft and supple.  Back scars are all also well-healed, but are developing hypertrophy and are hyperpigmented almost along the entire length.  No raised keloids at this point.    A/P:  I will contact Dr. Ferreira regarding the timing of a second round of fat grafting to breasts and place a case request if appropriate.  Photos taken today.  Azalea will expect a phone call in 2 to 3 weeks for scheduling either the surgery or follow-up with Dr. Ferreira if that is what he would prefer.  I will also inquire about the timing for tattooing and our artist recommendations and reach out to her via [employer].  If she does decide on  nipple/areola reconstruction during her fat grafting, she will let us know so we can lengthen the case appropriately.  We discussed scar care and the continued use of silicone tape to minimize appearance, however, they are already developing hypertrophy and hyperpigmentation.  Unless they become painful, she doubts she will request revision again.    20 minutes spent by me on the date of the encounter doing chart review, history and exam, documentation and further activities per the note      Again, thank you for allowing me to participate in the care of your patient.        Sincerely,        Delisa Ray PA-C

## 2024-12-17 NOTE — NURSING NOTE
Azalea Mckeon's goals for this visit include:   Chief Complaint   Patient presents with    Surgical Followup     Right breast pain, constant, rates 4-5/10 on pain scale       She requests these members of her care team be copied on today's visit information: no    PCP: Jem Arredondo    Referring Provider:  Referred Self, MD  No address on file    There were no vitals taken for this visit.    Do you need any medication refills at today's visit? No    Thao Grewal LPN

## 2024-12-17 NOTE — PROGRESS NOTES
Ms Mckeon is 6 week s/p bilateral fat grafting from thighs to breasts, along with bilateral back scar revision.  She feels she has had a relatively uneventful recovery but is certain that she would like another round of fat grafting to even out the breasts as the right seems somewhat larger and the left has no fullness in the superior medial aspect.  She is fine with thighs and flanks as donor sites and is aware of the restrictions and compression needed afterward.  She still does not plan to have nipple/areola reconstruction and is interested in tattooing wondering at what point in her recovery she can have this done.  Regarding the back scars, she feels that they look better than prior to revision, but are again becoming somewhat wide and itchy in some areas.  She is using silicone tape.    Exam: Alert, pleasant, no acute distress  Breast size, contour, and ptosis is approaching natural, more so on the right.  Left side is slightly smaller and with a somewhat pronounced divot in the superior medial aspect when compared to the right.  Breast scar is well-healed, soft and supple.  Back scars are all also well-healed, but are developing hypertrophy and are hyperpigmented almost along the entire length.  No raised keloids at this point.    A/P:  I will contact Dr. Ferreira regarding the timing of a second round of fat grafting to breasts and place a case request if appropriate.  Photos taken today.  Azalea will expect a phone call in 2 to 3 weeks for scheduling either the surgery or follow-up with Dr. Ferreira if that is what he would prefer.  I will also inquire about the timing for tattooing and our artist recommendations and reach out to her via [employer].  If she does decide on nipple/areola reconstruction during her fat grafting, she will let us know so we can lengthen the case appropriately.  We discussed scar care and the continued use of silicone tape to minimize appearance, however, they are already developing  hypertrophy and hyperpigmentation.  Unless they become painful, she doubts she will request revision again.    20 minutes spent by me on the date of the encounter doing chart review, history and exam, documentation and further activities per the note

## 2024-12-18 ENCOUNTER — THERAPY VISIT (OUTPATIENT)
Dept: PHYSICAL THERAPY | Facility: CLINIC | Age: 44
End: 2024-12-18
Attending: PLASTIC SURGERY
Payer: COMMERCIAL

## 2024-12-18 DIAGNOSIS — Z98.890 S/P BREAST RECONSTRUCTION: Primary | ICD-10-CM

## 2024-12-18 PROCEDURE — 97139 UNLISTED THERAPEUTIC PX: CPT | Mod: GP,CQ | Performed by: REHABILITATION PRACTITIONER

## 2024-12-18 PROCEDURE — 97140 MANUAL THERAPY 1/> REGIONS: CPT | Mod: GP,CQ | Performed by: REHABILITATION PRACTITIONER

## 2024-12-18 NOTE — PLAN OF CARE
"Goal Outcome Evaluation:    Time    /61 (BP Location: Left arm)   Pulse 81   Temp 98.8  F (37.1  C) (Oral)   Resp 16   Ht 1.626 m (5' 4\")   Wt (P) 63.9 kg (140 lb 12.8 oz)   SpO2 98%   BMI (P) 24.17 kg/m      Reason for admission: POD #3 Bilateral breast reconstruction with bilateral latissimus flap  Activity: A1 out of bed. Mother at bed side to help. Abduction of shoulder 30 -40 degree at all time  Pain: managed with Dilaudid and robaxin   Neuro: alert and oriented  Cardiac: wnl, denies chest pain  Respiratory: denies SOB, no distress observed, on RA  GI/: no BM, passing Gas.  Diet: regular  Lines: PIV, HONEY x 4, bulb suction  Wounds: bilateral chest and back incision, Cdi  Labs/imaging: please review lab in the chart      New changes this shift:     Plan:       Continue to monitor and follow POC    " Patient/Caregiver provided printed discharge information.

## 2024-12-20 ENCOUNTER — PREP FOR PROCEDURE (OUTPATIENT)
Dept: PLASTIC SURGERY | Facility: CLINIC | Age: 44
End: 2024-12-20
Payer: COMMERCIAL

## 2024-12-20 PROBLEM — Z98.890 S/P BREAST RECONSTRUCTION, BILATERAL: Status: ACTIVE | Noted: 2024-12-17

## 2024-12-30 ENCOUNTER — THERAPY VISIT (OUTPATIENT)
Dept: PHYSICAL THERAPY | Facility: CLINIC | Age: 44
End: 2024-12-30
Attending: PEDIATRICS
Payer: COMMERCIAL

## 2024-12-30 DIAGNOSIS — M75.01 ADHESIVE CAPSULITIS OF RIGHT SHOULDER: Primary | ICD-10-CM

## 2024-12-30 PROCEDURE — 97110 THERAPEUTIC EXERCISES: CPT | Mod: GP | Performed by: PHYSICAL THERAPIST

## 2024-12-30 PROCEDURE — 97140 MANUAL THERAPY 1/> REGIONS: CPT | Mod: GP | Performed by: PHYSICAL THERAPIST

## 2024-12-30 NOTE — PROGRESS NOTES
"    PLAN  Continue therapy per current plan of care.    Beginning/End Dates of Progress Note Reporting Period:  11/19/24 to 12/30/2024    Referring Provider:  Angeline Wilcox     12/30/24 0500   Appointment Info   Signing clinician's name / credentials Gemini Hargrove, PT, DPT   Visits Used 3   Medical Diagnosis Adhesive capsulitis of right shoulder  S/P breast reconstruction  Acute pain of right shoulder   PT Tx Diagnosis R shoulder pain   Progress Note/Certification   Start of Care Date 11/19/24   Onset of illness/injury or Date of Surgery 10/23/24  (order date)   Therapy Frequency 1x/wk   Predicted Duration 10 weeks   Progress Note Due Date 01/28/25   Progress Note Completed Date 11/19/24   PT Goal 1   Goal Identifier STG   Goal Description pt will have 160* shoulder flexion and abduction in 4 weeks to improve ROM.   Goal Progress in progress- working towards end range   Target Date 12/17/24   PT Goal 2   Goal Identifier LTG   Goal Description Pt will have no pain when sleeping on her side in 10 weeks.   Target Date 01/28/25   Subjective Report   Subjective Report R frozen shoulder. I reached out to the surgeon prior to her appointmetn to get an idea on ROM allowed and the Dr had responded back with, \"Within reason range of motion is fine.  By 6 to 8 weeks she has no restrictions.\"  Pt notes doing well. She got her lymphedema machine and is able to reduce the swelling to the area at this time.   Objective Measure 1   Objective Measure ROM   Details flex- 125*, scaption- 110*, ER-40*;  45* by the end of the session   Treatment Interventions (PT)   Interventions Therapeutic Procedure/Exercise;Manual Therapy   Therapeutic Procedure/Exercise   Therapeutic Procedures: strength, endurance, ROM, flexibility minutes (98779) 8   Ther Proc 1 - Details AAROM with cane to 90* flex and 45* ER;  modified scap dep/ret- 15x   Skilled Intervention used to work on ROM, improve mobility, decrease pain   Patient Response/Progress " slight stretching to the area but no concerns, pain raidaitng in the shoulder down to the elbow with ER   Manual Therapy   Manual Therapy: Mobilization, MFR, MLD, friction massage minutes (24626) 20   Manual Therapy 1 - Details TPR to supra;  PROM with modified ER and stability at the shoulder joint   Skilled Intervention used to increase tissue extensibility and improve ROM   Patient Response/Progress no tightness noted in the shoulder joint wth action, limited to 45* to the area, no stretch nor irritaiton to the surgrical site   Education   Learner/Method Patient;Listening;Demonstration;No Barriers to Learning   Plan   Home program papers   Plan for next session progress to AAROM and AROM   Total Session Time   Timed Code Treatment Minutes 28   Total Treatment Time (sum of timed and untimed services) 28

## 2025-04-07 ENCOUNTER — TRANSFERRED RECORDS (OUTPATIENT)
Dept: MULTI SPECIALTY CLINIC | Facility: CLINIC | Age: 45
End: 2025-04-07
Payer: COMMERCIAL

## 2025-04-07 LAB — RETINOPATHY: NORMAL

## 2025-04-22 ENCOUNTER — OFFICE VISIT (OUTPATIENT)
Dept: FAMILY MEDICINE | Facility: CLINIC | Age: 45
End: 2025-04-22
Payer: COMMERCIAL

## 2025-04-22 VITALS
RESPIRATION RATE: 16 BRPM | HEIGHT: 64 IN | OXYGEN SATURATION: 98 % | TEMPERATURE: 98.2 F | WEIGHT: 138.4 LBS | SYSTOLIC BLOOD PRESSURE: 114 MMHG | DIASTOLIC BLOOD PRESSURE: 78 MMHG | HEART RATE: 79 BPM | BODY MASS INDEX: 23.63 KG/M2

## 2025-04-22 DIAGNOSIS — F41.9 ANXIETY: ICD-10-CM

## 2025-04-22 DIAGNOSIS — Z98.890 S/P BREAST RECONSTRUCTION, BILATERAL: ICD-10-CM

## 2025-04-22 DIAGNOSIS — Z01.818 PREOP GENERAL PHYSICAL EXAM: Primary | ICD-10-CM

## 2025-04-22 LAB
ERYTHROCYTE [DISTWIDTH] IN BLOOD BY AUTOMATED COUNT: 12 % (ref 10–15)
HCT VFR BLD AUTO: 39.5 % (ref 35–47)
HGB BLD-MCNC: 13.4 G/DL (ref 11.7–15.7)
MCH RBC QN AUTO: 30.1 PG (ref 26.5–33)
MCHC RBC AUTO-ENTMCNC: 33.9 G/DL (ref 31.5–36.5)
MCV RBC AUTO: 89 FL (ref 78–100)
PLATELET # BLD AUTO: 269 10E3/UL (ref 150–450)
RBC # BLD AUTO: 4.45 10E6/UL (ref 3.8–5.2)
WBC # BLD AUTO: 5.1 10E3/UL (ref 4–11)

## 2025-04-22 PROCEDURE — 1126F AMNT PAIN NOTED NONE PRSNT: CPT | Performed by: PHYSICIAN ASSISTANT

## 2025-04-22 PROCEDURE — 90715 TDAP VACCINE 7 YRS/> IM: CPT | Performed by: PHYSICIAN ASSISTANT

## 2025-04-22 PROCEDURE — 99213 OFFICE O/P EST LOW 20 MIN: CPT | Mod: 25 | Performed by: PHYSICIAN ASSISTANT

## 2025-04-22 PROCEDURE — 80053 COMPREHEN METABOLIC PANEL: CPT | Performed by: PHYSICIAN ASSISTANT

## 2025-04-22 PROCEDURE — 3074F SYST BP LT 130 MM HG: CPT | Performed by: PHYSICIAN ASSISTANT

## 2025-04-22 PROCEDURE — 3078F DIAST BP <80 MM HG: CPT | Performed by: PHYSICIAN ASSISTANT

## 2025-04-22 PROCEDURE — 85027 COMPLETE CBC AUTOMATED: CPT | Performed by: PHYSICIAN ASSISTANT

## 2025-04-22 PROCEDURE — 36415 COLL VENOUS BLD VENIPUNCTURE: CPT | Performed by: PHYSICIAN ASSISTANT

## 2025-04-22 PROCEDURE — 90471 IMMUNIZATION ADMIN: CPT | Performed by: PHYSICIAN ASSISTANT

## 2025-04-22 ASSESSMENT — PAIN SCALES - GENERAL: PAINLEVEL_OUTOF10: NO PAIN (0)

## 2025-04-22 NOTE — PROGRESS NOTES
Preoperative Evaluation  [facility]  [address] [facility] [[email or web address]] [address]  Phone: [phone]  Primary Provider: Jem Arredondo PA-C  Pre-op Performing Provider: Jem Arredondo PA-C  Apr 22, 2025 4/22/2025   Surgical Information   What procedure is being done? Bilateral breast fat grafting from abdomen and thighs    Facility or Hospital where procedure/surgery will be performed: [facility]   Who is doing the procedure / surgery? EDWARDO Ferreira MD    Date of surgery / procedure: 5/12/25   Time of surgery / procedure: 9:35 am   Where do you plan to recover after surgery? at home with family     Fax number for surgical facility: Note does not need to be faxed, will be available electronically in Epic.    Assessment & Plan     The proposed surgical procedure is considered INTERMEDIATE risk.    (Z01.818) Preop general physical exam  (primary encounter diagnosis)  Comment: Preop physical examination.  History of breast cancer.  Has had fat grafting previously with plans for another grafting.  Patient is hopeful this may be from last grafting.  Mastectomy in 2023.  Plan: Comprehensive metabolic panel (BMP + Alb, Alk         Phos, ALT, AST, Total. Bili, TP), CBC with         platelets           (Z98.890) S/P breast reconstruction, bilateral  Comment: Plans for surgical procedure fat grafting for reconstruction    (F41.9) Anxiety  Comment: Mood stable continue with SSRIs.       - No identified additional risk factors other than previously addressed    Antiplatelet or Anticoagulation Medication Instructions   - We reviewed the medication list and the patient is not on an antiplatelet or anticoagulation medications.    Additional Medication Instructions   - Herbal medications and vitamins: DO NOT TAKE 14 days prior to surgery.   - pregabalin, gabapentin: Continue without modification.   - SSRIs, SNRIs, TCAs, Antipsychotics: Continue without modification.    -  Letrozole continue day of   Recommendation  Approval given to proceed with proposed procedure pending review of diagnostic evaluation.    Approval for proposed procedure. 4/20/25  Jem Arredondo PA-C     Val Tristan is a 44 year old, presenting for the following:  Pre-Op Exam (Bilateral breast fat grafting from abdomen and thighs)        4/22/2025    11:12 AM   Additional Questions   Roomed by KEVIN CRAMER   Accompanied by SELF     Via the Health Maintenance questionnaire, the patient has reported the following services have been completed -Eye Exam: [facility] 2025-04-07, this information has been sent to the abstraction team.  HPI:   Patient presents for preop examination.  Plans for fat grafting from abdomen and thighs, back scar revisions, bilateral breast revision.  Patient had completed systemic chemotherapy and adjuvant radiation.  Continues to follow with Dr. Ferreira.  Original mastectomy 4/23 with reconstruction 6/13/2024. Last grafting November.  Some shaking post procedural sedation November of 24.     2 years since last menstrual period. Tubal ligation 2013.             4/22/2025   Pre-Op Questionnaire   Have you ever had a heart attack or stroke? No   Have you ever had surgery on your heart or blood vessels, such as a stent placement, a coronary artery bypass, or surgery on an artery in your head, neck, heart, or legs? No   Do you have chest pain with activity? No   Do you have a history of heart failure? No   Do you currently have a cold, bronchitis or symptoms of other infection? No   Do you have a cough, shortness of breath, or wheezing? No   Do you or anyone in your family have previous history of blood clots? (!) YES Sister with PE.    Do you or does anyone in your family have a serious bleeding problem such as prolonged bleeding following surgeries or cuts? No   Have you ever had problems with anemia or been told to take iron pills? No   Have you had any abnormal blood loss such as  black, tarry or bloody stools, or abnormal vaginal bleeding? No   Have you ever had a blood transfusion? No   Are you willing to have a blood transfusion if it is medically needed before, during, or after your surgery? Yes   Have you or any of your relatives ever had problems with anesthesia? No   Do you have sleep apnea, excessive snoring or daytime drowsiness? No   Do you have any artifical heart valves or other implanted medical devices like a pacemaker, defibrillator, or continuous glucose monitor? No   Do you have artificial joints? No   Are you allergic to latex? (!) YES     Advance Care Planning  Document on file is a Health Care Directive or POLST.    Preoperative Review of    reviewed - Gabapentin history.     Status of Chronic Conditions:  See problem list for active medical problems.  Problems all longstanding and stable, except as noted/documented.  See ROS for pertinent symptoms related to these conditions.    Patient Active Problem List    Diagnosis Date Noted    S/P breast reconstruction, bilateral 12/17/2024     Priority: Medium    Adhesive capsulitis of right shoulder 11/19/2024     Priority: Medium    Status post bilateral breast reconstruction 08/06/2024     Priority: Medium    S/P breast reconstruction 06/13/2024     Priority: Medium    Cervical high risk HPV (human papillomavirus) test positive 11/02/2023     Priority: Medium     2006, 2007, 2009, 2010 NIL paps per CE  3/12/13 NIL pap  Gap in care  11/2/23 NIL pap, +HR HPV (not 16/18). Pt states she just finished chemo and radiation. Planning tamoxifen. Plan: cotest in 1 year   10/29/24 NIL pap, neg HPV. Plan cotest in 1 year      Chemotherapy-induced neutropenia 06/01/2023     Priority: Medium    Malignant neoplasm of overlapping sites of right breast in female, estrogen receptor positive (H) 03/29/2023     Priority: Medium     Check 1.24 follow-up [place].      Anxiety 03/12/2015     Priority: Medium    CARDIOVASCULAR SCREENING; LDL  GOAL LESS THAN 160 01/10/2013     Priority: Medium      Past Medical History:   Diagnosis Date    Breast cancer (H) 2023    Right Breast    Cancer (H)     right breast, s/p bilateral mastectomies    Personal history of chemotherapy     Taxotere + Cytoxan (2023 - 2023) - Dr. Dyson    S/P radiation therapy     5,000 cGy to right chestwall completed on 2023 - [facility]     Past Surgical History:   Procedure Laterality Date    BREAST BIOPSY, RT/LT Right 2023    GYN SURGERY  2010        GYN SURGERY  2011        GYN SURGERY  2013    C/S    GYN SURGERY  2013    C/S    HC REMOVAL OF OVARIAN CYST(S)  1999    IR CHEST PORT PLACEMENT > 5 YRS OF AGE  2023    MASTECTOMY PARTIAL WITH SENTINEL NODE Bilateral 2023    Procedure: BILATERAL Skin-Sparing Mastectomy, BILATERAL breast implant removal, RIGHT Axillary [place] Lymph Node Biopsy;  Surgeon: Mitra Souza MD;  Location: [facility] OR    PROCURE GRAFT FAT Bilateral 2024    Procedure: bilateral breast fat grafting from abdomen and thighs, back scar revisions;  Surgeon: EDWARDO Ferreira MD;  Location: [facility] OR    RECONSTRUCT BREAST, INSERT TISSUE EXPANDER BILATERAL, COMBINED Bilateral 2023    Procedure: bilateral breast reconstruction, insert tissue expander bilateral, Spy **Latex Allergy**;  Surgeon: EDWARDO Ferreira MD;  Location: [facility] OR    RECONSTRUCT CHEST WALL LATISSIMUS DORSI PEDICLE Bilateral 2024    Procedure: Bilateral breast reconstruction with bilateral latissimus flap reconstructions, spy  **Latex Allergy**;  Surgeon: EDWARDO Ferreira MD;  Location: [facility] OR    TUBAL LIGATION  2013     Current Outpatient Medications   Medication Sig Dispense Refill    acetaminophen (TYLENOL) 325 MG tablet Take 2 tablets (650 mg) by mouth every 4 hours as needed for other (For optimal non-opioid multimodal pain management to improve pain  control.) 30 tablet 0    escitalopram (LEXAPRO) 10 MG tablet Take 1 tablet (10 mg) by mouth daily. 90 tablet 1    gabapentin (NEURONTIN) 100 MG capsule Take 2 capsules (200 mg) by mouth at bedtime. 60 capsule 3    letrozole (FEMARA) 2.5 MG tablet Take 1 tablet (2.5 mg) by mouth daily 90 tablet 3    omeprazole (PRILOSEC) 20 MG DR capsule Take 1 capsule (20 mg) by mouth daily 30 capsule 0    HYDROcodone-acetaminophen (NORCO) 5-325 MG tablet Take 1-2 tablets by mouth every 6 hours as needed for moderate to severe pain. (Patient not taking: Reported on 4/22/2025) 15 tablet 0    ondansetron (ZOFRAN ODT) 4 MG ODT tab Take 1 tablet (4 mg) by mouth every 8 hours as needed for nausea. (Patient not taking: Reported on 4/22/2025) 4 tablet 0    senna-docusate (SENOKOT-S/PERICOLACE) 8.6-50 MG tablet Take 1-2 tablets by mouth 2 times daily. (Patient not taking: Reported on 4/22/2025) 30 tablet 0    triamcinolone (KENALOG) 0.1 % external ointment Apply topically 2 times daily Apply twice daily as needed to rash if it flares (Patient not taking: Reported on 4/22/2025) 80 g 2       Allergies   Allergen Reactions    Oxycodone-Acetaminophen Hives    Percocet [Oxycodone-Acetaminophen] Itching    Latex Rash        Social History     Tobacco Use    Smoking status: Former     Current packs/day: 0.00     Average packs/day: 0.5 packs/day for 10.0 years (5.0 ttl pk-yrs)     Types: Cigarettes     Start date: 1/1/2000     Quit date: 1/1/2010     Years since quitting: 15.3     Passive exposure: Past    Smokeless tobacco: Never   Substance Use Topics    Alcohol use: No     Family History   Problem Relation Age of Onset    Anxiety Disorder Mother     Diabetes Father     Substance Abuse Father     Bleeding Disorder Sister     Pulmonary Embolism Sister         33    Depression Sister     Autism Spectrum Disorder Daughter     Anxiety Disorder Daughter     Breast Cancer Other         Distant 2nd cousin per patient report    Cancer No family hx of      "Hypertension No family hx of     Cerebrovascular Disease No family hx of     Thyroid Disease No family hx of     Glaucoma No family hx of     Macular Degeneration No family hx of     Ovarian Cancer No family hx of     Anesthesia Reaction No family hx of      History   Drug Use No             Review of Systems  Constitutional, neuro, ENT, endocrine, pulmonary, cardiac, gastrointestinal, genitourinary, musculoskeletal, integument and psychiatric systems are negative, except as otherwise noted.    Objective    /78   Pulse 79   Temp 98.2  F (36.8  C) (Tympanic)   Resp 16   Ht 1.63 m (5' 4.17\")   Wt 62.8 kg (138 lb 6.4 oz)   SpO2 98%   BMI 23.63 kg/m     Estimated body mass index is 23.63 kg/m  as calculated from the following:    Height as of this encounter: 1.63 m (5' 4.17\").    Weight as of this encounter: 62.8 kg (138 lb 6.4 oz).  Physical Exam  GENERAL: alert and no distress  EYES: Eyes grossly normal to inspection, PERRL and conjunctivae and sclerae normal  HENT: normal cephalic/atraumatic, nose and mouth without ulcers or lesions, oropharynx clear, and oral mucous membranes moist  NECK: no adenopathy, no asymmetry, masses, or scars  RESP: lungs clear to auscultation - no rales, rhonchi or wheezes  CV: regular rate and rhythm, normal S1 S2, no S3 or S4, no murmur, click or rub, no peripheral edema  ABDOMEN: soft, nontender, no hepatosplenomegaly, no masses and bowel sounds normal  MS: no gross musculoskeletal defects noted, no edema  SKIN: no suspicious lesions or rashes  NEURO: Normal strength and tone, mentation intact and speech normal  PSYCH: mentation appears normal, affect normal/bright    Recent Labs   Lab Test 11/11/24  0930 10/11/24  1159 05/21/24  1144   HGB  --  13.9 14.4   PLT  --  235 216    140  --    POTASSIUM 3.2* 4.4  --    CR 0.75 0.78  --       Diagnostics  Labs pending at this time.  Results will be reviewed when available.   No EKG required, no history of coronary heart " disease, significant arrhythmia, peripheral arterial disease or other structural heart disease.    Revised Cardiac Risk Index (RCRI)  The patient has the following serious cardiovascular risks for perioperative complications:   - No serious cardiac risks = 0 points     RCRI Interpretation: 0 points: Class I (very low risk - 0.4% complication rate)    Signed Electronically by: Jem Arredondo PA-C  A copy of this evaluation report is provided to the requesting physician.

## 2025-04-22 NOTE — PATIENT INSTRUCTIONS
How to Take Your Medication Before Surgery  Preoperative Medication Instructions   Additional Medication Instructions   - Herbal medications and vitamins: DO NOT TAKE 14 days prior to surgery.   - pregabalin, gabapentin: Continue without modification.   - SSRIs, SNRIs, TCAs, Antipsychotics: Continue without modification.    - Letrozole continue day of        Patient Education   Preparing for Your Surgery  For Adults  Getting started  In most cases, a nurse will call to review your health history and instructions. They will give you an arrival time based on your scheduled surgery time. Please be ready to share:  Your doctor's clinic name and phone number  Your medical, surgical, and anesthesia history  A list of allergies and sensitivities  A list of medicines, including herbal treatments and over-the-counter drugs  Whether the patient has a legal guardian (ask how to send us the papers in advance)  Note: You may not receive a call if you were seen at our PAC (Preoperative Assessment Center).  Please tell us if you're pregnant--or if there's any chance you might be pregnant. Some surgeries may injure a fetus (unborn baby), so they require a pregnancy test. Surgeries that are safe for a fetus don't always need a test, and you can choose whether to have one.   Preparing for surgery  Within 10 to 30 days of surgery: Have a pre-op exam (sometimes called an H&P, or History and Physical). This can be done at a clinic or pre-operative center.  If you're having a , you may not need this exam. Talk to your care team.  At your pre-op exam, talk to your care team about all medicines you take. (This includes CBD oil and any drugs, such as THC, marijuana, and other forms of cannabis.) If you need to stop any medicine before surgery, ask when to start taking it again.  This is for your safety. Many medicines and drugs can make you bleed too much during surgery. Some change how well surgery (anesthesia) drugs work.  Call  your insurance company to let them know you're having surgery. (If you don't have insurance, call [phone].)  Call your clinic if there's any change in your health. This includes a scrape or scratch near the surgery site, or any signs of a cold (sore throat, runny nose, cough, rash, fever).  Eating and drinking guidelines  For your safety: Unless your surgeon tells you otherwise, follow the guidelines below.  Eat and drink as normal until 8 hours before you arrive for surgery. After that, no food or milk. You can spit out gum when you arrive.  Drink clear liquids until 2 hours before you arrive. These are liquids you can see through, like water, Gatorade, and Propel Water. They also include plain black coffee and tea (no cream or milk).  No alcohol for 24 hours before you arrive. The night before surgery, stop any drinks that contain THC.  If your care team tells you to take medicine on the morning of surgery, it's okay to take it with a sip of water. No other medicines or drugs are allowed (including CBD oil)--follow your care team's instructions.  If you have questions the day of surgery, call your hospital or surgery center.   Preventing infection  Shower or bathe the night before and the morning of surgery. Follow the instructions your clinic gave you. (If no instructions, use regular soap.)  Don't shave or clip hair near your surgery site. We'll remove the hair if needed.  Don't smoke or vape the morning of surgery. No chewing tobacco for 6 hours before you arrive. A nicotine patch is okay. You may spit out nicotine gum when you arrive.  For some surgeries, the surgeon will tell you to fully quit smoking and nicotine.  We will make every effort to keep you safe from infection. We will:  Clean our hands often with soap and water (or an alcohol-based hand rub).  Clean the skin at your surgery site with a special soap that kills germs.  Give you a special gown to keep you warm. (Cold raises the risk of  infection.)  Wear hair covers, masks, gowns, and gloves during surgery.  Give antibiotic medicine, if prescribed. Not all surgeries need this medicine.  What to bring on the day of surgery  Photo ID and insurance card  Copy of your health care directive, if you have one  Glasses and hearing aids (bring cases)  You can't wear contacts during surgery  Inhaler and eye drops, if you use them (tell us about these when you arrive)  CPAP machine or breathing device, if you use them  A few personal items, if spending the night  If you have . . .  A pacemaker, ICD (cardiac defibrillator), or other implant: Bring the ID card.  An implanted stimulator: Bring the remote control.  A legal guardian: Bring a copy of the certified (court-stamped) guardianship papers.  Please remove any jewelry, including body piercings. Leave jewelry and other valuables at home.  If you're going home the day of surgery  You must have a responsible adult drive you home. They should stay with you overnight as well.  If you don't have someone to stay with you, and you aren't safe to go home alone, we may keep you overnight. Insurance often won't pay for this.  After surgery  If it's hard to control your pain or you need more pain medicine, please call your surgeon's office.  Questions?   If you have any questions for your care team, list them here:   ____________________________________________________________________________________________________________________________________________________________________________________________________________________________________________________________  For informational purposes only. Not to replace the advice of your health care provider. Copyright   2003, 2019 [facility]. All rights reserved. Clinically reviewed by Donta Ann MD. [place] [id number] - REV 08/24.

## 2025-04-23 LAB
ALBUMIN SERPL BCG-MCNC: 4.4 G/DL (ref 3.5–5.2)
ALP SERPL-CCNC: 42 U/L (ref 40–150)
ALT SERPL W P-5'-P-CCNC: 23 U/L (ref 0–50)
ANION GAP SERPL CALCULATED.3IONS-SCNC: 10 MMOL/L (ref 7–15)
AST SERPL W P-5'-P-CCNC: 23 U/L (ref 0–45)
BILIRUB SERPL-MCNC: 0.3 MG/DL
BUN SERPL-MCNC: 13.7 MG/DL (ref 6–20)
CALCIUM SERPL-MCNC: 9.5 MG/DL (ref 8.8–10.4)
CHLORIDE SERPL-SCNC: 106 MMOL/L (ref 98–107)
CREAT SERPL-MCNC: 0.78 MG/DL (ref 0.51–0.95)
EGFRCR SERPLBLD CKD-EPI 2021: >90 ML/MIN/1.73M2
GLUCOSE SERPL-MCNC: 78 MG/DL (ref 70–99)
HCO3 SERPL-SCNC: 27 MMOL/L (ref 22–29)
POTASSIUM SERPL-SCNC: 4.3 MMOL/L (ref 3.4–5.3)
PROT SERPL-MCNC: 6.9 G/DL (ref 6.4–8.3)
SODIUM SERPL-SCNC: 143 MMOL/L (ref 135–145)

## 2025-05-08 ENCOUNTER — ANESTHESIA EVENT (OUTPATIENT)
Dept: SURGERY | Facility: AMBULATORY SURGERY CENTER | Age: 45
End: 2025-05-08
Payer: COMMERCIAL

## 2025-05-12 ENCOUNTER — HOSPITAL ENCOUNTER (OUTPATIENT)
Facility: AMBULATORY SURGERY CENTER | Age: 45
Discharge: HOME | End: 2025-05-12
Attending: PLASTIC SURGERY
Payer: COMMERCIAL

## 2025-05-12 ENCOUNTER — ANESTHESIA (OUTPATIENT)
Dept: SURGERY | Facility: AMBULATORY SURGERY CENTER | Age: 45
End: 2025-05-12
Payer: COMMERCIAL

## 2025-05-12 VITALS
WEIGHT: 135 LBS | HEART RATE: 55 BPM | RESPIRATION RATE: 16 BRPM | BODY MASS INDEX: 23.05 KG/M2 | HEIGHT: 64 IN | OXYGEN SATURATION: 98 % | TEMPERATURE: 98 F | DIASTOLIC BLOOD PRESSURE: 72 MMHG | SYSTOLIC BLOOD PRESSURE: 117 MMHG

## 2025-05-12 DIAGNOSIS — Z98.890 S/P BREAST RECONSTRUCTION: ICD-10-CM

## 2025-05-12 DIAGNOSIS — Z98.890 S/P BREAST RECONSTRUCTION, BILATERAL: Primary | ICD-10-CM

## 2025-05-12 RX ORDER — LIDOCAINE 40 MG/G
CREAM TOPICAL
Status: DISCONTINUED | OUTPATIENT
Start: 2025-05-12 | End: 2025-05-13 | Stop reason: HOSPADM

## 2025-05-12 RX ORDER — DEXAMETHASONE SODIUM PHOSPHATE 4 MG/ML
INJECTION, SOLUTION INTRA-ARTICULAR; INTRALESIONAL; INTRAMUSCULAR; INTRAVENOUS; SOFT TISSUE PRN
Status: DISCONTINUED | OUTPATIENT
Start: 2025-05-12 | End: 2025-05-12

## 2025-05-12 RX ORDER — SODIUM CHLORIDE, SODIUM LACTATE, POTASSIUM CHLORIDE, AND CALCIUM CHLORIDE .6; .31; .03; .02 G/100ML; G/100ML; G/100ML; G/100ML
IRRIGANT IRRIGATION PRN
Status: DISCONTINUED | OUTPATIENT
Start: 2025-05-12 | End: 2025-05-12 | Stop reason: HOSPADM

## 2025-05-12 RX ORDER — FENTANYL CITRATE 50 UG/ML
25 INJECTION, SOLUTION INTRAMUSCULAR; INTRAVENOUS EVERY 5 MIN PRN
Status: DISCONTINUED | OUTPATIENT
Start: 2025-05-12 | End: 2025-05-13 | Stop reason: HOSPADM

## 2025-05-12 RX ORDER — PROPOFOL 10 MG/ML
INJECTION, EMULSION INTRAVENOUS CONTINUOUS PRN
Status: DISCONTINUED | OUTPATIENT
Start: 2025-05-12 | End: 2025-05-12

## 2025-05-12 RX ORDER — ONDANSETRON 2 MG/ML
INJECTION INTRAMUSCULAR; INTRAVENOUS PRN
Status: DISCONTINUED | OUTPATIENT
Start: 2025-05-12 | End: 2025-05-12

## 2025-05-12 RX ORDER — FENTANYL CITRATE 50 UG/ML
INJECTION, SOLUTION INTRAMUSCULAR; INTRAVENOUS PRN
Status: DISCONTINUED | OUTPATIENT
Start: 2025-05-12 | End: 2025-05-12

## 2025-05-12 RX ORDER — DEXAMETHASONE SODIUM PHOSPHATE 10 MG/ML
4 INJECTION, SOLUTION INTRAMUSCULAR; INTRAVENOUS
Status: DISCONTINUED | OUTPATIENT
Start: 2025-05-12 | End: 2025-05-13 | Stop reason: HOSPADM

## 2025-05-12 RX ORDER — PROPOFOL 10 MG/ML
INJECTION, EMULSION INTRAVENOUS PRN
Status: DISCONTINUED | OUTPATIENT
Start: 2025-05-12 | End: 2025-05-12

## 2025-05-12 RX ORDER — ONDANSETRON 2 MG/ML
4 INJECTION INTRAMUSCULAR; INTRAVENOUS EVERY 30 MIN PRN
Status: DISCONTINUED | OUTPATIENT
Start: 2025-05-12 | End: 2025-05-13 | Stop reason: HOSPADM

## 2025-05-12 RX ORDER — HYDROMORPHONE HYDROCHLORIDE 1 MG/ML
0.2 INJECTION, SOLUTION INTRAMUSCULAR; INTRAVENOUS; SUBCUTANEOUS EVERY 5 MIN PRN
Status: DISCONTINUED | OUTPATIENT
Start: 2025-05-12 | End: 2025-05-13 | Stop reason: HOSPADM

## 2025-05-12 RX ORDER — ONDANSETRON 4 MG/1
4 TABLET, FILM COATED ORAL EVERY 8 HOURS PRN
Qty: 12 TABLET | Refills: 0 | Status: SHIPPED | OUTPATIENT
Start: 2025-05-12

## 2025-05-12 RX ORDER — FENTANYL CITRATE 50 UG/ML
50 INJECTION, SOLUTION INTRAMUSCULAR; INTRAVENOUS EVERY 5 MIN PRN
Status: DISCONTINUED | OUTPATIENT
Start: 2025-05-12 | End: 2025-05-13 | Stop reason: HOSPADM

## 2025-05-12 RX ORDER — ONDANSETRON 4 MG/1
4 TABLET, ORALLY DISINTEGRATING ORAL EVERY 30 MIN PRN
Status: DISCONTINUED | OUTPATIENT
Start: 2025-05-12 | End: 2025-05-13 | Stop reason: HOSPADM

## 2025-05-12 RX ORDER — CEFAZOLIN SODIUM 2 G/50ML
2 SOLUTION INTRAVENOUS SEE ADMIN INSTRUCTIONS
Status: DISCONTINUED | OUTPATIENT
Start: 2025-05-12 | End: 2025-05-13 | Stop reason: HOSPADM

## 2025-05-12 RX ORDER — SENNA AND DOCUSATE SODIUM 50; 8.6 MG/1; MG/1
1 TABLET, FILM COATED ORAL AT BEDTIME
Qty: 12 TABLET | Refills: 0 | Status: SHIPPED | OUTPATIENT
Start: 2025-05-12

## 2025-05-12 RX ORDER — SODIUM CHLORIDE, SODIUM LACTATE, POTASSIUM CHLORIDE, CALCIUM CHLORIDE 600; 310; 30; 20 MG/100ML; MG/100ML; MG/100ML; MG/100ML
INJECTION, SOLUTION INTRAVENOUS CONTINUOUS
Status: DISCONTINUED | OUTPATIENT
Start: 2025-05-12 | End: 2025-05-13 | Stop reason: HOSPADM

## 2025-05-12 RX ORDER — GLYCOPYRROLATE 0.2 MG/ML
INJECTION, SOLUTION INTRAMUSCULAR; INTRAVENOUS PRN
Status: DISCONTINUED | OUTPATIENT
Start: 2025-05-12 | End: 2025-05-12

## 2025-05-12 RX ORDER — NALOXONE HYDROCHLORIDE 0.4 MG/ML
0.1 INJECTION, SOLUTION INTRAMUSCULAR; INTRAVENOUS; SUBCUTANEOUS
Status: DISCONTINUED | OUTPATIENT
Start: 2025-05-12 | End: 2025-05-13 | Stop reason: HOSPADM

## 2025-05-12 RX ORDER — HYDROMORPHONE HYDROCHLORIDE 1 MG/ML
0.4 INJECTION, SOLUTION INTRAMUSCULAR; INTRAVENOUS; SUBCUTANEOUS EVERY 5 MIN PRN
Status: DISCONTINUED | OUTPATIENT
Start: 2025-05-12 | End: 2025-05-13 | Stop reason: HOSPADM

## 2025-05-12 RX ORDER — ACETAMINOPHEN 325 MG/1
975 TABLET ORAL ONCE
Status: COMPLETED | OUTPATIENT
Start: 2025-05-12 | End: 2025-05-12

## 2025-05-12 RX ORDER — HYDROCODONE BITARTRATE AND ACETAMINOPHEN 5; 325 MG/1; MG/1
1 TABLET ORAL EVERY 6 HOURS PRN
Qty: 15 TABLET | Refills: 0 | Status: SHIPPED | OUTPATIENT
Start: 2025-05-12 | End: 2025-05-19

## 2025-05-12 RX ORDER — OXYCODONE HYDROCHLORIDE 5 MG/1
10 TABLET ORAL
Status: DISCONTINUED | OUTPATIENT
Start: 2025-05-12 | End: 2025-05-13 | Stop reason: HOSPADM

## 2025-05-12 RX ORDER — LIDOCAINE HYDROCHLORIDE 20 MG/ML
INJECTION, SOLUTION INFILTRATION; PERINEURAL PRN
Status: DISCONTINUED | OUTPATIENT
Start: 2025-05-12 | End: 2025-05-12

## 2025-05-12 RX ORDER — OXYCODONE HYDROCHLORIDE 5 MG/1
5 TABLET ORAL
Status: COMPLETED | OUTPATIENT
Start: 2025-05-12 | End: 2025-05-12

## 2025-05-12 RX ORDER — LABETALOL HYDROCHLORIDE 5 MG/ML
10 INJECTION, SOLUTION INTRAVENOUS
Status: DISCONTINUED | OUTPATIENT
Start: 2025-05-12 | End: 2025-05-13 | Stop reason: HOSPADM

## 2025-05-12 RX ORDER — MEPERIDINE HYDROCHLORIDE 25 MG/ML
25 INJECTION INTRAMUSCULAR; INTRAVENOUS; SUBCUTANEOUS ONCE
Status: COMPLETED | OUTPATIENT
Start: 2025-05-12 | End: 2025-05-12

## 2025-05-12 RX ORDER — CEFAZOLIN SODIUM 2 G/50ML
2 SOLUTION INTRAVENOUS
Status: COMPLETED | OUTPATIENT
Start: 2025-05-12 | End: 2025-05-12

## 2025-05-12 RX ADMIN — ACETAMINOPHEN 975 MG: 325 TABLET ORAL at 08:19

## 2025-05-12 RX ADMIN — PROPOFOL 200 MCG/KG/MIN: 10 INJECTION, EMULSION INTRAVENOUS at 10:19

## 2025-05-12 RX ADMIN — ONDANSETRON 4 MG: 2 INJECTION INTRAMUSCULAR; INTRAVENOUS at 10:26

## 2025-05-12 RX ADMIN — DEXAMETHASONE SODIUM PHOSPHATE 4 MG: 4 INJECTION, SOLUTION INTRA-ARTICULAR; INTRALESIONAL; INTRAMUSCULAR; INTRAVENOUS; SOFT TISSUE at 10:26

## 2025-05-12 RX ADMIN — Medication 0.5 MG: at 10:55

## 2025-05-12 RX ADMIN — CEFAZOLIN SODIUM 2 G: 2 SOLUTION INTRAVENOUS at 10:20

## 2025-05-12 RX ADMIN — SODIUM CHLORIDE, SODIUM LACTATE, POTASSIUM CHLORIDE, CALCIUM CHLORIDE: 600; 310; 30; 20 INJECTION, SOLUTION INTRAVENOUS at 08:27

## 2025-05-12 RX ADMIN — LIDOCAINE HYDROCHLORIDE 100 MG: 20 INJECTION, SOLUTION INFILTRATION; PERINEURAL at 10:19

## 2025-05-12 RX ADMIN — GLYCOPYRROLATE 0.2 MG: 0.2 INJECTION, SOLUTION INTRAMUSCULAR; INTRAVENOUS at 10:13

## 2025-05-12 RX ADMIN — PROPOFOL 50 MG: 10 INJECTION, EMULSION INTRAVENOUS at 11:13

## 2025-05-12 RX ADMIN — OXYCODONE HYDROCHLORIDE 5 MG: 5 TABLET ORAL at 12:19

## 2025-05-12 RX ADMIN — PROPOFOL 200 MG: 10 INJECTION, EMULSION INTRAVENOUS at 10:19

## 2025-05-12 RX ADMIN — PROPOFOL 100 MG: 10 INJECTION, EMULSION INTRAVENOUS at 10:54

## 2025-05-12 RX ADMIN — MEPERIDINE HYDROCHLORIDE 25 MG: 25 INJECTION INTRAMUSCULAR; INTRAVENOUS; SUBCUTANEOUS at 12:48

## 2025-05-12 RX ADMIN — SODIUM CHLORIDE, SODIUM LACTATE, POTASSIUM CHLORIDE, CALCIUM CHLORIDE: 600; 310; 30; 20 INJECTION, SOLUTION INTRAVENOUS at 11:16

## 2025-05-12 RX ADMIN — FENTANYL CITRATE 100 MCG: 50 INJECTION, SOLUTION INTRAMUSCULAR; INTRAVENOUS at 10:19

## 2025-05-12 NOTE — OP NOTE
PREOPERATIVE DIAGNOSIS: History of right-sided breast cancer and bilateral breast reconstruction with flaps now ready for symmetry enhancement procedures    POSTOPERATIVE DIAGNOSIS: As above    PROCEDURES:   1.  Bilateral breast fat grafting using the Alfaro technique and revolve system from abdomen thighs and flanks a total of 150 cc injected     SURGEON: Melida Ferreira MD      RESIDENT: Nati Doe MD     ANESTHESIA: General anesthesia with LMA     COMPLICATIONS: Nil.      DRAINS: None     SPECIMENS: None     BLOOD LOSS: 10 mL        DESCRIPTION OF PROCEDURE: After informed consent was taken from the patient, the proper site and procedure was ascertained with them and they were appropriately marked, they were taken to the operating room. They were placed in a supine position with their knees comfortably flexed with pillows underneath them, and pneumoboots placed and running prior to induction of anesthesia. Preoperative antibiotics were given in the OR and appropriately redosed during the case. General anesthesia was administered without any complications.  She was prepped and draped in the standard surgical fashion.  On evaluation of her chest, her left breast was smaller than the right and had more upper pole indentations than the right.    I instilled tumescent solution in the thighs flanks and anterior upper abdomen.  I allowed 10 minutes to pass and then started the harvest of the flap using a 4 mm cannula and liposuction of these areas.  A total of about 250 cc of aspirate was collected.  This was washed 3 different times according to 's recommendations and the fat was then collected in 10 cc syringes.  The fat was then injected using blunt Alfaro catheters through multiple stab incisions in both breasts more on the left than on the right.  A total of about 150 cc was injected.  All the incisions were closed with 5-0 fast absorbing gut suture and surgical tape and glue.  She was wrapped  appropriately.  The patient tolerated the procedure well. All counts were correct at the end of the case. The patient was extubated and sent to recovery room in stable condition.

## 2025-05-12 NOTE — ANESTHESIA PROCEDURE NOTES
Airway       Patient location during procedure: OR  Staff -        Performed By: CRNAIndications and Patient Condition       Indications for airway management: nora-procedural       Induction type:intravenous       Mask difficulty assessment: 1 - vent by mask    Final Airway Details       Final airway type: supraglottic airway    Supraglottic Airway Details        Type: LMA       Brand: LMA Unique       LMA size: 4    Post intubation assessment        Placement verified by: capnometry and equal breath sounds        Number of attempts at approach: 1       Number of other approaches attempted: 0       Secured with: tape       Ease of procedure: easy       Dentition: Intact and Unchanged

## 2025-05-12 NOTE — ANESTHESIA PREPROCEDURE EVALUATION
Anesthesia Pre-Procedure Evaluation    Patient: Azalea Mckeon   MRN: [id number] : 1980          Procedure : Procedure(s):  Bilateral breast fat grafting from abdomen and thighs         Past Medical History:   Diagnosis Date     Breast cancer (H) 2023    Right Breast     Cancer (H)     right breast, s/p bilateral mastectomies     Personal history of chemotherapy     Taxotere + Cytoxan (2023 - 2023) - Dr. Dyson     S/P radiation therapy     5,000 cGy to right chestwall completed on 2023 - [facility]      Past Surgical History:   Procedure Laterality Date     BREAST BIOPSY, RT/LT Right 2023     GYN SURGERY  2010         GYN SURGERY  2011         GYN SURGERY  2013    C/S     GYN SURGERY  2013    C/S     HC REMOVAL OF OVARIAN CYST(S)  1999     IR CHEST PORT PLACEMENT > 5 YRS OF AGE  2023     MASTECTOMY PARTIAL WITH SENTINEL NODE Bilateral 2023    Procedure: BILATERAL Skin-Sparing Mastectomy, BILATERAL breast implant removal, RIGHT Axillary [place] Lymph Node Biopsy;  Surgeon: Mitra Souza MD;  Location: [facility] OR     PROCURE GRAFT FAT Bilateral 2024    Procedure: bilateral breast fat grafting from abdomen and thighs, back scar revisions;  Surgeon: EDWARDO Ferreira MD;  Location:  OR     RECONSTRUCT BREAST, INSERT TISSUE EXPANDER BILATERAL, COMBINED Bilateral 2023    Procedure: bilateral breast reconstruction, insert tissue expander bilateral, Spy **Latex Allergy**;  Surgeon: EDWARDO Ferreira MD;  Location: [facility] OR     RECONSTRUCT CHEST WALL LATISSIMUS DORSI PEDICLE Bilateral 2024    Procedure: Bilateral breast reconstruction with bilateral latissimus flap reconstructions, spy  **Latex Allergy**;  Surgeon: EDWARDO Ferreira MD;  Location: [facility] OR     TUBAL LIGATION  2013      Allergies   Allergen Reactions     Oxycodone-Acetaminophen Hives     Percocet  [Oxycodone-Acetaminophen] Itching     Latex Rash      Social History     Tobacco Use     Smoking status: Former     Current packs/day: 0.00     Average packs/day: 0.5 packs/day for 10.0 years (5.0 ttl pk-yrs)     Types: Cigarettes     Start date: 1/1/2000     Quit date: 1/1/2010     Years since quitting: 15.3     Passive exposure: Past     Smokeless tobacco: Never   Substance Use Topics     Alcohol use: No      Wt Readings from Last 1 Encounters:   04/22/25 62.8 kg (138 lb 6.4 oz)        Anesthesia Evaluation            ROS/MED HX  ENT/Pulmonary:  - neg pulmonary ROS     Neurologic:  - neg neurologic ROS     Cardiovascular:  - neg cardiovascular ROS     METS/Exercise Tolerance:     Hematologic:  - neg hematologic  ROS     Musculoskeletal:  - neg musculoskeletal ROS     GI/Hepatic:     (+) GERD, Asymptomatic on medication,                  Renal/Genitourinary:  - neg Renal ROS     Endo:  - neg endo ROS     Psychiatric/Substance Use:     (+) psychiatric history anxiety       Infectious Disease:  - neg infectious disease ROS     Malignancy:   (+) Malignancy, History of Breast.Breast CA status post Chemo.      Other:              Physical Exam  Airway  Mallampati: I  TM distance: >3 FB  Neck ROM: full  Mouth opening: >= 4 cm    Cardiovascular   Rhythm: regular  Rate: tachycardia     Dental   (+) Minor Abnormalities - some fillings, tiny chips      Pulmonary - normal examBreath sounds clear to auscultation        Neurological   Other Findings       OUTSIDE LABS:  CBC:   Lab Results   Component Value Date    WBC 5.1 04/22/2025    WBC 6.3 10/11/2024    HGB 13.4 04/22/2025    HGB 13.9 10/11/2024    HCT 39.5 04/22/2025    HCT 39.9 10/11/2024     04/22/2025     10/11/2024     BMP:   Lab Results   Component Value Date     04/22/2025     11/11/2024    POTASSIUM 4.3 04/22/2025    POTASSIUM 3.2 (L) 11/11/2024    CHLORIDE 106 04/22/2025    CHLORIDE 103 11/11/2024    CO2 27 04/22/2025    CO2 22 11/11/2024  "   BUN 13.7 04/22/2025    BUN 19.8 11/11/2024    CR 0.78 04/22/2025    CR 0.75 11/11/2024    GLC 78 04/22/2025    GLC 88 11/11/2024     COAGS: No results found for: \"PTT\", \"INR\", \"FIBR\"  POC:   Lab Results   Component Value Date    HCG Negative 09/22/2023     HEPATIC:   Lab Results   Component Value Date    ALBUMIN 4.4 04/22/2025    PROTTOTAL 6.9 04/22/2025    ALT 23 04/22/2025    AST 23 04/22/2025    ALKPHOS 42 04/22/2025    BILITOTAL 0.3 04/22/2025     OTHER:   Lab Results   Component Value Date    MINNA 9.5 04/22/2025    LIPASE 33 02/05/2024    TSH 0.60 02/05/2024       Anesthesia Plan    ASA Status:  2      NPO Status: NPO Appropriate   Anesthesia Type: General.   Induction: intravenous.        Consents    Anesthesia Plan(s) and associated risks, benefits, and realistic alternatives discussed. Questions answered and patient/representative(s) expressed understanding.     - Discussed:     - Discussed with:  Patient            Postoperative Care            Comments:                 Paul Palmer MD    I have reviewed the pertinent notes and labs in the chart from the past 30 days and (re)examined the patient.  Any updates or changes from those notes are reflected in this note.    Clinically Significant Risk Factors Present on Admission                                 # Financial/Environmental Concerns:                 "

## 2025-05-12 NOTE — DISCHARGE INSTRUCTIONS
FAT GRAFTING POST-OPERATIVE INSTRUCTIONS    Instructions      Have someone drive you home after surgery and help you at home for 1-2      days.     Get plenty of rest.     Follow balanced diet.     Decreased activity may promote constipation, so you may want to add      more raw fruit to your diet, and be sure to increase fluid intake.     Take pain medication as prescribed. Do not take aspirin or any products      containing aspirin unless approved by your surgeon.     Do not drink alcohol when taking pain medications.     Even when not taking pain medications, no alcohol for 3 weeks as it      causes fluid retention.     If you are taking vitamins with iron, resume these as tolerated.     Do not smoke, as smoking delays healing and increases the risk of      complications.    Activities     Do not drive until you are no longer taking any pain medications      (narcotics).     Start walking as soon as possible, this helps to reduce swelling and       lowers the chance of blood clots.     Unless stated on this form, discuss your time off work with your surgeon.    Treated Area Care      You may shower after 24 hours. The ACE wrap (if used) may be rewrapped as needed (if too tight or loose). Use it for support and may subtitute with a sports bra if preferred.  Wear the compression garment (spandex type clothing or the provided sponge and binder) in the area where the liposuction was performed to harvest the fat for the fat injection for 2 weeks post opor per the surgeon's recommendation.     Avoid exposing scars to sun for at least 12 months.     Always use a strong sunblock, if sun exposure is unavoidable (SPF 50 or      greater).     Inspect daily for signs of infection.     No tub soaking, bathing, or swimming while sutures or drains are in place.     You may wear makeup with sunblock protection shortly.     Stay out of the sun until redness and bruising subsides (usually 48      Hours).    What to Expect      Temporary stinging, throbbing, burning sensation, redness, swelling,       bruising, and excess fullness.     Some swelling, bruising or redness in the donor and recipient sites.     Swelling and puffiness may last several weeks.     Redness and bruising usually lasts about 48 hours.     Appearance     Improved skin texture.     Firmer and smoother skin.    Follow-Up Care     With regular follow-up treatments, you can easily maintain your new       look.     Repeated treatments may be necessary.    When to Call     If you have increased swelling or bruising.     If swelling and redness persist after a few days.     If you have increased redness along the incision.     If you have severe or increased pain not relieved by medication.     If you have any side effects to medications; such as, rash, nausea,      headache, vomiting.     If you have an oral temperature over 100.4 degrees.     If you have any yellowish or greenish drainage from the incisions or      notice a foul odor.     If you have bleeding from the incisions that is difficult to control with      light pressure.     If you have loss of feeling or motion.     If you have any sign of abscesses, open sores, skin peeling or lumpiness.    For Medical Questions, Please Call:     [phone], Monday - Friday, 8 a.m. - 4:30 p.m.     After hours and on weekends, call Hospital Paging at [phone] and      ask for the Plastic Surgeon on call.      [facility]  Home Care Following Anesthesia  For 24 hours after surgery:  Get plenty of rest.  A responsible adult must stay with you for at least 24 hours after you leave the surgery center.  Do not drive or use heavy equipment.  If you have weakness or tingling, don't drive or use heavy equipment until this feeling goes away.   Do not drink alcohol.   Avoid strenuous or risky activities.  Ask for help when climbing stairs.  You may feel lightheaded.  IF so, sit for a few minutes  before standing.  Have someone help you get up.   If you have nausea (feel sick to your stomach): Drink only clear liquids such as apple juice, ginger ale, broth or 7-Up.  Rest may also help.  Be sure to drink enough fluids.  Move to a regular diet as you feel able.   You may have a slight fever.  Call the doctor if your fever is over 100 F (37.7 C) (taken under the tongue) or lasts longer than 24 hours.  You may have a dry mouth, a sore throat, muscle aches or trouble sleeping. These should go away after 24 hours.  Do not make important or legal decisions.   It is recommended to avoid smoking.               Tips for taking pain medications  To get the best pain relief possible, remember these points:  Take pain medications as directed, before pain becomes severe.  Pain medication can upset your stomach: taking it with food may help.  Constipation is a common side effect of pain medication. Drink plenty of  fluids.  Eat foods high in fiber. Take a stool softener if recommended by your doctor or pharmacist.  Do not drink alcohol, drive or operate machinery while taking pain medications.  Ask about other ways to control pain, such as with heat, ice or relaxation.    Tylenol/Acetaminophen Consumption    If you feel your pain relief is insufficient, you may take Tylenol/Acetaminophen in addition to your narcotic pain medication.   Be careful not to exceed 4,000 mg of Tylenol/Acetaminophen in a 24 hour period from all sources.  If you are taking extra strength Tylenol/acetaminophen (500 mg), the maximum dose is 8 tablets in 24 hours.  If you are taking regular strength acetaminophen (325 mg), the maximum dose is 12 tablets in 24 hours.    Call a doctor for any of the following:  Signs of infection (fever, growing tenderness at the surgery site, a large amount of drainage or bleeding, severe pain, foul-smelling drainage, redness, swelling).  It has been over 8 to 10 hours since surgery and you are still not able to  urinate (pass water).  Headache for over 24 hours.  Numbness, tingling or weakness the day after surgery (if you had spinal anesthesia).  Signs of Covid-19 infection (temperature over 100 degrees, shortness of breath, cough, loss of taste/smell, generalized body aches, persistent headache, chills, sore throat, nausea/vomiting/diarrhea)    Your doctor is:  Dr. Melida Ferreira, Plastic Surgery: [phone]                    After hours and weekends call the hospital @ [phone] and ask for the resident on call for:  Plastics  For emergency care, call the:  [place] [facility]:  [phone] (TTY for hearing impaired: [phone])

## 2025-05-12 NOTE — ANESTHESIA CARE TRANSFER NOTE
Patient: Azalea Mckeon    Procedure: Procedure(s):  Bilateral breast fat grafting from abdomen and thighs       Diagnosis: S/P breast reconstruction, bilateral [Z98.890]  Diagnosis Additional Information: No value filed.    Anesthesia Type:   General     Note:    Oropharynx: spontaneously breathing  Level of Consciousness: drowsy  Oxygen Supplementation: face mask  Level of Supplemental Oxygen (L/min / FiO2): 6  Independent Airway: airway patency satisfactory and stable  Dentition: dentition unchanged  Vital Signs Stable: post-procedure vital signs reviewed and stable  Report to RN Given: handoff report given  Patient transferred to: PACU  Comments: Temp 96.5 F facial  Handoff Report: Identifed the Patient, Identified the Reponsible Provider, Reviewed the pertinent medical history, Discussed the surgical course, Reviewed Intra-OP anesthesia mangement and issues during anesthesia, Set expectations for post-procedure period and Allowed opportunity for questions and acknowledgement of understanding      Vitals:  Vitals Value Taken Time   /75 05/12/25 11:55   Temp 34.4  C (93.92  F) 05/12/25 11:55   Pulse 86 05/12/25 11:55   Resp 19 05/12/25 11:55   SpO2 100 % 05/12/25 11:55   Vitals shown include unfiled device data.    Electronically Signed By: MOLINA Ramos CRNA  May 12, 2025  11:57 AM

## 2025-05-12 NOTE — ANESTHESIA POSTPROCEDURE EVALUATION
Patient: Azalea Mckeon    Procedure: Procedure(s):  Bilateral breast fat grafting from abdomen and thighs       Anesthesia Type:  General    Note:  Disposition: Outpatient   Postop Pain Control: Uneventful            Sign Out: Well controlled pain   PONV: No   Neuro/Psych: Uneventful            Sign Out: Acceptable/Baseline neuro status   Airway/Respiratory: Uneventful            Sign Out: Acceptable/Baseline resp. status   CV/Hemodynamics: Uneventful            Sign Out: Acceptable CV status; No obvious hypovolemia; No obvious fluid overload   Other NRE: NONE   DID A NON-ROUTINE EVENT OCCUR? No           Last vitals:  Vitals Value Taken Time   /69 05/12/25 12:15   Temp 36  C (96.8  F) 05/12/25 12:15   Pulse 59 05/12/25 12:16   Resp 9 05/12/25 12:16   SpO2 100 % 05/12/25 12:20   Vitals shown include unfiled device data.    Electronically Signed By: Alban Mcdonnell MD  May 12, 2025  3:27 PM

## 2025-05-22 NOTE — PROGRESS NOTES
Oncology Follow Up Visit: May 28, 2025    Oncologist: Dr King Dyson  PCP: Jem Arredondo    Diagnosis: Stage 1A (pT1c pN0 cM0), hormone-positive, HER2-negative, right-sided breast cancer:  # Feb 2023 Presented w/ abnormal mammogram.  # Apr 2023 Status post bilateral mastectomy w/ sentinel node and reconstruction; path w/ multifocal disease, largest 16 mm, grade 3 disease, neg margins for IDC, neg node (1/1)   # Apr 2023 Oncotype score 22; adjuvant chemotherapy indicated.  # Jun 2023 - Aug 2023 Adjuvant Taxotere / Cytoxan x 4 cycles.  # Jul 2023 Negative hereditary breast cancer panel (40 genes).  # Nov 2023 Adjuvant right chest wall radiation 5000 cGy over 25 fractions.  # Nov 2023 Adjuvant Tamoxifen; discontinued due to side effects and entered menopause.  # July 2024 Adjuvant Letrozole     Interval History: Ms Mckeon comes to clinic for review of her right breast cancer and use of Letrozole. Pt reports she is tolerating the letrozole well with some hot flashes but are tolerable.   Some aches but not affecting function   Breast shoots of pain occasionally - using Neurontin as needed couple times weekly.   No new masses or tenderness noted but lymphedema continues- has new vest for this and just had further reconstruction in last 2 weeks and is mildly tender to the left breast but basically happy with the reconstruction for now.   Very active with teenage girls and is eating healthy with weight stable.   Rest of comprehensive and complete ROS is reviewed and is negative.   Past Medical History:   Diagnosis Date    Breast cancer (H) 03/17/2023    Right Breast    Cancer (H)     right breast, s/p bilateral mastectomies    Personal history of chemotherapy     Taxotere + Cytoxan (6/26/2023 - 8/28/2023) - Dr. Dyson    S/P radiation therapy     5,000 cGy to right chestwall completed on 11/8/2023 - [facility]    Shivering     after anesthesia     Current Outpatient Medications   Medication  "Sig Dispense Refill    acetaminophen (TYLENOL) 325 MG tablet Take 2 tablets (650 mg) by mouth every 4 hours as needed for other (For optimal non-opioid multimodal pain management to improve pain control.) 30 tablet 0    escitalopram (LEXAPRO) 10 MG tablet Take 1 tablet (10 mg) by mouth daily. 90 tablet 1    gabapentin (NEURONTIN) 100 MG capsule Take 2 capsules (200 mg) by mouth at bedtime. 60 capsule 3    letrozole (FEMARA) 2.5 MG tablet Take 1 tablet (2.5 mg) by mouth daily. 90 tablet 3    omeprazole (PRILOSEC) 20 MG DR capsule Take 1 capsule (20 mg) by mouth daily 30 capsule 0    ondansetron (ZOFRAN) 4 MG tablet Take 1 tablet (4 mg) by mouth every 8 hours as needed for nausea. (Patient not taking: Reported on 5/27/2025) 12 tablet 0    SENNA-docusate sodium (SENNA S) 8.6-50 MG tablet Take 1 tablet by mouth at bedtime. (Patient not taking: Reported on 5/27/2025) 12 tablet 0     No current facility-administered medications for this visit.     Allergies   Allergen Reactions    Oxycodone-Acetaminophen Hives    Percocet [Oxycodone-Acetaminophen] Itching    Latex Rash       Physical Exam:/85   Pulse 58   Resp 16   Ht 1.626 m (5' 4.02\")   Wt 67.1 kg (148 lb)   SpO2 98%   BMI 25.39 kg/m   - weight stable.   ECOG PS- 0  Constitutional: Alert, cooperative, and in no distress.   ENT: Eyes bright , No mouth sores  Neck: Supple, No adenopathy.  Cardiac: Heart rate and rhythm is regular and strong without murmur  Respiratory: Breathing easy. Lung sounds clear to auscultation  Breasts:bilaterally reconstructed with some bruising noted to the left breast and some lymphedema noted to lateral chest bilaterally. No new masses or new skin changes found to the breasts.   GI: Abdomen is soft, non-tender, BS normal. No masses or organomegaly  MS: Muscle tone normal, extremities normal with no edema.   Skin: No suspicious lesions or rashes  Neuro: Sensory grossly WNL, gait normal.   Lymph: Normal ant/post cervical, axillary, " supraclavicular nodes  Psych: Mentation appears normal and affect normal/bright - appears to be taking side effects of all well.     Laboratory/Imaging Results:   Results for orders placed or performed in visit on 05/27/25   Comprehensive metabolic panel     Status: Normal   Result Value Ref Range    Sodium 140 135 - 145 mmol/L    Potassium 3.7 3.4 - 5.3 mmol/L    Carbon Dioxide (CO2) 26 22 - 29 mmol/L    Anion Gap 12 7 - 15 mmol/L    Urea Nitrogen 10.9 6.0 - 20.0 mg/dL    Creatinine 0.78 0.51 - 0.95 mg/dL    GFR Estimate >90 >60 mL/min/1.73m2    Calcium 9.7 8.8 - 10.4 mg/dL    Chloride 102 98 - 107 mmol/L    Glucose 98 70 - 99 mg/dL    Alkaline Phosphatase 47 40 - 150 U/L    AST 25 0 - 45 U/L    ALT 25 0 - 50 U/L    Protein Total 6.9 6.4 - 8.3 g/dL    Albumin 4.4 3.5 - 5.2 g/dL    Bilirubin Total 0.4 <=1.2 mg/dL   CBC with platelets and differential     Status: None   Result Value Ref Range    WBC Count 6.7 4.0 - 11.0 10e3/uL    RBC Count 4.09 3.80 - 5.20 10e6/uL    Hemoglobin 12.7 11.7 - 15.7 g/dL    Hematocrit 37.4 35.0 - 47.0 %    MCV 91 78 - 100 fL    MCH 31.1 26.5 - 33.0 pg    MCHC 34.0 31.5 - 36.5 g/dL    RDW 13.2 10.0 - 15.0 %    Platelet Count 227 150 - 450 10e3/uL    % Neutrophils 61 %    % Lymphocytes 29 %    % Monocytes 5 %    % Eosinophils 4 %    % Basophils 1 %    % Immature Granulocytes 0 %    NRBCs per 100 WBC 0 <1 /100    Absolute Neutrophils 4.1 1.6 - 8.3 10e3/uL    Absolute Lymphocytes 2.0 0.8 - 5.3 10e3/uL    Absolute Monocytes 0.3 0.0 - 1.3 10e3/uL    Absolute Eosinophils 0.3 0.0 - 0.7 10e3/uL    Absolute Basophils 0.1 0.0 - 0.2 10e3/uL    Absolute Immature Granulocytes 0.0 <=0.4 10e3/uL    Absolute NRBCs 0.0 10e3/uL   Extra Tube     Status: None (In process)    Narrative    The following orders were created for panel order Extra Tube.  Procedure                               Abnormality         Status                     ---------                               -----------         ------                      Extra Serum Separator T...[[id number]]                      In process                   Please view results for these tests on the individual orders.   CBC with platelets differential     Status: None    Narrative    The following orders were created for panel order CBC with platelets differential.  Procedure                               Abnormality         Status                     ---------                               -----------         ------                     CBC with platelets and ...[[id number]]                      Final result                 Please view results for these tests on the individual orders.     Assessment and Plan:   Stage 1A (pT1c pN0 cM0), hormone-positive, HER2-negative, right-sided breast cancer-Pt completed 4 cycles of adjuvated TC then bilateral mastectomies with SLN biopsy and  back  flap reconstruction.   She then completed radiation therapy and started Tamoxifen by 11/2023 but had side effects with the drug and moved to Letrozole with good/better tolerance. She is now out 1.5 years with use of Hormone therapy and has not new signs of return of disease with review, labs or exam.   She will continue with the Letrozole daily and is aware we are expecting 5 years of use.   Lymphedema - working with lymphedema specialists as needed and now has vest for help.   Recently completed last reconstruction phase and is hoping for now further surgeries.   Return every 6 months for review.   Discussed since had chemotherapy in the past we would expect yearly CBC. Today's labs review is showing no abnormalities.      Bone health- 11/2024 DEXA scan showing normal bone density- repeat DEXA in 2 years( 11/2026) Confirms using calcium +vitamin D supplement daily and leads active lifestyle.     Genetics - Has 3 daughters. Negative screening  in 7/2023.     The total time of this encounter amounted to  40 minutes. This time included  face to face time spent with the patient, prep work,  ordering tests, and performing post visit documentation.  Shabnam Sharif,Cnp

## 2025-05-27 ENCOUNTER — ONCOLOGY VISIT (OUTPATIENT)
Dept: ONCOLOGY | Facility: CLINIC | Age: 45
End: 2025-05-27
Payer: COMMERCIAL

## 2025-05-27 ENCOUNTER — LAB (OUTPATIENT)
Dept: INFUSION THERAPY | Facility: CLINIC | Age: 45
End: 2025-05-27
Attending: NURSE PRACTITIONER
Payer: COMMERCIAL

## 2025-05-27 VITALS
SYSTOLIC BLOOD PRESSURE: 124 MMHG | HEART RATE: 58 BPM | DIASTOLIC BLOOD PRESSURE: 85 MMHG | RESPIRATION RATE: 16 BRPM | HEIGHT: 64 IN | OXYGEN SATURATION: 98 % | WEIGHT: 148 LBS | BODY MASS INDEX: 25.27 KG/M2

## 2025-05-27 DIAGNOSIS — Z17.0 MALIGNANT NEOPLASM OF OVERLAPPING SITES OF RIGHT BREAST IN FEMALE, ESTROGEN RECEPTOR POSITIVE (H): ICD-10-CM

## 2025-05-27 DIAGNOSIS — T45.1X5A HOT FLASHES RELATED TO AROMATASE INHIBITOR THERAPY: ICD-10-CM

## 2025-05-27 DIAGNOSIS — C50.811 MALIGNANT NEOPLASM OF OVERLAPPING SITES OF RIGHT BREAST IN FEMALE, ESTROGEN RECEPTOR POSITIVE (H): ICD-10-CM

## 2025-05-27 DIAGNOSIS — R74.01 ELEVATED ALT MEASUREMENT: ICD-10-CM

## 2025-05-27 DIAGNOSIS — R23.2 HOT FLASHES RELATED TO AROMATASE INHIBITOR THERAPY: ICD-10-CM

## 2025-05-27 LAB
ALBUMIN SERPL BCG-MCNC: 4.4 G/DL (ref 3.5–5.2)
ALP SERPL-CCNC: 47 U/L (ref 40–150)
ALT SERPL W P-5'-P-CCNC: 25 U/L (ref 0–50)
ANION GAP SERPL CALCULATED.3IONS-SCNC: 12 MMOL/L (ref 7–15)
AST SERPL W P-5'-P-CCNC: 25 U/L (ref 0–45)
BASOPHILS # BLD AUTO: 0.1 10E3/UL (ref 0–0.2)
BASOPHILS NFR BLD AUTO: 1 %
BILIRUB SERPL-MCNC: 0.4 MG/DL
BUN SERPL-MCNC: 10.9 MG/DL (ref 6–20)
CALCIUM SERPL-MCNC: 9.7 MG/DL (ref 8.8–10.4)
CHLORIDE SERPL-SCNC: 102 MMOL/L (ref 98–107)
CREAT SERPL-MCNC: 0.78 MG/DL (ref 0.51–0.95)
EGFRCR SERPLBLD CKD-EPI 2021: >90 ML/MIN/1.73M2
EOSINOPHIL # BLD AUTO: 0.3 10E3/UL (ref 0–0.7)
EOSINOPHIL NFR BLD AUTO: 4 %
ERYTHROCYTE [DISTWIDTH] IN BLOOD BY AUTOMATED COUNT: 13.2 % (ref 10–15)
GLUCOSE SERPL-MCNC: 98 MG/DL (ref 70–99)
HCO3 SERPL-SCNC: 26 MMOL/L (ref 22–29)
HCT VFR BLD AUTO: 37.4 % (ref 35–47)
HGB BLD-MCNC: 12.7 G/DL (ref 11.7–15.7)
HOLD SPECIMEN: NORMAL
IMM GRANULOCYTES # BLD: 0 10E3/UL
IMM GRANULOCYTES NFR BLD: 0 %
LYMPHOCYTES # BLD AUTO: 2 10E3/UL (ref 0.8–5.3)
LYMPHOCYTES NFR BLD AUTO: 29 %
MCH RBC QN AUTO: 31.1 PG (ref 26.5–33)
MCHC RBC AUTO-ENTMCNC: 34 G/DL (ref 31.5–36.5)
MCV RBC AUTO: 91 FL (ref 78–100)
MONOCYTES # BLD AUTO: 0.3 10E3/UL (ref 0–1.3)
MONOCYTES NFR BLD AUTO: 5 %
NEUTROPHILS # BLD AUTO: 4.1 10E3/UL (ref 1.6–8.3)
NEUTROPHILS NFR BLD AUTO: 61 %
NRBC # BLD AUTO: 0 10E3/UL
NRBC BLD AUTO-RTO: 0 /100
PLATELET # BLD AUTO: 227 10E3/UL (ref 150–450)
POTASSIUM SERPL-SCNC: 3.7 MMOL/L (ref 3.4–5.3)
PROT SERPL-MCNC: 6.9 G/DL (ref 6.4–8.3)
RBC # BLD AUTO: 4.09 10E6/UL (ref 3.8–5.2)
SODIUM SERPL-SCNC: 140 MMOL/L (ref 135–145)
WBC # BLD AUTO: 6.7 10E3/UL (ref 4–11)

## 2025-05-27 PROCEDURE — 99215 OFFICE O/P EST HI 40 MIN: CPT | Performed by: NURSE PRACTITIONER

## 2025-05-27 PROCEDURE — 80053 COMPREHEN METABOLIC PANEL: CPT

## 2025-05-27 PROCEDURE — 36415 COLL VENOUS BLD VENIPUNCTURE: CPT

## 2025-05-27 PROCEDURE — 85048 AUTOMATED LEUKOCYTE COUNT: CPT

## 2025-05-27 PROCEDURE — G0463 HOSPITAL OUTPT CLINIC VISIT: HCPCS | Performed by: NURSE PRACTITIONER

## 2025-05-27 RX ORDER — LETROZOLE 2.5 MG/1
2.5 TABLET, FILM COATED ORAL DAILY
Qty: 90 TABLET | Refills: 3 | Status: SHIPPED | OUTPATIENT
Start: 2025-05-27

## 2025-05-27 RX ORDER — GABAPENTIN 100 MG/1
200 CAPSULE ORAL AT BEDTIME
Qty: 60 CAPSULE | Refills: 3 | Status: SHIPPED | OUTPATIENT
Start: 2025-05-27

## 2025-05-27 ASSESSMENT — PAIN SCALES - GENERAL: PAINLEVEL_OUTOF10: MILD PAIN (3)

## 2025-05-27 NOTE — NURSING NOTE
"Oncology Rooming Note    May 27, 2025 8:42 AM   Azalea Mckeon is a 45 year old female who presents for:    Chief Complaint   Patient presents with    Oncology Clinic Visit     6 month follow up     Initial Vitals: /85   Pulse 58   Resp 16   Ht 1.626 m (5' 4.02\")   Wt 67.1 kg (148 lb)   SpO2 98%   BMI 25.39 kg/m   Estimated body mass index is 25.39 kg/m  as calculated from the following:    Height as of this encounter: 1.626 m (5' 4.02\").    Weight as of this encounter: 67.1 kg (148 lb). Body surface area is 1.74 meters squared.  Mild Pain (3) Comment: Data Unavailable   No LMP recorded. (Menstrual status: Chemotherapy).  Allergies reviewed: Yes  Medications reviewed: Yes    Medications: Medication refills not needed today.  Pharmacy name entered into Moz:    [id number] [place], [place] - [address] [place] [address] - [address] [place] [address] [phone]    Frailty Screening:   Is the patient here for a new oncology consult visit in cancer care? 2. No    PHQ9:  Did this patient require a PHQ9?: No      Clinical concerns: No new concerns        Nancy Mccurdy LPN              "

## 2025-05-27 NOTE — LETTER
5/27/2025      Azalea Mckeon  [address] [facility] [id number]      Dear Colleague,    Thank you for referring your patient, Azalea Mckeon, to the [facility]. Please see a copy of my visit note below.    Oncology Follow Up Visit: May 28, 2025    Oncologist: Dr King Dyson  PCP: Jem Arredondo    Diagnosis: Stage 1A (pT1c pN0 cM0), hormone-positive, HER2-negative, right-sided breast cancer:  # Feb 2023 Presented w/ abnormal mammogram.  # Apr 2023 Status post bilateral mastectomy w/ sentinel node and reconstruction; path w/ multifocal disease, largest 16 mm, grade 3 disease, neg margins for IDC, neg node (1/1)   # Apr 2023 Oncotype score 22; adjuvant chemotherapy indicated.  # Jun 2023 - Aug 2023 Adjuvant Taxotere / Cytoxan x 4 cycles.  # Jul 2023 Negative hereditary breast cancer panel (40 genes).  # Nov 2023 Adjuvant right chest wall radiation 5000 cGy over 25 fractions.  # Nov 2023 Adjuvant Tamoxifen; discontinued due to side effects and entered menopause.  # July 2024 Adjuvant Letrozole     Interval History: Ms Mckeon comes to clinic for review of her right breast cancer and use of Letrozole. Pt reports she is tolerating the letrozole well with some hot flashes but are tolerable.   Some aches but not affecting function   Breast shoots of pain occasionally - using Neurontin as needed couple times weekly.   No new masses or tenderness noted but lymphedema continues- has new vest for this and just had further reconstruction in last 2 weeks and is mildly tender to the left breast but basically happy with the reconstruction for now.   Very active with teenage girls and is eating healthy with weight stable.   Rest of comprehensive and complete ROS is reviewed and is negative.   Past Medical History:   Diagnosis Date     Breast cancer (H) 03/17/2023    Right Breast     Cancer (H)     right breast, s/p bilateral mastectomies     Personal history of chemotherapy      "Taxotere + Cytoxan (6/26/2023 - 8/28/2023) - Dr. Dyson     S/P radiation therapy     5,000 cGy to right chestwall completed on 11/8/2023 - [facility]     Shivering     after anesthesia     Current Outpatient Medications   Medication Sig Dispense Refill     acetaminophen (TYLENOL) 325 MG tablet Take 2 tablets (650 mg) by mouth every 4 hours as needed for other (For optimal non-opioid multimodal pain management to improve pain control.) 30 tablet 0     escitalopram (LEXAPRO) 10 MG tablet Take 1 tablet (10 mg) by mouth daily. 90 tablet 1     gabapentin (NEURONTIN) 100 MG capsule Take 2 capsules (200 mg) by mouth at bedtime. 60 capsule 3     letrozole (FEMARA) 2.5 MG tablet Take 1 tablet (2.5 mg) by mouth daily. 90 tablet 3     omeprazole (PRILOSEC) 20 MG DR capsule Take 1 capsule (20 mg) by mouth daily 30 capsule 0     ondansetron (ZOFRAN) 4 MG tablet Take 1 tablet (4 mg) by mouth every 8 hours as needed for nausea. (Patient not taking: Reported on 5/27/2025) 12 tablet 0     SENNA-docusate sodium (SENNA S) 8.6-50 MG tablet Take 1 tablet by mouth at bedtime. (Patient not taking: Reported on 5/27/2025) 12 tablet 0     No current facility-administered medications for this visit.     Allergies   Allergen Reactions     Oxycodone-Acetaminophen Hives     Percocet [Oxycodone-Acetaminophen] Itching     Latex Rash       Physical Exam:/85   Pulse 58   Resp 16   Ht 1.626 m (5' 4.02\")   Wt 67.1 kg (148 lb)   SpO2 98%   BMI 25.39 kg/m   - weight stable.   ECOG PS- 0  Constitutional: Alert, cooperative, and in no distress.   ENT: Eyes bright , No mouth sores  Neck: Supple, No adenopathy.  Cardiac: Heart rate and rhythm is regular and strong without murmur  Respiratory: Breathing easy. Lung sounds clear to auscultation  Breasts:bilaterally reconstructed with some bruising noted to the left breast and some lymphedema noted to lateral chest bilaterally. No new masses or new skin changes found to the " breasts.   GI: Abdomen is soft, non-tender, BS normal. No masses or organomegaly  MS: Muscle tone normal, extremities normal with no edema.   Skin: No suspicious lesions or rashes  Neuro: Sensory grossly WNL, gait normal.   Lymph: Normal ant/post cervical, axillary, supraclavicular nodes  Psych: Mentation appears normal and affect normal/bright - appears to be taking side effects of all well.     Laboratory/Imaging Results:   Results for orders placed or performed in visit on 05/27/25   Comprehensive metabolic panel     Status: Normal   Result Value Ref Range    Sodium 140 135 - 145 mmol/L    Potassium 3.7 3.4 - 5.3 mmol/L    Carbon Dioxide (CO2) 26 22 - 29 mmol/L    Anion Gap 12 7 - 15 mmol/L    Urea Nitrogen 10.9 6.0 - 20.0 mg/dL    Creatinine 0.78 0.51 - 0.95 mg/dL    GFR Estimate >90 >60 mL/min/1.73m2    Calcium 9.7 8.8 - 10.4 mg/dL    Chloride 102 98 - 107 mmol/L    Glucose 98 70 - 99 mg/dL    Alkaline Phosphatase 47 40 - 150 U/L    AST 25 0 - 45 U/L    ALT 25 0 - 50 U/L    Protein Total 6.9 6.4 - 8.3 g/dL    Albumin 4.4 3.5 - 5.2 g/dL    Bilirubin Total 0.4 <=1.2 mg/dL   CBC with platelets and differential     Status: None   Result Value Ref Range    WBC Count 6.7 4.0 - 11.0 10e3/uL    RBC Count 4.09 3.80 - 5.20 10e6/uL    Hemoglobin 12.7 11.7 - 15.7 g/dL    Hematocrit 37.4 35.0 - 47.0 %    MCV 91 78 - 100 fL    MCH 31.1 26.5 - 33.0 pg    MCHC 34.0 31.5 - 36.5 g/dL    RDW 13.2 10.0 - 15.0 %    Platelet Count 227 150 - 450 10e3/uL    % Neutrophils 61 %    % Lymphocytes 29 %    % Monocytes 5 %    % Eosinophils 4 %    % Basophils 1 %    % Immature Granulocytes 0 %    NRBCs per 100 WBC 0 <1 /100    Absolute Neutrophils 4.1 1.6 - 8.3 10e3/uL    Absolute Lymphocytes 2.0 0.8 - 5.3 10e3/uL    Absolute Monocytes 0.3 0.0 - 1.3 10e3/uL    Absolute Eosinophils 0.3 0.0 - 0.7 10e3/uL    Absolute Basophils 0.1 0.0 - 0.2 10e3/uL    Absolute Immature Granulocytes 0.0 <=0.4 10e3/uL    Absolute NRBCs 0.0 10e3/uL   Extra Tube      Status: None (In process)    Narrative    The following orders were created for panel order Extra Tube.  Procedure                               Abnormality         Status                     ---------                               -----------         ------                     Extra Serum Separator T...[[id number]]                      In process                   Please view results for these tests on the individual orders.   CBC with platelets differential     Status: None    Narrative    The following orders were created for panel order CBC with platelets differential.  Procedure                               Abnormality         Status                     ---------                               -----------         ------                     CBC with platelets and ...[[id number]]                      Final result                 Please view results for these tests on the individual orders.     Assessment and Plan:   Stage 1A (pT1c pN0 cM0), hormone-positive, HER2-negative, right-sided breast cancer-Pt completed 4 cycles of adjuvated TC then bilateral mastectomies with SLN biopsy and  back  flap reconstruction.   She then completed radiation therapy and started Tamoxifen by 11/2023 but had side effects with the drug and moved to Letrozole with good/better tolerance. She is now out 1.5 years with use of Hormone therapy and has not new signs of return of disease with review, labs or exam.   She will continue with the Letrozole daily and is aware we are expecting 5 years of use.   Lymphedema - working with lymphedema specialists as needed and now has vest for help.   Recently completed last reconstruction phase and is hoping for now further surgeries.   Return every 6 months for review.   Discussed since had chemotherapy in the past we would expect yearly CBC. Today's labs review is showing no abnormalities.      Bone health- 11/2024 DEXA scan showing normal bone density- repeat DEXA in 2 years( 11/2026) Confirms  using calcium +vitamin D supplement daily and leads active lifestyle.     Genetics - Has 3 daughters. Negative screening  in 7/2023.     The total time of this encounter amounted to  40 minutes. This time included  face to face time spent with the patient, prep work, ordering tests, and performing post visit documentation.  Shabnam Sharif Cnp      Again, thank you for allowing me to participate in the care of your patient.        Sincerely,        Shabnam Sharif NP, APRN CNP    Electronically signed

## 2025-06-23 ENCOUNTER — OFFICE VISIT (OUTPATIENT)
Dept: FAMILY MEDICINE | Facility: CLINIC | Age: 45
End: 2025-06-23
Payer: COMMERCIAL

## 2025-06-23 VITALS
OXYGEN SATURATION: 100 % | WEIGHT: 139 LBS | DIASTOLIC BLOOD PRESSURE: 79 MMHG | TEMPERATURE: 97.8 F | HEIGHT: 65 IN | RESPIRATION RATE: 16 BRPM | SYSTOLIC BLOOD PRESSURE: 115 MMHG | HEART RATE: 65 BPM | BODY MASS INDEX: 23.16 KG/M2

## 2025-06-23 DIAGNOSIS — L28.2 PRURITIC RASH: ICD-10-CM

## 2025-06-23 DIAGNOSIS — Z87.898 HISTORY OF ELEVATED ANTINUCLEAR ANTIBODY (ANA): Primary | ICD-10-CM

## 2025-06-23 PROCEDURE — 86039 ANTINUCLEAR ANTIBODIES (ANA): CPT | Performed by: PHYSICIAN ASSISTANT

## 2025-06-23 PROCEDURE — 36415 COLL VENOUS BLD VENIPUNCTURE: CPT | Performed by: PHYSICIAN ASSISTANT

## 2025-06-23 PROCEDURE — 86038 ANTINUCLEAR ANTIBODIES: CPT | Performed by: PHYSICIAN ASSISTANT

## 2025-06-23 PROCEDURE — 3078F DIAST BP <80 MM HG: CPT | Performed by: PHYSICIAN ASSISTANT

## 2025-06-23 PROCEDURE — 99213 OFFICE O/P EST LOW 20 MIN: CPT | Performed by: PHYSICIAN ASSISTANT

## 2025-06-23 PROCEDURE — 3074F SYST BP LT 130 MM HG: CPT | Performed by: PHYSICIAN ASSISTANT

## 2025-06-23 PROCEDURE — 1126F AMNT PAIN NOTED NONE PRSNT: CPT | Performed by: PHYSICIAN ASSISTANT

## 2025-06-23 RX ORDER — PREDNISONE 2.5 MG/1
2.5 TABLET ORAL DAILY
COMMUNITY
Start: 2025-06-18

## 2025-06-23 RX ORDER — HYDROXYZINE HYDROCHLORIDE 25 MG/1
25 TABLET, FILM COATED ORAL 3 TIMES DAILY PRN
Qty: 30 TABLET | Refills: 1 | Status: SHIPPED | OUTPATIENT
Start: 2025-06-23

## 2025-06-23 ASSESSMENT — PAIN SCALES - GENERAL: PAINLEVEL_OUTOF10: NO PAIN (0)

## 2025-06-23 NOTE — PROGRESS NOTES
Assessment & Plan     (Z87.898) History of elevated antinuclear antibody (RELL)  (primary encounter diagnosis)  Comment: History of elevated RELL.  Discussed with patient recheck RELL antibody possibility following up with rheumatology.  Plan: Anti Nuclear Harika IgG by IFA with Reflex, Adult         Rheumatology  Referral          (L28.2) Pruritic rash  Comment: Pruritic rash.  Patient has had 2 separate flares in the last 90 days.  Discussed with patient possibility incomplete treatment with first round of prednisone and recurrent flare.  Discussed continue with prednisone.  No evidence of intraoral lesions.  Discussed with patient following back up with dermatology given recurrence of symptoms.  Hydroxyzine as needed for itching.  Patient was comfortable with this plan.  Exact etiology of rash not clear at this time.  Plan: Adult Dermatology  Referral,         hydrOXYzine HCl (ATARAX) 25 MG tablet          Val Tristan is a 45 year old, presenting for the following health issues:  LUPUS FLARES UP      6/23/2025    10:44 AM   Additional Questions   Roomed by KEVIN CRAMER   Accompanied by SELF     History of Present Illness       Reason for visit:  Lupus   She is taking medications regularly.        Patient with intermittent rash issues.  Previously thought possible lupus rash.  Patient had more bullous rash history followed with dermatology 1/11/2024.  Had borderline positive RELL and negative pemphigoid antibody panel at that time.  Suspicion was more for edema blisters at that time.  Over the last few weeks patient has had multiple rash flares.  Initially was seen in urgent care provided a 7-day course of steroids.  After completion had recurrence with more itchy bumps scattered over upper as well as lower extremities and anterior torso.  Did have some blistering lesions during first flare but none with the second.  Oral lesions have since resolved.  Denies any throat swelling or shortness of  "breath.  No recent changes in any soaps, detergents, medications.  No new pets or home remodel projects.           Review of Systems  Constitutional, HEENT, cardiovascular, pulmonary, gi and gu systems are negative, except as otherwise noted.      Objective    /79   Pulse 65   Temp 97.8  F (36.6  C) (Tympanic)   Resp 16   Ht 1.651 m (5' 5\")   Wt 63 kg (139 lb)   SpO2 100%   BMI 23.13 kg/m    Body mass index is 23.13 kg/m .  Physical Exam   GENERAL: alert and no distress  RESP: lungs clear to auscultation - no rales, rhonchi or wheezes  ABDOMEN: soft, nontender, no hepatosplenomegaly, no masses and bowel sounds normal  MS: no gross musculoskeletal defects noted, no edema  SKIN: Scattered papule lesions over upper extremities as well as anterior chest.  No lesions of the webspace or palms.  Healing blistering lesions left fourth finger.  No surrounding erythema            Signed Electronically by: Jem Arredondo PA-C    "

## 2025-06-24 ENCOUNTER — RESULTS FOLLOW-UP (OUTPATIENT)
Dept: FAMILY MEDICINE | Facility: CLINIC | Age: 45
End: 2025-06-24
Payer: COMMERCIAL

## 2025-06-24 LAB
ANA PAT SER IF-IMP: ABNORMAL
ANA SER QL IF: ABNORMAL
ANA TITR SER IF: ABNORMAL {TITER}

## 2025-07-20 DIAGNOSIS — F41.9 ANXIETY: ICD-10-CM

## 2025-07-20 DIAGNOSIS — F32.A DEPRESSION, UNSPECIFIED DEPRESSION TYPE: ICD-10-CM

## 2025-07-21 RX ORDER — ESCITALOPRAM OXALATE 10 MG/1
10 TABLET ORAL DAILY
Qty: 90 TABLET | Refills: 1 | Status: SHIPPED | OUTPATIENT
Start: 2025-07-21

## 2025-08-07 ENCOUNTER — OFFICE VISIT (OUTPATIENT)
Dept: DERMATOLOGY | Facility: CLINIC | Age: 45
End: 2025-08-07
Payer: COMMERCIAL

## 2025-08-07 DIAGNOSIS — L28.2 PRURITIC RASH: ICD-10-CM

## 2025-08-07 ASSESSMENT — PAIN SCALES - GENERAL: PAINLEVEL_OUTOF10: NO PAIN (0)

## (undated) DEVICE — LABEL MEDICATION SYSTEM 3303-P

## (undated) DEVICE — DRAPE IOBAN INCISE 23X17" 6650EZ

## (undated) DEVICE — DRSG KERLIX 4 1/2"X4YDS ROLL 6730

## (undated) DEVICE — ESU ELEC BLADE HEX-LOCKING 2.5" E1450X

## (undated) DEVICE — TUBE SMOKE EVAC 7/8"X10 (STERILE)

## (undated) DEVICE — CLIP HORIZON SM RED WIDE SLOT 001201

## (undated) DEVICE — SPONGE LAP 18X18" X8435

## (undated) DEVICE — GLOVE BIOGEL PI MICRO SZ 7.0 48570

## (undated) DEVICE — SYR 10ML FINGER CONTROL W/O NDL 309695

## (undated) DEVICE — PREP CHLORAPREP 26ML TINTED HI-LITE ORANGE 930815

## (undated) DEVICE — CONNECTOR STOPCOCK 3 WAY MALE LL HI-FLO MX9311L

## (undated) DEVICE — GOWN IMPERVIOUS BREATHABLE SMART LG 89015

## (undated) DEVICE — SUCTION TUBING 10' N1010

## (undated) DEVICE — SU ETHILON 3-0 PS-1 18" 1663H

## (undated) DEVICE — PAD CHUX UNDERPAD 23X24" 7136

## (undated) DEVICE — SOL RINGERS LACTATED 1000ML BAG 2B2324X

## (undated) DEVICE — SU DERMABOND PRINEO 22CM CLR222US

## (undated) DEVICE — LINEN TOWEL PACK X30 5481

## (undated) DEVICE — STRAP UNIVERSAL POSITIONING 2-PIECE 4X47X76" 91-287

## (undated) DEVICE — GLOVE BIOGEL PI MICRO SZ 6.0 48560

## (undated) DEVICE — BNDG ELASTIC 6" DBL LENGTH UNSTERILE 6611-16

## (undated) DEVICE — SUCTION MANIFOLD NEPTUNE 2 SYS 4 PORT 0702-020-000

## (undated) DEVICE — ANGLED WIRELESS GAMMA PROBE COVER

## (undated) DEVICE — LINEN TOWEL PACK X5 5464

## (undated) DEVICE — ESU PENCIL SMOKE EVAC W/ROCKER SWITCH 0703-047-000

## (undated) DEVICE — SURGICEL ABSORBABLE HEMOSTAT SNOW 4"X4" 2083

## (undated) DEVICE — DRSG STERI STRIP 1/2X4" R1547

## (undated) DEVICE — LINEN TOWEL PACK X6 WHITE 5487

## (undated) DEVICE — KLEIN INFILTRATION SET SINGLE SPIKE ITS-10

## (undated) DEVICE — BLADE KNIFE SURG 10 371110

## (undated) DEVICE — ESU ELEC BLADE 2.75" COATED/INSULATED E1455

## (undated) DEVICE — Device

## (undated) DEVICE — SU MONOCRYL 4-0 PS-2 27" UND Y426H

## (undated) DEVICE — DRSG PRIMAPORE 02X3" 7133

## (undated) DEVICE — SOL NACL 0.9% IRRIG 1000ML BOTTLE 2F7124

## (undated) DEVICE — SU PLAIN FAST ABSORB 5-0 PC-1 18" 1915G

## (undated) DEVICE — PITCHER STERILE 1000ML  SSK9004A

## (undated) DEVICE — DRAIN JACKSON PRATT RESERVOIR 100ML SU130-1305

## (undated) DEVICE — SYR 50ML LL W/O NDL 309653

## (undated) DEVICE — SU MONOCRYL 2-0 SH 27" UND Y417H

## (undated) DEVICE — SU DERMABOND ADVANCED .7ML DNX12

## (undated) DEVICE — DRAPE POUCH INSTRUMENT 1018

## (undated) DEVICE — TUBING SUCTION MEDI-VAC 1/4"X20' N620A

## (undated) DEVICE — DRSG GAUZE 4X4" 3033

## (undated) DEVICE — GLOVE BIOGEL PI MICRO INDICATOR UNDERGLOVE SZ 7.5 48975

## (undated) DEVICE — DRAPE MAYO STAND 23X54 8337

## (undated) DEVICE — SYR 50ML CATH TIP W/O NDL 309620

## (undated) DEVICE — STPL SKIN 35W 054887

## (undated) DEVICE — PREP POVIDONE IODINE SOLUTION 10% 4OZ BOTTLE 29906-004

## (undated) DEVICE — DECANTER TRANSFER DEVICE 2008S

## (undated) DEVICE — ESU GROUND PAD ADULT W/CORD E7507

## (undated) DEVICE — SOL WATER IRRIG 1000ML BOTTLE 07139-09

## (undated) DEVICE — COLLECTION DEVICE SYSTEM TISSUE REVOLVE RV0001 2 PACK RV0002

## (undated) DEVICE — SOL WATER IRRIG 1000ML BOTTLE 2F7114

## (undated) DEVICE — SUCTION TIP YANKAUER W/O VENT K86

## (undated) DEVICE — DRAPE U SPLIT 74X120" 29440

## (undated) DEVICE — SU PDS II 0 CTX 36" Z370T

## (undated) DEVICE — DRAPE STERI INCISE 24X14" 1040

## (undated) DEVICE — BNDG ELASTIC 6"X5YDS UNSTERILE 6611-60

## (undated) DEVICE — SU PROLENE 0 CTX 30" 8454H

## (undated) DEVICE — PACK ASC BREAST SBA1BPUMA / SB15BPUM1

## (undated) DEVICE — DRSG KERLIX 4 1/2"X4YDS ROLL 6715

## (undated) DEVICE — SOL NACL 0.9% IRRIG 500ML BOTTLE 2F7123

## (undated) DEVICE — SYR 10ML LL W/O NDL

## (undated) DEVICE — TUBING SUCTION 10'X3/16" N510

## (undated) DEVICE — DRAPE SHEET REV FOLD 3/4 9349

## (undated) DEVICE — NDL 19GA 1.5"

## (undated) DEVICE — SPONGE LAP 18X18" 1515

## (undated) DEVICE — PACK MINOR SBA15MIFSE

## (undated) DEVICE — IMM SHOULDER  ABDUCT ULTRASLING III MED 11-0449-3

## (undated) DEVICE — CLIP HORIZON MED BLUE 002200

## (undated) DEVICE — ESU ELEC BLADE 6" COATED/INSULATED E1455-6

## (undated) DEVICE — SU STRATAFIX MONOCRYL 3-0 SPIRAL PS-2 45CM SXMP1B107

## (undated) DEVICE — SU DERMABOND PRINEO 42CM CLR422US

## (undated) DEVICE — STPL SKIN 35W ROTATING HEAD PRW35

## (undated) DEVICE — DRAPE BREAST/CHEST 29420

## (undated) DEVICE — MARKER SKIN DOUBLE TIP W/FLEXI-RULER W/LABELS

## (undated) DEVICE — CATH FOLEY 14FR 5ML SILICONE LUBRI-SIL 175814

## (undated) DEVICE — PREP CHLORAPREP 26ML TINTED ORANGE  260815

## (undated) DEVICE — ESU ELEC NDL 1" COATED/INSULATED E1465

## (undated) DEVICE — GLOVE BIOGEL PI MICRO INDICATOR UNDERGLOVE SZ 6.0 48960

## (undated) DEVICE — SU DERMABOND DHV12

## (undated) DEVICE — NDL 30GA 0.5" 305106

## (undated) DEVICE — COVER CAMERA IN-LIGHT DISP LT-C02

## (undated) DEVICE — WIPES FOLEY CARE SURESTEP PROVON DFC100

## (undated) DEVICE — SYR BULB IRRIG DOVER 60 ML LATEX FREE 67000

## (undated) DEVICE — SU ETHILON 2-0 FS 18" 664H

## (undated) DEVICE — SU SILK 3-0 SH 30" K832H

## (undated) DEVICE — DRSG ABDOMINAL 07 1/2X8" 7197D

## (undated) DEVICE — SUCTION SLEEVE NEPTUNE 2 125MM 0703-005-125

## (undated) DEVICE — TUBING INFUSION INFILTRATION LIPOSUCTION 156" 24-6008

## (undated) DEVICE — DRAPE SHEET HALF 40X60" 9358

## (undated) DEVICE — KIT PROCEDURE SPY-PHI SGL HH9001

## (undated) DEVICE — BLADE KNIFE SURG 11 371111

## (undated) DEVICE — DRAIN JACKSON PRATT CHANNEL 15FR ROUND HUBLESS SIL JP-2228

## (undated) DEVICE — NDL SPINAL 25GA 3.5" QUINCKE 405180

## (undated) DEVICE — CLOSURE SYS SKIN PREMIERPRO EXOFIN FUSION 4X22CM STRL 3472

## (undated) DEVICE — BNDG ABDOMINAL BINDER 9X30-45" 79-89070

## (undated) DEVICE — SU MONOCRYL 3-0 SH 27" UND Y416H

## (undated) DEVICE — NDL 18GA 1.5" 305196

## (undated) DEVICE — PREP POVIDONE-IODINE 10% SOLUTION 4OZ BOTTLE MDS093944

## (undated) RX ORDER — CEFAZOLIN SODIUM/WATER 2 G/20 ML
SYRINGE (ML) INTRAVENOUS
Status: DISPENSED
Start: 2023-04-24

## (undated) RX ORDER — LIDOCAINE HYDROCHLORIDE 10 MG/ML
INJECTION, SOLUTION EPIDURAL; INFILTRATION; INTRACAUDAL; PERINEURAL
Status: DISPENSED
Start: 2025-05-12

## (undated) RX ORDER — CEFAZOLIN SODIUM 1 G/3ML
INJECTION, POWDER, FOR SOLUTION INTRAMUSCULAR; INTRAVENOUS
Status: DISPENSED
Start: 2024-06-13

## (undated) RX ORDER — PROPOFOL 10 MG/ML
INJECTION, EMULSION INTRAVENOUS
Status: DISPENSED
Start: 2024-11-01

## (undated) RX ORDER — PROPOFOL 10 MG/ML
INJECTION, EMULSION INTRAVENOUS
Status: DISPENSED
Start: 2023-04-24

## (undated) RX ORDER — FENTANYL CITRATE 50 UG/ML
INJECTION, SOLUTION INTRAMUSCULAR; INTRAVENOUS
Status: DISPENSED
Start: 2023-04-24

## (undated) RX ORDER — HYDROMORPHONE HYDROCHLORIDE 1 MG/ML
INJECTION, SOLUTION INTRAMUSCULAR; INTRAVENOUS; SUBCUTANEOUS
Status: DISPENSED
Start: 2024-06-13

## (undated) RX ORDER — METHOCARBAMOL 500 MG/1
TABLET, FILM COATED ORAL
Status: DISPENSED
Start: 2024-06-13

## (undated) RX ORDER — ACETAMINOPHEN 325 MG/1
TABLET ORAL
Status: DISPENSED
Start: 2023-04-24

## (undated) RX ORDER — ONDANSETRON 4 MG/1
TABLET, ORALLY DISINTEGRATING ORAL
Status: DISPENSED
Start: 2023-04-24

## (undated) RX ORDER — FENTANYL CITRATE-0.9 % NACL/PF 10 MCG/ML
PLASTIC BAG, INJECTION (ML) INTRAVENOUS
Status: DISPENSED
Start: 2024-06-13

## (undated) RX ORDER — HYDROMORPHONE HYDROCHLORIDE 2 MG/1
TABLET ORAL
Status: DISPENSED
Start: 2024-06-13

## (undated) RX ORDER — CEFAZOLIN SODIUM 1 G/3ML
INJECTION, POWDER, FOR SOLUTION INTRAMUSCULAR; INTRAVENOUS
Status: DISPENSED
Start: 2023-04-24

## (undated) RX ORDER — CEFAZOLIN SODIUM/WATER 2 G/20 ML
SYRINGE (ML) INTRAVENOUS
Status: DISPENSED
Start: 2024-06-13

## (undated) RX ORDER — SODIUM CHLORIDE, SODIUM LACTATE, POTASSIUM CHLORIDE, CALCIUM CHLORIDE 600; 310; 30; 20 MG/100ML; MG/100ML; MG/100ML; MG/100ML
INJECTION, SOLUTION INTRAVENOUS
Status: DISPENSED
Start: 2024-06-13

## (undated) RX ORDER — EPINEPHRINE 1 MG/ML
INJECTION, SOLUTION INTRAMUSCULAR; SUBCUTANEOUS
Status: DISPENSED
Start: 2025-05-12

## (undated) RX ORDER — FENTANYL CITRATE 50 UG/ML
INJECTION, SOLUTION INTRAMUSCULAR; INTRAVENOUS
Status: DISPENSED
Start: 2024-06-13

## (undated) RX ORDER — DEXAMETHASONE SODIUM PHOSPHATE 4 MG/ML
INJECTION, SOLUTION INTRA-ARTICULAR; INTRALESIONAL; INTRAMUSCULAR; INTRAVENOUS; SOFT TISSUE
Status: DISPENSED
Start: 2025-05-12

## (undated) RX ORDER — FENTANYL CITRATE 50 UG/ML
INJECTION, SOLUTION INTRAMUSCULAR; INTRAVENOUS
Status: DISPENSED
Start: 2024-11-01

## (undated) RX ORDER — DEXAMETHASONE SODIUM PHOSPHATE 4 MG/ML
INJECTION, SOLUTION INTRA-ARTICULAR; INTRALESIONAL; INTRAMUSCULAR; INTRAVENOUS; SOFT TISSUE
Status: DISPENSED
Start: 2024-06-13

## (undated) RX ORDER — KETOROLAC TROMETHAMINE 30 MG/ML
INJECTION, SOLUTION INTRAMUSCULAR; INTRAVENOUS
Status: DISPENSED
Start: 2024-06-13

## (undated) RX ORDER — FENTANYL CITRATE 50 UG/ML
INJECTION, SOLUTION INTRAMUSCULAR; INTRAVENOUS
Status: DISPENSED
Start: 2025-05-12

## (undated) RX ORDER — OXYCODONE HYDROCHLORIDE 5 MG/1
TABLET ORAL
Status: DISPENSED
Start: 2024-11-01

## (undated) RX ORDER — PROPOFOL 10 MG/ML
INJECTION, EMULSION INTRAVENOUS
Status: DISPENSED
Start: 2025-05-12

## (undated) RX ORDER — EPINEPHRINE 1 MG/ML
INJECTION, SOLUTION INTRAMUSCULAR; SUBCUTANEOUS
Status: DISPENSED
Start: 2024-11-01

## (undated) RX ORDER — CEFAZOLIN SODIUM 2 G/50ML
SOLUTION INTRAVENOUS
Status: DISPENSED
Start: 2025-05-12

## (undated) RX ORDER — ACETAMINOPHEN 325 MG/1
TABLET ORAL
Status: DISPENSED
Start: 2024-11-01

## (undated) RX ORDER — BUPIVACAINE HYDROCHLORIDE 2.5 MG/ML
INJECTION, SOLUTION INFILTRATION; PERINEURAL
Status: DISPENSED
Start: 2024-11-01

## (undated) RX ORDER — DEXAMETHASONE SODIUM PHOSPHATE 4 MG/ML
INJECTION, SOLUTION INTRA-ARTICULAR; INTRALESIONAL; INTRAMUSCULAR; INTRAVENOUS; SOFT TISSUE
Status: DISPENSED
Start: 2023-04-24

## (undated) RX ORDER — ACETAMINOPHEN 325 MG/1
TABLET ORAL
Status: DISPENSED
Start: 2024-06-13

## (undated) RX ORDER — ONDANSETRON 2 MG/ML
INJECTION INTRAMUSCULAR; INTRAVENOUS
Status: DISPENSED
Start: 2024-06-13

## (undated) RX ORDER — ONDANSETRON 2 MG/ML
INJECTION INTRAMUSCULAR; INTRAVENOUS
Status: DISPENSED
Start: 2023-04-24

## (undated) RX ORDER — MEPERIDINE HYDROCHLORIDE 25 MG/ML
INJECTION INTRAMUSCULAR; INTRAVENOUS; SUBCUTANEOUS
Status: DISPENSED
Start: 2025-05-12

## (undated) RX ORDER — ONDANSETRON 2 MG/ML
INJECTION INTRAMUSCULAR; INTRAVENOUS
Status: DISPENSED
Start: 2025-05-12

## (undated) RX ORDER — OXYCODONE HYDROCHLORIDE 5 MG/1
TABLET ORAL
Status: DISPENSED
Start: 2025-05-12

## (undated) RX ORDER — CEFAZOLIN SODIUM 1 G/3ML
INJECTION, POWDER, FOR SOLUTION INTRAMUSCULAR; INTRAVENOUS
Status: DISPENSED
Start: 2024-11-01

## (undated) RX ORDER — DEXTROSE MONOHYDRATE, SODIUM CHLORIDE, AND POTASSIUM CHLORIDE 50; 1.49; 4.5 G/1000ML; G/1000ML; G/1000ML
INJECTION, SOLUTION INTRAVENOUS
Status: DISPENSED
Start: 2024-06-13

## (undated) RX ORDER — DIPHENHYDRAMINE HYDROCHLORIDE 50 MG/ML
INJECTION INTRAMUSCULAR; INTRAVENOUS
Status: DISPENSED
Start: 2024-06-13

## (undated) RX ORDER — GINSENG 100 MG
CAPSULE ORAL
Status: DISPENSED
Start: 2024-11-01

## (undated) RX ORDER — LIDOCAINE HYDROCHLORIDE AND EPINEPHRINE 10; 10 MG/ML; UG/ML
INJECTION, SOLUTION INFILTRATION; PERINEURAL
Status: DISPENSED
Start: 2024-11-01

## (undated) RX ORDER — PROPOFOL 10 MG/ML
INJECTION, EMULSION INTRAVENOUS
Status: DISPENSED
Start: 2024-06-13

## (undated) RX ORDER — ENOXAPARIN SODIUM 100 MG/ML
INJECTION SUBCUTANEOUS
Status: DISPENSED
Start: 2023-04-24

## (undated) RX ORDER — LIDOCAINE HYDROCHLORIDE 10 MG/ML
INJECTION, SOLUTION EPIDURAL; INFILTRATION; INTRACAUDAL; PERINEURAL
Status: DISPENSED
Start: 2024-11-01

## (undated) RX ORDER — HYDROMORPHONE HYDROCHLORIDE 1 MG/ML
INJECTION, SOLUTION INTRAMUSCULAR; INTRAVENOUS; SUBCUTANEOUS
Status: DISPENSED
Start: 2023-04-24

## (undated) RX ORDER — ACETAMINOPHEN 325 MG/1
TABLET ORAL
Status: DISPENSED
Start: 2025-05-12

## (undated) RX ORDER — LIDOCAINE HYDROCHLORIDE 20 MG/ML
INJECTION, SOLUTION INFILTRATION; PERINEURAL
Status: DISPENSED
Start: 2024-11-01

## (undated) RX ORDER — SCOLOPAMINE TRANSDERMAL SYSTEM 1 MG/1
PATCH, EXTENDED RELEASE TRANSDERMAL
Status: DISPENSED
Start: 2024-06-13

## (undated) RX ORDER — HYDROMORPHONE HYDROCHLORIDE 1 MG/ML
INJECTION, SOLUTION INTRAMUSCULAR; INTRAVENOUS; SUBCUTANEOUS
Status: DISPENSED
Start: 2025-05-12

## (undated) RX ORDER — LIDOCAINE HYDROCHLORIDE 10 MG/ML
INJECTION, SOLUTION EPIDURAL; INFILTRATION; INTRACAUDAL; PERINEURAL
Status: DISPENSED
Start: 2024-06-13

## (undated) RX ORDER — FENTANYL CITRATE 0.05 MG/ML
INJECTION, SOLUTION INTRAMUSCULAR; INTRAVENOUS
Status: DISPENSED
Start: 2024-11-01

## (undated) RX ORDER — DEXTROSE MONOHYDRATE 25 G/50ML
INJECTION, SOLUTION INTRAVENOUS
Status: DISPENSED
Start: 2024-06-13